# Patient Record
Sex: MALE | Race: ASIAN | NOT HISPANIC OR LATINO | Employment: OTHER | URBAN - METROPOLITAN AREA
[De-identification: names, ages, dates, MRNs, and addresses within clinical notes are randomized per-mention and may not be internally consistent; named-entity substitution may affect disease eponyms.]

---

## 2017-01-04 ENCOUNTER — TRANSFERRED RECORDS (OUTPATIENT)
Dept: HEALTH INFORMATION MANAGEMENT | Facility: CLINIC | Age: 57
End: 2017-01-04

## 2017-01-18 ENCOUNTER — TRANSFERRED RECORDS (OUTPATIENT)
Dept: HEALTH INFORMATION MANAGEMENT | Facility: CLINIC | Age: 57
End: 2017-01-18

## 2017-02-01 ENCOUNTER — TRANSFERRED RECORDS (OUTPATIENT)
Dept: HEALTH INFORMATION MANAGEMENT | Facility: CLINIC | Age: 57
End: 2017-02-01

## 2017-02-09 DIAGNOSIS — E11.9 TYPE 2 DIABETES MELLITUS WITHOUT COMPLICATION, WITHOUT LONG-TERM CURRENT USE OF INSULIN (H): Primary | ICD-10-CM

## 2017-02-09 DIAGNOSIS — E11.9 TYPE 2 DIABETES MELLITUS WITHOUT COMPLICATION (H): ICD-10-CM

## 2017-02-15 ENCOUNTER — TRANSFERRED RECORDS (OUTPATIENT)
Dept: HEALTH INFORMATION MANAGEMENT | Facility: CLINIC | Age: 57
End: 2017-02-15

## 2017-02-17 ENCOUNTER — TRANSFERRED RECORDS (OUTPATIENT)
Dept: HEALTH INFORMATION MANAGEMENT | Facility: CLINIC | Age: 57
End: 2017-02-17

## 2017-02-21 ENCOUNTER — TRANSFERRED RECORDS (OUTPATIENT)
Dept: HEALTH INFORMATION MANAGEMENT | Facility: CLINIC | Age: 57
End: 2017-02-21

## 2017-02-27 ENCOUNTER — RECORDS - HEALTHEAST (OUTPATIENT)
Dept: ADMINISTRATIVE | Facility: OTHER | Age: 57
End: 2017-02-27

## 2017-02-27 ENCOUNTER — OFFICE VISIT (OUTPATIENT)
Dept: FAMILY MEDICINE | Facility: CLINIC | Age: 57
End: 2017-02-27

## 2017-02-27 DIAGNOSIS — J06.9 UPPER RESPIRATORY TRACT INFECTION, UNSPECIFIED TYPE: Primary | ICD-10-CM

## 2017-02-27 DIAGNOSIS — E11.65 TYPE 2 DIABETES MELLITUS WITH HYPERGLYCEMIA, WITHOUT LONG-TERM CURRENT USE OF INSULIN (H): ICD-10-CM

## 2017-02-27 DIAGNOSIS — M1A.0790 IDIOPATHIC CHRONIC GOUT OF ANKLE WITHOUT TOPHUS, UNSPECIFIED LATERALITY: ICD-10-CM

## 2017-02-27 DIAGNOSIS — Z11.59 NEED FOR HEPATITIS C SCREENING TEST: ICD-10-CM

## 2017-02-27 DIAGNOSIS — B19.20 UNSPECIFIED VIRAL HEPATITIS C WITHOUT HEPATIC COMA: ICD-10-CM

## 2017-02-27 DIAGNOSIS — M1A.9XX0 CHRONIC GOUT WITHOUT TOPHUS, UNSPECIFIED CAUSE, UNSPECIFIED SITE: ICD-10-CM

## 2017-02-27 DIAGNOSIS — M22.2X1 PATELLOFEMORAL SYNDROME OF BOTH KNEES: ICD-10-CM

## 2017-02-27 DIAGNOSIS — M10.9 GOUT, UNSPECIFIED: ICD-10-CM

## 2017-02-27 DIAGNOSIS — M22.2X2 PATELLOFEMORAL SYNDROME OF BOTH KNEES: ICD-10-CM

## 2017-02-27 DIAGNOSIS — E11.65 TYPE 2 DIABETES MELLITUS WITH HYPERGLYCEMIA, WITHOUT LONG-TERM CURRENT USE OF INSULIN (H): Primary | ICD-10-CM

## 2017-02-27 DIAGNOSIS — B19.20 HEPATITIS C VIRUS INFECTION WITHOUT HEPATIC COMA, UNSPECIFIED CHRONICITY: ICD-10-CM

## 2017-02-27 LAB
FLUAV AG UPPER RESP QL IA.RAPID: NEGATIVE
FLUBV AG UPPER RESP QL IA.RAPID: NEGATIVE
HBA1C MFR BLD: 10.2 % (ref 4.1–5.7)
URATE SERPL-MCNC: 8 MG/DL (ref 3–8)

## 2017-02-27 RX ORDER — OXYMETAZOLINE HYDROCHLORIDE 0.05 G/100ML
2-3 SPRAY NASAL 2 TIMES DAILY PRN
Qty: 1 BOTTLE | Refills: 0 | Status: SHIPPED | OUTPATIENT
Start: 2017-02-27 | End: 2017-03-04

## 2017-02-27 RX ORDER — CODEINE PHOSPHATE AND GUAIFENESIN 10; 100 MG/5ML; MG/5ML
1 SOLUTION ORAL EVERY 4 HOURS PRN
Qty: 236 ML | Refills: 0 | Status: SHIPPED | OUTPATIENT
Start: 2017-02-27 | End: 2017-04-06

## 2017-02-27 RX ORDER — FEBUXOSTAT 40 MG/1
TABLET, FILM COATED ORAL
Qty: 31 TABLET | Refills: 11 | Status: SHIPPED | OUTPATIENT
Start: 2017-02-27 | End: 2018-03-05

## 2017-02-27 RX ORDER — ISOPROPYL ALCOHOL 0.7 ML/1
1 SWAB TOPICAL 2 TIMES DAILY
Qty: 60 EACH | Refills: 11 | Status: SHIPPED | OUTPATIENT
Start: 2017-02-27 | End: 2019-02-14

## 2017-02-27 NOTE — NURSING NOTE
name: Mello Dykes  Language: Gibraltarian  Agency: [employer]  Phone number: [phone]

## 2017-02-27 NOTE — LETTER
March 7, 2017       [facility] [address]  [address]  [place] [address]        Dear Sa,    I hope you are feeling better.  You did not have Influenza.  As I wrote in a separate message you have been exposed to Hepatitis C in the past but you do not have active infection in your system. You are also protected against (immune to) the other Hepatitis viruses, A and B - this is good.       Your liver is working normally.  Your uric acid (gout test) is in the normal range - good!     The main abnormality is that your diabetes is not well controlled judging by the average sugar A1C level of 10.2.  This should be under 8.0.  It is telling us that on average your blood sugars are around 200 which is too high.  Can you work on getting your sugars down?  This is a combination of being more careful with your diet and of not missing any of your diabetes medications.  If the sugars will not come down better, we will have to recommend insulin or another injection.  Let's check this again at least in 3 months if not sooner.  Take care!    Please see below for your test results.    Resulted Orders   Uric Acid ([facility])   Result Value Ref Range    Uric Acid 8.0 3.0 - 8.0 mg/dL    Narrative    Test performed by:  [facility] [address] [place] [address]   Hep[facility] C Antibody ([facility])   Result Value Ref Range    Hepatitis C Antibody Screen Positive (A) Negative    Narrative    Test performed by:  [facility] [address] [place] [address]   Hemoglobin A1c ([facility])   Result Value Ref Range    Hemoglobin A1C 10.2 (H) 4.1 - 5.7 %   Influenza A/B Antigen ([facility])   Result Value Ref Range    Influenza A NEGATIVE Negative    Influenza B NEGATIVE Negative      Comment:      A negative result does not exclude influenza infection.   A positive result does not rule out co-infections with other pathogens.       Hepatitis B Surface Ag ([facility])   Result Value Ref Range    Hepatitis B  Surface Antigen Negative Negative    Narrative    Test performed by:  [facility] [address] [place] [address]   Hepatitis A Immune Status ([facility])   Result Value Ref Range    Hepatitis A Antibody, Total Positive Positive    Narrative    Test performed by:  [facility] [address] [place] [address]   Hepatitis B Surface Ab ([facility])   Result Value Ref Range    Hepatitis B Surface Antibody Positive (A) Negative    Narrative    Test performed by:  [facility] [address] [place] [address]   Hepatic Profile ([facility])   Result Value Ref Range    Bilirubin Total 0.7 0.0 - 1.0 mg/dL    Bilirubin Direct 0.2 <=0.5 mg/dL    Protein Total 7.8 6.0 - 8.0 g/dL    Albumin 3.8 3.5 - 5.0 g/dL    Alkaline Phosphatase 158 (H) 45 - 120 U/L    AST (SGOT) 25 0 - 40 U/L    ALT (SGPT) 44 0 - 45 U/L    Narrative    Test performed by:  [facility] [address] [place] [address]       If you have any questions, please call the clinic to make an appointment.    Sincerely,    Jason Siddiqui MD

## 2017-02-27 NOTE — MR AVS SNAPSHOT
After Visit Summary   2/27/2017    Sa KENIA Mackey    MRN: [id number]           Patient Information     Date Of Birth          1960        Visit Information        Provider Department      2/27/2017 11:00 AM Jason Siddiqui MD [facility]        Today's Diagnoses     Upper respiratory tract infection, unspecified type    -  1    Type 2 diabetes mellitus with hyperglycemia, without long-term current use of insulin (H)        Idiopathic chronic gout of ankle without tophus, unspecified laterality        Need for hepatitis C screening test        Patellofemoral syndrome of both knees        Gout          Care Instructions    We'll see you again every 3 months.    If the knee pain is not getting better, either consider shots or to go see a specialist.          Follow-ups after your visit        Additional Services     PHYSICAL THERAPY REFERRAL       PT REFERRAL  Sa KENIA Mackey  1960  Phone #: [phone] (home)    needed: Yes  Language: Liam    PT/OT  Facility:   Other: Close to home address    History: Patellofemoral Syndrome both knees; Chronic gout    Precautions/Contraindications: None    Imaging Studies: MRI    Surgical Procedure/Test Results: None    Treatment Goals:   Increase: Strength and ROM  Decrease: Pain    Prognosis: good    Visits: Up to 12    Evaluate: Evaluate and treat                  Follow-up notes from your care team     Return in about 3 months (around 5/27/2017), or if symptoms worsen or fail to improve.      Your next 10 appointments already scheduled     Mar 01, 2017  2:00 PM CST   RETURN EXTENDED with [facility] , [facility] THERAPIST   [facility] ([facility])    [address] [place] [address]   [phone]              Future tests that were ordered for you today     Open Future Orders        Priority Expected Expires Ordered    PHYSICAL THERAPY REFERRAL Routine  2/27/2018 2/27/2017            Who to contact     Please call your clinic  at [phone] to:    Ask questions about your health    Make or cancel appointments    Discuss your medicines    Learn about your test results    Speak to your doctor   If you have compliments or concerns about an experience at your clinic, or if you wish to file a complaint, please contact [facility] Patient Relations at [phone] or email us at [email or web address]         Additional Information About Your Visit        [email or web address] Information     [employer] gives you secure access to your electronic health record. If you see a primary care provider, you can also send messages to your care team and make appointments. If you have questions, please call your primary care clinic.  If you do not have a primary care provider, please call [phone] and they will assist you.      [employer] is an electronic gateway that provides easy, online access to your medical records. With [employer], you can request a clinic appointment, read your test results, renew a prescription or communicate with your care team.     To access your existing account, please contact your [facility] or call [phone] for assistance.        Care EveryWhere ID     This is your Care EveryWhere ID. This could be used by other organizations to access your [facility] medical records  [id number]         Blood Pressure from Last 3 Encounters:   11/04/16 137/85   10/20/16 (!) 154/93   09/22/16 (!) 151/95    Weight from Last 3 Encounters:   11/04/16 225 lb 12.8 oz (102.4 kg)   10/20/16 227 lb (103 kg)   09/22/16 225 lb 9.6 oz (102.3 kg)              We Performed the Following     Hemoglobin A1c ([facility])     Hepatitis C Antibody ([facility])     Influenza A/B Antigen ([facility])     Uric Acid ([facility])          Today's Medication Changes          These changes are accurate as of: 2/27/17 12:37 PM.  If you have any questions, ask your nurse or doctor.               Start taking these medicines.         Dose/Directions    guaiFENesin-codeine 100-10 MG/5ML Soln solution   Commonly known as:  ROBITUSSIN AC   Used for:  Upper respiratory tract infection, unspecified type   Started by:  Jason Siddiqui MD        Dose:  1 tsp.   Take 5 mLs by mouth every 4 hours as needed for cough   Quantity:  236 mL   Refills:  0       oxymetazoline 0.05 % spray   Commonly known as:  AFRIN NASAL SPRAY   Used for:  Upper respiratory tract infection, unspecified type   Started by:  Jason Siddiqui MD        Dose:  2-3 spray   Spray 2-3 sprays into both nostrils 2 times daily as needed for congestion   Quantity:  1 Bottle   Refills:  0         Stop taking these medicines if you haven't already. Please contact your care team if you have questions.     gentamicin 0.3 % ophthalmic ointment   Commonly known as:  GARAMYCIN   Stopped by:  Jason Siddiqui MD                Where to get your medicines      These medications were sent to [facility] - [place], [place] - [address], [place] [address]     Phone:  [phone]     Alcohol Prep Pad 70 % Pads    blood glucose monitoring lancets    blood glucose monitoring test strip    febuxostat 40 MG Tabs tablet    oxymetazoline 0.05 % spray         Some of these will need a paper prescription and others can be bought over the counter.  Ask your nurse if you have questions.     Bring a paper prescription for each of these medications     guaiFENesin-codeine 100-10 MG/5ML Soln solution                Primary Care Provider Office Phone # Fax #    Jason Siddiqui [phone]       [address] [address]        Thank you!     Thank you for choosing [facility]  for your care. Our goal is always to provide you with excellent care. Hearing back from our patients is one way we can continue to improve our services. Please take a few minutes to complete the written survey that you may receive in the mail after your visit  with us. Thank you!             Your Updated Medication List - Protect others around you: Learn how to safely use, store and throw away your medicines at www.disposemymeds.org.          This list is accurate as of: 2/27/17 12:37 PM.  Always use your most recent med list.                   Brand Name Dispense Instructions for use    acetaminophen 500 MG tablet    TYLENOL    100 tablet    Take 2 tablets (1,000 mg) by mouth every 8 hours as needed for mild pain       Alcohol Prep Pad 70 % Pads     60 each    1 pad 2 times daily       aspirin 81 MG EC tablet     90 tablet    Take 1 tablet (81 mg) by mouth daily       atorvastatin 80 MG tablet    LIPITOR    30 tablet    Take 1 tablet (80 mg) by mouth daily       blood glucose monitoring lancets     1 Box    Use to test blood sugar 2 times daily or as directed.       * blood glucose monitoring test strip    ALEM CONTOUR    1 Box    Use to test blood sugars 2 times daily or as directed.       * blood glucose monitoring test strip    no brand specified    1 Box    Use up to 3 times a day       carboxymethylcellulose 0.5 % Soln ophthalmic solution    REFRESH PLUS    1 Bottle    Place 1 drop into both eyes daily as needed for dry eyes       colchicine 0.6 MG tablet    COLCRYS    60 tablet    Take 1 tablet (0.6 mg) by mouth 2 times daily       febuxostat 40 MG Tabs tablet    ULORIC    31 tablet    Take 1 tablet daily       fluocinonide 0.05 % ointment    LIDEX    30 g    Apply sparingly to affected area twice daily for 14 days.  Do not apply to face.       glipiZIDE 10 MG 24 hr tablet    glipiZIDE XL    31 tablet    Take 1 tablet (10 mg) by mouth daily       guaiFENesin-codeine 100-10 MG/5ML Soln solution    ROBITUSSIN AC    236 mL    Take 5 mLs by mouth every 4 hours as needed for cough       lisinopril 10 MG tablet    PRINIVIL/ZESTRIL    30 tablet    Take 1 tablet (10 mg) by mouth daily       metFORMIN 500 MG 24 hr tablet    GLUCOPHAGE-XR    124 tablet    Take 2 tablets  (1,000 mg) by mouth 2 times daily (with meals)       order for DME      (Continuous Positive Airway Pressure) nightly       oxymetazoline 0.05 % spray    AFRIN NASAL SPRAY    1 Bottle    Spray 2-3 sprays into both nostrils 2 times daily as needed for congestion       polyethylene glycol Packet    MIRALAX    238 g    Miralax powder 8.3 oz bottle (238 gm) as directed       sitagliptin 100 MG tablet    JANUVIA    90 tablet    Take 1 tablet (100 mg) by mouth daily       VASELINE CONSTANT CARE Oint     1 Tube    Externally apply topically 3 times daily       vitamin D 2000 UNITS tablet     30 tablet    Take 2,000 Units by mouth daily       * Notice:  This list has 2 medication(s) that are the same as other medications prescribed for you. Read the directions carefully, and ask your doctor or other care provider to review them with you.

## 2017-02-27 NOTE — PATIENT INSTRUCTIONS
We'll see you again every 3 months.    If the knee pain is not getting better, either consider shots or to go see a specialist.        2/28/2017 8:02 AM: [facility][phone]). They will contact patient to schedule.  Abby Cline     Xavier are scheduled for Physical Therapy at:  [facility]  [address] [place] [address]  [phone]  Date:  Tuesday April 11, 2017  Time:  Arrival 2:45 PM for 3:00 Therapy.  Liam  has been requested.  Given to patient.  Cecilia Amaral  4/6/17

## 2017-02-28 ENCOUNTER — TELEPHONE (OUTPATIENT)
Dept: FAMILY MEDICINE | Facility: CLINIC | Age: 57
End: 2017-02-28

## 2017-02-28 ENCOUNTER — AMBULATORY - HEALTHEAST (OUTPATIENT)
Dept: ADMINISTRATIVE | Facility: REHABILITATION | Age: 57
End: 2017-02-28

## 2017-02-28 DIAGNOSIS — M22.2X1 PATELLOFEMORAL SYNDROME OF BOTH KNEES: ICD-10-CM

## 2017-02-28 DIAGNOSIS — M22.2X2 PATELLOFEMORAL SYNDROME OF BOTH KNEES: ICD-10-CM

## 2017-02-28 DIAGNOSIS — B19.20 UNSPECIFIED VIRAL HEPATITIS C WITHOUT HEPATIC COMA: Primary | ICD-10-CM

## 2017-02-28 LAB
ALBUMIN SERPL BCP-MCNC: 3.8 G/DL (ref 3.5–5)
ALP SERPL-CCNC: 158 U/L (ref 45–120)
ALT SERPL W/O P-5'-P-CCNC: 44 U/L (ref 0–45)
AST SERPL-CCNC: 25 U/L (ref 0–40)
BILIRUB DIRECT SERPL-MCNC: 0.2 MG/DL (ref 0–0.5)
BILIRUB SERPL-MCNC: 0.7 MG/DL (ref 0–1)
HCV AB SER QL: POSITIVE
PROT SERPL-MCNC: 7.8 G/DL (ref 6–8)

## 2017-02-28 NOTE — TELEPHONE ENCOUNTER
[facility] phone call message- general phone call:    Reason for call: Just want to update about a prior authorization for Dr. Siddiqui.     Medication Uloric has been approve. Approval date 2/23/17 until 2/27/18. Case Id # [id number]    She is calling from [employer]. No call back needed.     Return call needed: No    OK to leave a message on voice mail? N/A     Primary language: Emirati      needed? Yes    Call taken on February 28, 2017 at 3:11 PM by Neftaly Milan

## 2017-02-28 NOTE — PROGRESS NOTES
SUBJECTIVE:  Sa Mackey is a 56-year-old [place] male who is well-known to me.  He attends today with several concerns:   1.  He received a letter from his insurance company indicating that Uloric wasn't a covered treatment.  He has taken this now for several years and was previously intolerant of allopurinol.  He has severe chronic tophaceous gout and needs to remain on Uloric.  We've already processed a prior authorization request and are waiting to hear from the insurance company that this has been approved.   2.  He had a runny nose and cough for 3 days.  He feels moderately unwell.  He hasn't noticed a fever.  He denies sore throat or ear pain.  He has no sick contacts.   3.  He complains of pain in both knees when he both walks up and down a staircase.  He denies injury.  He says that the symptoms have been present for about 3 weeks and he does not know what triggered them.  He had knee pain in the past and had cortisone shots, which he found very painful and he is reluctant to repeat.   4.  In addition, he reports that he is dissatisfied with his current pharmacy and is considering switching his prescriptions to an alternative pharmacy and wants to get my permission.   REVIEW OF SYSTEMS:  He is notably sleepy during the encounter; he has diagnosis of sleep apnea.  He tells me he uses his CPAP.   PSYCHIATRIC:   He has chronic depressive symptoms and follows with CBT.  We didn't explore this at today's visit.   ENDOCRINE:  He has diabetes and believes it is reasonably controlled, although he needs to get refill of supplies so he can track blood sugars.   OBJECTIVE:   Vital signs are noted.  He is obese.  ENT exam reveals nasal congestion.  Lungs are clear to auscultation.  I obtained a rapid influenza swab.  Heart sounds are normal.  Exam of both knees reveals no warmth, erythema, or significant effusion.  The collateral ligaments appear intact.  Lachman tests are negative bilaterally.  His medication list was  reviewed and updated.   ASSESSMENT:   1.  Influenza-like illness.   2.  Chronic tophaceous gout; needs Uloric for control.   3.  Type II diabetes poorly controlled.   4.  Patellofemoral syndrome.   5.  Sleep apnea, with apparent somnolence.   PLAN:  I offered him cortisone shots into both knees and he declined.  I talked about other treatment strategies and explained about the condition of patellofemoral syndrome.  He agreed to try physical therapy.  I've advised him to return for cortisone shots if the physical therapy is ineffective.  Alternatively, we can refer him for surgical evaluation, which he preferred to avoid.  I recommended we recheck his labs today and I'll follow up with results.  I've given him oxymetazoline for nasal congestion, as well as an antitussive agent.  I've encouraged him to continue losing weight, from which he will benefit on several counts, including sleep apnea, diabetes, and bilateral knee pain.   Total visit time today, through an , was 40 minutes and all of this was face-to-face MD time. Over 50% was spent in counseling and coordination of care.  In addition, I called his pharmacy to verify which meds he was taking, and explaining about the prior authorization of Uloric.

## 2017-03-01 ENCOUNTER — TRANSFERRED RECORDS (OUTPATIENT)
Dept: HEALTH INFORMATION MANAGEMENT | Facility: CLINIC | Age: 57
End: 2017-03-01

## 2017-03-01 DIAGNOSIS — B19.20 UNSPECIFIED VIRAL HEPATITIS C WITHOUT HEPATIC COMA: ICD-10-CM

## 2017-03-01 LAB
HAV IGG SER QL IA: POSITIVE
HBV SURFACE AB SER-ACNC: POSITIVE M[IU]/ML
HBV SURFACE AG SERPL QL IA: NEGATIVE

## 2017-03-03 LAB — HCV RNA QUANT IU/ML: NORMAL IU/ML

## 2017-03-15 ENCOUNTER — TRANSFERRED RECORDS (OUTPATIENT)
Dept: HEALTH INFORMATION MANAGEMENT | Facility: CLINIC | Age: 57
End: 2017-03-15

## 2017-03-22 ENCOUNTER — TRANSFERRED RECORDS (OUTPATIENT)
Dept: HEALTH INFORMATION MANAGEMENT | Facility: CLINIC | Age: 57
End: 2017-03-22

## 2017-04-05 ENCOUNTER — TRANSFERRED RECORDS (OUTPATIENT)
Dept: HEALTH INFORMATION MANAGEMENT | Facility: CLINIC | Age: 57
End: 2017-04-05

## 2017-04-06 ENCOUNTER — OFFICE VISIT (OUTPATIENT)
Dept: FAMILY MEDICINE | Facility: CLINIC | Age: 57
End: 2017-04-06

## 2017-04-06 ENCOUNTER — RECORDS - HEALTHEAST (OUTPATIENT)
Dept: ADMINISTRATIVE | Facility: OTHER | Age: 57
End: 2017-04-06

## 2017-04-06 VITALS
HEART RATE: 64 BPM | DIASTOLIC BLOOD PRESSURE: 86 MMHG | WEIGHT: 231.6 LBS | OXYGEN SATURATION: 96 % | BODY MASS INDEX: 38.84 KG/M2 | SYSTOLIC BLOOD PRESSURE: 135 MMHG | TEMPERATURE: 98.2 F

## 2017-04-06 DIAGNOSIS — L30.9 DERMATITIS: ICD-10-CM

## 2017-04-06 DIAGNOSIS — M25.561 ARTHRALGIA OF BOTH LOWER LEGS: ICD-10-CM

## 2017-04-06 DIAGNOSIS — E11.65 TYPE 2 DIABETES MELLITUS WITH HYPERGLYCEMIA, WITHOUT LONG-TERM CURRENT USE OF INSULIN (H): ICD-10-CM

## 2017-04-06 DIAGNOSIS — Z12.11 ENCOUNTER FOR SCREENING COLONOSCOPY: Primary | ICD-10-CM

## 2017-04-06 DIAGNOSIS — M25.562 ARTHRALGIA OF BOTH LOWER LEGS: ICD-10-CM

## 2017-04-06 DIAGNOSIS — M17.12 PRIMARY LOCALIZED OSTEOARTHROSIS, LOWER LEG, LEFT: ICD-10-CM

## 2017-04-06 DIAGNOSIS — F43.10 POSTTRAUMATIC STRESS DISORDER: ICD-10-CM

## 2017-04-06 RX ORDER — FLUOCINONIDE 0.5 MG/G
OINTMENT TOPICAL
Qty: 60 G | Refills: 3 | Status: SHIPPED | OUTPATIENT
Start: 2017-04-06 | End: 2017-08-02

## 2017-04-06 RX ORDER — TRIAMCINOLONE ACETONIDE 40 MG/ML
40 INJECTION, SUSPENSION INTRA-ARTICULAR; INTRAMUSCULAR ONCE
Qty: 1 ML | Refills: 0 | OUTPATIENT
Start: 2017-04-06 | End: 2017-04-06

## 2017-04-06 NOTE — MR AVS SNAPSHOT
After Visit Summary   4/6/2017    Sa KENIA Mackey    MRN: [id number]           Patient Information     Date Of Birth          1960        Visit Information        Provider Department      4/6/2017 9:00 AM Jason Siddiqui MD [facility]        Today's Diagnoses     Encounter for screening colonoscopy    -  1    Dermatitis        Primary localized osteoarthrosis, lower leg, left        Arthralgia of both lower legs        Posttraumatic stress disorder        Type 2 diabetes mellitus with hyperglycemia, without long-term current use of insulin (H)          Care Instructions    Please come 15 minutes early to your next appointment in May and we will check labs first.    Concerning your dry and cracked skin of your fingers:  Please moisturize them twice daily - I have sent a prescription for Eucerin but insurance may not cover this - if not, please buy this or Vaseline and use twice daily  Use the lidex (fluocinonide) ointment while the hands are cracked and sore - stop it when they are better  Purchase a HYPO-ALLERGENIC, FRAGRANCE FREE soap - DOVE brand has several options.  Only use this soap  Wear LATEX-FREE kitchen gloves when you are washing dishes   If after following all these steps, the dermatitis comes back I will refer you to a dermatologist.    I'll see you again in around 6 weeks - take care!        Follow-ups after your visit        Follow-up notes from your care team     Return in about 6 weeks (around 5/18/2017) for Lab Work, Routine Visit.      Your next 10 appointments already scheduled     Apr 12, 2017  2:00 PM CDT   RETURN EXTENDED with [facility] , [facility] THERAPIST   [facility] ([facility])    [address] [place] [id number]   [phone]              Future tests that were ordered for you today     Open Future Orders        Priority Expected Expires Ordered    Fecal Occult Blood - FIT, iFOB ([facility]) Routine  4/6/2018 4/6/2017            Who to contact      Please call your clinic at [phone] to:    Ask questions about your health    Make or cancel appointments    Discuss your medicines    Learn about your test results    Speak to your doctor   If you have compliments or concerns about an experience at your clinic, or if you wish to file a complaint, please contact [facility] Patient Relations at [phone] or email us at [email or web address]         Additional Information About Your Visit        [employer] Information     [employer] gives you secure access to your electronic health record. If you see a primary care provider, you can also send messages to your care team and make appointments. If you have questions, please call your primary care clinic.  If you do not have a primary care provider, please call [phone] and they will assist you.      [employer] is an electronic gateway that provides easy, online access to your medical records. With [employer], you can request a clinic appointment, read your test results, renew a prescription or communicate with your care team.     To access your existing account, please contact your [facility] or call [phone] for assistance.        Care EveryWhere ID     This is your Care EveryWhere ID. This could be used by other organizations to access your [place] medical records  [id number]        Your Vitals Were     Pulse Temperature Pulse Oximetry BMI (Body Mass Index)          64 98.2  F (36.8  C) (Oral) 96% 38.84 kg/m2         Blood Pressure from Last 3 Encounters:   04/06/17 135/86   11/04/16 137/85   10/20/16 (!) 154/93    Weight from Last 3 Encounters:   04/06/17 231 lb 9.6 oz (105.1 kg)   11/04/16 225 lb 12.8 oz (102.4 kg)   10/20/16 227 lb (103 kg)              We Performed the Following     Kenalog 40 MG  []          Today's Medication Changes          These changes are accurate as of: 4/6/17  9:58 AM.  If you have any questions, ask your nurse  or doctor.               Start taking these medicines.        Dose/Directions    triamcinolone acetonide 40 MG/ML injection   Commonly known as:  KENALOG   Used for:  Primary localized osteoarthrosis, lower leg, left   Started by:  Jason Siddiqui MD        Dose:  40 mg   1 mL (40 mg) by INTRA-ARTICULAR route once for 1 dose   Quantity:  1 mL   Refills:  0         These medicines have changed or have updated prescriptions.        Dose/Directions    fluocinonide 0.05 % ointment   Commonly known as:  LIDEX   This may have changed:  additional instructions   Used for:  Dermatitis   Changed by:  Jason Siddiqui MD        Apply sparingly to dry cracked skin on hands twice daily   Quantity:  60 g   Refills:  3         Stop taking these medicines if you haven't already. Please contact your care team if you have questions.     guaiFENesin-codeine 100-10 MG/5ML Soln solution   Commonly known as:  ROBITUSSIN AC   Stopped by:  Jason Siddiqui MD                Where to get your medicines      [facility] [place], [place] - [address], [place] [address]     Phone:  [phone]     fluocinonide 0.05 % ointment         Some of these will need a paper prescription and others can be bought over the counter.  Ask your nurse if you have questions.     You don't need a prescription for these medications     triamcinolone acetonide 40 MG/ML injection                Primary Care Provider Office Phone # Fax #    Jason Siddiqui [phone]       [address] [address]        Thank you!     Thank you for choosing [facility]  for your care. Our goal is always to provide you with excellent care. Hearing back from our patients is one way we can continue to improve our services. Please take a few minutes to complete the written survey that you may receive in the mail after your visit with us. Thank you!             Your Updated  Medication List - Protect others around you: Learn how to safely use, store and throw away your medicines at www.disposemymeds.org.          This list is accurate as of: 4/6/17  9:58 AM.  Always use your most recent med list.                   Brand Name Dispense Instructions for use    acetaminophen 500 MG tablet    TYLENOL    100 tablet    Take 2 tablets (1,000 mg) by mouth every 8 hours as needed for mild pain       Alcohol Prep Pad 70 % Pads     60 each    1 pad 2 times daily       aspirin 81 MG EC tablet     90 tablet    Take 1 tablet (81 mg) by mouth daily       atorvastatin 80 MG tablet    LIPITOR    30 tablet    Take 1 tablet (80 mg) by mouth daily       blood glucose monitoring lancets     1 Box    Use to test blood sugar 2 times daily or as directed.       * blood glucose monitoring test strip    ALEM CONTOUR    1 Box    Use to test blood sugars 2 times daily or as directed.       * blood glucose monitoring test strip    no brand specified    1 Box    Use up to 3 times a day       carboxymethylcellulose 0.5 % Soln ophthalmic solution    REFRESH PLUS    1 Bottle    Place 1 drop into both eyes daily as needed for dry eyes       colchicine 0.6 MG tablet    COLCRYS    60 tablet    Take 1 tablet (0.6 mg) by mouth 2 times daily       febuxostat 40 MG Tabs tablet    ULORIC    31 tablet    Take 1 tablet daily       fluocinonide 0.05 % ointment    LIDEX    60 g    Apply sparingly to dry cracked skin on hands twice daily       glipiZIDE 10 MG 24 hr tablet    glipiZIDE XL    31 tablet    Take 1 tablet (10 mg) by mouth daily       lisinopril 10 MG tablet    PRINIVIL/ZESTRIL    30 tablet    Take 1 tablet (10 mg) by mouth daily       metFORMIN 500 MG 24 hr tablet    GLUCOPHAGE-XR    124 tablet    Take 2 tablets (1,000 mg) by mouth 2 times daily (with meals)       order for DME      (Continuous Positive Airway Pressure) nightly       polyethylene glycol Packet    MIRALAX    238 g    Miralax powder 8.3 oz bottle (238 gm)  as directed       sitagliptin 100 MG tablet    JANUVIA    90 tablet    Take 1 tablet (100 mg) by mouth daily       triamcinolone acetonide 40 MG/ML injection    KENALOG    1 mL    1 mL (40 mg) by INTRA-ARTICULAR route once for 1 dose       VASELINE CONSTANT CARE Oint     1 Tube    Externally apply topically 3 times daily       vitamin D 2000 UNITS tablet     30 tablet    Take 2,000 Units by mouth daily       * Notice:  This list has 2 medication(s) that are the same as other medications prescribed for you. Read the directions carefully, and ask your doctor or other care provider to review them with you.

## 2017-04-06 NOTE — NURSING NOTE
name: Mariola Rizvi  Language: Prydeinig  Agency: [facility]  Phone number: [phone]

## 2017-04-06 NOTE — PROGRESS NOTES
SUBJECTIVE:  Sa Peña is a 56-year-old male who is well-known to me.  He attends today particularly for two reasons:   1.  He continues to have dry skin of the hands; particularly, the skin of the right hand is cracked, which is quite tender and painful.  He said that the lotions that I gave him haven't worked, although through an  it was clarified that they help clear up the symptom in the short-term, but that it returns.  He felt that washing with soap aggravated the condition.  He said that if he did some housework and then washed his hands with soap, it seemed that it made the skin dry and it could become cracked.  He didn't believe that he has known allergies to other skin products.   2.  He continued to complain of bilateral knee pain, left knee greater than right.  He never heard from physical therapy after the last visit, but he was interested in going.  He is very reluctant to consider having injections into the knees, although I discussed the fact that I would use a local anesthetic.  After prolonged discussion today, he agreed to have the worst knee injected.  On review of the records, I found an MRI of the right knee from 2008 that demonstrated chondromalacia patella, which was my clinical diagnosis at last visit.  An MRI of the left knee was less remarkable in this regard in 2009 and there was no obvious meniscal tear noted.   He reports that his diabetes is well-controlled.  I'm mildly skeptical about this because he had an elevated A1C at last visit and he thought that his blood sugars were normal at that time.  He told me that a fasting blood sugar every day is around 100.  He said that he takes all of his medications.     Concerning his mental health, he said that he still has problems with this.  It comes and goes.  He described a symptom where he feels the walls and ceiling are closing in on him.  He wasn't sure whether this is a physical or mental symptom.  He gave me permission to talk  with his therapist about the possibility that this was a PTSD-related symptom.  He denied headache or other visual changes.  He had ringing in his ears, but he thought his hearing was okay.     OBJECTIVE:   Vital signs were noted.  He is obese.  His PHQ-9 remained positive.  Despite this, he made good eye contact and smiled appropriately.  As noted above, I located and reviewed the MRI reports of his knees.  I reviewed the most recent labs with him, including the uncontrolled A1C.  His uric acid was good.  I called his pharmacy and verified that he appears to be taking all his medications.   On exam, he had cracked and dry skin at the knuckles particularly of the right hand and dry skin at the knuckles of the left hand.  He denied involvement of the feet.  On exam of the left knee, there appeared to be a small effusion in the joint, but there was no redness or warmth.  With his consent, one cc of Kenalog (40 mg per cc) mixed with four cc of bupivacaine was injected without complication into the joint.     ASSESSMENT:   1.  Chronic dermatitis of the hands.   2.  Bilateral knee pain, clinically patellofemoral syndrome.   3.  Diabetes type II, previously uncontrolled.   4.  PTSD.   5.  Uncertain symptom, physical vs mental.     PLAN:  I advised him that he should try to avoid possible allergenic products and I wrote some notes which he should discuss with his son.  He also gave me permission to talk with his son about any of his health conditions.  I've advised that he should use hypoallergenic soap and use latex-free gloves while he does housework.  I've refilled the moderate potency steroid which he previously had and also encouraged him to use a daily moisturizer.  If despite these efforts the dermatitis persists or recurs, I would refer him to Dermatology.  Concerning the knees, if he gets good relief from the left knee injection, I've encouraged him to schedule a right knee injection.  He was sure that he didn't  want to have surgery.  We'll also refer him again to physical therapy in the interim.  We agreed that he'll return in six weeks' time to recheck labs and to review the progress both of the dermatitis and the bilateral knee pain.  He was satisfied with this plan.   Total visit time today. including a knee injection. was 40 minutes and all of this was face-to-face MD time.  Over 50% was spent in counseling and coordination of care, as outlined above.

## 2017-04-06 NOTE — PATIENT INSTRUCTIONS
Please come 15 minutes early to your next appointment in May and we will check labs first.    Concerning your dry and cracked skin of your fingers:  Please moisturize them twice daily - I have sent a prescription for Eucerin but insurance may not cover this - if not, please buy this or Vaseline and use twice daily  Use the lidex (fluocinonide) ointment while the hands are cracked and sore - stop it when they are better  Purchase a HYPO-ALLERGENIC, FRAGRANCE FREE soap - DOVE brand has several options.  Only use this soap  Wear LATEX-FREE kitchen gloves when you are washing dishes   If after following all these steps, the dermatitis comes back I will refer you to a dermatologist.    I'll see you again in around 6 weeks - take care!

## 2017-04-07 ASSESSMENT — PATIENT HEALTH QUESTIONNAIRE - PHQ9: SUM OF ALL RESPONSES TO PHQ QUESTIONS 1-9: 7

## 2017-04-11 ENCOUNTER — OFFICE VISIT - HEALTHEAST (OUTPATIENT)
Dept: PHYSICAL THERAPY | Facility: REHABILITATION | Age: 57
End: 2017-04-11

## 2017-04-11 DIAGNOSIS — R29.898 WEAKNESS OF BOTH HIPS: ICD-10-CM

## 2017-04-11 DIAGNOSIS — M22.2X1 PATELLOFEMORAL STRESS SYNDROME OF BOTH KNEES: ICD-10-CM

## 2017-04-11 DIAGNOSIS — M25.562 ACUTE BILATERAL KNEE PAIN: ICD-10-CM

## 2017-04-11 DIAGNOSIS — M22.2X2 PATELLOFEMORAL STRESS SYNDROME OF BOTH KNEES: ICD-10-CM

## 2017-04-11 DIAGNOSIS — M62.89 QUADRICEP TIGHTNESS: ICD-10-CM

## 2017-04-11 DIAGNOSIS — M25.561 ACUTE BILATERAL KNEE PAIN: ICD-10-CM

## 2017-04-12 ENCOUNTER — TRANSFERRED RECORDS (OUTPATIENT)
Dept: HEALTH INFORMATION MANAGEMENT | Facility: CLINIC | Age: 57
End: 2017-04-12

## 2017-04-18 ENCOUNTER — TRANSFERRED RECORDS (OUTPATIENT)
Dept: HEALTH INFORMATION MANAGEMENT | Facility: CLINIC | Age: 57
End: 2017-04-18

## 2017-04-19 ENCOUNTER — TRANSFERRED RECORDS (OUTPATIENT)
Dept: HEALTH INFORMATION MANAGEMENT | Facility: CLINIC | Age: 57
End: 2017-04-19

## 2017-04-25 ENCOUNTER — OFFICE VISIT - HEALTHEAST (OUTPATIENT)
Dept: PHYSICAL THERAPY | Facility: REHABILITATION | Age: 57
End: 2017-04-25

## 2017-04-25 DIAGNOSIS — M22.2X2 PATELLOFEMORAL STRESS SYNDROME OF BOTH KNEES: ICD-10-CM

## 2017-04-25 DIAGNOSIS — M62.89 QUADRICEP TIGHTNESS: ICD-10-CM

## 2017-04-25 DIAGNOSIS — M25.562 ACUTE BILATERAL KNEE PAIN: ICD-10-CM

## 2017-04-25 DIAGNOSIS — M22.2X1 PATELLOFEMORAL STRESS SYNDROME OF BOTH KNEES: ICD-10-CM

## 2017-04-25 DIAGNOSIS — M25.561 ACUTE BILATERAL KNEE PAIN: ICD-10-CM

## 2017-04-25 DIAGNOSIS — R29.898 WEAKNESS OF BOTH HIPS: ICD-10-CM

## 2017-05-02 ENCOUNTER — OFFICE VISIT - HEALTHEAST (OUTPATIENT)
Dept: PHYSICAL THERAPY | Facility: REHABILITATION | Age: 57
End: 2017-05-02

## 2017-05-02 DIAGNOSIS — M22.2X2 PATELLOFEMORAL STRESS SYNDROME OF BOTH KNEES: ICD-10-CM

## 2017-05-02 DIAGNOSIS — M62.89 QUADRICEP TIGHTNESS: ICD-10-CM

## 2017-05-02 DIAGNOSIS — M25.561 ACUTE BILATERAL KNEE PAIN: ICD-10-CM

## 2017-05-02 DIAGNOSIS — R29.898 WEAKNESS OF BOTH HIPS: ICD-10-CM

## 2017-05-02 DIAGNOSIS — M25.562 ACUTE BILATERAL KNEE PAIN: ICD-10-CM

## 2017-05-02 DIAGNOSIS — M22.2X1 PATELLOFEMORAL STRESS SYNDROME OF BOTH KNEES: ICD-10-CM

## 2017-05-03 ENCOUNTER — TRANSFERRED RECORDS (OUTPATIENT)
Dept: HEALTH INFORMATION MANAGEMENT | Facility: CLINIC | Age: 57
End: 2017-05-03

## 2017-05-09 ENCOUNTER — OFFICE VISIT - HEALTHEAST (OUTPATIENT)
Dept: PHYSICAL THERAPY | Facility: REHABILITATION | Age: 57
End: 2017-05-09

## 2017-05-09 DIAGNOSIS — M22.2X2 PATELLOFEMORAL STRESS SYNDROME OF BOTH KNEES: ICD-10-CM

## 2017-05-09 DIAGNOSIS — M25.562 ACUTE BILATERAL KNEE PAIN: ICD-10-CM

## 2017-05-09 DIAGNOSIS — R29.898 WEAKNESS OF BOTH HIPS: ICD-10-CM

## 2017-05-09 DIAGNOSIS — M62.89 QUADRICEP TIGHTNESS: ICD-10-CM

## 2017-05-09 DIAGNOSIS — M22.2X1 PATELLOFEMORAL STRESS SYNDROME OF BOTH KNEES: ICD-10-CM

## 2017-05-09 DIAGNOSIS — M25.561 ACUTE BILATERAL KNEE PAIN: ICD-10-CM

## 2017-05-10 ENCOUNTER — TELEPHONE (OUTPATIENT)
Dept: FAMILY MEDICINE | Facility: CLINIC | Age: 57
End: 2017-05-10

## 2017-05-17 ENCOUNTER — TRANSFERRED RECORDS (OUTPATIENT)
Dept: HEALTH INFORMATION MANAGEMENT | Facility: CLINIC | Age: 57
End: 2017-05-17

## 2017-06-02 DIAGNOSIS — E11.65 TYPE 2 DIABETES MELLITUS WITH HYPERGLYCEMIA, WITHOUT LONG-TERM CURRENT USE OF INSULIN (H): Primary | ICD-10-CM

## 2017-06-02 RX ORDER — METFORMIN HCL 500 MG
1000 TABLET, EXTENDED RELEASE 24 HR ORAL 2 TIMES DAILY WITH MEALS
Qty: 360 TABLET | Refills: 3 | Status: SHIPPED | OUTPATIENT
Start: 2017-06-02 | End: 2018-03-12

## 2017-06-07 ENCOUNTER — TRANSFERRED RECORDS (OUTPATIENT)
Dept: HEALTH INFORMATION MANAGEMENT | Facility: CLINIC | Age: 57
End: 2017-06-07

## 2017-07-05 ENCOUNTER — TRANSFERRED RECORDS (OUTPATIENT)
Dept: HEALTH INFORMATION MANAGEMENT | Facility: CLINIC | Age: 57
End: 2017-07-05

## 2017-07-26 ENCOUNTER — TRANSFERRED RECORDS (OUTPATIENT)
Dept: HEALTH INFORMATION MANAGEMENT | Facility: CLINIC | Age: 57
End: 2017-07-26

## 2017-08-02 ENCOUNTER — TRANSFERRED RECORDS (OUTPATIENT)
Dept: HEALTH INFORMATION MANAGEMENT | Facility: CLINIC | Age: 57
End: 2017-08-02

## 2017-08-02 DIAGNOSIS — L30.9 DERMATITIS: ICD-10-CM

## 2017-08-02 RX ORDER — FLUOCINONIDE 0.5 MG/G
OINTMENT TOPICAL
Qty: 60 G | Refills: 3 | Status: SHIPPED | OUTPATIENT
Start: 2017-08-02 | End: 2018-02-06

## 2017-08-09 ENCOUNTER — TRANSFERRED RECORDS (OUTPATIENT)
Dept: HEALTH INFORMATION MANAGEMENT | Facility: CLINIC | Age: 57
End: 2017-08-09

## 2017-08-09 DIAGNOSIS — Z12.11 ENCOUNTER FOR SCREENING COLONOSCOPY: ICD-10-CM

## 2017-08-09 LAB — HEMOCCULT STL QL IA: NEGATIVE

## 2017-08-23 ENCOUNTER — TRANSFERRED RECORDS (OUTPATIENT)
Dept: HEALTH INFORMATION MANAGEMENT | Facility: CLINIC | Age: 57
End: 2017-08-23

## 2017-09-13 ENCOUNTER — TRANSFERRED RECORDS (OUTPATIENT)
Dept: HEALTH INFORMATION MANAGEMENT | Facility: CLINIC | Age: 57
End: 2017-09-13

## 2017-09-19 ENCOUNTER — TRANSFERRED RECORDS (OUTPATIENT)
Dept: HEALTH INFORMATION MANAGEMENT | Facility: CLINIC | Age: 57
End: 2017-09-19

## 2017-10-05 DIAGNOSIS — E11.9 TYPE 2 DIABETES MELLITUS WITHOUT COMPLICATION, UNSPECIFIED LONG TERM INSULIN USE STATUS: ICD-10-CM

## 2017-10-05 RX ORDER — GLIPIZIDE 10 MG/1
10 TABLET, FILM COATED, EXTENDED RELEASE ORAL DAILY
Qty: 31 TABLET | Refills: 5 | Status: SHIPPED | OUTPATIENT
Start: 2017-10-05 | End: 2018-03-12

## 2018-01-09 DIAGNOSIS — I10 ESSENTIAL HYPERTENSION WITH GOAL BLOOD PRESSURE LESS THAN 140/90: ICD-10-CM

## 2018-01-09 DIAGNOSIS — E11.9 TYPE 2 DIABETES MELLITUS WITHOUT COMPLICATION, WITHOUT LONG-TERM CURRENT USE OF INSULIN (H): ICD-10-CM

## 2018-01-09 DIAGNOSIS — H04.123 DRY EYES: ICD-10-CM

## 2018-01-09 RX ORDER — CARBOXYMETHYLCELLULOSE SODIUM 5 MG/ML
1 SOLUTION/ DROPS OPHTHALMIC DAILY PRN
Qty: 1 BOTTLE | Refills: 1 | Status: SHIPPED | OUTPATIENT
Start: 2018-01-09 | End: 2018-02-27

## 2018-01-09 RX ORDER — ATORVASTATIN CALCIUM 80 MG/1
80 TABLET, FILM COATED ORAL DAILY
Qty: 30 TABLET | Refills: 1 | Status: SHIPPED | OUTPATIENT
Start: 2018-01-09 | End: 2018-02-27

## 2018-01-09 RX ORDER — LISINOPRIL 10 MG/1
10 TABLET ORAL DAILY
Qty: 30 TABLET | Refills: 1 | Status: SHIPPED | OUTPATIENT
Start: 2018-01-09 | End: 2018-02-27

## 2018-02-06 DIAGNOSIS — L30.9 DERMATITIS: ICD-10-CM

## 2018-02-06 RX ORDER — FLUOCINONIDE 0.5 MG/G
OINTMENT TOPICAL
Qty: 60 G | Refills: 0 | Status: SHIPPED | OUTPATIENT
Start: 2018-02-06 | End: 2018-04-20

## 2018-02-19 ENCOUNTER — TELEPHONE (OUTPATIENT)
Dept: FAMILY MEDICINE | Facility: CLINIC | Age: 58
End: 2018-02-19

## 2018-02-19 NOTE — LETTER
February 19, 2018      RE: Sa KENIA Richter  [address]  [place] [address]    RE: SA KENIA RICHTER (Key: [id number]) - [id number]    To [facility] PRIOR AUTHORIZATION appeals, -[phone].    I have been this man's primary care physician for over 10 years.  He has severe chronic gout.  Over 8 years ago I referred him to rheumatologist, Dr. Jeferson Oneill at [facility].  At the time he was taking maximum dose Allopurinol 600mgs daily and was still having gout symptoms.  Rheumatologist started Uloric 20 mg daily.  After 1 month on this dose and still having gout symptoms, he increased it to 40 mg daily.    The patient has been taking this medication since that time and has reasonable control of his gout.  No other medication has had this benefit   If you do not approve this medication, the patient will suffer unnecessary pain and y0ou will ultimately increase the cost of his care due to increased frequency of costly ER visits, specialist visits and office visits.    Sincerely,    Jason Siddiqui MD

## 2018-02-19 NOTE — TELEPHONE ENCOUNTER
Marcela with [employer] calling back regarding [place] appeal. She states that the doctor will need to submit an appeal statement stating again why the patient needs to be on this medication and any supporting documentation. If no supporting documentation is ok as well and the appeal statement will suffice.  Appeal statement can be faxed to [phone]. /MANJU Young  Routed to Dr. Siddiqui

## 2018-02-23 ENCOUNTER — TELEPHONE (OUTPATIENT)
Dept: FAMILY MEDICINE | Facility: CLINIC | Age: 58
End: 2018-02-23

## 2018-02-23 NOTE — TELEPHONE ENCOUNTER
[facility] phone call message- general phone call:    Reason for call: Calling to inform that the medication Uloric is approved for the member,    Return call needed: Yes    OK to leave a message on voice mail? Yes    Primary language: Liam      needed? Yes    Call taken on February 23, 2018 at 3:09 PM by Kiya Barros

## 2018-02-27 DIAGNOSIS — E11.9 TYPE 2 DIABETES MELLITUS WITHOUT COMPLICATION, WITHOUT LONG-TERM CURRENT USE OF INSULIN (H): ICD-10-CM

## 2018-02-27 DIAGNOSIS — H04.123 DRY EYES: ICD-10-CM

## 2018-02-27 DIAGNOSIS — I10 ESSENTIAL HYPERTENSION WITH GOAL BLOOD PRESSURE LESS THAN 140/90: ICD-10-CM

## 2018-02-27 RX ORDER — CARBOXYMETHYLCELLULOSE SODIUM 5 MG/ML
1 SOLUTION/ DROPS OPHTHALMIC DAILY PRN
Qty: 1 BOTTLE | Refills: 1 | Status: SHIPPED | OUTPATIENT
Start: 2018-02-27 | End: 2019-02-14

## 2018-02-27 RX ORDER — PROBENECID 500 MG/1
TABLET, FILM COATED ORAL
OUTPATIENT
Start: 2018-02-27

## 2018-02-27 RX ORDER — ATORVASTATIN CALCIUM 80 MG/1
80 TABLET, FILM COATED ORAL DAILY
Qty: 30 TABLET | Refills: 1 | Status: SHIPPED | OUTPATIENT
Start: 2018-02-27 | End: 2018-03-12

## 2018-02-27 RX ORDER — LISINOPRIL 10 MG/1
10 TABLET ORAL DAILY
Qty: 30 TABLET | Refills: 1 | Status: SHIPPED | OUTPATIENT
Start: 2018-02-27 | End: 2018-03-12

## 2018-03-05 ENCOUNTER — TELEPHONE (OUTPATIENT)
Dept: FAMILY MEDICINE | Facility: CLINIC | Age: 58
End: 2018-03-05

## 2018-03-05 DIAGNOSIS — M1A.09X0 IDIOPATHIC CHRONIC GOUT OF MULTIPLE SITES WITHOUT TOPHUS: Primary | ICD-10-CM

## 2018-03-05 RX ORDER — FEBUXOSTAT 40 MG/1
TABLET, FILM COATED ORAL
Qty: 31 TABLET | Refills: 0 | Status: SHIPPED | OUTPATIENT
Start: 2018-03-05 | End: 2018-03-12

## 2018-03-05 NOTE — TELEPHONE ENCOUNTER
Called the appeals department of [facility]. Letter was faxed to the dept on 3/1/18 and have not heard back from them. Appeals reviewed Dr. Siddiqui's letter and approved Uloric from 1/23/18 through 2/23/19.     Called [facility] pharmacy, they will need a new Rx from Dr. Siddiqui. Please send.     Thank you. Routed to Dr. Siddiqui. /MANJU Man

## 2018-03-12 ENCOUNTER — AMBULATORY - HEALTHEAST (OUTPATIENT)
Dept: ADMINISTRATIVE | Facility: REHABILITATION | Age: 58
End: 2018-03-12

## 2018-03-12 ENCOUNTER — RECORDS - HEALTHEAST (OUTPATIENT)
Dept: ADMINISTRATIVE | Facility: OTHER | Age: 58
End: 2018-03-12

## 2018-03-12 ENCOUNTER — OFFICE VISIT (OUTPATIENT)
Dept: FAMILY MEDICINE | Facility: CLINIC | Age: 58
End: 2018-03-12
Payer: COMMERCIAL

## 2018-03-12 VITALS
BODY MASS INDEX: 37.9 KG/M2 | DIASTOLIC BLOOD PRESSURE: 89 MMHG | OXYGEN SATURATION: 96 % | SYSTOLIC BLOOD PRESSURE: 159 MMHG | HEART RATE: 65 BPM | TEMPERATURE: 97.7 F | RESPIRATION RATE: 16 BRPM | WEIGHT: 226 LBS

## 2018-03-12 DIAGNOSIS — I10 ESSENTIAL HYPERTENSION WITH GOAL BLOOD PRESSURE LESS THAN 140/90: ICD-10-CM

## 2018-03-12 DIAGNOSIS — M1A.0790 IDIOPATHIC CHRONIC GOUT OF ANKLE WITHOUT TOPHUS, UNSPECIFIED LATERALITY: Primary | ICD-10-CM

## 2018-03-12 DIAGNOSIS — E29.1 TESTICULAR HYPOFUNCTION: ICD-10-CM

## 2018-03-12 DIAGNOSIS — E11.65 TYPE 2 DIABETES MELLITUS WITH HYPERGLYCEMIA, WITHOUT LONG-TERM CURRENT USE OF INSULIN (H): ICD-10-CM

## 2018-03-12 DIAGNOSIS — G47.33 OBSTRUCTIVE SLEEP APNEA: ICD-10-CM

## 2018-03-12 DIAGNOSIS — E11.9 TYPE 2 DIABETES MELLITUS WITHOUT COMPLICATION, UNSPECIFIED LONG TERM INSULIN USE STATUS: ICD-10-CM

## 2018-03-12 DIAGNOSIS — E11.9 TYPE 2 DIABETES MELLITUS WITHOUT COMPLICATION, WITHOUT LONG-TERM CURRENT USE OF INSULIN (H): ICD-10-CM

## 2018-03-12 DIAGNOSIS — M1A.09X0 IDIOPATHIC CHRONIC GOUT OF MULTIPLE SITES WITHOUT TOPHUS: ICD-10-CM

## 2018-03-12 DIAGNOSIS — M10.072 ACUTE IDIOPATHIC GOUT OF LEFT ANKLE: ICD-10-CM

## 2018-03-12 DIAGNOSIS — M25.521 PAIN IN JOINT, UPPER ARM, RIGHT: ICD-10-CM

## 2018-03-12 LAB
ANION GAP SERPL CALCULATED.3IONS-SCNC: 8 MMOL/L (ref 5–18)
BUN SERPL-MCNC: 16 MG/DL (ref 8–22)
CALCIUM SERPL-MCNC: 9.5 MG/DL (ref 8.5–10.5)
CHLORIDE SERPL-SCNC: 102 MMOL/L (ref 98–107)
CHOLEST SERPL-MCNC: 216 MG/DL
CO2 SERPL-SCNC: 27 MMOL/L (ref 22–31)
CREAT SERPL-MCNC: 0.8 MG/DL (ref 0.7–1.3)
CREAT UR-MCNC: 40.5 MG/DL
FASTING?: ABNORMAL
GLUCOSE SERPL-MCNC: 182 MG/DL (ref 70–125)
HBA1C MFR BLD: 11.9 % (ref 4.1–5.7)
HDLC SERPL-MCNC: 39 MG/DL
LDLC SERPL CALC-MCNC: 124 MG/DL
MICROALBUMIN UR-MCNC: 1.31 MG/DL (ref 0–1.99)
MICROALBUMIN/CREAT UR: 32.3 MG/G
POTASSIUM SERPL-SCNC: 3.8 MMOL/L (ref 3.5–5)
SODIUM SERPL-SCNC: 137 MMOL/L (ref 136–145)
TRIGL SERPL-MCNC: 263 MG/DL
URATE SERPL-MCNC: 7.7 MG/DL (ref 3–8)

## 2018-03-12 RX ORDER — ATORVASTATIN CALCIUM 80 MG/1
80 TABLET, FILM COATED ORAL DAILY
Qty: 93 TABLET | Refills: 1 | Status: SHIPPED | OUTPATIENT
Start: 2018-03-12 | End: 2018-08-02

## 2018-03-12 RX ORDER — GLIPIZIDE 10 MG/1
10 TABLET, FILM COATED, EXTENDED RELEASE ORAL DAILY
Qty: 93 TABLET | Refills: 1 | Status: SHIPPED | OUTPATIENT
Start: 2018-03-12 | End: 2018-08-02

## 2018-03-12 RX ORDER — METFORMIN HCL 500 MG
1000 TABLET, EXTENDED RELEASE 24 HR ORAL 2 TIMES DAILY WITH MEALS
Qty: 360 TABLET | Refills: 1 | Status: SHIPPED | OUTPATIENT
Start: 2018-03-12 | End: 2018-08-02

## 2018-03-12 RX ORDER — COLCHICINE 0.6 MG/1
0.6 TABLET ORAL 2 TIMES DAILY PRN
Qty: 60 TABLET | Refills: 11 | Status: SHIPPED | OUTPATIENT
Start: 2018-03-12 | End: 2018-03-15

## 2018-03-12 RX ORDER — IBUPROFEN 800 MG/1
800 TABLET, FILM COATED ORAL EVERY 8 HOURS PRN
Qty: 60 TABLET | Refills: 1 | Status: SHIPPED | OUTPATIENT
Start: 2018-03-12 | End: 2018-05-11

## 2018-03-12 RX ORDER — LISINOPRIL 40 MG/1
40 TABLET ORAL DAILY
Qty: 93 TABLET | Refills: 3 | Status: SHIPPED | OUTPATIENT
Start: 2018-03-12 | End: 2018-08-02

## 2018-03-12 RX ORDER — FEBUXOSTAT 40 MG/1
TABLET, FILM COATED ORAL
Qty: 93 TABLET | Refills: 1 | Status: SHIPPED | OUTPATIENT
Start: 2018-03-12 | End: 2018-08-02

## 2018-03-12 NOTE — MR AVS SNAPSHOT
After Visit Summary   3/12/2018    Sa KENIA Mackey    MRN: [id number]           Patient Information     Date Of Birth          1960        Visit Information        Provider Department      3/12/2018 8:20 AM Jason Siddiqui MD [facility]        Today's Diagnoses     Idiopathic chronic gout of ankle without tophus, unspecified laterality    -  1    Type 2 diabetes mellitus with hyperglycemia, without long-term current use of insulin (H)        Testicular hypofunction        Essential hypertension with goal blood pressure less than 140/90        Pain in joint, upper arm, right        Type 2 diabetes mellitus without complication, unspecified long term insulin use status (H)        Idiopathic chronic gout of multiple sites without tophus        Acute idiopathic gout of left ankle        Type 2 diabetes mellitus without complication, without long-term current use of insulin (H)        Obstructive sleep apnea          Care Instructions    Great to see you again Sa Than!  I'm very happy that your mood is much improved - you can always return to [facility] if you think you need to see them.    Please come back in about 4 weeks to review your labs.    I have placed several referrals for you and we can check on your progress with them when you come back.      Referral sent to [facility]  Phone: [phone]  Fax: [phone]  Clinic will contact patient to schedule  es/March 12, 2018    [facility].  They will contact patient to schedule.  Aniya  03/12/18      Referral sent to [facility]  Phone: [phone]  Fax: [phone]  Clinic will contact patient to schedule  es March 12, 2018    [facility] [address] [address]  Phone: [phone]    Appointment   Date:3/20/18  Time:1:45pm     *Please bring insurance card and photo ID for appointment.   *Your appointment could take up to 90 minutes.    Please contact the above clinic if you  need to cancel or reschedule. Feel free to contact me with any questions. Thanks!    Aniya  Care Coordinator  [phone]    Gave to Taye Ferrell.           Follow-ups after your visit        Additional Services     Diabetes Education Referral       Initial    Reason for Referral: DM2 and HTN desires nutritional counselling     needed: Yes   Language: Liam    May leave message on voicemail: Yes            Ophthalmology Adult Referral       Refill    Reason for Referral: DM2 annual visit     needed: Yes   Language: Liam    May leave message on voicemail: Yes            PHYSICAL THERAPY REFERRAL       PT REFERRAL  Sa KENIA Mackey  1960  Phone #: [phone] (home)    needed: Yes  Language: Liam    PT/OT  Facility:   [facility], P: [phone], F: [phone]    History: Recurring pain at insertion of right deltoid into humerus, xray negative    Precautions/Contraindications: None    Imaging Studies: X-Ray    Surgical Procedure/Test Results: previous lateral epicondylitis surgery    Treatment Goals:   Decrease: Pain    Prognosis: good    Visits: Up to 12    Evaluate: Evaluate and treat            SLEEP EVALUATION & MANAGEMENT REFERRAL - ADULT -[facility] - Numerous Locations - [phone] (Dr Da Silva [place])       Please be aware that coverage of these services is subject to the terms and limitations of your health insurance plan.  Call member services at your health plan with any benefit or coverage questions.      Please bring the following to your appointment:    >>   List of current medications   >>   This referral request   >>   Any documents/labs given to you for this referral                      Follow-up notes from your care team     Return in about 4 weeks (around 4/9/2018), or if symptoms worsen or fail to improve.      Future tests that were ordered for you today     Open Future Orders        Priority Expected Expires Ordered     PHYSICAL THERAPY REFERRAL Routine  3/12/2019 3/12/2018    SLEEP EVALUATION & MANAGEMENT REFERRAL - ADULT -[facility] - Numerous Locations - [phone] (Shila Marie) Routine  3/12/2019 3/12/2018            Who to contact     Please call your clinic at [phone] to:    Ask questions about your health    Make or cancel appointments    Discuss your medicines    Learn about your test results    Speak to your doctor            Additional Information About Your Visit        [facility] Information     [employer] gives you secure access to your electronic health record. If you see a primary care provider, you can also send messages to your care team and make appointments. If you have questions, please call your primary care clinic.  If you do not have a primary care provider, please call [phone] and they will assist you.      [employer] is an electronic gateway that provides easy, online access to your medical records. With [employer], you can request a clinic appointment, read your test results, renew a prescription or communicate with your care team.     To access your existing account, please contact your [facility] or call [phone] for assistance.        Care EveryWhere ID     This is your Care EveryWhere ID. This could be used by other organizations to access your [facility] medical records  [id number]        Your Vitals Were     Pulse Temperature Respirations Pulse Oximetry BMI (Body Mass Index)       65 97.7  F (36.5  C) 16 96% 37.9 kg/m2        Blood Pressure from Last 3 Encounters:   03/12/18 159/89   04/06/17 135/86   11/04/16 137/85    Weight from Last 3 Encounters:   03/12/18 226 lb (102.5 kg)   04/06/17 231 lb 9.6 oz (105.1 kg)   11/04/16 225 lb 12.8 oz (102.4 kg)              We Performed the Following     Basic Metabolic Profile ([facility])     Diabetes Education Referral     Hemoglobin A1c ([facility])     Lipid Dougherty ([facility] [employer]) - Results > 1 hr      Microalbumin Random Ur ([facility])     Ophthalmology Adult Referral     Testosterone F / T ([facility])     Uric Acid ([facility])     XR HUMERUS RT G/E 2 VW          Today's Medication Changes          These changes are accurate as of 3/12/18 12:54 PM.  If you have any questions, ask your nurse or doctor.               Start taking these medicines.        Dose/Directions    ibuprofen 800 MG tablet   Commonly known as:  ADVIL/MOTRIN   Used for:  Pain in joint, upper arm, right   Started by:  Jason Siddiqui MD        Dose:  800 mg   Take 1 tablet (800 mg) by mouth every 8 hours as needed for moderate pain (do not take every day)   Quantity:  60 tablet   Refills:  1         These medicines have changed or have updated prescriptions.        Dose/Directions    colchicine 0.6 MG tablet   Commonly known as:  COLCRYS   This may have changed:    - when to take this  - reasons to take this   Used for:  Acute idiopathic gout of left ankle   Changed by:  Jason Siddiqui MD        Dose:  0.6 mg   Take 1 tablet (0.6 mg) by mouth 2 times daily as needed for moderate pain (gout pain)   Quantity:  60 tablet   Refills:  11       lisinopril 40 MG tablet   Commonly known as:  PRINIVIL/ZESTRIL   This may have changed:    - medication strength  - how much to take   Used for:  Essential hypertension with goal blood pressure less than 140/90   Changed by:  Jason Siddiqui MD        Dose:  40 mg   Take 1 tablet (40 mg) by mouth daily   Quantity:  93 tablet   Refills:  3            Where to get your medicines      These medications were sent to [facility] - [place], [place] - [address], [place] [address]     Phone:  [phone]     aspirin 81 MG EC tablet    atorvastatin 80 MG tablet    blood glucose monitoring lancets    blood glucose monitoring test strip    blood glucose monitoring test strip    colchicine 0.6 MG tablet    febuxostat 40 MG Tabs tablet    glipiZIDE 10 MG 24 hr tablet    ibuprofen 800 MG  tablet    lisinopril 40 MG tablet    metFORMIN 500 MG 24 hr tablet    sitagliptin 100 MG tablet                Primary Care Provider Office Phone # Fax #    Jason Siddiqui [phone]       [address] [address]        Equal Access to Services     ANGELA MANCIA : Hadii shannan neumann hadarslano Soomaali, waaxda luqadaha, qaybta kaalmada adeegyada, waxandrea annin hayjamarcusn gonzalez nancycher prado. So [facility] [phone].    ATENCIÓN: Si habla español, tiene a yen disposición servicios gratuitos de asistencia lingüística. Llame al [phone].    We comply with applicable federal civil rights laws and [place] laws. We do not discriminate on the basis of race, color, national origin, age, disability, sex, sexual orientation, or gender identity.            Thank you!     Thank you for choosing [facility]  for your care. Our goal is always to provide you with excellent care. Hearing back from our patients is one way we can continue to improve our services. Please take a few minutes to complete the written survey that you may receive in the mail after your visit with us. Thank you!             Your Updated Medication List - Protect others around you: Learn how to safely use, store and throw away your medicines at www.disposemymeds.org.          This list is accurate as of 3/12/18 12:54 PM.  Always use your most recent med list.                   Brand Name Dispense Instructions for use Diagnosis    acetaminophen 500 MG tablet    TYLENOL    100 tablet    Take 2 tablets (1,000 mg) by mouth every 8 hours as needed for mild pain    Episodic tension-type headache, not intractable       Alcohol Prep Pad 70 % Pads     60 each    1 pad 2 times daily    Type 2 diabetes mellitus with hyperglycemia, without long-term current use of insulin (H)       aspirin 81 MG EC tablet     90 tablet    Take 1 tablet (81 mg) by mouth daily    Type 2 diabetes mellitus without complication, without long-term  current use of insulin (H)       atorvastatin 80 MG tablet    LIPITOR    93 tablet    Take 1 tablet (80 mg) by mouth daily    Type 2 diabetes mellitus without complication, without long-term current use of insulin (H)       blood glucose monitoring lancets     3 each    Use to test blood sugar 2 times daily or as directed.    Type 2 diabetes mellitus with hyperglycemia, without long-term current use of insulin (H)       * blood glucose monitoring test strip    no brand specified    3 Box    Use up to 3 times a day    Type 2 diabetes mellitus with hyperglycemia, without long-term current use of insulin (H)       * blood glucose monitoring test strip    ALEM CONTOUR    3 Box    Use to test blood sugars 2 times daily or as directed.    Type 2 diabetes mellitus with hyperglycemia, without long-term current use of insulin (H)       carboxymethylcellulose 0.5 % Soln ophthalmic solution    REFRESH PLUS    1 Bottle    Place 1 drop into both eyes daily as needed for dry eyes    Dry eyes       colchicine 0.6 MG tablet    COLCRYS    60 tablet    Take 1 tablet (0.6 mg) by mouth 2 times daily as needed for moderate pain (gout pain)    Acute idiopathic gout of left ankle       febuxostat 40 MG Tabs tablet    ULORIC    93 tablet    Take 1 tablet daily    Idiopathic chronic gout of multiple sites without tophus       fluocinonide 0.05 % ointment    LIDEX    60 g    Apply sparingly to dry cracked skin on hands twice daily    Dermatitis       glipiZIDE 10 MG 24 hr tablet    GLUCOTROL XL    93 tablet    Take 1 tablet (10 mg) by mouth daily    Type 2 diabetes mellitus without complication, unspecified long term insulin use status (H)       ibuprofen 800 MG tablet    ADVIL/MOTRIN    60 tablet    Take 1 tablet (800 mg) by mouth every 8 hours as needed for moderate pain (do not take every day)    Pain in joint, upper arm, right       lisinopril 40 MG tablet    PRINIVIL/ZESTRIL    93 tablet    Take 1 tablet (40 mg) by mouth daily     Essential hypertension with goal blood pressure less than 140/90       metFORMIN 500 MG 24 hr tablet    GLUCOPHAGE-XR    360 tablet    Take 2 tablets (1,000 mg) by mouth 2 times daily (with meals)    Type 2 diabetes mellitus with hyperglycemia, without long-term current use of insulin (H)       order for DME      (Continuous Positive Airway Pressure) nightly        polyethylene glycol Packet    MIRALAX    238 g    Miralax powder 8.3 oz bottle (238 gm) as directed    Routine general medical examination at a health care facility       sitagliptin 100 MG tablet    JANUVIA    93 tablet    Take 1 tablet (100 mg) by mouth daily    Type 2 diabetes mellitus without complication, unspecified long term insulin use status (H)       VASELINE CONSTANT CARE Oint     1 Tube    Externally apply topically 3 times daily    Dermatitis       vitamin D 2000 UNITS tablet     30 tablet    Take 2,000 Units by mouth daily    Hypovitaminosis D       * Notice:  This list has 2 medication(s) that are the same as other medications prescribed for you. Read the directions carefully, and ask your doctor or other care provider to review them with you.

## 2018-03-12 NOTE — LETTER
March 15, 2018       [facility]  [address]  [place] [address]        Dear ,    Please see below for your test results.    Resulted Orders   Microalbumin Random Ur ([facility])   Result Value Ref Range    Microalbumin, Urine 1.31 0.00 - 1.99 mg/dL    Creatinine, Urine 40.5 mg/dL    Albumin Urine mg/g Cr 32.3 (H) <=19.9 mg/g    Narrative    Test performed by:  [facility]  [address] [place] [address]  Microalbumin, Random Urine  <2.0 mg/dL . . . . . . . . Normal  3.0-30.0 mg/dL . . . . . . Microalbuminuria  >30.0 mg/dL . . . . . .  . Clinical Proteinuria  Microalbumin/Creatinine Ratio, Random Urine  <20 mg/g . . . . .. . . . Normal   mg/g . . . . . . . Microalbuminuria  >300 mg/g . . . . . . . . Clinical Proteinuria   Hemoglobin A1c ([facility])   Result Value Ref Range    Hemoglobin A1C 11.9 (H) 4.1 - 5.7 %   Testosterone F / T ([facility])   Result Value Ref Range    Testosterone Free 4.74 3.87 - 14.7 ng/dL      Comment:         -------------------ADDITIONAL INFORMATION-------------------  Testing performed by [facility].  This test was developed and its performance characteristics   determined by [facility] in a manner consistent with CLIA   requirements. This test has not been cleared or approved by   the U.S. Food and Drug Administration.      Testosterone Total 153 (L) 240 - 950 ng/dL      Comment:         -------------------ADDITIONAL INFORMATION-------------------  Testing performed by Liquid Chromatography-Tandem Mass   Spectrometry (LC-MS/MS).  This test was developed and its performance characteristics   determined by [facility] in a manner consistent with CLIA   requirements. This test has not been cleared or approved by   the U.S. Food and Drug Administration.     Test Performed by:  [facility]  [address] [place] [address]      Narrative    Test performed by:  [facility]  [address] [place] [address]   Uric Acid ([facility])   Result Value Ref Range    Uric Acid 7.7 3.0 - 8.0 mg/dL    Narrative    Test performed by:  [facility]  [address] [place] [address]   Basic Metabolic Profile ([facility])   Result Value Ref Range    Sodium 137 136 - 145 mmol/L    Potassium 3.8 3.5 - 5.0 mmol/L    Chloride 102 98 - 107 mmol/L    CO2, Total 27 22 - 31 mmol/L    Anion Gap 8 5 - 18 mmol/L    Glucose 182 (H) 70 - 125 mg/dL    Calcium 9.5 8.5 - 10.5 mg/dL    Urea Nitrogen 16 8 - 22 mg/dL    Creatinine 0.80 0.70 - 1.30 mg/dL    GFR Estimate If Black >60 >60 mL/min/1.73m2    GFR Estimate >60 >60 mL/min/1.73m2    Narrative    Test performed by:  [facility]  [address] [place] [address]  Fasting Glucose reference range is 70-99 mg/dL per  American Diabetes Association (ADA) guidelines.   Lipid Swisher ([facility]) - Results > 1 hr   Result Value Ref Range    Cholesterol 216 (H) <=199 mg/dL    Triglycerides 263 (H) <=149 mg/dL    HDL Cholesterol 39 (L) >=40 mg/dL    LDL Cholesterol Calculated 124 <=129 mg/dL    Fasting? Unknown     Narrative    Test performed by:  [facility]  [address] [place] [address]                   Clyde Peña - it was very nice to see you in clinic a few days ago.  And SO good to hear that you are doing better! - I was worried that you were, on the contrary, doing worse when I had not seen you for so long.  The main abnormality with your labs is that the average sugar is too high - we aim to have the A1C measurement under 8.0.  You are prescribed 3 medications for diabetes: Metformin 2 tablets twice daily, Sitagliptin one tab once daily and Glipizide also one tab once daily.  I strongly encourage you to ALWAYS take these medications in addition to being careful with your diet and trying to lose weight - OK?  If we can't get your sugars down, all that's left are injections and I know you don't want to start using  those.    Otherwise, your gout lab (uric acid) was good, your kidneys are working well and your testosterone levels are improved from previously (although still on low end).  OK?  I look forward to seeing you again in 1 month - take care!  Dr Jason dooley

## 2018-03-12 NOTE — PATIENT INSTRUCTIONS
Great to see you again Sa Than!  I'm very happy that your mood is much improved - you can always return to [facility] if you think you need to see them.    Please come back in about 4 weeks to review your labs.    I have placed several referrals for you and we can check on your progress with them when you come back.      Referral sent to [facility]  Phone: [phone]  Fax: [phone]  Clinic will contact patient to schedule  es/March 12, 2018    [facility].  They will contact patient to schedule.  Aniya  03/12/18      Referral sent to [facility]  Phone: [phone]  Fax: [phone]  Clinic will contact patient to schedule  es March 12, 2018    [facility]  [address] [place] [id number]  Phone: [phone]    Appointment   Date:3/20/18  Time:1:45pm     *Please bring insurance card and photo ID for appointment.   *Your appointment could take up to 90 minutes.    Please contact the above clinic if you need to cancel or reschedule. Feel free to contact me with any questions. Thanks!    Aniya  Care Coordinator  [phone]    Gave to Taye Ferrell.

## 2018-03-12 NOTE — PROGRESS NOTES
This is a 57-year-old [place] man who is well known to me.  He had been asked to come in due to a prolonged absence from clinic.  When questioned he reports that this was because he was feeling well.  In particular he reports that his depression is much improved and he does not need to follow with CVT.  He attributes exercise and meditation as the reason for the improvement.    Otherwise he is complaining of pain in his right upper arm.  He says he has had this pain in the past and it is worse in cold weather.  It has been bothering him for 2 weeks.  He can identify a tender spot to palpation.  He does not believe it is gout since this usually affects his knees and feet.    Concerning his diabetes he acknowledges that he does miss some pills some days.  He denies any urinary frequency.  He has lost a little weight and is generally happy about this.  He has not recently seen an eye doctor and does note that his vision is impaired and is therefore agreeable to follow-up.  He says that he would like to get more education about healthy eating.  He has previously visited with clinic nurses but would like more in-depth information.    ROS:  He has been diagnosed with sleep apnea but is not using CPAP.  He is told that he snores.  He also wakes during the middle of the night and cannot return to sleep.  He does check his blood pressures at home and says the top number is usually either 140 or 150.    Objective:  /89  Pulse 65  Temp 97.7  F (36.5  C)  Resp 16  Wt 226 lb (102.5 kg)  SpO2 96%  BMI 37.9 kg/m2  His blood pressure is elevated.  He remains obese although has lost about 15 pounds.  He makes good eye contact and looks much happier than at previous encounters smiling frequently.  He identifies the insertion of the deltoid and to the right humerus as approximately the site of his upper arm pain.  Will obtain an x-ray to rule out more sinister possible causes.  I spent 15 minutes reviewing his medications,  reconciling his medication list and sending refills.    Personal review of the x-ray reveals no bony injury of the right humerus.    Sa was seen today for musculoskeletal problem and derm problem.    Diagnoses and all orders for this visit:    Idiopathic chronic gout of ankle without tophus, unspecified laterality  -     Uric Acid ([facility])    Type 2 diabetes mellitus with hyperglycemia, without long-term current use of insulin (H)  -     Diabetes Education Referral  -     Ophthalmology Adult Referral  -     Microalbumin Random Ur ([facility])  -     Cancel: Basic Metabolic Panel ([facility])  -     Hemoglobin A1c ([facility])  -     Cancel: Lipid Panel ([facility])  -     metFORMIN (GLUCOPHAGE-XR) 500 MG 24 hr tablet; Take 2 tablets (1,000 mg) by mouth 2 times daily (with meals)  -     blood glucose monitoring (NO BRAND SPECIFIED) test strip; Use up to 3 times a day  -     blood glucose monitoring (ALEM MICROLET) lancets; Use to test blood sugar 2 times daily or as directed.  -     blood glucose monitoring ([employer] CONTOUR) test strip; Use to test blood sugars 2 times daily or as directed.  -     [facility] Profile ([facility])  -     Lipid Gonzales ([facility]) - Results > 1 hr    Testicular hypofunction  -     Testosterone F / T ([facility])    Essential hypertension with goal blood pressure less than 140/90  -     lisinopril (PRINIVIL/ZESTRIL) 40 MG tablet; Take 1 tablet (40 mg) by mouth daily    Pain in joint, upper arm, right  -     XR HUMERUS RT G/E 2 VW  -     ibuprofen (ADVIL/MOTRIN) 800 MG tablet; Take 1 tablet (800 mg) by mouth every 8 hours as needed for moderate pain (do not take every day)  -     PHYSICAL THERAPY REFERRAL; Future    Type 2 diabetes mellitus without complication, unspecified long term insulin use status (H)  -     sitagliptin (JANUVIA) 100 MG tablet; Take 1 tablet (100 mg) by mouth daily  -     glipiZIDE (GLUCOTROL XL) 10 MG 24 hr tablet; Take 1 tablet (10 mg) by mouth daily    Idiopathic  chronic gout of multiple sites without tophus  -     febuxostat (ULORIC) 40 MG TABS tablet; Take 1 tablet daily    Acute idiopathic gout of left ankle  -     colchicine (COLCRYS) 0.6 MG tablet; Take 1 tablet (0.6 mg) by mouth 2 times daily as needed for moderate pain (gout pain)    Type 2 diabetes mellitus without complication, without long-term current use of insulin (H)  -     atorvastatin (LIPITOR) 80 MG tablet; Take 1 tablet (80 mg) by mouth daily  -     aspirin 81 MG EC tablet; Take 1 tablet (81 mg) by mouth daily    Obstructive sleep apnea  -     SLEEP EVALUATION & MANAGEMENT REFERRAL - [facility] - Numerous Locations - [phone] (Dr Da Silva [place]); Future      Since his blood pressures elevated I recommended increasing his lisinopril dose and he agrees.  I placed a referral for diabetes nutritional education.    Is very likely that he continues to have sleep apnea that is not currently treated and ever referred him back to the sleep clinic to have this reevaluated.  I referred him to physical therapy for the right upper arm pain.  It is possible that an injection at the site of the tendon insertion might help but he declines any injections.    I refilled all his medications.  He likes to take ibuprofen as needed pain and I refilled this but urged caution and not to take it daily.    I placed a referral to ophthalmology for routine diabetes exam.    There was insufficient time to review his lab results with him today and have asked him to return in a few weeks time both to discuss his labs and review his general progress and review his medications one more time.    Total visit time was 50 mins, all of which was face to face MD time, and over 50% of this time was spent in counseling and coordination of care.

## 2018-03-13 ENCOUNTER — COMMUNICATION - HEALTHEAST (OUTPATIENT)
Dept: ADMINISTRATIVE | Facility: CLINIC | Age: 58
End: 2018-03-13

## 2018-03-14 ENCOUNTER — DOCUMENTATION ONLY (OUTPATIENT)
Dept: FAMILY MEDICINE | Facility: CLINIC | Age: 58
End: 2018-03-14

## 2018-03-14 LAB
TESTOST SERPL-MCNC: 153 NG/DL (ref 240–950)
TESTOSTERONE FREE: 4.74 NG/DL (ref 3.87–14.7)

## 2018-03-14 NOTE — PROGRESS NOTES
Prior Authorization needed on:  03/14/18    Medication:  Colchicine Dose:  0.6    Pharmacy confirmed as   [facility] - [place], [place] - [address]  [address] [facility] [address]  [place] [address]  Phone: [phone] Fax: [phone]  : Yes    Insurance Name:  [employer]  Insurance Phone:   Insurance Patient ID:     Key: [id number]    Alternatives Suggested:  Brand name Colycrys. Insurance will cover up to 3 days of this for an emergency but anything outside of that qty, requires a HESHAM Oliver March 14, 2018 at 4:39 PM

## 2018-03-19 ENCOUNTER — OFFICE VISIT - HEALTHEAST (OUTPATIENT)
Dept: PHYSICAL THERAPY | Facility: REHABILITATION | Age: 58
End: 2018-03-19

## 2018-03-19 DIAGNOSIS — M25.811 SHOULDER IMPINGEMENT, RIGHT: ICD-10-CM

## 2018-03-19 DIAGNOSIS — M79.18 MYOFASCIAL PAIN: ICD-10-CM

## 2018-03-19 DIAGNOSIS — M25.521 RIGHT ELBOW PAIN: ICD-10-CM

## 2018-03-19 DIAGNOSIS — M25.511 CHRONIC RIGHT SHOULDER PAIN: ICD-10-CM

## 2018-03-19 DIAGNOSIS — R29.3 POOR POSTURE: ICD-10-CM

## 2018-03-19 DIAGNOSIS — M62.81 GENERALIZED MUSCLE WEAKNESS: ICD-10-CM

## 2018-03-19 DIAGNOSIS — G89.29 CHRONIC RIGHT SHOULDER PAIN: ICD-10-CM

## 2018-03-22 ENCOUNTER — OFFICE VISIT - HEALTHEAST (OUTPATIENT)
Dept: PHYSICAL THERAPY | Facility: REHABILITATION | Age: 58
End: 2018-03-22

## 2018-03-22 DIAGNOSIS — M25.811 SHOULDER IMPINGEMENT, RIGHT: ICD-10-CM

## 2018-03-22 DIAGNOSIS — G89.29 CHRONIC RIGHT SHOULDER PAIN: ICD-10-CM

## 2018-03-22 DIAGNOSIS — M25.521 RIGHT ELBOW PAIN: ICD-10-CM

## 2018-03-22 DIAGNOSIS — M62.81 GENERALIZED MUSCLE WEAKNESS: ICD-10-CM

## 2018-03-22 DIAGNOSIS — M79.18 MYOFASCIAL PAIN: ICD-10-CM

## 2018-03-22 DIAGNOSIS — M25.511 CHRONIC RIGHT SHOULDER PAIN: ICD-10-CM

## 2018-03-22 DIAGNOSIS — R29.3 POOR POSTURE: ICD-10-CM

## 2018-03-23 ENCOUNTER — DOCUMENTATION ONLY (OUTPATIENT)
Dept: PSYCHOLOGY | Facility: CLINIC | Age: 58
End: 2018-03-23

## 2018-03-23 NOTE — PROGRESS NOTES
Trenton Leonardo Christine; Aniya Mike Dr. and Aniya,     I am update you both that we are not going to screen him at this time. That reason is that he is doing will at this time and he dose not need services at this time. If he does in the future need service, we are open to screen him.     Thank You,   Trenton

## 2018-03-26 ENCOUNTER — OFFICE VISIT - HEALTHEAST (OUTPATIENT)
Dept: PHYSICAL THERAPY | Facility: REHABILITATION | Age: 58
End: 2018-03-26

## 2018-03-26 DIAGNOSIS — M79.18 MYOFASCIAL PAIN: ICD-10-CM

## 2018-03-26 DIAGNOSIS — M25.521 RIGHT ELBOW PAIN: ICD-10-CM

## 2018-03-26 DIAGNOSIS — G89.29 CHRONIC RIGHT SHOULDER PAIN: ICD-10-CM

## 2018-03-26 DIAGNOSIS — R29.3 POOR POSTURE: ICD-10-CM

## 2018-03-26 DIAGNOSIS — M25.811 SHOULDER IMPINGEMENT, RIGHT: ICD-10-CM

## 2018-03-26 DIAGNOSIS — M25.511 CHRONIC RIGHT SHOULDER PAIN: ICD-10-CM

## 2018-03-26 DIAGNOSIS — M62.81 GENERALIZED MUSCLE WEAKNESS: ICD-10-CM

## 2018-03-29 ENCOUNTER — OFFICE VISIT - HEALTHEAST (OUTPATIENT)
Dept: EDUCATION SERVICES | Facility: CLINIC | Age: 58
End: 2018-03-29

## 2018-03-29 DIAGNOSIS — E11.9 DIABETES (H): ICD-10-CM

## 2018-04-05 ENCOUNTER — OFFICE VISIT - HEALTHEAST (OUTPATIENT)
Dept: PHYSICAL THERAPY | Facility: REHABILITATION | Age: 58
End: 2018-04-05

## 2018-04-05 DIAGNOSIS — G89.29 CHRONIC RIGHT SHOULDER PAIN: ICD-10-CM

## 2018-04-05 DIAGNOSIS — M79.18 MYOFASCIAL PAIN: ICD-10-CM

## 2018-04-05 DIAGNOSIS — M25.511 CHRONIC RIGHT SHOULDER PAIN: ICD-10-CM

## 2018-04-05 DIAGNOSIS — R29.3 POOR POSTURE: ICD-10-CM

## 2018-04-05 DIAGNOSIS — M25.811 SHOULDER IMPINGEMENT, RIGHT: ICD-10-CM

## 2018-04-05 DIAGNOSIS — M25.521 RIGHT ELBOW PAIN: ICD-10-CM

## 2018-04-05 DIAGNOSIS — M62.81 GENERALIZED MUSCLE WEAKNESS: ICD-10-CM

## 2018-04-09 ENCOUNTER — TELEPHONE (OUTPATIENT)
Dept: FAMILY MEDICINE | Facility: CLINIC | Age: 58
End: 2018-04-09

## 2018-04-09 DIAGNOSIS — G89.29 CHRONIC PAIN OF RIGHT UPPER EXTREMITY: Primary | ICD-10-CM

## 2018-04-09 DIAGNOSIS — M79.601 CHRONIC PAIN OF RIGHT UPPER EXTREMITY: Primary | ICD-10-CM

## 2018-04-09 NOTE — TELEPHONE ENCOUNTER
[facility] phone call message- general phone call:    Reason for call: Would like order to Kacie who is Order Specialist for OT. For Pain Science Education. For chronic pain     Return call needed: Yes    OK to leave a message on voice mail? Yes    Primary language: Liam      needed? Yes    Call taken on April 9, 2018 at 9:47 AM by Renetta Severino

## 2018-04-19 ENCOUNTER — OFFICE VISIT - HEALTHEAST (OUTPATIENT)
Dept: PHYSICAL THERAPY | Facility: REHABILITATION | Age: 58
End: 2018-04-19

## 2018-04-19 DIAGNOSIS — M79.18 MYOFASCIAL PAIN: ICD-10-CM

## 2018-04-19 DIAGNOSIS — M62.81 GENERALIZED MUSCLE WEAKNESS: ICD-10-CM

## 2018-04-19 DIAGNOSIS — G89.29 CHRONIC RIGHT SHOULDER PAIN: ICD-10-CM

## 2018-04-19 DIAGNOSIS — M25.811 SHOULDER IMPINGEMENT, RIGHT: ICD-10-CM

## 2018-04-19 DIAGNOSIS — M25.521 RIGHT ELBOW PAIN: ICD-10-CM

## 2018-04-19 DIAGNOSIS — M25.511 CHRONIC RIGHT SHOULDER PAIN: ICD-10-CM

## 2018-04-19 DIAGNOSIS — R29.3 POOR POSTURE: ICD-10-CM

## 2018-04-20 ENCOUNTER — OFFICE VISIT (OUTPATIENT)
Dept: FAMILY MEDICINE | Facility: CLINIC | Age: 58
End: 2018-04-20
Payer: COMMERCIAL

## 2018-04-20 VITALS
BODY MASS INDEX: 37.06 KG/M2 | TEMPERATURE: 98.1 F | OXYGEN SATURATION: 95 % | SYSTOLIC BLOOD PRESSURE: 122 MMHG | DIASTOLIC BLOOD PRESSURE: 85 MMHG | RESPIRATION RATE: 16 BRPM | HEART RATE: 62 BPM | WEIGHT: 221 LBS

## 2018-04-20 DIAGNOSIS — L30.1 POMPHOLYX: Primary | ICD-10-CM

## 2018-04-20 DIAGNOSIS — E11.65 TYPE 2 DIABETES MELLITUS WITH HYPERGLYCEMIA, WITHOUT LONG-TERM CURRENT USE OF INSULIN (H): ICD-10-CM

## 2018-04-20 DIAGNOSIS — H65.93 OME (OTITIS MEDIA WITH EFFUSION), BILATERAL: ICD-10-CM

## 2018-04-20 RX ORDER — BETAMETHASONE DIPROPIONATE 0.5 MG/G
OINTMENT, AUGMENTED TOPICAL
Qty: 50 G | Refills: 3 | Status: SHIPPED | OUTPATIENT
Start: 2018-04-20 | End: 2019-02-14

## 2018-04-20 NOTE — LETTER
4/20/2018      RE: Sa T Key  [address]  [place] [address]     ARELY Mcconnell - chronic suppurative OME with a concern for right sided cholesteatoma - regards, Jason Siddiqui    This is a 57-year-old male who is well known to me.  He attends for a number of reasons:  1.  He continues to have an eruption on the fingers of both hands.  He has been using a high potency steroid ointment which does help but this symptoms return.  He does not have any similar eruption on his feet.  He cannot identify any particular triggers and does not believe he is allergic to any environmental products.    2.  He was very happy with the nutrition referral and has implemented many of the recommendations.  He brings with him his glucose meter today to show that his readings for the past 2 weeks are consistently between 60 and 150.  Given that his last A1c was over 11, he is congratulated for having made these significant changes.  He verifies that he is taking 3 anti-diabetes medications.    #3 he reports that he has some burning behind his right ear ×2 weeks.  He mentions that he notices some drainage from both ears and this is a long-standing symptom.  He recalls that he had been referred to ENT several years ago for decreased hearing but apparently hearing was better when tested at their clinic and he does not recall any recommendations or follow-up.  He knows that he had ear infections when he was a child in [place].    Objective:  /85  Pulse 62  Temp 98.1  F (36.7  C)  Resp 16  Wt 221 lb (100.2 kg)  SpO2 95%  BMI 37.06 kg/m2    His blood pressure is excellent and he is congratulated for this also.  He has lost about 10 pounds since last visit    Examination of his fingers reveals an eruption consistent with dyshidrotic eczema particularly in the webspaces between fingers.    His ears are examined and show concerning effusions bilaterally with some evidence of purulence in 1 of the canals consistent with  chronic suppurative otitis media with effusion.  In addition he has some tenderness in the right mastoid which raises a concern for cholesteatoma.  He is unsure how long the ear problems have persisted.    I obtained a tympanogram which verifies decreased movement of both tympanic membranes consistent with this diagnosis.    Sa was seen today for shoulder right.    Diagnoses and all orders for this visit:    Pompholyx  -     augmented betamethasone dipropionate (DIPROLENE-AF) 0.05 % ointment; Apply sparingly to affected area twice daily as needed.  Do not apply to face.    OME (otitis media with effusion), bilateral  -     amoxicillin-clavulanate (AUGMENTIN) 875-125 MG per tablet; Take 1 tablet by mouth 2 times daily  -     TYMPANOMETRY  -     OTOLARYNGOLOGY REFERRAL; Future    Type 2 diabetes mellitus with hyperglycemia, without long-term current use of insulin (H)      I prescribed a stronger potency steroid for his dermatitis and offered a referral to dermatology and also to allergy to explore whether he is allergic to something in his environment which continues to provoke this dermatitis.  For now he does not want to pursue such a referral.    I have encouraged him to continue his excellent diet and exercise and be adherent with his diabetes medications and will plan to follow-up in 6 weeks for this.    I am very concerned about his ear findings today including the possibility of a cholesteatoma.  I recommended we obtain a scan of the right ear and he is reluctant to consider this.  He will however agreed to see an ENT doctor and I will refer him.  I started him on Augmentin for now.  I will copy my notes to ENT.    Total visit time was 25 mins, all of which was face to face MD time, and over 50% of this time was spent in counseling and coordination of care.        Jason Siddiqui MD

## 2018-04-20 NOTE — MR AVS SNAPSHOT
After Visit Summary   4/20/2018    Sa KENIA Mackey    MRN: [id number]           Patient Information     Date Of Birth          1960        Visit Information        Provider Department      4/20/2018 10:00 AM Jason Siddiqui MD [facility]        Today's Diagnoses     Pompholyx    -  1    OME (otitis media with effusion), bilateral        Type 2 diabetes mellitus with hyperglycemia, without long-term current use of insulin (H)           Follow-ups after your visit        Additional Services     OTOLARYNGOLOGY REFERRAL       Patient to stop at the [place] Desk    Reason for Referral: Bilateral OME with drainage, flat tympanograms - concern for right cholesteatoma    ONLY Dr Hughes, [facility], [place]     needed: Yes  Language: Liam    May leave message on voicemail: Yes                  Follow-up notes from your care team     Return in about 6 weeks (around 6/1/2018).      Future tests that were ordered for you today     Open Future Orders        Priority Expected Expires Ordered    OTOLARYNGOLOGY REFERRAL Routine  4/20/2019 4/20/2018            Who to contact     Please call your clinic at [phone] to:    Ask questions about your health    Make or cancel appointments    Discuss your medicines    Learn about your test results    Speak to your doctor            Additional Information About Your Visit        [employer] Information     [employer] gives you secure access to your electronic health record. If you see a primary care provider, you can also send messages to your care team and make appointments. If you have questions, please call your primary care clinic.  If you do not have a primary care provider, please call [phone] and they will assist you.      [employer] is an electronic gateway that provides easy, online access to your medical records. With [employer], you can request a clinic appointment, read your test results, renew a prescription or communicate with your care  team.     To access your existing account, please contact your [facility] or call [phone] for assistance.        Care EveryWhere ID     This is your Care EveryWhere ID. This could be used by other organizations to access your [place] medical records  [id number]        Your Vitals Were     Pulse Temperature Respirations Pulse Oximetry BMI (Body Mass Index)       62 98.1  F (36.7  C) 16 95% 37.06 kg/m2        Blood Pressure from Last 3 Encounters:   04/20/18 122/85   03/12/18 159/89   04/06/17 135/86    Weight from Last 3 Encounters:   04/20/18 221 lb (100.2 kg)   03/12/18 226 lb (102.5 kg)   04/06/17 231 lb 9.6 oz (105.1 kg)              We Performed the Following     TYMPANOMETRY          Today's Medication Changes          These changes are accurate as of 4/20/18 12:34 PM.  If you have any questions, ask your nurse or doctor.               Start taking these medicines.        Dose/Directions    amoxicillin-clavulanate 875-125 MG per tablet   Commonly known as:  AUGMENTIN   Used for:  OME (otitis media with effusion), bilateral   Started by:  Jason Siddiqui MD        Dose:  1 tablet   Take 1 tablet by mouth 2 times daily   Quantity:  20 tablet   Refills:  0       augmented betamethasone dipropionate 0.05 % ointment   Commonly known as:  DIPROLENE-AF   Used for:  Pompholyx   Started by:  Jason Siddiqui MD        Apply sparingly to affected area twice daily as needed.  Do not apply to face.   Quantity:  50 g   Refills:  3         Stop taking these medicines if you haven't already. Please contact your care team if you have questions.     fluocinonide 0.05 % ointment   Commonly known as:  LIDEX   Stopped by:  Jason Siddiqui MD                Where to get your medicines      [facility] [place], [place] - [address], [place] [address]     Phone:  [phone]     amoxicillin-clavulanate 875-125 MG per tablet     augmented betamethasone dipropionate 0.05 % ointment                Primary Care Provider Office Phone # Fax #    Jason Siddiqui [phone]       [address] [address]        Equal Access to Services     ANGELA MANCIA : Chayo neumann franceso Soomaali, waaxda luqadaha, qaybta kaalmada ademichael, ale mitchellamarilys adamsharoon diamond van prado. So [facility] [place] [phone].    ATENCIÓN: Si habla español, tiene a yen disposición servicios gratuitos de asistencia lingüística. Llame al [phone].    We comply with applicable federal civil rights laws and [place] laws. We do not discriminate on the basis of race, color, national origin, age, disability, sex, sexual orientation, or gender identity.            Thank you!     Thank you for choosing [facility]  for your care. Our goal is always to provide you with excellent care. Hearing back from our patients is one way we can continue to improve our services. Please take a few minutes to complete the written survey that you may receive in the mail after your visit with us. Thank you!             Your Updated Medication List - Protect others around you: Learn how to safely use, store and throw away your medicines at www.disposemymeds.org.          This list is accurate as of 4/20/18 12:34 PM.  Always use your most recent med list.                   Brand Name Dispense Instructions for use Diagnosis    acetaminophen 500 MG tablet    TYLENOL    100 tablet    Take 2 tablets (1,000 mg) by mouth every 8 hours as needed for mild pain    Episodic tension-type headache, not intractable       Alcohol Prep Pad 70 % Pads     60 each    1 pad 2 times daily    Type 2 diabetes mellitus with hyperglycemia, without long-term current use of insulin (H)       amoxicillin-clavulanate 875-125 MG per tablet    AUGMENTIN    20 tablet    Take 1 tablet by mouth 2 times daily    OME (otitis media with effusion), bilateral       aspirin 81 MG EC tablet      90 tablet    Take 1 tablet (81 mg) by mouth daily    Type 2 diabetes mellitus without complication, without long-term current use of insulin (H)       atorvastatin 80 MG tablet    LIPITOR    93 tablet    Take 1 tablet (80 mg) by mouth daily    Type 2 diabetes mellitus without complication, without long-term current use of insulin (H)       augmented betamethasone dipropionate 0.05 % ointment    DIPROLENE-AF    50 g    Apply sparingly to affected area twice daily as needed.  Do not apply to face.    Pompholyx       blood glucose lancets standard    no brand specified    100 each    Use to test blood sugar 3 times daily or as directed.    Type 2 diabetes mellitus with hyperglycemia, without long-term current use of insulin (H)       * blood glucose monitoring test strip    no brand specified    3 Box    Use up to 3 times a day    Type 2 diabetes mellitus with hyperglycemia, without long-term current use of insulin (H)       * blood glucose monitoring test strip    no brand specified    100 strip    Use to test blood sugars 3 times daily or as directed    Type 2 diabetes mellitus with hyperglycemia, without long-term current use of insulin (H)       carboxymethylcellulose 0.5 % Soln ophthalmic solution    REFRESH PLUS    1 Bottle    Place 1 drop into both eyes daily as needed for dry eyes    Dry eyes       febuxostat 40 MG Tabs tablet    ULORIC    93 tablet    Take 1 tablet daily    Idiopathic chronic gout of multiple sites without tophus       glipiZIDE 10 MG 24 hr tablet    GLUCOTROL XL    93 tablet    Take 1 tablet (10 mg) by mouth daily    Type 2 diabetes mellitus without complication, unspecified long term insulin use status (H)       ibuprofen 800 MG tablet    ADVIL/MOTRIN    60 tablet    Take 1 tablet (800 mg) by mouth every 8 hours as needed for moderate pain (do not take every day)    Pain in joint, upper arm, right       lisinopril 40 MG tablet    PRINIVIL/ZESTRIL    93 tablet    Take 1 tablet (40 mg) by  mouth daily    Essential hypertension with goal blood pressure less than 140/90       metFORMIN 500 MG 24 hr tablet    GLUCOPHAGE-XR    360 tablet    Take 2 tablets (1,000 mg) by mouth 2 times daily (with meals)    Type 2 diabetes mellitus with hyperglycemia, without long-term current use of insulin (H)       ONETOUCH BASIC SYSTEM w/Device Kit     1 kit    1 kit daily    Type 2 diabetes mellitus with hyperglycemia, without long-term current use of insulin (H)       order for DME      (Continuous Positive Airway Pressure) nightly        polyethylene glycol Packet    MIRALAX    238 g    Miralax powder 8.3 oz bottle (238 gm) as directed    Routine general medical examination at a health care facility       sitagliptin 100 MG tablet    JANUVIA    93 tablet    Take 1 tablet (100 mg) by mouth daily    Type 2 diabetes mellitus without complication, unspecified long term insulin use status (H)       VASELINE CONSTANT CARE Oint     1 Tube    Externally apply topically 3 times daily    Dermatitis       vitamin D 2000 units tablet     30 tablet    Take 2,000 Units by mouth daily    Hypovitaminosis D       * Notice:  This list has 2 medication(s) that are the same as other medications prescribed for you. Read the directions carefully, and ask your doctor or other care provider to review them with you.

## 2018-04-20 NOTE — PROGRESS NOTES
This is a 57-year-old male who is well known to me.  He attends for a number of reasons:  1.  He continues to have an eruption on the fingers of both hands.  He has been using a high potency steroid ointment which does help but this symptoms return.  He does not have any similar eruption on his feet.  He cannot identify any particular triggers and does not believe he is allergic to any environmental products.    2.  He was very happy with the nutrition referral and has implemented many of the recommendations.  He brings with him his glucose meter today to show that his readings for the past 2 weeks are consistently between 60 and 150.  Given that his last A1c was over 11, he is congratulated for having made these significant changes.  He verifies that he is taking 3 anti-diabetes medications.    #3 he reports that he has some burning behind his right ear ×2 weeks.  He mentions that he notices some drainage from both ears and this is a long-standing symptom.  He recalls that he had been referred to ENT several years ago for decreased hearing but apparently hearing was better when tested at their clinic and he does not recall any recommendations or follow-up.  He knows that he had ear infections when he was a child in [place].    Objective:  /85  Pulse 62  Temp 98.1  F (36.7  C)  Resp 16  Wt 221 lb (100.2 kg)  SpO2 95%  BMI 37.06 kg/m2    His blood pressure is excellent and he is congratulated for this also.  He has lost about 10 pounds since last visit    Examination of his fingers reveals an eruption consistent with dyshidrotic eczema particularly in the webspaces between fingers.    His ears are examined and show concerning effusions bilaterally with some evidence of purulence in 1 of the canals consistent with chronic suppurative otitis media with effusion.  In addition he has some tenderness in the right mastoid which raises a concern for cholesteatoma.  He is unsure how long the ear problems have  persisted.    I obtained a tympanogram which verifies decreased movement of both tympanic membranes consistent with this diagnosis.    Sa was seen today for shoulder right.    Diagnoses and all orders for this visit:    Pompholyx  -     augmented betamethasone dipropionate (DIPROLENE-AF) 0.05 % ointment; Apply sparingly to affected area twice daily as needed.  Do not apply to face.    OME (otitis media with effusion), bilateral  -     amoxicillin-clavulanate (AUGMENTIN) 875-125 MG per tablet; Take 1 tablet by mouth 2 times daily  -     TYMPANOMETRY  -     OTOLARYNGOLOGY REFERRAL; Future    Type 2 diabetes mellitus with hyperglycemia, without long-term current use of insulin (H)      I prescribed a stronger potency steroid for his dermatitis and offered a referral to dermatology and also to allergy to explore whether he is allergic to something in his environment which continues to provoke this dermatitis.  For now he does not want to pursue such a referral.    I have encouraged him to continue his excellent diet and exercise and be adherent with his diabetes medications and will plan to follow-up in 6 weeks for this.    I am very concerned about his ear findings today including the possibility of a cholesteatoma.  I recommended we obtain a scan of the right ear and he is reluctant to consider this.  He will however agreed to see an ENT doctor and I will refer him.  I started him on Augmentin for now.  I will copy my notes to ENT.    Total visit time was 25 mins, all of which was face to face MD time, and over 50% of this time was spent in counseling and coordination of care.

## 2018-04-24 NOTE — PATIENT INSTRUCTIONS
[facility]   [address], [place] [address]  Phone: [phone]    Appointment  Date:5/8/18  Time: 10:15am arrival      Please contact the above clinic if you need to cancel or reschedule. Feel free to contact me with any questions. Thanks!    Aniya  Care Coordinator  [phone]  Gave to Taye Ferrell

## 2018-04-26 NOTE — TELEPHONE ENCOUNTER
Chalino from [facility] is calling back she needs these orders fax tho them she has been waiting for two to three weeks fax number

## 2018-04-26 NOTE — TELEPHONE ENCOUNTER
Return call from therapist she states that the patient is overall doing well without any new symptoms. She states that his strength and motion in his arm is better but the dilemma is that the patient continues to have pain and because of that she would like for him to see one of their OT specialist who leads a pain science education program to help with his pain. She states that it is a different skill than what she has or able to perform. She feel that the patient will benefit from the program but a referral would be required. /MANJU Young  Routed to Dr. Siddiqui and Referrals

## 2018-04-26 NOTE — TELEPHONE ENCOUNTER
It looks like Dr. Siddiqui is requesting that we address why this is needed as it has not been discussed in the past.   Triage, do you feel it would be appropriate to evaluate symptoms or should they come in for an office visit?  Please advise.

## 2018-04-30 ENCOUNTER — AMBULATORY - HEALTHEAST (OUTPATIENT)
Dept: ADMINISTRATIVE | Facility: REHABILITATION | Age: 58
End: 2018-04-30

## 2018-04-30 DIAGNOSIS — M79.601 CHRONIC PAIN OF RIGHT UPPER EXTREMITY: ICD-10-CM

## 2018-04-30 DIAGNOSIS — G89.29 CHRONIC PAIN OF RIGHT UPPER EXTREMITY: ICD-10-CM

## 2018-04-30 NOTE — TELEPHONE ENCOUNTER
Referral sent to [facility]  Phone: [phone]  Fax: [phone]  Clinic will contact patient to schedule  es/April 30, 2018

## 2018-05-03 ENCOUNTER — OFFICE VISIT - HEALTHEAST (OUTPATIENT)
Dept: PHYSICAL THERAPY | Facility: REHABILITATION | Age: 58
End: 2018-05-03

## 2018-05-03 DIAGNOSIS — M62.81 GENERALIZED MUSCLE WEAKNESS: ICD-10-CM

## 2018-05-03 DIAGNOSIS — R29.3 POOR POSTURE: ICD-10-CM

## 2018-05-03 DIAGNOSIS — M25.511 CHRONIC RIGHT SHOULDER PAIN: ICD-10-CM

## 2018-05-03 DIAGNOSIS — M25.811 SHOULDER IMPINGEMENT, RIGHT: ICD-10-CM

## 2018-05-03 DIAGNOSIS — G89.29 CHRONIC RIGHT SHOULDER PAIN: ICD-10-CM

## 2018-05-03 DIAGNOSIS — M79.18 MYOFASCIAL PAIN: ICD-10-CM

## 2018-05-03 DIAGNOSIS — M25.521 RIGHT ELBOW PAIN: ICD-10-CM

## 2018-05-11 ENCOUNTER — OFFICE VISIT (OUTPATIENT)
Dept: FAMILY MEDICINE | Facility: CLINIC | Age: 58
End: 2018-05-11
Payer: COMMERCIAL

## 2018-05-11 VITALS
SYSTOLIC BLOOD PRESSURE: 145 MMHG | TEMPERATURE: 97.9 F | BODY MASS INDEX: 38.6 KG/M2 | DIASTOLIC BLOOD PRESSURE: 83 MMHG | OXYGEN SATURATION: 97 % | WEIGHT: 230.2 LBS | HEART RATE: 68 BPM

## 2018-05-11 DIAGNOSIS — M76.32 ILIOTIBIAL BAND SYNDROME, LEFT: Primary | ICD-10-CM

## 2018-05-11 RX ORDER — IBUPROFEN 800 MG/1
800 TABLET, FILM COATED ORAL EVERY 8 HOURS PRN
Qty: 60 TABLET | Refills: 1 | Status: SHIPPED | OUTPATIENT
Start: 2018-05-11 | End: 2018-07-25

## 2018-05-11 NOTE — PROGRESS NOTES
"       SUBJECTIVE       Sa T Key is a 57 year old  male with a PMH significant for:     Patient Active Problem List   Diagnosis     Health Care Home     Male erectile disorder     Obstructive sleep apnea     Posttraumatic stress disorder     Testicular hypofunction     Gout     Hyperlipidemia LDL goal <100     Lesion of ulnar nerve     Hypovitaminosis D     Allergic rhinitis     H/O exploratory laparotomy     Chronic gout without tophus, unspecified cause, unspecified site     Dyshidrotic eczema     Essential hypertension with goal blood pressure less than 140/90     Severe episode of recurrent major depressive disorder (H)     Type 2 diabetes mellitus with hyperglycemia, without long-term current use of insulin (H)     He presents with L thigh pain. In the past used to have \"ama horses\" of the thigh. Also has had numbness in this region, over the outside of his L thigh. Numbness has been there 1-2 months. No injury at the outset. Progressed to pain when the patient started exercising, walking outside. Walking and touching the outside of the leg worse. It is hard to sleep on the L side. Has not tried anything to make the pain better.     PMH, Medications and Allergies were reviewed and updated as needed.        REVIEW OF SYSTEMS     See HPI        OBJECTIVE     Vitals:    05/11/18 1554 05/11/18 1557   BP: 162/89 145/83   Pulse: 68 68   Temp: 97.9  F (36.6  C)    TempSrc: Oral    SpO2: 97%    Weight: 230 lb 3.2 oz (104.4 kg)      Body mass index is 38.6 kg/(m^2).    General appearance: Well-appearing adult male.  Alert, oriented and appropriate.  No acute distress  HEENT: Mucous membranes moist  CV: Regular rate and rhythm, no rubs, murmurs or extra heart sounds  Pulmonary: Clear to auscultation bilaterally.  No wheezes, rales or rhonchi  MSK: No tenderness along the cervical, thoracic or lumbar spinous processes.  No tenderness of the paraspinous musculature, SI joints or greater trochanters.  Patient is " tender to palpation over the left iliotibial band distal to the greater trochanter, extending toward the knee.  There is normal active range of motion and strength of the hip and knee    No results found for this or any previous visit (from the past 24 hour(s)).        ASSESSMENT AND PLAN     1. Iliotibial band syndrome, left  Suspect that this is secondary to rapid acceleration of activity in a short amount of time.  Patient states that he has not been active for some time prior to extended walking just before the onset of pain.  No evidence of low back involvement.  Discussed relative rest from activities that exacerbate pain for now.  Ice and/or heat coupled with anti-inflammatory medications for discomfort.  Patient referred to physical therapy as well.  - PHYSICAL THERAPY REFERRAL; Future      RTC in 4-6 weeks for follow up of iliotibial band syndrome or sooner if develops new or worsening symptoms.    Latanya Grimm

## 2018-05-11 NOTE — NURSING NOTE
Due to patient being non-English speaking/uses sign language, an  was used for this visit. Only for face-to-face interpretation by an external agency, date and length of interpretation can be found on the scanned worksheet.     name: Taye Ferrell  Agency: [employer]  Language: Liam   Telephone number: [phone]  Type of interpretation: Face-to-face, spoken

## 2018-05-11 NOTE — PROGRESS NOTES
Preceptor Attestation:   Patient seen, evaluated and discussed with the resident. I have verified the content of the note, which accurately reflects my assessment of the patient and the plan of care.   Supervising Physician:  Magen Black MD

## 2018-05-11 NOTE — MR AVS SNAPSHOT
After Visit Summary   5/11/2018    Sa KENIA Mackey    MRN: [id number]           Patient Information     Date Of Birth          1960        Visit Information        Provider Department      5/11/2018 3:30 PM Latanya Grimm MD [facility]        Today's Diagnoses     Iliotibial band syndrome, left    -  1       Follow-ups after your visit        Additional Services     PHYSICAL THERAPY REFERRAL       PT/OT REFERRAL  Sa KENIA Mackey  1960  Phone #: [phone] (home)    needed: Yes  Language: Liam    PT/OT  Facility:   [facility], P: [phone], F: [phone]    History: L IT band pain after sudden increase in physical activity    Precautions/Contraindications: None    Imaging Studies: None    Surgical Procedure/Test Results: None    Treatment Goals:   Increase: Flexibility and Strength  Decrease: Pain    Prognosis: good    Visits: Up to 10    Evaluate: Evaluate and treat    Plan: Manual Therapy, Flexibility Exercise and Strength Exercise                  Future tests that were ordered for you today     Open Future Orders        Priority Expected Expires Ordered    PHYSICAL THERAPY REFERRAL Routine  5/11/2019 5/11/2018            Who to contact     Please call your clinic at [phone] to:    Ask questions about your health    Make or cancel appointments    Discuss your medicines    Learn about your test results    Speak to your doctor            Additional Information About Your Visit        [employer] Information     [employer] gives you secure access to your electronic health record. If you see a primary care provider, you can also send messages to your care team and make appointments. If you have questions, please call your primary care clinic.  If you do not have a primary care provider, please call [phone] and they will assist you.      [employer] is an electronic gateway that provides easy, online access to your medical records. With [employer], you can  request a clinic appointment, read your test results, renew a prescription or communicate with your care team.     To access your existing account, please contact your [facility] or call [phone] for assistance.        Care EveryWhere ID     This is your Care EveryWhere ID. This could be used by other organizations to access your [place] medical records  [id number]        Your Vitals Were     Pulse Temperature Pulse Oximetry BMI (Body Mass Index)          68 97.9  F (36.6  C) (Oral) 97% 38.6 kg/m2         Blood Pressure from Last 3 Encounters:   05/11/18 145/83   04/20/18 122/85   03/12/18 159/89    Weight from Last 3 Encounters:   05/11/18 230 lb 3.2 oz (104.4 kg)   04/20/18 221 lb (100.2 kg)   03/12/18 226 lb (102.5 kg)                 Where to get your medicines      These medications were sent to [facility] - [place], [place] - [address], [place] [address]     Phone:  [phone]     ibuprofen 800 MG tablet          Primary Care Provider Office Phone # Fax #    Jason Siddiqui [phone]       [address] [place] [address]        Equal Access to Services     ANGELA MANCIA : Hadii shannan neumann hadasho Soomaali, waaxda luqadaha, qaybta kaalmada adeegyada, ale prado. So [facility] [phone].    ATENCIÓN: Si habla español, tiene a yen disposición servicios gratuitos de asistencia lingüística. Llame al [phone].    We comply with applicable federal civil rights laws and [place] laws. We do not discriminate on the basis of race, color, national origin, age, disability, sex, sexual orientation, or gender identity.            Thank you!     Thank you for choosing [facility]  for your care. Our goal is always to provide you with excellent care. Hearing back from our patients is one way we can continue to improve our services. Please take a few minutes to complete the written survey that you may  receive in the mail after your visit with us. Thank you!             Your Updated Medication List - Protect others around you: Learn how to safely use, store and throw away your medicines at www.disposemymeds.org.          This list is accurate as of 5/11/18  4:31 PM.  Always use your most recent med list.                   Brand Name Dispense Instructions for use Diagnosis    acetaminophen 500 MG tablet    TYLENOL    100 tablet    Take 2 tablets (1,000 mg) by mouth every 8 hours as needed for mild pain    Episodic tension-type headache, not intractable       Alcohol Prep Pad 70 % Pads     60 each    1 pad 2 times daily    Type 2 diabetes mellitus with hyperglycemia, without long-term current use of insulin (H)       amoxicillin-clavulanate 875-125 MG per tablet    AUGMENTIN    20 tablet    Take 1 tablet by mouth 2 times daily    OME (otitis media with effusion), bilateral       aspirin 81 MG EC tablet     90 tablet    Take 1 tablet (81 mg) by mouth daily    Type 2 diabetes mellitus without complication, without long-term current use of insulin (H)       atorvastatin 80 MG tablet    LIPITOR    93 tablet    Take 1 tablet (80 mg) by mouth daily    Type 2 diabetes mellitus without complication, without long-term current use of insulin (H)       augmented betamethasone dipropionate 0.05 % ointment    DIPROLENE-AF    50 g    Apply sparingly to affected area twice daily as needed.  Do not apply to face.    Pompholyx       blood glucose lancets standard    no brand specified    100 each    Use to test blood sugar 3 times daily or as directed.    Type 2 diabetes mellitus with hyperglycemia, without long-term current use of insulin (H)       * blood glucose monitoring test strip    no brand specified    3 Box    Use up to 3 times a day    Type 2 diabetes mellitus with hyperglycemia, without long-term current use of insulin (H)       * blood glucose monitoring test strip    no brand specified    100 strip    Use to test blood  sugars 3 times daily or as directed    Type 2 diabetes mellitus with hyperglycemia, without long-term current use of insulin (H)       carboxymethylcellulose 0.5 % Soln ophthalmic solution    REFRESH PLUS    1 Bottle    Place 1 drop into both eyes daily as needed for dry eyes    Dry eyes       febuxostat 40 MG Tabs tablet    ULORIC    93 tablet    Take 1 tablet daily    Idiopathic chronic gout of multiple sites without tophus       glipiZIDE 10 MG 24 hr tablet    GLUCOTROL XL    93 tablet    Take 1 tablet (10 mg) by mouth daily    Type 2 diabetes mellitus without complication, unspecified long term insulin use status (H)       ibuprofen 800 MG tablet    ADVIL/MOTRIN    60 tablet    Take 1 tablet (800 mg) by mouth every 8 hours as needed for moderate pain (do not take every day)        lisinopril 40 MG tablet    PRINIVIL/ZESTRIL    93 tablet    Take 1 tablet (40 mg) by mouth daily    Essential hypertension with goal blood pressure less than 140/90       metFORMIN 500 MG 24 hr tablet    GLUCOPHAGE-XR    360 tablet    Take 2 tablets (1,000 mg) by mouth 2 times daily (with meals)    Type 2 diabetes mellitus with hyperglycemia, without long-term current use of insulin (H)       ONETOUCH BASIC SYSTEM w/Device Kit     1 kit    1 kit daily    Type 2 diabetes mellitus with hyperglycemia, without long-term current use of insulin (H)       order for DME      (Continuous Positive Airway Pressure) nightly        polyethylene glycol Packet    MIRALAX    238 g    Miralax powder 8.3 oz bottle (238 gm) as directed    Routine general medical examination at a health care facility       sitagliptin 100 MG tablet    JANUVIA    93 tablet    Take 1 tablet (100 mg) by mouth daily    Type 2 diabetes mellitus without complication, unspecified long term insulin use status (H)       VASELINE CONSTANT CARE Oint     1 Tube    Externally apply topically 3 times daily    Dermatitis       vitamin D 2000 units tablet     30 tablet    Take 2,000  Units by mouth daily    Hypovitaminosis D       * Notice:  This list has 2 medication(s) that are the same as other medications prescribed for you. Read the directions carefully, and ask your doctor or other care provider to review them with you.

## 2018-05-15 NOTE — PATIENT INSTRUCTIONS
Referral sent to [facility]  Phone: [phone]  Fax: [phone]  Clinic will contact patient to schedule  es/May 15, 2018    PHYSICAL THERAPY REFERRAL APPOINTMENT:  [facility]  [address]  [place] [address]  [phone]  Date:  Monday August 13, 2018  Time:  8:00 AM   has been requested for this appointment.  Given to patient.  Cecilia Amaral  8/2/18

## 2018-05-16 ENCOUNTER — AMBULATORY - HEALTHEAST (OUTPATIENT)
Dept: ADMINISTRATIVE | Facility: REHABILITATION | Age: 58
End: 2018-05-16

## 2018-05-16 DIAGNOSIS — M76.32 IT BAND SYNDROME, LEFT: ICD-10-CM

## 2018-05-29 ENCOUNTER — TRANSFERRED RECORDS (OUTPATIENT)
Dept: HEALTH INFORMATION MANAGEMENT | Facility: CLINIC | Age: 58
End: 2018-05-29

## 2018-07-25 DIAGNOSIS — M1A.09X0 IDIOPATHIC CHRONIC GOUT OF MULTIPLE SITES WITHOUT TOPHUS: Primary | ICD-10-CM

## 2018-07-25 RX ORDER — IBUPROFEN 800 MG/1
800 TABLET, FILM COATED ORAL EVERY 8 HOURS PRN
Qty: 60 TABLET | Refills: 1 | Status: SHIPPED | OUTPATIENT
Start: 2018-07-25 | End: 2019-02-14

## 2018-08-02 ENCOUNTER — OFFICE VISIT (OUTPATIENT)
Dept: FAMILY MEDICINE | Facility: CLINIC | Age: 58
End: 2018-08-02
Payer: COMMERCIAL

## 2018-08-02 VITALS
BODY MASS INDEX: 38.1 KG/M2 | HEART RATE: 58 BPM | WEIGHT: 227.2 LBS | TEMPERATURE: 97.8 F | DIASTOLIC BLOOD PRESSURE: 94 MMHG | RESPIRATION RATE: 16 BRPM | OXYGEN SATURATION: 96 % | SYSTOLIC BLOOD PRESSURE: 154 MMHG

## 2018-08-02 DIAGNOSIS — E11.65 TYPE 2 DIABETES MELLITUS WITH HYPERGLYCEMIA, WITHOUT LONG-TERM CURRENT USE OF INSULIN (H): ICD-10-CM

## 2018-08-02 DIAGNOSIS — H65.91 OME (OTITIS MEDIA WITH EFFUSION), RIGHT: ICD-10-CM

## 2018-08-02 DIAGNOSIS — M1A.0790 IDIOPATHIC CHRONIC GOUT OF ANKLE WITHOUT TOPHUS, UNSPECIFIED LATERALITY: ICD-10-CM

## 2018-08-02 DIAGNOSIS — E11.65 TYPE 2 DIABETES MELLITUS WITH HYPERGLYCEMIA, WITHOUT LONG-TERM CURRENT USE OF INSULIN (H): Primary | ICD-10-CM

## 2018-08-02 DIAGNOSIS — E11.9 TYPE 2 DIABETES MELLITUS WITHOUT COMPLICATION, UNSPECIFIED LONG TERM INSULIN USE STATUS: ICD-10-CM

## 2018-08-02 DIAGNOSIS — M1A.09X0 IDIOPATHIC CHRONIC GOUT OF MULTIPLE SITES WITHOUT TOPHUS: ICD-10-CM

## 2018-08-02 DIAGNOSIS — I10 ESSENTIAL HYPERTENSION WITH GOAL BLOOD PRESSURE LESS THAN 140/90: ICD-10-CM

## 2018-08-02 DIAGNOSIS — G57.12 MERALGIA PARAESTHETICA, LEFT: ICD-10-CM

## 2018-08-02 DIAGNOSIS — E11.9 TYPE 2 DIABETES MELLITUS WITHOUT COMPLICATION, WITHOUT LONG-TERM CURRENT USE OF INSULIN (H): ICD-10-CM

## 2018-08-02 DIAGNOSIS — M76.32 ILIOTIBIAL BAND SYNDROME, LEFT: Primary | ICD-10-CM

## 2018-08-02 LAB
CHOLEST SERPL-MCNC: 169.3 MG/DL (ref 0–200)
CHOLEST/HDLC SERPL: 4.4 {RATIO} (ref 0–5)
HBA1C MFR BLD: 6.8 % (ref 4.1–5.7)
HDLC SERPL-MCNC: 38.7 MG/DL
LDLC SERPL CALC-MCNC: 80 MG/DL (ref 0–129)
TRIGL SERPL-MCNC: 250.9 MG/DL (ref 0–150)
URATE SERPL-MCNC: 8.1 MG/DL (ref 3–8)
VLDL CHOLESTEROL: 50.2 MG/DL (ref 7–32)

## 2018-08-02 RX ORDER — FEBUXOSTAT 40 MG/1
TABLET, FILM COATED ORAL
Qty: 93 TABLET | Refills: 1 | Status: SHIPPED | OUTPATIENT
Start: 2018-08-02 | End: 2019-02-14

## 2018-08-02 RX ORDER — LISINOPRIL 40 MG/1
40 TABLET ORAL DAILY
Qty: 93 TABLET | Refills: 1 | Status: SHIPPED | OUTPATIENT
Start: 2018-08-02 | End: 2019-02-14

## 2018-08-02 RX ORDER — METFORMIN HCL 500 MG
1000 TABLET, EXTENDED RELEASE 24 HR ORAL 2 TIMES DAILY WITH MEALS
Qty: 360 TABLET | Refills: 1 | Status: SHIPPED | OUTPATIENT
Start: 2018-08-02 | End: 2019-02-14

## 2018-08-02 RX ORDER — GLIPIZIDE 10 MG/1
10 TABLET, FILM COATED, EXTENDED RELEASE ORAL DAILY
Qty: 93 TABLET | Refills: 1 | Status: SHIPPED | OUTPATIENT
Start: 2018-08-02 | End: 2019-02-14

## 2018-08-02 RX ORDER — ATORVASTATIN CALCIUM 80 MG/1
80 TABLET, FILM COATED ORAL DAILY
Qty: 93 TABLET | Refills: 1 | Status: SHIPPED | OUTPATIENT
Start: 2018-08-02 | End: 2019-02-14

## 2018-08-02 NOTE — PATIENT INSTRUCTIONS
We have referred you again to PHYSICAL THERAPY for left leg pain (IT band syndrome - information below)  Take your gout, blood pressure and diabetes medications.  Please bring ALL your medications with you to the next appointment.  I have refilled all your pills, including ULORIC which you should be taking daily.   Follow up with ENT doctor as discussed to get procedure to right ear.  Take care - see you in 3 months   Understanding Iliotibial Band Syndrome  Iliotibial band syndrome, or IT band syndrome, is a condition that causes pain on the outside of the knee. It most often occurs in athletes, especially long-distance runners. It can also happen if you cycle, ski, row, or play soccer. It can also occur in people who are just starting to exercise.  What is the IT band?  The iliotibial (IT) band is a strong, thick band of tissue that runs down the outside of your thigh. It goes all the way from your hipbones to the top of your shinbone (tibia), just below your knee joint. The bones of your knee joint include your tibia, your thighbone (femur), and your kneecap (patella).  When you bend and straighten your leg, the IT band moves over the outer lower edge of your femur. Over time, bending and straightening your leg can cause the IT band to irritate nearby tissues and cause pain.    Follow up with ENT doctor as discussed to get procedure to right eqar  What causes IT band syndrome?  Researchers are still learning the exact cause of IT band syndrome. The pain may be caused by the IT band rubbing over the lower outer edge of the femur. This may cause inflammation in the bone, tendons, and small fluid-filled sacs (bursa) in the area. The IT band may also compress the tissue under it and cause pain.  If you are a runner, you might be more likely to develop IT band syndrome if:    You run on uneven or downhill terrain    You run on worn-out shoes    You run many miles per day    Your legs slope a little inward from your  knee to your ankle (bowlegs)  What are the symptoms of IT band syndrome?  IT band syndrome causes pain on the outside of the knee or side of the thigh. It might affect one or both of your knees. The pain is an aching, burning feeling that can spread up the thigh to the hip. You may feel this pain only when you exercise, such as while running. The pain may be worst right after you step on your foot. It may only happen near the end of your exercise. As the condition gets worse, pain may start earlier and continue after you have stopped exercising. Going up and down the stairs may make the pain worse.  How is IT band syndrome diagnosed?  Your doctor will ask about your health history and your symptoms. He or she will give you a physical exam. This will include an exam of your knee. The range of motion and strength of your knee will be tested. The doctor will look for areas of pain around your knee, thigh, and hip. The symptoms of IT band syndrome can be like those of osteoarthritis or a meniscal tear. Your doctor will need to make sure IT band syndrome is the cause of your symptoms. If the diagnosis is not clear, you may need imaging tests. These can include an X-ray or MRI scan.  Date Last Reviewed: 4/1/2017 2000-2017 [employer]. [address] [place] [address], [place] [address]. All rights reserved. This information is not intended as a substitute for professional medical care. Always follow your healthcare professional's instructions.

## 2018-08-02 NOTE — LETTER
August 6, 2018       T Key  [address]  [place] [address]        Dear ,  Congratulations!  Your average sugar test (A1C) is at goal! (see recent results below).  It's the lowest number it's been for several years!  Keep up the good work Sa Than - you had told me that your diet was better - it shows, it's working - this is fantastic news!     Also your bad cholesterol LDL is at a low number - this is also good!     Your uric acid (gout) test is mildly increased - this should have been reduced by restarting Uloric.     See you in about 3 months!     Lab Results        Component                Value               Date                        A1C                      6.8                 08/02/2018                  A1C                      11.9                03/12/2018                  A1C                      10.2                02/27/2017                  A1C                      10.2                11/04/2016                  A1C                      12.0                07/28/2016             Please see below for your test results.    Resulted Orders   Hemoglobin A1c ([facility])   Result Value Ref Range    Hemoglobin A1C 6.8 (H) 4.1 - 5.7 %   Uric Acid ([facility])   Result Value Ref Range    Uric Acid 8.1 (H) 3.0 - 8.0 mg/dL    Narrative    Test performed by:  [facility]  [address] [place] [address]   Lipid Panel ([facility])   Result Value Ref Range    Cholesterol 169.3 0.0 - 200.0 mg/dL    Cholesterol/HDL Ratio 4.4 0.0 - 5.0    HDL Cholesterol 38.7 (L) >40.0 mg/dL    LDL Cholesterol Calculated 80 0 - 129 mg/dL    Triglycerides 250.9 (H) 0.0 - 150.0 mg/dL    VLDL Cholesterol 50.2 (H) 7.0 - 32.0 mg/dL       If you have any questions, please call the clinic to make an appointment.    Sincerely,    Jason Siddiqui MD

## 2018-08-02 NOTE — MR AVS SNAPSHOT
After Visit Summary   8/2/2018    Sa KENIA Mackey    MRN: [id number]           Patient Information     Date Of Birth          1960        Visit Information        Provider Department      8/2/2018 8:40 AM Jason Siddiqui MD [facility]        Today's Diagnoses     Iliotibial band syndrome, left    -  1    Type 2 diabetes mellitus with hyperglycemia, without long-term current use of insulin (H)        Idiopathic chronic gout of ankle without tophus, unspecified laterality        Meralgia paraesthetica, left        Type 2 diabetes mellitus without complication, without long-term current use of insulin (H)        Idiopathic chronic gout of multiple sites without tophus        Type 2 diabetes mellitus without complication, unspecified long term insulin use status (H)        Essential hypertension with goal blood pressure less than 140/90        OME (otitis media with effusion), right          Care Instructions    We have referred you again to PHYSICAL THERAPY for left leg pain (IT band syndrome - information below)  Take your gout, blood pressure and diabetes medications.  Please bring ALL your medications with you to the next appointment.  I have refilled all your pills, including ULORIC which you should be taking daily.   Follow up with ENT doctor as discussed to get procedure to right ear.  Take care - see you in 3 months   Understanding Iliotibial Band Syndrome  Iliotibial band syndrome, or IT band syndrome, is a condition that causes pain on the outside of the knee. It most often occurs in athletes, especially long-distance runners. It can also happen if you cycle, ski, row, or play soccer. It can also occur in people who are just starting to exercise.  What is the IT band?  The iliotibial (IT) band is a strong, thick band of tissue that runs down the outside of your thigh. It goes all the way from your hipbones to the top of your shinbone (tibia), just below your knee joint. The bones of your knee  joint include your tibia, your thighbone (femur), and your kneecap (patella).  When you bend and straighten your leg, the IT band moves over the outer lower edge of your femur. Over time, bending and straightening your leg can cause the IT band to irritate nearby tissues and cause pain.    Follow up with ENT doctor as discussed to get procedure to right eqar  What causes IT band syndrome?  Researchers are still learning the exact cause of IT band syndrome. The pain may be caused by the IT band rubbing over the lower outer edge of the femur. This may cause inflammation in the bone, tendons, and small fluid-filled sacs (bursa) in the area. The IT band may also compress the tissue under it and cause pain.  If you are a runner, you might be more likely to develop IT band syndrome if:    You run on uneven or downhill terrain    You run on worn-out shoes    You run many miles per day    Your legs slope a little inward from your knee to your ankle (bowlegs)  What are the symptoms of IT band syndrome?  IT band syndrome causes pain on the outside of the knee or side of the thigh. It might affect one or both of your knees. The pain is an aching, burning feeling that can spread up the thigh to the hip. You may feel this pain only when you exercise, such as while running. The pain may be worst right after you step on your foot. It may only happen near the end of your exercise. As the condition gets worse, pain may start earlier and continue after you have stopped exercising. Going up and down the stairs may make the pain worse.  How is IT band syndrome diagnosed?  Your doctor will ask about your health history and your symptoms. He or she will give you a physical exam. This will include an exam of your knee. The range of motion and strength of your knee will be tested. The doctor will look for areas of pain around your knee, thigh, and hip. The symptoms of IT band syndrome can be like those of osteoarthritis or a meniscal tear.  Your doctor will need to make sure IT band syndrome is the cause of your symptoms. If the diagnosis is not clear, you may need imaging tests. These can include an X-ray or MRI scan.  Date Last Reviewed: 4/1/2017 2000-2017 [employer]. [address] [place] [address]. All rights reserved. This information is not intended as a substitute for professional medical care. Always follow your healthcare professional's instructions.                Follow-ups after your visit        Follow-up notes from your care team     Return in about 3 months (around 11/2/2018), or if symptoms worsen or fail to improve.      Who to contact     Please call your clinic at [phone] to:    Ask questions about your health    Make or cancel appointments    Discuss your medicines    Learn about your test results    Speak to your doctor            Additional Information About Your Visit        [employer] Information     [employer] gives you secure access to your electronic health record. If you see a primary care provider, you can also send messages to your care team and make appointments. If you have questions, please call your primary care clinic.  If you do not have a primary care provider, please call [phone] and they will assist you.      [employer] is an electronic gateway that provides easy, online access to your medical records. With [employer], you can request a clinic appointment, read your test results, renew a prescription or communicate with your care team.     To access your existing account, please contact your [facility] or call [phone] for assistance.        Care EveryWhere ID     This is your Care EveryWhere ID. This could be used by other organizations to access your [facility] medical records  [id number]        Your Vitals Were     Pulse Temperature Respirations Pulse Oximetry BMI (Body Mass Index)       58 97.8  F (36.6  C) (Oral) 16 96% 38.1 kg/m2        Blood  Pressure from Last 3 Encounters:   08/02/18 (!) 154/94   05/11/18 145/83   04/20/18 122/85    Weight from Last 3 Encounters:   08/02/18 227 lb 3.2 oz (103.1 kg)   05/11/18 230 lb 3.2 oz (104.4 kg)   04/20/18 221 lb (100.2 kg)              We Performed the Following     Hemoglobin A1c ([facility] )     Lipid Panel ([facility])     Uric Acid ([facility])          Today's Medication Changes          These changes are accurate as of 8/2/18  9:25 AM.  If you have any questions, ask your nurse or doctor.               Stop taking these medicines if you haven't already. Please contact your care team if you have questions.     amoxicillin-clavulanate 875-125 MG per tablet   Commonly known as:  AUGMENTIN   Stopped by:  Jason Siddiqui MD                Where to get your medicines      These medications were sent to [facility] - [place], [place] - [address], [place] [address]     Phone:  [phone]     atorvastatin 80 MG tablet    febuxostat 40 MG Tabs tablet    glipiZIDE 10 MG 24 hr tablet    lisinopril 40 MG tablet    metFORMIN 500 MG 24 hr tablet    sitagliptin 100 MG tablet                Primary Care Provider Office Phone # Fax #    Jason Siddiqui [phone]       [address] [place] [address]        Equal Access to Services     ANGELA MANCIA AH: Hadii shannan ku hadasho Soomaali, waaxda luqadaha, qaybta kaalmada adeegyada, ale prado. So [facility] [phone].    ATENCIÓN: Si habla español, tiene a yen disposición servicios gratuitos de asistencia lingüística. Lucila al [phone].    We comply with applicable federal civil rights laws and [place] laws. We do not discriminate on the basis of race, color, national origin, age, disability, sex, sexual orientation, or gender identity.            Thank you!     Thank you for choosing [facility]  for your care. Our goal is always to provide you with excellent care. Hearing back from our patients is one  way we can continue to improve our services. Please take a few minutes to complete the written survey that you may receive in the mail after your visit with us. Thank you!             Your Updated Medication List - Protect others around you: Learn how to safely use, store and throw away your medicines at www.disposemymeds.org.          This list is accurate as of 8/2/18  9:25 AM.  Always use your most recent med list.                   Brand Name Dispense Instructions for use Diagnosis    acetaminophen 500 MG tablet    TYLENOL    100 tablet    Take 2 tablets (1,000 mg) by mouth every 8 hours as needed for mild pain    Episodic tension-type headache, not intractable       Alcohol Prep Pad 70 % Pads     60 each    1 pad 2 times daily    Type 2 diabetes mellitus with hyperglycemia, without long-term current use of insulin (H)       aspirin 81 MG EC tablet     90 tablet    Take 1 tablet (81 mg) by mouth daily    Type 2 diabetes mellitus without complication, without long-term current use of insulin (H)       atorvastatin 80 MG tablet    LIPITOR    93 tablet    Take 1 tablet (80 mg) by mouth daily    Type 2 diabetes mellitus without complication, without long-term current use of insulin (H)       augmented betamethasone dipropionate 0.05 % ointment    DIPROLENE-AF    50 g    Apply sparingly to affected area twice daily as needed.  Do not apply to face.    Pompholyx       blood glucose lancets standard    no brand specified    100 each    Use to test blood sugar 3 times daily or as directed.    Type 2 diabetes mellitus with hyperglycemia, without long-term current use of insulin (H)       * blood glucose monitoring test strip    no brand specified    3 Box    Use up to 3 times a day    Type 2 diabetes mellitus with hyperglycemia, without long-term current use of insulin (H)       * blood glucose monitoring test strip    no brand specified    100 strip    Use to test blood sugars 3 times daily or as directed    Type 2  diabetes mellitus with hyperglycemia, without long-term current use of insulin (H)       carboxymethylcellulose 0.5 % Soln ophthalmic solution    REFRESH PLUS    1 Bottle    Place 1 drop into both eyes daily as needed for dry eyes    Dry eyes       febuxostat 40 MG Tabs tablet    ULORIC    93 tablet    Take 1 tablet daily    Idiopathic chronic gout of multiple sites without tophus       glipiZIDE 10 MG 24 hr tablet    GLUCOTROL XL    93 tablet    Take 1 tablet (10 mg) by mouth daily    Type 2 diabetes mellitus without complication, unspecified long term insulin use status (H)       ibuprofen 800 MG tablet    ADVIL/MOTRIN    60 tablet    Take 1 tablet (800 mg) by mouth every 8 hours as needed for moderate pain (do not take every day)    Idiopathic chronic gout of multiple sites without tophus       lisinopril 40 MG tablet    PRINIVIL/ZESTRIL    93 tablet    Take 1 tablet (40 mg) by mouth daily    Essential hypertension with goal blood pressure less than 140/90       metFORMIN 500 MG 24 hr tablet    GLUCOPHAGE-XR    360 tablet    Take 2 tablets (1,000 mg) by mouth 2 times daily (with meals)    Type 2 diabetes mellitus with hyperglycemia, without long-term current use of insulin (H)       ONETOUCH BASIC SYSTEM w/Device Kit     1 kit    1 kit daily    Type 2 diabetes mellitus with hyperglycemia, without long-term current use of insulin (H)       order for DME      (Continuous Positive Airway Pressure) nightly        polyethylene glycol Packet    MIRALAX    238 g    Miralax powder 8.3 oz bottle (238 gm) as directed    Routine general medical examination at a health care facility       sitagliptin 100 MG tablet    JANUVIA    93 tablet    Take 1 tablet (100 mg) by mouth daily    Type 2 diabetes mellitus without complication, unspecified long term insulin use status (H)       VASELINE CONSTANT CARE Oint     1 Tube    Externally apply topically 3 times daily    Dermatitis       vitamin D 2000 units tablet     30 tablet     Take 2,000 Units by mouth daily    Hypovitaminosis D       * Notice:  This list has 2 medication(s) that are the same as other medications prescribed for you. Read the directions carefully, and ask your doctor or other care provider to review them with you.

## 2018-08-02 NOTE — PROGRESS NOTES
This is a 58-year-old [place] male who is well known to me.  He follows up with several concerns.  He apologizes for not having come back sooner but did see a different physician in clinic for leg pain.  He also reports that for 2 weeks in July he became [place] and lived in a [place] and feels that this experience was good for his health and that he ate less and was very calm and free of stress.  Overall he reports that mentally he is doing better and remains optimistic and has a good outlook.    His concerns today include:  1.  He continues to have pain on the lateral aspect of the left leg.  He did see a clinic colleague who diagnosed him with left IT band syndrome and referred him to physical therapy.  He tells me he never heard about the physical therapy appointment but is willing to go.  In addition he is describing numbness running from his left pelvis along the lateral aspect of the left leg which is been present for many months.  He is denying low back pain.    2.  He continues to have pain in his right ear.  Following last visit I referred to ENT and he attended and describes what sounds like a recommendation to have a PE tube inserted.  He wants to check with me before agreeing to proceed.    3.  He tells me he is not taking Uloric and as a result is having various pains in his joints notably his arms and hands.  He wonders if he can restart this medication.    Objective:  BP (!) 154/94 (BP Location: Left arm, Patient Position: Sitting, Cuff Size: Adult Large)  Pulse 58  Temp 97.8  F (36.6  C) (Oral)  Resp 16  Wt 227 lb 3.2 oz (103.1 kg)  SpO2 96%  BMI 38.1 kg/m2  His blood pressure's mildly elevated.  He remains obese.  Exam of his right ear clinically continues to appear like OME.  He has tenderness on palpation above the lateral aspect of the left knee in the region of the IT band and consistent with IT band syndrome.  He outlines the area of numbness running along the lateral aspect of the left  leg and not entirely overlapping the area of pain.  We reviewed his medication list although he does not have his medicines with him.  We reviewed most recent labs.    Sa was seen today for follow up for, pain, ear problem and eye problem.    Diagnoses and all orders for this visit:    Iliotibial band syndrome, left    Type 2 diabetes mellitus with hyperglycemia, without long-term current use of insulin (H)  -     Hemoglobin A1c ([facility])  -     Lipid Panel ([facility])  -     metFORMIN (GLUCOPHAGE-XR) 500 MG 24 hr tablet; Take 2 tablets (1,000 mg) by mouth 2 times daily (with meals)    Idiopathic chronic gout of ankle without tophus, unspecified laterality  -     Uric Acid ([facility])    Meralgia paraesthetica, left    Type 2 diabetes mellitus without complication, without long-term current use of insulin (H)  -     atorvastatin (LIPITOR) 80 MG tablet; Take 1 tablet (80 mg) by mouth daily    Idiopathic chronic gout of multiple sites without tophus  -     febuxostat (ULORIC) 40 MG TABS tablet; Take 1 tablet daily    Type 2 diabetes mellitus without complication, unspecified long term insulin use status (H)  -     sitagliptin (JANUVIA) 100 MG tablet; Take 1 tablet (100 mg) by mouth daily  -     glipiZIDE (GLUCOTROL XL) 10 MG 24 hr tablet; Take 1 tablet (10 mg) by mouth daily    Essential hypertension with goal blood pressure less than 140/90  -     lisinopril (PRINIVIL/ZESTRIL) 40 MG tablet; Take 1 tablet (40 mg) by mouth daily    OME (otitis media with effusion), right      I told him I believe that he has left-sided meralgia paresthetica (lateral cutaneous nerve of the thigh) in addition to left IT band syndrome and explained that the former is a minor annoyance that is likely secondary to central obesity while the latter should respond well to PT.  We will go ahead and set him up with a PT appointment while he is in clinic to avoid miscommunications.    We will check his labs today and notify him of the results.    I  encouraged him to proceed with the recommended ENT procedure to his right ear and we will have clinic call to assist in that follow-up process.    I did call his pharmacy and apparently he has been out of some of his medications which were mailed to him yesterday.  In addition I verified that carlotta Grubbs has been prior authorized and needs to be given to him.  For some reason he has not been receiving this apparently for a few months.  I refilled his other meds as well.    By the time of dictation his A1c has come back much improved and he will be heartily congratulated for his efforts.  I have asked him to come in every 3 months or sooner as needed.    He does tell me that he remains well mentally and does not feel like he needs to follow-up with psychologist or take psychotropic medications.    Total visit time today is 50 mins, all of which was face to face MD time, and over 50% of this time was spent in counseling and coordination of care.

## 2018-08-02 NOTE — NURSING NOTE
Due to patient being non-English speaking/uses sign language, an  was used for this visit. Only for face-to-face interpretation by an external agency, date and length of interpretation can be found on the scanned worksheet.     name: Taye Ferrell  Agency: [employer]  Language: Liam   Telephone number: [phone]  Type of interpretation: Face-to-face, spoken

## 2018-08-06 NOTE — PROGRESS NOTES
Hi  Than - congratulations!  Your average sugar test (A1C) is at goal! (see recent results below).  It's the lowest number it's been for several years!  Keep up the good work Sa Than - you had told me that your diet was better - it shows, it's working - this is fantastic news!    Also your bad cholesterol LDL is at a low number - this is also good!    Your uric acid (gout) test is mildly increased - this should have been reduced by restarting Uloric.    See you in about 3 months!   Dr Jason Siddiqui    Lab Results       Component                Value               Date                       A1C                      6.8                 08/02/2018                 A1C                      11.9                03/12/2018                 A1C                      10.2                02/27/2017                 A1C                      10.2                11/04/2016                 A1C                      12.0                07/28/2016

## 2018-08-13 ENCOUNTER — OFFICE VISIT - HEALTHEAST (OUTPATIENT)
Dept: PHYSICAL THERAPY | Facility: REHABILITATION | Age: 58
End: 2018-08-13

## 2018-08-13 DIAGNOSIS — M62.81 MUSCLE WEAKNESS (GENERALIZED): ICD-10-CM

## 2018-08-13 DIAGNOSIS — M76.32 IT BAND SYNDROME, LEFT: ICD-10-CM

## 2018-08-20 DIAGNOSIS — E11.9 TYPE 2 DIABETES MELLITUS WITHOUT COMPLICATION, WITHOUT LONG-TERM CURRENT USE OF INSULIN (H): ICD-10-CM

## 2018-08-28 ENCOUNTER — OFFICE VISIT - HEALTHEAST (OUTPATIENT)
Dept: PHYSICAL THERAPY | Facility: REHABILITATION | Age: 58
End: 2018-08-28

## 2018-08-28 DIAGNOSIS — M62.81 MUSCLE WEAKNESS (GENERALIZED): ICD-10-CM

## 2018-08-28 DIAGNOSIS — M76.32 IT BAND SYNDROME, LEFT: ICD-10-CM

## 2018-09-11 ENCOUNTER — OFFICE VISIT - HEALTHEAST (OUTPATIENT)
Dept: PHYSICAL THERAPY | Facility: REHABILITATION | Age: 58
End: 2018-09-11

## 2018-09-11 DIAGNOSIS — M76.32 IT BAND SYNDROME, LEFT: ICD-10-CM

## 2018-09-11 DIAGNOSIS — M62.81 MUSCLE WEAKNESS (GENERALIZED): ICD-10-CM

## 2018-09-19 DIAGNOSIS — E11.9 TYPE 2 DIABETES MELLITUS WITHOUT COMPLICATION, UNSPECIFIED LONG TERM INSULIN USE STATUS: ICD-10-CM

## 2018-09-20 ENCOUNTER — OFFICE VISIT - HEALTHEAST (OUTPATIENT)
Dept: PHYSICAL THERAPY | Facility: REHABILITATION | Age: 58
End: 2018-09-20

## 2018-09-20 DIAGNOSIS — M62.81 MUSCLE WEAKNESS (GENERALIZED): ICD-10-CM

## 2018-09-20 DIAGNOSIS — M76.32 IT BAND SYNDROME, LEFT: ICD-10-CM

## 2018-10-26 ENCOUNTER — OFFICE VISIT (OUTPATIENT)
Dept: FAMILY MEDICINE | Facility: CLINIC | Age: 58
End: 2018-10-26
Payer: COMMERCIAL

## 2018-10-26 VITALS
OXYGEN SATURATION: 95 % | BODY MASS INDEX: 38.1 KG/M2 | RESPIRATION RATE: 12 BRPM | WEIGHT: 227.2 LBS | TEMPERATURE: 97.9 F | DIASTOLIC BLOOD PRESSURE: 84 MMHG | HEART RATE: 66 BPM | SYSTOLIC BLOOD PRESSURE: 138 MMHG

## 2018-10-26 DIAGNOSIS — Z00.00 ENCOUNTER FOR PREVENTIVE CARE: ICD-10-CM

## 2018-10-26 DIAGNOSIS — M1A.0790 IDIOPATHIC CHRONIC GOUT OF ANKLE WITHOUT TOPHUS, UNSPECIFIED LATERALITY: Primary | ICD-10-CM

## 2018-10-26 DIAGNOSIS — E11.65 TYPE 2 DIABETES MELLITUS WITH HYPERGLYCEMIA, WITHOUT LONG-TERM CURRENT USE OF INSULIN (H): ICD-10-CM

## 2018-10-26 DIAGNOSIS — Z23 NEED FOR IMMUNIZATION AGAINST INFLUENZA: ICD-10-CM

## 2018-10-26 DIAGNOSIS — R19.5 POSITIVE FIT (FECAL IMMUNOCHEMICAL TEST): ICD-10-CM

## 2018-10-26 LAB
BUN SERPL-MCNC: 12 MG/DL (ref 7–21)
CALCIUM SERPL-MCNC: 9.7 MG/DL (ref 8.5–10.1)
CHLORIDE SERPLBLD-SCNC: 103.6 MMOL/L (ref 98–110)
CO2 SERPL-SCNC: 29.9 MMOL/L (ref 20–32)
CREAT SERPL-MCNC: 0.7 MG/DL (ref 0.7–1.3)
GFR SERPL CREATININE-BSD FRML MDRD: >90 ML/MIN/1.7 M2
GLUCOSE SERPL-MCNC: 183.6 MG'DL (ref 70–99)
HBA1C MFR BLD: 8.7 % (ref 4.1–5.7)
HIV 1+2 AB+HIV1 P24 AG SERPL QL IA: NEGATIVE
POTASSIUM SERPL-SCNC: 4.5 MMOL/DL (ref 3.2–4.6)
SODIUM SERPL-SCNC: 140.2 MMOL/L (ref 132–142)
URATE SERPL-MCNC: 5.2 MG/DL (ref 3–8)

## 2018-10-26 RX ORDER — COLCHICINE 0.6 MG/1
TABLET ORAL
Qty: 30 TABLET | Refills: 3 | Status: SHIPPED | OUTPATIENT
Start: 2018-10-26 | End: 2019-02-14

## 2018-10-26 ASSESSMENT — PATIENT HEALTH QUESTIONNAIRE - PHQ9: SUM OF ALL RESPONSES TO PHQ QUESTIONS 1-9: 2

## 2018-10-26 NOTE — PATIENT INSTRUCTIONS
[facility]  [address], [place]   PHONE: [phone]    [facility]  [address], [place]   PHONE: [phone]    [facility]  [address], [place]  (across from [facility])  PHONE: [phone]    [facility]  Multiple locations in the [facility]  Main phone line: [phone]  Fax: [phone]

## 2018-10-26 NOTE — MR AVS SNAPSHOT
After Visit Summary   10/26/2018    Sa KENIA Mackey    MRN: [id number]           Patient Information     Date Of Birth          1960        Visit Information        Provider Department      10/26/2018 9:20 AM Jason Siddiqui MD [facility]        Today's Diagnoses     Idiopathic chronic gout of ankle without tophus, unspecified laterality    -  1    Type 2 diabetes mellitus with hyperglycemia, without long-term current use of insulin (H)        Need for immunization against influenza        Encounter for preventive care        Positive FIT (fecal immunochemical test)          Care Instructions    [facility]  [address], [place]   PHONE: [phone]    [email or web address]  [address], [place]   PHONE: [phone]    [facility]  [address], [place]  (across from [facility])  PHONE: [phone]    [facility]  Multiple locations in the [facility]  Main phone line: [phone]  Fax: [phone]            Follow-ups after your visit        Follow-up notes from your care team     Return in about 3 months (around 1/26/2019), or if symptoms worsen or fail to improve.      Future tests that were ordered for you today     Open Future Orders        Priority Expected Expires Ordered    Fecal Occult Blood - FIT, iFOB ([facility]) Routine  11/2/2018 10/26/2018            Who to contact     Please call your clinic at [phone] to:    Ask questions about your health    Make or cancel appointments    Discuss your medicines    Learn about your test results    Speak to your doctor            Additional Information About Your Visit        [employer] Information     [email or web address] gives you secure access to your electronic health record. If you see a primary care provider, you can also send messages to your care team and make appointments. If you have questions, please call your primary care clinic.  If you do not have a primary care provider,  please call [phone] and they will assist you.      [employer] is an electronic gateway that provides easy, online access to your medical records. With [employer], you can request a clinic appointment, read your test results, renew a prescription or communicate with your care team.     To access your existing account, please contact your [facility] or call [phone] for assistance.        Care EveryWhere ID     This is your Care EveryWhere ID. This could be used by other organizations to access your [facility] medical records  [id number]        Your Vitals Were     Pulse Temperature Respirations Pulse Oximetry BMI (Body Mass Index)       66 97.9  F (36.6  C) (Oral) 12 95% 38.1 kg/m2        Blood Pressure from Last 3 Encounters:   10/26/18 138/84   08/02/18 (!) 154/94   05/11/18 145/83    Weight from Last 3 Encounters:   10/26/18 227 lb 3.2 oz (103.1 kg)   08/02/18 227 lb 3.2 oz (103.1 kg)   05/11/18 230 lb 3.2 oz (104.4 kg)              We Performed the Following     ADMIN VACCINE, INITIAL     FLU VAC QUADRIVLENT SPLIT VIRUS IM 0.5ml dosage     HIV Ag/Ab Screen [place] ([employer])          Today's Medication Changes          These changes are accurate as of 10/26/18 10:00 AM.  If you have any questions, ask your nurse or doctor.               Start taking these medicines.        Dose/Directions    colchicine 0.6 MG tablet   Commonly known as:  COLCRYS   Used for:  Idiopathic chronic gout of ankle without tophus, unspecified laterality   Started by:  Jason Siddiqui MD        Take 2 tabs (1.2mg) by mouth at the first sign of a gout attack, then 1 tab (0.6mg) 1 hour later.  Max 3 tabs (1.8mg) over 1 hour.   Quantity:  30 tablet   Refills:  3       order for DME   Used for:  Idiopathic chronic gout of ankle without tophus, unspecified laterality   Started by:  Jason Siddiqui MD        Equipment being ordered: 1 seated 4-wheel walker with brakes   Quantity:  1 Device   Refills:  0             Where to get your medicines      These medications were sent to [facility] [place], [place] - [address], [place] [address]     Phone:  [phone]     colchicine 0.6 MG tablet         Some of these will need a paper prescription and others can be bought over the counter.  Ask your nurse if you have questions.     Bring a paper prescription for each of these medications     order for DME                Primary Care Provider Office Phone # Fax #    Jason Siddiqui [phone]       [address] [place] [id number]        Equal Access to Services     [facility]: Hadii shannan neumann hadasho Soomaali, waaxda luqadaha, qaybta kaalmada adeegyada, ale araujo . So [facility] [phone].    ATENCIÓN: Si habla español, tiene a yen disposición servicios gratuitos de asistencia lingüística. [facility] [phone].    We comply with applicable federal civil rights laws and [place] laws. We do not discriminate on the basis of race, color, national origin, age, disability, sex, sexual orientation, or gender identity.            Thank you!     Thank you for choosing [facility]  for your care. Our goal is always to provide you with excellent care. Hearing back from our patients is one way we can continue to improve our services. Please take a few minutes to complete the written survey that you may receive in the mail after your visit with us. Thank you!             Your Updated Medication List - Protect others around you: Learn how to safely use, store and throw away your medicines at www.disposemymeds.org.          This list is accurate as of 10/26/18 10:00 AM.  Always use your most recent med list.                   Brand Name Dispense Instructions for use Diagnosis    acetaminophen 500 MG tablet    TYLENOL    100 tablet    Take 2 tablets (1,000 mg) by mouth every 8 hours as needed for mild pain    Episodic tension-type headache, not intractable        Alcohol Prep Pad 70 % Pads     60 each    1 pad 2 times daily    Type 2 diabetes mellitus with hyperglycemia, without long-term current use of insulin (H)       aspirin 81 MG EC tablet     90 tablet    Take 1 tablet (81 mg) by mouth daily    Type 2 diabetes mellitus without complication, without long-term current use of insulin (H)       atorvastatin 80 MG tablet    LIPITOR    93 tablet    Take 1 tablet (80 mg) by mouth daily    Type 2 diabetes mellitus without complication, without long-term current use of insulin (H)       augmented betamethasone dipropionate 0.05 % ointment    DIPROLENE-AF    50 g    Apply sparingly to affected area twice daily as needed.  Do not apply to face.    Pompholyx       blood glucose lancets standard    no brand specified    100 each    Use to test blood sugar 3 times daily or as directed.    Type 2 diabetes mellitus with hyperglycemia, without long-term current use of insulin (H)       * blood glucose monitoring test strip    no brand specified    3 Box    Use up to 3 times a day    Type 2 diabetes mellitus with hyperglycemia, without long-term current use of insulin (H)       * blood glucose monitoring test strip    no brand specified    100 strip    Use to test blood sugars 3 times daily or as directed    Type 2 diabetes mellitus with hyperglycemia, without long-term current use of insulin (H)       carboxymethylcellulose 0.5 % Soln ophthalmic solution    REFRESH PLUS    1 Bottle    Place 1 drop into both eyes daily as needed for dry eyes    Dry eyes       colchicine 0.6 MG tablet    COLCRYS    30 tablet    Take 2 tabs (1.2mg) by mouth at the first sign of a gout attack, then 1 tab (0.6mg) 1 hour later.  Max 3 tabs (1.8mg) over 1 hour.    Idiopathic chronic gout of ankle without tophus, unspecified laterality       febuxostat 40 MG Tabs tablet    ULORIC    93 tablet    Take 1 tablet daily    Idiopathic chronic gout of multiple sites without tophus       glipiZIDE 10 MG 24 hr  tablet    GLUCOTROL XL    93 tablet    Take 1 tablet (10 mg) by mouth daily    Type 2 diabetes mellitus without complication, unspecified long term insulin use status       ibuprofen 800 MG tablet    ADVIL/MOTRIN    60 tablet    Take 1 tablet (800 mg) by mouth every 8 hours as needed for moderate pain (do not take every day)    Idiopathic chronic gout of multiple sites without tophus       lisinopril 40 MG tablet    PRINIVIL/ZESTRIL    93 tablet    Take 1 tablet (40 mg) by mouth daily    Essential hypertension with goal blood pressure less than 140/90       metFORMIN 500 MG 24 hr tablet    GLUCOPHAGE-XR    360 tablet    Take 2 tablets (1,000 mg) by mouth 2 times daily (with meals)    Type 2 diabetes mellitus with hyperglycemia, without long-term current use of insulin (H)       ONETOUCH BASIC SYSTEM w/Device Kit     1 kit    1 kit daily    Type 2 diabetes mellitus with hyperglycemia, without long-term current use of insulin (H)       order for DME      (Continuous Positive Airway Pressure) nightly        order for DME     1 Device    Equipment being ordered: 1 seated 4-wheel walker with brakes    Idiopathic chronic gout of ankle without tophus, unspecified laterality       polyethylene glycol Packet    MIRALAX    238 g    Miralax powder 8.3 oz bottle (238 gm) as directed    Routine general medical examination at a health care facility       sitagliptin 100 MG tablet    JANUVIA    93 tablet    Take 1 tablet (100 mg) by mouth daily    Type 2 diabetes mellitus without complication, unspecified long term insulin use status       VASELINE CONSTANT CARE Oint     1 Tube    Externally apply topically 3 times daily    Dermatitis       vitamin D 2000 units tablet     30 tablet    Take 2,000 Units by mouth daily    Hypovitaminosis D       * Notice:  This list has 2 medication(s) that are the same as other medications prescribed for you. Read the directions carefully, and ask your doctor or other care provider to review them  with you.

## 2018-10-26 NOTE — NURSING NOTE
Injectable influenza vaccine documentation    1. Has the patient received the information for the influenza vaccine? YES    2. Does the patient have a severe allergy to eggs (Patients with a severe egg allergy should be assessed by a medical provider, RN, or clinical pharmacist. If they receive the influenza vaccine, please have them observed for 15 minutes.)? No    3. Has the patient had an allergic reaction to previous influenza vaccines? No    4. Has the patient had any severe allergic reactions to past influenza vaccines ? No       5. Does patient have a history of Guillain-Garland syndrome? No    Ok per Dr. Siddiqui to go ahead and administrate flu shot .  Based on responses above, I administered the influenza vaccine.  Neris Saravia CMA      Due to patient being non-English speaking/uses sign language, an  was used for this visit. Only for face-to-face interpretation by an external agency, date and length of interpretation can be found on the scanned worksheet.     name: Taye Ferrell  Language: Liam  Agency: [employer]  Phone number: [phone]

## 2018-10-26 NOTE — LETTER
October 30, 2018       [facility]  [address]  [place] [address]        Dear Sa,    Your gout test (uric acid) is better - that's good.  But your average sugar A1C has gotten a bit worse - we want it to be under 8.0 - like it was in August.  Hopefully you can do what you were doing so well then - see below.  Also, kidneys are working well - that is good.  Take care!    Hemoglobin A1C        Date                     Value               Ref Range           Status                10/26/2018               8.7 (H)             4.1 - 5.7 %         Final                  08/02/2018               6.8 (H)             4.1 - 5.7 %         Final                  03/12/2018               11.9 (H)            4.1 - 5.7 %         Final     Please see below for your test results.    Resulted Orders   HIV Ag/Ab Screen [place] ([employer])   Result Value Ref Range    HIV Antigen/Antibody Negative Negative    Narrative    Test performed by:  [email or web address]  [address] [place] [address]  Method is Abbott HIV Ag/Ab for the detection of HIV p24 antigen, HIV-1   antibodies and HIV-2 antibodies.   Hemoglobin A1c ([facility])   Result Value Ref Range    Hemoglobin A1C 8.7 (H) 4.1 - 5.7 %   Basic Metabolic Panel ([facility])   Result Value Ref Range    Urea Nitrogen 12.0 7.0 - 21.0 mg/dL    Calcium 9.7 8.5 - 10.1 mg/dL    Chloride 103.6 98.0 - 110.0 mmol/L    Carbon Dioxide 29.9 20.0 - 32.0 mmol/L    Creatinine 0.7 0.7 - 1.3 mg/dL    Glucose 183.6 (H) 70.0 - 99.0 mg'dL    Potassium 4.5 3.2 - 4.6 mmol/dL    Sodium 140.2 132.0 - 142.0 mmol/L    GFR Estimate >90 >60.0 mL/min/1.7 m2    GFR Estimate If Black >90 >60.0 mL/min/1.7 m2   Uric Acid ([employer])   Result Value Ref Range    Uric Acid 5.2 3.0 - 8.0 mg/dL    Narrative    Test performed by:  [facility]  [address] [place] [address]       If you have any questions, please call the clinic to make an appointment.    Sincerely,    Jason Siddiqui MD

## 2018-10-26 NOTE — PROGRESS NOTES
This is a 58-year-old male who is well known to me.  He attends today for a number of reasons:  1.  He reports that he had a flareup of his gout which caused bilateral knee pain and right ankle pain.  His ankle has been a common site of gout attacks over the years.  He is taking you Lorick and did not miss any pills and thinks the attack occurred because he had been eating a lot of chicken organ meats such as liver and kidneys which he typically avoids.  He does not have any medication to take when he has a flareup and we will discuss this.      #2 he requests a seated walker.  [place] and he says this helps him be active.    3.  He believes he is controlling his diabetes well.  This was very well controlled when last checked nearly 3 months ago and he is congratulated for that control.  He says that was in part related to regular exercise.    In the course of conversation he reports that he is  from his wife who lives with a daughter while he lives with his son.    Objective:  /84 (BP Location: Left arm, Patient Position: Sitting, Cuff Size: Adult Large)  Pulse 66  Temp 97.9  F (36.6  C) (Oral)  Resp 12  Wt 227 lb 3.2 oz (103.1 kg)  SpO2 95%  BMI 38.1 kg/m2  Blood pressure is satisfactory.  He remains obese and has not had a significant weight change.  Exam of his knees reveals no active arthritis today.  He does describe tenderness on palpation of the right ankle.  He denies any plantar fascia symptoms.    Results for orders placed or performed in visit on 10/26/18   Hemoglobin A1c ([facility])   Result Value Ref Range    Hemoglobin A1C 8.7 (H) 4.1 - 5.7 %   Basic Metabolic Panel ([facility])   Result Value Ref Range    Urea Nitrogen 12.0 7.0 - 21.0 mg/dL    Calcium 9.7 8.5 - 10.1 mg/dL    Chloride 103.6 98.0 - 110.0 mmol/L    Carbon Dioxide 29.9 20.0 - 32.0 mmol/L    Creatinine 0.7 0.7 - 1.3 mg/dL    Glucose 183.6 (H) 70.0 -  99.0 mg'dL    Potassium 4.5 3.2 - 4.6 mmol/dL    Sodium 140.2 132.0 - 142.0 mmol/L    GFR Estimate >90 >60.0 mL/min/1.7 m2    GFR Estimate If Black >90 >60.0 mL/min/1.7 m2     Sa was seen today for recheck medication, musculoskeletal problem and medication reconciliation.    Diagnoses and all orders for this visit:    Idiopathic chronic gout of ankle without tophus, unspecified laterality  -     Uric Acid ([facility]); Future  -     colchicine (COLCRYS) 0.6 MG tablet; Take 2 tabs (1.2mg) by mouth at the first sign of a gout attack, then 1 tab (0.6mg) 1 hour later.  Max 3 tabs (1.8mg) over 1 hour.  -     order for DME; Equipment being ordered: 1 seated 4-wheel walker with brakes  -     Uric Acid ([facility])    Type 2 diabetes mellitus with hyperglycemia, without long-term current use of insulin (H)  -     Hemoglobin A1c ([facility]); Future  -     Basic Metabolic Panel ([facility]); Future  -     Hemoglobin A1c ([facility])  -     Basic Metabolic Panel ([facility])    Need for immunization against influenza  -     ADMIN VACCINE, INITIAL  -     FLU VAC QUADRIVLENT SPLIT VIRUS IM 0.5ml dosage    Encounter for preventive care  -     HIV Ag/Ab Screen [place] ([facility])    Positive FIT (fecal immunochemical test)  -     Fecal Occult Blood - FIT, iFOB ([facility]); Future      I reminded him that he has not had a colonoscopy for a positive fit result.  He says this is not a good time and he is also scared of the procedure.  He does agreed to repeat a fit test.    I have sent a prescription for colchicine for him to use if he does have an acute gout attack.  If this is financially prohibitive instead he will use ibuprofen which he has at home.    We will check routine labs again.  At the time of dictation his A1c has come back higher than when last measured and I will follow-up with him about this.  He agrees to a flu shot today.    We will plan to see him in 3 months

## 2018-10-29 NOTE — PROGRESS NOTES
Hi Sa Than, your gout test (uric acid) is better - that's good.  But your average sugar A1C has gotten a bit worse - we want it to be under 8.0 - like it was in August.  Hopefully you can do what you were doing so well then - see below.  Also, kidneys are working well - that is good.  Take care!: Dr Jason Siddiqui    Hemoglobin A1C       Date                     Value               Ref Range           Status                10/26/2018               8.7 (H)             4.1 - 5.7 %         Final                 08/02/2018               6.8 (H)             4.1 - 5.7 %         Final                 03/12/2018               11.9 (H)            4.1 - 5.7 %         Final            ----------

## 2019-02-14 ENCOUNTER — OFFICE VISIT (OUTPATIENT)
Dept: FAMILY MEDICINE | Facility: CLINIC | Age: 59
End: 2019-02-14
Payer: COMMERCIAL

## 2019-02-14 VITALS
RESPIRATION RATE: 16 BRPM | BODY MASS INDEX: 37.9 KG/M2 | TEMPERATURE: 98.2 F | DIASTOLIC BLOOD PRESSURE: 87 MMHG | OXYGEN SATURATION: 95 % | HEIGHT: 64 IN | WEIGHT: 222 LBS | HEART RATE: 73 BPM | SYSTOLIC BLOOD PRESSURE: 137 MMHG

## 2019-02-14 DIAGNOSIS — G44.219 EPISODIC TENSION-TYPE HEADACHE, NOT INTRACTABLE: ICD-10-CM

## 2019-02-14 DIAGNOSIS — R30.0 DYSURIA: ICD-10-CM

## 2019-02-14 DIAGNOSIS — M1A.09X0 IDIOPATHIC CHRONIC GOUT OF MULTIPLE SITES WITHOUT TOPHUS: ICD-10-CM

## 2019-02-14 DIAGNOSIS — H04.123 DRY EYES: ICD-10-CM

## 2019-02-14 DIAGNOSIS — L30.1 POMPHOLYX: ICD-10-CM

## 2019-02-14 DIAGNOSIS — E11.65 TYPE 2 DIABETES MELLITUS WITH HYPERGLYCEMIA, WITHOUT LONG-TERM CURRENT USE OF INSULIN (H): Primary | ICD-10-CM

## 2019-02-14 DIAGNOSIS — Z00.00 ROUTINE GENERAL MEDICAL EXAMINATION AT A HEALTH CARE FACILITY: ICD-10-CM

## 2019-02-14 DIAGNOSIS — E55.9 HYPOVITAMINOSIS D: ICD-10-CM

## 2019-02-14 DIAGNOSIS — I10 ESSENTIAL HYPERTENSION WITH GOAL BLOOD PRESSURE LESS THAN 140/90: ICD-10-CM

## 2019-02-14 DIAGNOSIS — L84 CORNS AND CALLOSITIES: ICD-10-CM

## 2019-02-14 DIAGNOSIS — M1A.0790 IDIOPATHIC CHRONIC GOUT OF ANKLE WITHOUT TOPHUS, UNSPECIFIED LATERALITY: ICD-10-CM

## 2019-02-14 DIAGNOSIS — L30.9 DERMATITIS: ICD-10-CM

## 2019-02-14 LAB
BACTERIA: NORMAL
BUN SERPL-MCNC: 11.6 MG/DL (ref 7–21)
CALCIUM SERPL-MCNC: 8.9 MG/DL (ref 8.5–10.1)
CASTS: NORMAL /LPF
CHLORIDE SERPLBLD-SCNC: 104.7 MMOL/L (ref 98–110)
CO2 SERPL-SCNC: 22.9 MMOL/L (ref 20–32)
CREAT SERPL-MCNC: 0.8 MG/DL (ref 0.7–1.3)
CREAT UR-MCNC: 56.7 MG/DL
CRYSTAL URINE: NORMAL /LPF
EPITHELIAL CELLS UR: <2 /LPF (ref 0–2)
GFR SERPL CREATININE-BSD FRML MDRD: >90 ML/MIN/1.7 M2
GLUCOSE SERPL-MCNC: 382.7 MG'DL (ref 70–99)
HBA1C MFR BLD: 11.1 % (ref 4.1–5.7)
MICROALBUMIN UR-MCNC: 0.72 MG/DL (ref 0–1.99)
MICROALBUMIN/CREAT UR: 12.7 MG/G
MUCOUS URINE: NORMAL LPF
POTASSIUM SERPL-SCNC: 4.6 MMOL/DL (ref 3.2–4.6)
RBC URINE: NORMAL /HPF
SODIUM SERPL-SCNC: 135.8 MMOL/L (ref 132–142)
WBC URINE: NORMAL /HPF

## 2019-02-14 RX ORDER — CHOLECALCIFEROL (VITAMIN D3) 50 MCG
2000 TABLET ORAL DAILY
Qty: 90 TABLET | Refills: 3 | Status: SHIPPED | OUTPATIENT
Start: 2019-02-14 | End: 2019-06-28

## 2019-02-14 RX ORDER — FEBUXOSTAT 40 MG/1
TABLET, FILM COATED ORAL
Qty: 93 TABLET | Refills: 3 | Status: SHIPPED | OUTPATIENT
Start: 2019-02-14 | End: 2019-05-24

## 2019-02-14 RX ORDER — METFORMIN HCL 500 MG
1000 TABLET, EXTENDED RELEASE 24 HR ORAL 2 TIMES DAILY WITH MEALS
Qty: 360 TABLET | Refills: 3 | Status: SHIPPED | OUTPATIENT
Start: 2019-02-14 | End: 2020-02-17

## 2019-02-14 RX ORDER — ATORVASTATIN CALCIUM 80 MG/1
80 TABLET, FILM COATED ORAL DAILY
Qty: 93 TABLET | Refills: 3 | Status: SHIPPED | OUTPATIENT
Start: 2019-02-14 | End: 2020-02-17

## 2019-02-14 RX ORDER — LISINOPRIL 40 MG/1
40 TABLET ORAL DAILY
Qty: 93 TABLET | Refills: 3 | Status: SHIPPED | OUTPATIENT
Start: 2019-02-14 | End: 2020-02-17

## 2019-02-14 RX ORDER — BETAMETHASONE DIPROPIONATE 0.5 MG/G
OINTMENT, AUGMENTED TOPICAL
Qty: 50 G | Refills: 3 | Status: SHIPPED | OUTPATIENT
Start: 2019-02-14 | End: 2019-06-28

## 2019-02-14 RX ORDER — CARBOXYMETHYLCELLULOSE SODIUM 5 MG/ML
1 SOLUTION/ DROPS OPHTHALMIC DAILY PRN
Qty: 3 BOTTLE | Refills: 3 | Status: SHIPPED | OUTPATIENT
Start: 2019-02-14 | End: 2020-03-05

## 2019-02-14 RX ORDER — GLIPIZIDE 10 MG/1
10 TABLET, FILM COATED, EXTENDED RELEASE ORAL DAILY
Qty: 93 TABLET | Refills: 3 | Status: SHIPPED | OUTPATIENT
Start: 2019-02-14 | End: 2019-06-28

## 2019-02-14 RX ORDER — IBUPROFEN 800 MG/1
800 TABLET, FILM COATED ORAL DAILY PRN
Qty: 30 TABLET | Refills: 3 | Status: SHIPPED | OUTPATIENT
Start: 2019-02-14 | End: 2019-06-24

## 2019-02-14 RX ORDER — ACETAMINOPHEN 500 MG
1000 TABLET ORAL EVERY 8 HOURS PRN
Qty: 100 TABLET | Refills: 3 | Status: SHIPPED | OUTPATIENT
Start: 2019-02-14 | End: 2020-10-08

## 2019-02-14 RX ORDER — BLOOD SUGAR DIAGNOSTIC
1 STRIP MISCELLANEOUS 2 TIMES DAILY
Qty: 100 EACH | Refills: 3 | Status: SHIPPED | OUTPATIENT
Start: 2019-02-14 | End: 2019-09-27

## 2019-02-14 RX ORDER — MINERAL OIL/HYDROPHIL PETROLAT
OINTMENT (GRAM) TOPICAL PRN
Qty: 420 G | Refills: 3 | Status: SHIPPED | OUTPATIENT
Start: 2019-02-14 | End: 2020-02-14

## 2019-02-14 RX ORDER — COLCHICINE 0.6 MG/1
TABLET ORAL
Qty: 30 TABLET | Refills: 3 | Status: SHIPPED | OUTPATIENT
Start: 2019-02-14 | End: 2019-06-28

## 2019-02-14 ASSESSMENT — MIFFLIN-ST. JEOR: SCORE: 1746.96

## 2019-02-14 NOTE — NURSING NOTE
Due to patient being non-English speaking/uses sign language, an  was used for this visit. Only for face-to-face interpretation by an external agency, date and length of interpretation can be found on the scanned worksheet.     name: Mello Dykes  Agency: [employer]  Language: Liam   Telephone number: [phone]  Type of interpretation: Face-to-face, spoken

## 2019-02-14 NOTE — PROGRESS NOTES
This is a 58-year-old Citizen of [place] speaking male who is well known to me and who attends for a routine follow-up.  However it transpires that he has not taken any of his medications, apart from his gout pill, for 2 months.  This is because his prior pharmacy closed.  In addition his son bought a home approximately [address] from the clinic where he now lives making it difficult for him both to attend pharmacy and to attend this clinic; his son drove him here today because this is his day off.  His son works as a  in [employer].    He reports that about 2 days ago he felt unwell and had some shivering.  He had one episode of burning with urination that resolved after he drank a lot of water.  He denies upper respiratory symptoms or cough and says he is feeling a bit better now.  He is however having generalized body pains which he feels relates to an exacerbation of his chronic gout since he is not taking medications.    He does not have any baseline nocturia.  Concerning his mood, he says he always feels a bit down when he is having physical symptoms and that once the pain improves he expects his mood will improve.  He does not want to see a psychologist.    He has corns on both feet and the one on his left foot is quite painful and he would appreciate this being pared today.    He is agreeable to restarting all his medications.  He was interested in the idea of a mail order such that they would be delivered to his home.  I asked pharmacy to look into this and was told that his insurance does not cover this option.    He reports that he has dental caries and knows that he needs to follow-up with a dentist who has already recommended extractions.    He continues to have a chronic dermatitis of his right ring finger and requests a refill of the previously prescribed steroid ointment.      Objective:  /87 (BP Location: Left arm, Patient Position: Sitting, Cuff Size: Adult Large)   Pulse  "73   Temp 98.2  F (36.8  C) (Oral)   Resp 16   Ht 1.632 m (5' 4.25\")   Wt 100.7 kg (222 lb)   SpO2 95%   BMI 37.81 kg/m    H EENT exam reveals that the TMs look satisfactory, he has some nasal congestion but no neck adenopathy and no goiter.  He has an elevated pulse but it is regular.  Lungs are clear to auscultation.  Abdomen is soft without any guarding.  He has no renal angle tenderness and no suprapubic tenderness.    Exam of his feet reveals that he is got good pedal pulses bilaterally.  Microfilament exam of both feet is normal.  He has a callus overlying approximately the fourth MTP joint on the left foot and would like this..  He has a callus on the inner aspect of the right foot which she would also like pared.    Procedure:  Callus on left foot and callus on medial aspect of right foot both pared without complication and with good effect.    I reviewed his medications and reconciled that he is taking these and refilled them to a new pharmacy.  This took 15 minutes of my time.    Results for orders placed or performed in visit on 02/14/19   Urine Microscopic ([facility])   Result Value Ref Range    WBC Urine None <5 /hpf    RBC Urine None <5 /hpf    Epithelial Cells UR <2 0 - 2 /lpf    Mucous Urine None NONE lpf    Casts Urine None NONE /lpf    Crystal Urine None NONE /lpf    Bacteria Wet Prep Few None     Hemoglobin A1C   Date Value Ref Range Status   02/14/2019 11.1 (H) 4.1 - 5.7 % Final         Sa was seen today for diabetes, fatigue and arthritis.    Diagnoses and all orders for this visit:    Type 2 diabetes mellitus with hyperglycemia, without long-term current use of insulin (H)  -     Microalbumin Creatinine Ratio Random Ur ([facility])  -     blood glucose (NO BRAND SPECIFIED) test strip; Use to test blood sugars 3 times daily or as directed  -     blood glucose (NO BRAND SPECIFIED) lancets standard; Use to test blood sugar 3 times daily or as directed.  -     glipiZIDE (GLUCOTROL XL) 10 MG 24 " hr tablet; Take 1 tablet (10 mg) by mouth daily  -     metFORMIN (GLUCOPHAGE-XR) 500 MG 24 hr tablet; Take 2 tablets (1,000 mg) by mouth 2 times daily (with meals)  -     sitagliptin (JANUVIA) 100 MG tablet; Take 1 tablet (100 mg) by mouth daily  -     Alcohol Swabs (ALCOHOL PADS) 70 % PADS; 1 pad 2 times daily    Dysuria  -     Urine Microscopic ([facility])    Episodic tension-type headache, not intractable  -     acetaminophen (TYLENOL) 500 MG tablet; Take 2 tablets (1,000 mg) by mouth every 8 hours as needed for mild pain    Dry eyes  -     carboxymethylcellulose (REFRESH PLUS) 0.5 % SOLN ophthalmic solution; Place 1 drop into both eyes daily as needed for dry eyes    Hypovitaminosis D  -     vitamin D3 (CHOLECALCIFEROL) 2000 units tablet; Take 1 tablet by mouth daily    Idiopathic chronic gout of ankle without tophus, unspecified laterality  -     colchicine (COLCRYS) 0.6 MG tablet; Take 2 tabs (1.2mg) by mouth at the first sign of a gout attack, then 1 tab (0.6mg) 1 hour later.  Max 3 tabs (1.8mg) over 1 hour.    Pompholyx  -     augmented betamethasone dipropionate (DIPROLENE-AF) 0.05 % external ointment; Apply sparingly to affected area twice daily as needed.  Do not apply to face.    Idiopathic chronic gout of multiple sites without tophus  -     febuxostat (ULORIC) 40 MG TABS tablet; Take 1 tablet daily  -     ibuprofen (ADVIL/MOTRIN) 800 MG tablet; Take 1 tablet (800 mg) by mouth daily as needed for moderate pain (do not take every day)    Essential hypertension with goal blood pressure less than 140/90  -     lisinopril (PRINIVIL/ZESTRIL) 40 MG tablet; Take 1 tablet (40 mg) by mouth daily    Routine general medical examination at a health care facility    Dermatitis  -     mineral oil-hydrophilic petrolatum (AQUAPHOR) external ointment; Apply topically as needed    Corns and callosities  -     TRIM HYPERKERATOTIC SKIN LESION,2-4    Other orders  -     aspirin (ASA) 81 MG EC tablet; Take 1 tablet (81 mg) by  mouth daily  -     atorvastatin (LIPITOR) 80 MG tablet; Take 1 tablet (80 mg) by mouth daily      He clearly needs to continue taking his medications.  We discussed the fact that his diabetes is poorly controlled today whereas it had been well controlled in August 2018.  I refilled his diabetes medications and recommended that he restart at 1 metformin pill daily and gradually increase back up to 4 pills daily as is currently prescribed.    He likely is having gout symptoms causing his generalized pain.  We agreed to restart you ULoric as well as both colchicine and ibuprofen for as needed symptoms.  In particular I encouraged him not to take the latter on a daily basis and he agrees not to.    We discussed the option of transferring care to a clinic closer to his home however given his longevity with this clinic he would instead prefer to come here for routine care about every 3 months and only go to a local clinic for acute needs.  I am agreeable with this plan.  I refilled his medications to his local pharmacy deliberately intending for him to be able to receive 3-month quantities each time they are filled.    Total visit time was 50 mins, all of which was face to face MD time, and over 50% of this time was spent in counseling and coordination of care including 15 minutes spent performing medication reconciliation and refill.

## 2019-02-14 NOTE — PATIENT INSTRUCTIONS
Goal A1c is under 8.0 - like it was back in August.    Lab Results   Component Value Date    A1C 11.1 02/14/2019    A1C 8.7 10/26/2018    A1C 6.8 08/02/2018    A1C 11.9 03/12/2018    A1C 10.2 02/27/2017     Patient Education     Treating Corns and Calluses  If your corns or calluses are mild, reducing friction may help. Different shoes, moleskin patches, or soft pads may be all the treatment you need. In more severe cases, treating tissue buildup may require your doctor s care. Sometimes custom-made shoe inserts (orthotics) or special pads are prescribed to reduce friction and pressure.     Doctor examining senior man's foot.   Change shoes  If you have corns, your doctor may suggest wearing shoes that have more toe room. This way, buckled joints are less likely to be pinched against the top of the shoe. If you have calluses, wearing a cushioned insole, arch support, or heel counter can help reduce friction.  Visit your doctor  In some cases, your doctor may trim away the outer layers of skin that make up the corn or callus. For a painful corn, medicine may be injected beneath the built-up tissue.  Wear orthotics  Orthotics are specially made to meet the needs of your feet. They cushion calluses or divert pressure away from these problem areas. Worn as directed, orthotics help limit existing problems and prevent new ones from forming.  If you need surgery  If a bone or joint is out of place, certain parts of your foot may be under too much pressure. This can cause severe corns and calluses. In such cases, surgery may be the best way to correct the problem.  Outpatient procedures  In most cases, surgery to improve bone position is an outpatient procedure. Your doctor may cut away excess bone, reposition prominent bones, or even fuse joints. Sometimes tendons or ligaments are cut to reduce tension on a bone or joint. Your doctor will talk with you about the procedure that is best suited to your needs.  Date Last  Reviewed: 7/1/2016 2000-2018 [employer] [address] [place] [address], [place] [address]. All rights reserved. This information is not intended as a substitute for professional medical care. Always follow your healthcare professional's instructions.

## 2019-02-14 NOTE — LETTER
February 15, 2019      Sa T Key  [address] [place] [address]        Dear Sa,    You are not losing any protein in your urine - this is good.  There also is no sign of a urine infection.  Take care - take your medications and I will expect to see you in 3 months.  Please see below for your test results.    Resulted Orders   Urine Microscopic ([facility])   Result Value Ref Range    WBC Urine None <5 /hpf    RBC Urine None <5 /hpf    Epithelial Cells UR <2 0 - 2 /lpf    Mucous Urine None NONE lpf    Casts Urine None NONE /lpf    Crystal Urine None NONE /lpf    Bacteria Wet Prep Few None   Microalbumin Creatinine Ratio Random Ur ([facility])   Result Value Ref Range    Microalbumin, Urine 0.72 0.00 - 1.99 mg/dL    Creatinine, Urine 56.7 mg/dL    Albumin Urine mg/g Cr 12.7 <=19.9 mg/g    Narrative    Test performed by:  [facility]  [address] [place] [address]  Microalbumin, Random Urine  <2.0 mg/dL . . . . . . . . Normal  3.0-30.0 mg/dL . . . . . . Microalbuminuria  >30.0 mg/dL . . . . . .  . Clinical Proteinuria  Microalbumin/Creatinine Ratio, Random Urine  <20 mg/g . . . . .. . . . Normal   mg/g . . . . . . . Microalbuminuria  >300 mg/g . . . . . . . . Clinical Proteinuria       If you have any questions, please call the clinic to make an appointment.    Sincerely,    Jason Siddiqui MD

## 2019-02-15 NOTE — RESULT ENCOUNTER NOTE
Clyde Peña Key - you are not losing any protein in your urine - this is good.  There also is no sign of a urine infection.  Take care - take your medications and I will expect to see you in 3 months - Dr Jason Siddiqui

## 2019-03-08 ENCOUNTER — TELEPHONE (OUTPATIENT)
Dept: FAMILY MEDICINE | Facility: CLINIC | Age: 59
End: 2019-03-08

## 2019-03-08 DIAGNOSIS — H04.123 DRY EYES: Primary | ICD-10-CM

## 2019-03-08 NOTE — TELEPHONE ENCOUNTER
Drug Change Request for carboxymethylcellulose (REFRESH PLUS) 0.5 % SOLN ophthalmic solution.     See pharmacy message: This eye drop is not covered on insurance. May we change to a different lubricant eye drop the insurance covers? Please advise.    Neris Saravia CMA

## 2019-03-21 NOTE — LETTER
June 6, 2019      TO: [employer]         To Whom it May Concern,    We are requesting an appeal of the prior authorization authorization rejection for patient Sa KENIA Mackey for Uloric 40mg daily.  The case number from [employer] is [id number].  We would like to appeal this because patient has been on Uloric since 2013.  He was switched to this medicine when he failed allopurinol for gout with tophi.  He has been fairly stable on this medication.  He is also on colchicine 0.6 mg as needed for gout attacks.  To my knowledge she has not tried probenecid; however we would prefer to continue on a medication that we know works versus switch to a medication that may not work and worsen the patient's gout status.      Sincerely,      Jason Lema, Pharm.D.

## 2019-03-21 NOTE — TELEPHONE ENCOUNTER
DRUG CHANGE REQUEST for febuxostat (ULORIC) 40 MG TABS    Please advise and see pharmacy message: Drug not covered by patient plan. Preferred alternative: Allopurinol, Probenecid and Probenecidcolchicine.    Neris Saravia, CMA

## 2019-05-22 ENCOUNTER — OFFICE VISIT (OUTPATIENT)
Dept: FAMILY MEDICINE | Facility: CLINIC | Age: 59
End: 2019-05-22
Payer: COMMERCIAL

## 2019-05-22 VITALS
HEART RATE: 71 BPM | TEMPERATURE: 98.9 F | RESPIRATION RATE: 16 BRPM | OXYGEN SATURATION: 96 % | SYSTOLIC BLOOD PRESSURE: 137 MMHG | DIASTOLIC BLOOD PRESSURE: 78 MMHG

## 2019-05-22 DIAGNOSIS — M79.672 PAIN IN BOTH FEET: Primary | ICD-10-CM

## 2019-05-22 DIAGNOSIS — Z00.00 PREVENTATIVE HEALTH CARE: ICD-10-CM

## 2019-05-22 DIAGNOSIS — M79.671 PAIN IN BOTH FEET: Primary | ICD-10-CM

## 2019-05-22 ASSESSMENT — PATIENT HEALTH QUESTIONNAIRE - PHQ9: SUM OF ALL RESPONSES TO PHQ QUESTIONS 1-9: 3

## 2019-05-22 NOTE — PROGRESS NOTES
[facility] Clinic Note    Patient: Sa KENIA Mackey  : 1960  MRN: [id number]         SUBJECTIVE       Sa KENIA Mackey is a 58 year old male with a PMH significant for:  Patient Active Problem List   Diagnosis     Health Care Home     Male erectile disorder     Obstructive sleep apnea     Posttraumatic stress disorder     Testicular hypofunction     Gout     Hyperlipidemia LDL goal <100     Lesion of ulnar nerve     Hypovitaminosis D     Allergic rhinitis     H/O exploratory laparotomy     Chronic gout without tophus, unspecified cause, unspecified site     Dyshidrotic eczema     Essential hypertension with goal blood pressure less than 140/90     Severe episode of recurrent major depressive disorder (H)     Type 2 diabetes mellitus with hyperglycemia, without long-term current use of insulin (H)     He presents to clinic today with chief complaint of pain in his feet.    Two days ago, the patient was on stairs in his home when he sustained a fall. He fell down 3 stairs. Immediately after the fall, the patient developed pain in his feet bilaterally. The pain involved the entire ankle and midfoot. The pain was worse in the right foot. The patient has had swelling involving the ankle, midfoot, and forefoot bilaterally since the fall. He did not seek out medical attention until today.    The patient was unable to bear weight immediately after the fall due to the pain. He has been using a wheelchair for mobility since the fall. He normally ambulates with a cane. He is now able to stand and bear weight on both feet with the assistance of the cane. He is uncertain if he is able to walk more than 4 steps.    The patient reports no pain in his knees, hips, back, wrists or shoulders. He did not hit his head or lose consciousness. No weakness or numbness.    Past Medical History, Past Surgical History, Medications, Allergies, and Family History were reviewed and updated as needed.        REVIEW OF SYSTEMS      CONSTITUTIONAL: No fever or chills.   CARDIOVASCULAR: There is lower extremity edema bilaterally.  MUSCULOSKELETAL: See above.   NEUROLOGIC: See above.          OBJECTIVE     Vitals:    05/22/19 1642 05/22/19 1646   BP: 150/81 137/78   BP Location: Right arm Right arm   Patient Position: Sitting Sitting   Cuff Size: Adult Regular Adult Regular   Pulse: 71 71   Resp: 16    Temp: 98.9  F (37.2  C)    TempSrc: Oral    SpO2: 96%      There is no height or weight on file to calculate BMI.    Physical Exam:  GENERAL: No acute distress.  SKIN: No rashes, lesions, erythema, petechiae, purpura, ecchymosis of the legs and feet bilaterally.  CARDIAC: Dorsalis pedis and posterior tibial pulses intact bilaterally.  MUSCULOSKELETAL: The ankle, midfoot, and forefoot are edematous bilaterally. There is mild tenderness to palpation over the medial and lateral malleoli on the left; there is moderate tenderness to palpation of the lateral malleolus on the right there is mild tenderness to palpation of the medial malleolus on the right; there is significant tenderness to palpation over the base of the 5th metatarsal and navicular bone on the right. Able to fully bear weight on two feet but unable to ambulate more than 4 feet due to pain.  NEUROLOGIC: Sensation to light touch is intact over the plantar, dorsal, medial and lateral surfaces of the ankle. Able to wiggle all toes and plantar flex and dorsiflex bilaterally.  PSYCH: Normal affect, mood, orientation, memory and insight.    LABORATORY:  No results found for this or any previous visit (from the past 24 hour(s)).      ASSESSMENT AND PLAN     1. Pain in both feet  Patient with bilateral foot pain (R > L) after sustaining mechanical fall on the stairs 2 days ago. Having significant pain and swelling (R > L) since the fall. Unable to bear weight or ambulate immediately after the fall. Has been using wheelchair since fall. Patient could stand and bear weight while standing on 2  feet in clinic today with assistance of cane. However, patient unable to ambulate more than 4 feet due to the pain during exam today. Exam also significant for moderate to severe tenderness to palpation of the lateral malleolus, base of 5th metatarsal and navicular bone. Based on these findings, patient should have x-ray imaging to assess for possible fracture. Unfortunately radiology was not available during the patient's clinic visit. Option to go to the emergency department versus return to clinic in 1-2 days for imaging was provided to the patient. Patient has a clinic appointment scheduled on 5/24 and stated he would like to have imaging taken at that time. Patient was encouraged to use ice/cold therapy, elevation, and ibuprofen/acetaminophen to help with pain management until imaging performed.  - XR ANKLE BILATERAL G/E 3 VW    2. Preventative health care  Patient due for colon cancer screening. Fecal occult blood test ordered today.  - Fecal Occult Blood - FIT, iFOB ([facility]); Future      Return to clinic in 1-2 days for follow-up of ankle and foot pain or sooner if develops new or worsening symptoms.    Patient was discussed with attending physician, Dr. Ruben Dias, who agrees with the assessment and plan.    Zachary Vasquez, PGY-1  [facility]  5/22/2019

## 2019-05-22 NOTE — PROGRESS NOTES
Preceptor Attestation:   Patient seen, evaluated and discussed with the resident. I have verified the content of the note, which accurately reflects my assessment of the patient and the plan of care.   Supervising Physician:  Ruben Dias MD

## 2019-05-22 NOTE — NURSING NOTE
Due to patient being non-English speaking/uses sign language, an  was used for this visit. Only for face-to-face interpretation by an external agency, date and length of interpretation can be found on the scanned worksheet.     name: Taye Ferrell  Agency: [employer]  Language: Liam   Telephone number: [phone]          Type of interpretation: Face-to-face, spoken      Patient declined to get height and weight today.

## 2019-05-22 NOTE — PATIENT INSTRUCTIONS
Plan:  1. It was a pleasure seeing you in clinic today.  2. You will need an x-ray to make sure there is no fracture of the ankle and foot. Please get the xray on Friday during your appointment with Dr. Siddiqui.  3. You can use Tylenol and ibuprofen alternating as needed for pain.  4. You can use ice/ice packs to apply cold to the areas of pain.  5. Elevating the feet can help with swelling and pain as well.    Please follow-up on Friday, May 24th with Dr. Siddiqui    [facility]  Phone: [phone]  Address: [address] [place] [address]

## 2019-05-22 NOTE — NURSING NOTE
FIT TEST  May 22, 2019 FIT TEST was ordered and patient was encouraged to stop at lab to get the kit.  JENNA Zavala

## 2019-05-24 ENCOUNTER — OFFICE VISIT (OUTPATIENT)
Dept: FAMILY MEDICINE | Facility: CLINIC | Age: 59
End: 2019-05-24
Payer: COMMERCIAL

## 2019-05-24 VITALS
RESPIRATION RATE: 12 BRPM | SYSTOLIC BLOOD PRESSURE: 133 MMHG | OXYGEN SATURATION: 94 % | TEMPERATURE: 98 F | HEART RATE: 77 BPM | DIASTOLIC BLOOD PRESSURE: 71 MMHG

## 2019-05-24 DIAGNOSIS — M25.579 PAIN IN JOINT, ANKLE AND FOOT, UNSPECIFIED LATERALITY: ICD-10-CM

## 2019-05-24 DIAGNOSIS — G56.22 ULNAR NEUROPATHY AT ELBOW OF LEFT UPPER EXTREMITY: ICD-10-CM

## 2019-05-24 DIAGNOSIS — S92.425A CLOSED NONDISPLACED FRACTURE OF DISTAL PHALANX OF LEFT GREAT TOE, INITIAL ENCOUNTER: ICD-10-CM

## 2019-05-24 DIAGNOSIS — E78.5 HYPERLIPIDEMIA LDL GOAL <100: ICD-10-CM

## 2019-05-24 DIAGNOSIS — M1A.09X0 IDIOPATHIC CHRONIC GOUT OF MULTIPLE SITES WITHOUT TOPHUS: ICD-10-CM

## 2019-05-24 RX ORDER — HYDROCODONE BITARTRATE AND ACETAMINOPHEN 5; 325 MG/1; MG/1
1 TABLET ORAL EVERY 6 HOURS PRN
Qty: 30 TABLET | Refills: 0 | Status: SHIPPED | OUTPATIENT
Start: 2019-05-24 | End: 2019-09-27

## 2019-05-24 RX ORDER — FEBUXOSTAT 40 MG/1
TABLET, FILM COATED ORAL
Qty: 93 TABLET | Refills: 3 | Status: SHIPPED | OUTPATIENT
Start: 2019-05-24 | End: 2020-03-05

## 2019-05-24 NOTE — NURSING NOTE
Due to patient being non-English speaking/uses sign language, an  was used for this visit. Only for face-to-face interpretation by an external agency, date and length of interpretation can be found on the scanned worksheet.     name: Taye Ferrell  Agency: [employer]  Language: Liam   Telephone number: [phone]  Type of interpretation: Face-to-face, spoken

## 2019-05-24 NOTE — PROGRESS NOTES
Submitted prior authorization for Uloric to [employer] per the recommendation of Dr. Siddiqui.  Paperwork given to me MD to keep if needed in the near future.    Savi Lema, Pharm.D.

## 2019-05-24 NOTE — PROGRESS NOTES
This is a 58-year-old Andorran speaking male who is well-known to me.  He attends today having been seen 2 days ago when there was a concern for a fracture however it was so late in the evening that x-rays could not be obtained.  He had already scheduled this appointment with me and it was therefore agreed that he would return today and have x-rays performed at this visit.  History is that he fell down a few stairs at home about 1 week ago.  He is complaining of pain in both feet, right greater than left.  He is presently seated in a wheelchair and reluctant to weight-bear.    When asked about his overall condition he reports that he has been unable to get his Uloric medication due to lack of insurance coverage.  He is been out of this for about 2 months.    He believes that his diabetes is well controlled however review of the records indicate that his last A1c was poorly controlled.  He prefer not to recheck labs today.    He also reports that for about 3 weeks he is noticing numbness in his ulnar 3 fingers of the left hand.  It is possible that he slept awkwardly on his left elbow.  He says he notices it most when he wakes in the morning.  I remind him that he did have an ulnar neuropathy affecting the right upper extremity and he believes that it is similar to that time.  He has no personal history of carpal tunnel syndrome.    Objective:  /71 (BP Location: Left arm, Patient Position: Sitting, Cuff Size: Adult Large)   Pulse 77   Temp 98  F (36.7  C) (Oral)   Resp 12   SpO2 94%   His blood pressure satisfactory  Exam of the feet reveals that he does have some bruising and marked tenderness on palpation of the left great toe.  In addition he has swelling over the dorsum of the right foot which is quite tender to palpation over the midfoot and on the lateral aspects.  He also appears to have some bruising affecting the right great and second toe.    X-rays of his ankles had previously been ordered.  In  addition I ordered x-ray of the left great toe and of the right foot.  Personal review of the x-rays by me reveals a nondisplaced fracture at the proximal aspect of the left IP joint of the great toe which does enter the joint.  Review of the right foot x-rays reveals no evidence for a fracture.  I did review the x-rays with radiology who confirmed my interpretation.    On exam of his left upper extremity he is describing tingling and numbness over the ulnar 3 fingers of the left hand.  Specifically he  denies that there is any sensory symptoms affecting the thumb and index finger.  Tinel sign does not appear to be positive nor does palpation at the ulnar nerve in the elbow reproduces symptoms.  There is no evidence of muscle wasting.    Sa was seen today for diabetes, arthritis and other.    Diagnoses and all orders for this visit:    Pain in joint, ankle and foot, unspecified laterality  -     Cancel: XR ANKLE BILATERAL G/E 3 VW  -     XR FOOT RT G/E 3 VW  -     XR TOE LT G/E 2 VW  -     XR Ankle Left G/E 3 Views  -     XR ANKLE RT G/E 3 VW    Hyperlipidemia LDL goal <100  -     aspirin (ASA) 81 MG EC tablet; Take 1 tablet (81 mg) by mouth daily    Idiopathic chronic gout of multiple sites without tophus  -     febuxostat (ULORIC) 40 MG TABS tablet; Take 1 tablet daily    Closed nondisplaced fracture of distal phalanx of left great toe, initial encounter  -     HYDROcodone-acetaminophen (NORCO) 5-325 MG tablet; Take 1 tablet by mouth every 6 hours as needed for pain    Ulnar neuropathy at elbow of left upper extremity    I did attempt to consult with orthopedics over the phone given that this patient lives about an hour away and due to his lack of English follow-up is more difficult than usual however I was unable to connect with an orthopedist.  We agreed that either using an orthopedic shoe or a well fitting running shoe and avoiding further injury should allow the fracture of the great toe to heal adequately.   He understands and agrees to follow-up.  If there is any repeat injury or risk of movement of the fracture site he needs to return and will repeat an x-ray.    It is entirely possible that gout may explain some of the swelling of the dorsum of the right foot or indeed this may represent soft tissue swelling.  In any case he needs to restart his Uloric and I will ask pharmacy to assist with obtaining a PA.  I complained to his community pharmacy that they had not contacted me seeking a PA and instead to allow the patient to be without the medication for 2 months.    Concerning his probable ulnar neuropathy, I suggested that we could obtain an EMG to confirm the diagnosis.  He would prefer to wait and see if it will resolve by itself and will follow-up if it does not.    He will plan to schedule another visit to follow-up on his diabetes at which point we will check his labs.  He can follow-up in about 4 weeks to reassess resolution of the great toe fracture.

## 2019-05-24 NOTE — PATIENT INSTRUCTIONS
Hemoglobin A1C   Date Value Ref Range Status   02/14/2019 11.1 (H) 4.1 - 5.7 % Final   10/26/2018 8.7 (H) 4.1 - 5.7 % Final   08/02/2018 6.8 (H) 4.1 - 5.7 % Final     Goal A1c is under 8.0

## 2019-06-06 NOTE — TELEPHONE ENCOUNTER
I called [facility] to follow-up on the PA that I sent 2 weeks ago.  They tell me the PA was denied on 5/25 because he is not tried and failed to covered alternatives for gout.  The covered alternatives are allopurinol, probenecid, and probenecid with colchicine.  To my knowledge patient has never been on probenecid or probenecid with colchicine.  But I need to know that he has tried and failed allopurinol.    We can file an appeal to [facility], which I will fax momentarily.  If this does not work then our only other option is to give the patient probenecid and then retry the prior authorization for Uloric if he fails probenecid.    Savi Lema, Pharm.D.

## 2019-06-06 NOTE — TELEPHONE ENCOUNTER
Please advise, receive another PA request. Do you want to process with PA or change medication?     Neris Saravia CMA

## 2019-06-07 NOTE — TELEPHONE ENCOUNTER
He for sure failed max dose Allopurinol (600mgs daily) about 10 years ago when - for that reason - I referred him to a rheumatologist.  He was prescribed colchicine but I also do not recall that he was on probenicid.  Do you want me to write a letter?

## 2019-06-21 DIAGNOSIS — M1A.09X0 IDIOPATHIC CHRONIC GOUT OF MULTIPLE SITES WITHOUT TOPHUS: ICD-10-CM

## 2019-06-24 RX ORDER — IBUPROFEN 800 MG/1
800 TABLET, FILM COATED ORAL DAILY PRN
Qty: 30 TABLET | Refills: 3 | Status: SHIPPED | OUTPATIENT
Start: 2019-06-24 | End: 2019-11-06

## 2019-06-28 ENCOUNTER — OFFICE VISIT (OUTPATIENT)
Dept: PHARMACY | Facility: CLINIC | Age: 59
End: 2019-06-28
Payer: COMMERCIAL

## 2019-06-28 ENCOUNTER — OFFICE VISIT (OUTPATIENT)
Dept: FAMILY MEDICINE | Facility: CLINIC | Age: 59
End: 2019-06-28
Payer: COMMERCIAL

## 2019-06-28 VITALS
OXYGEN SATURATION: 95 % | HEIGHT: 65 IN | WEIGHT: 225.8 LBS | TEMPERATURE: 98.1 F | RESPIRATION RATE: 24 BRPM | BODY MASS INDEX: 37.62 KG/M2 | DIASTOLIC BLOOD PRESSURE: 83 MMHG | SYSTOLIC BLOOD PRESSURE: 146 MMHG | HEART RATE: 90 BPM

## 2019-06-28 DIAGNOSIS — E11.65 TYPE 2 DIABETES MELLITUS WITH HYPERGLYCEMIA, WITHOUT LONG-TERM CURRENT USE OF INSULIN (H): ICD-10-CM

## 2019-06-28 DIAGNOSIS — E55.9 HYPOVITAMINOSIS D: ICD-10-CM

## 2019-06-28 DIAGNOSIS — Z00.00 ROUTINE GENERAL MEDICAL EXAMINATION AT A HEALTH CARE FACILITY: ICD-10-CM

## 2019-06-28 DIAGNOSIS — L30.1 POMPHOLYX: ICD-10-CM

## 2019-06-28 DIAGNOSIS — M1A.9XX0 CHRONIC GOUT WITHOUT TOPHUS, UNSPECIFIED CAUSE, UNSPECIFIED SITE: ICD-10-CM

## 2019-06-28 DIAGNOSIS — E11.65 TYPE 2 DIABETES MELLITUS WITH HYPERGLYCEMIA, WITHOUT LONG-TERM CURRENT USE OF INSULIN (H): Primary | ICD-10-CM

## 2019-06-28 DIAGNOSIS — M1A.9XX0 CHRONIC GOUT WITHOUT TOPHUS, UNSPECIFIED CAUSE, UNSPECIFIED SITE: Primary | ICD-10-CM

## 2019-06-28 DIAGNOSIS — G56.22 ULNAR NEUROPATHY OF LEFT UPPER EXTREMITY: ICD-10-CM

## 2019-06-28 DIAGNOSIS — M1A.0790 IDIOPATHIC CHRONIC GOUT OF ANKLE WITHOUT TOPHUS, UNSPECIFIED LATERALITY: ICD-10-CM

## 2019-06-28 DIAGNOSIS — Z00.00 PREVENTATIVE HEALTH CARE: ICD-10-CM

## 2019-06-28 LAB
BUN SERPL-MCNC: 12.8 MG/DL (ref 7–21)
CALCIUM SERPL-MCNC: 9.3 MG/DL (ref 8.5–10.1)
CHLORIDE SERPLBLD-SCNC: 101.5 MMOL/L (ref 98–110)
CO2 SERPL-SCNC: 24.3 MMOL/L (ref 20–32)
CREAT SERPL-MCNC: 0.7 MG/DL (ref 0.7–1.3)
GFR SERPL CREATININE-BSD FRML MDRD: >90 ML/MIN/1.7 M2
GLUCOSE SERPL-MCNC: 366.8 MG'DL (ref 70–99)
HBA1C MFR BLD: 11.2 % (ref 4.1–5.7)
POTASSIUM SERPL-SCNC: 4.1 MMOL/DL (ref 3.2–4.6)
SODIUM SERPL-SCNC: 135.3 MMOL/L (ref 132–142)
URATE SERPL-MCNC: 8 MG/DL (ref 3–8)

## 2019-06-28 RX ORDER — CHOLECALCIFEROL (VITAMIN D3) 50 MCG
2000 TABLET ORAL DAILY
Qty: 90 TABLET | Refills: 3 | Status: SHIPPED | OUTPATIENT
Start: 2019-06-28 | End: 2020-03-05

## 2019-06-28 RX ORDER — COLCHICINE 0.6 MG/1
TABLET ORAL
Qty: 60 TABLET | Refills: 3 | Status: SHIPPED | OUTPATIENT
Start: 2019-06-28 | End: 2019-12-09

## 2019-06-28 RX ORDER — POLYETHYLENE GLYCOL 3350 17 G/17G
POWDER, FOR SOLUTION ORAL
Qty: 238 G | Refills: 0 | Status: SHIPPED | OUTPATIENT
Start: 2019-06-28 | End: 2020-03-05

## 2019-06-28 RX ORDER — GLIPIZIDE 10 MG/1
10 TABLET, FILM COATED, EXTENDED RELEASE ORAL 2 TIMES DAILY
Qty: 180 TABLET | Refills: 3 | Status: SHIPPED | OUTPATIENT
Start: 2019-06-28 | End: 2020-02-17

## 2019-06-28 RX ORDER — PREDNISONE 20 MG/1
TABLET ORAL
Qty: 12 TABLET | Refills: 0 | Status: SHIPPED | OUTPATIENT
Start: 2019-06-28 | End: 2020-03-05

## 2019-06-28 RX ORDER — BETAMETHASONE DIPROPIONATE 0.5 MG/G
OINTMENT, AUGMENTED TOPICAL
Qty: 50 G | Refills: 3 | Status: SHIPPED | OUTPATIENT
Start: 2019-06-28 | End: 2020-03-05

## 2019-06-28 ASSESSMENT — ANXIETY QUESTIONNAIRES
7. FEELING AFRAID AS IF SOMETHING AWFUL MIGHT HAPPEN: NOT AT ALL
1. FEELING NERVOUS, ANXIOUS, OR ON EDGE: NOT AT ALL
6. BECOMING EASILY ANNOYED OR IRRITABLE: NOT AT ALL
3. WORRYING TOO MUCH ABOUT DIFFERENT THINGS: NOT AT ALL
2. NOT BEING ABLE TO STOP OR CONTROL WORRYING: NOT AT ALL
5. BEING SO RESTLESS THAT IT IS HARD TO SIT STILL: NOT AT ALL
GAD7 TOTAL SCORE: 0
IF YOU CHECKED OFF ANY PROBLEMS ON THIS QUESTIONNAIRE, HOW DIFFICULT HAVE THESE PROBLEMS MADE IT FOR YOU TO DO YOUR WORK, TAKE CARE OF THINGS AT HOME, OR GET ALONG WITH OTHER PEOPLE: NOT DIFFICULT AT ALL

## 2019-06-28 ASSESSMENT — PATIENT HEALTH QUESTIONNAIRE - PHQ9
SUM OF ALL RESPONSES TO PHQ QUESTIONS 1-9: 0
5. POOR APPETITE OR OVEREATING: NOT AT ALL

## 2019-06-28 ASSESSMENT — MIFFLIN-ST. JEOR: SCORE: 1781.71

## 2019-06-28 ASSESSMENT — PAIN SCALES - GENERAL: PAINLEVEL: SEVERE PAIN (7)

## 2019-06-28 NOTE — NURSING NOTE
Chief Complaint   Patient presents with     RECHECK     Having Leg Pain      Dave Marshall CMA      FIT TEST  June 28, 2019 FIT TEST was ordered and given to patient.   Dave Marshall CMA      Due to patient being non-English speaking/uses sign language, an  was used for this visit. Only for face-to-face interpretation by an external agency, date and length of interpretation can be found on the scanned worksheet.     name: Taye Ferrell  Agency: [employer]  Language: Liam   Telephone number: [phone]  Type of interpretation: Face-to-face, spoken     Dave Marshall CMA

## 2019-06-28 NOTE — PROGRESS NOTES
"This is a 58-year-old [place] Islander speaking male who is well-known to me.  He attends today primarily complaining of severe bilateral lower extremity pain.  When asked to describe this further he points to his knees, ankles and feet as the site of pain.  He reports that the pain has been present since he fell over 1 month ago.  He denies any low back pain.  He denies any sensory changes or weakness in the lower extremities.  He also reports that he has still not received febuxostat from his pharmacy despite the fact that he received a letter from his insurance company saying that it would be covered.    I explained to him that we have spent quite a bit of time obtaining prior authorization for febuxostat and I regret that his pharmacy has not yet provided him with this.  I will ask pharmacy to assist us in making sure he gets on this medication.    When asked whether he feels that his symptoms are caused by gout he says he is not sure.    In addition he continues to have numbness in the ulnar nerve distribution of the left upper extremity.  We discussed this at last visit and he says he is now ready to have this worked up.    He says that he is much less active because of his severe lower extremity pain and believes this explains why his diabetes is so poorly controlled today.    Objective:  /83   Pulse 90   Temp 98.1  F (36.7  C) (Oral)   Resp 24   Ht 1.66 m (5' 5.35\")   Wt 102.4 kg (225 lb 12.8 oz)   SpO2 95%   BMI 37.17 kg/m    His blood pressure is mildly elevated, he remains obese.  His knee joints are exquisitely tender on exam, as are his ankles and the dorsum of both feet.  Clinically, I am highly suspicious that much of his symptomatology reflects active gout given that he has not taken febuxostat for over 2 months.  He again demonstrates altered sensation in the ulnar nerve distribution of the left upper extremity.    Results for orders placed or performed in visit on 06/28/19   Uric Acid " ([facility])   Result Value Ref Range    Uric Acid 8.0 3.0 - 8.0 mg/dL    Narrative    Test performed by:  [facility]  [address] [place] [address]   Hemoglobin A1c ([facility])   Result Value Ref Range    Hemoglobin A1C 11.2 (H) 4.1 - 5.7 %   Basic Metabolic Panel ([facility])   Result Value Ref Range    Urea Nitrogen 12.8 7.0 - 21.0 mg/dL    Calcium 9.3 8.5 - 10.1 mg/dL    Chloride 101.5 98.0 - 110.0 mmol/L    Carbon Dioxide 24.3 20.0 - 32.0 mmol/L    Creatinine 0.7 0.7 - 1.3 mg/dL    Glucose 366.8 (H) 70.0 - 99.0 mg'dL    Potassium 4.1 3.2 - 4.6 mmol/dL    Sodium 135.3 132.0 - 142.0 mmol/L    GFR Estimate >90 >60.0 mL/min/1.7 m2    GFR Estimate If Black >90 >60.0 mL/min/1.7 m2     We have discussed his elevated A1c today indicating that a parenteral medication is indicated given that he has maximized oral diabetic treatments.     was seen today for recheck.    Diagnoses and all orders for this visit:    Chronic gout without tophus, unspecified cause, unspecified site  -     Uric Acid ([facility])  -     predniSONE (DELTASONE) 20 MG tablet; Take 2 tabs daily for 3 days, 1 tab daily for 3 days, 0.5 tab daily for 3 days, 0.25 tab daily for 3 days then stop    Type 2 diabetes mellitus with hyperglycemia, without long-term current use of insulin (H)  -     Hemoglobin A1c ([facility])  -     Basic Metabolic Panel ([facility])  -     glipiZIDE (GLUCOTROL XL) 10 MG 24 hr tablet; Take 1 tablet (10 mg) by mouth 2 times daily  -     exenatide ER (BYDUREON) 2 MG pen; Inject 2 mg Subcutaneous every 7 days    Preventative health care  -     Fecal Occult Blood - FIT, iFOB ([facility]); Future    Idiopathic chronic gout of ankle without tophus, unspecified laterality  -     colchicine (COLCRYS) 0.6 MG tablet; Take 1 tab twice a day for 5 days then 1 tab as needed for gout flare up.  Max 3 tabs (1.8mg) over 1 hour.    Pompholyx  -     augmented betamethasone dipropionate (DIPROLENE-AF) 0.05 % external ointment; Apply sparingly to  affected area twice daily as needed.  Do not apply to face.    Hypovitaminosis D  -     vitamin D3 (CHOLECALCIFEROL) 2000 units (50 mcg) tablet; Take 1 tablet (2,000 Units) by mouth daily    Routine general medical examination at a health care facility  -     polyethylene glycol (MIRALAX) packet; Miralax powder 8.3 oz bottle (238 gm) as directed    Ulnar neuropathy of left upper extremity  -     NEUROLOGY ADULT REFERRAL; Future      I have asked pharmacy to make sure he is able to  his febuxostat.  Given that he appears to be in the middle of an active gout episode I recommended that he wait a few days before restarting febuxostat.  He is taken prednisone with good effect in the past.  I explained that this is going to make his elevated blood sugars worse and strongly encouraged him to commence a parenteral injection.  He eventually agrees and I will ask pharmacy to discuss this further with him.  In addition he is found colchicine helpful and we agreed to use a 0.6 mg twice daily dosing for the next 5 days after which time he can use it as needed.    I will place a referral for an EMG plus neuro consult to evaluate his left upper extremity neuropathic symptoms.    I have asked him to return in about a month time to review his progress and hopefully at that point his gout will be much better controlled and his blood sugars will have come down.    Total visit time was 25 mins, all of which was face to face MD time, and over 50% of this time was spent in counseling and coordination of care.

## 2019-06-28 NOTE — PROGRESS NOTES
"SUBJECTIVE/OBJECTIVE:                Sa KENIA Mackey is a 58 year old male coming in for a follow-up visit for Medication Therapy Management.  He was referred to me from Dr. Siddiqui.     Chief Complaint: Follow up from our visit on 5/24.  Coming in for diabetes and gout.    Medication Adherence/Access:    Diabetes:  Pt currently taking metformin, glipizide, sitagliptan. Pt is not experiencing side effects.  SMBG: rarely.    Patient is not experiencing hypoglycemia  Recent symptoms of high blood sugar? none  ACEi/ARB: Yes: lisinopril .   Urine Albumin:   Lab Results   Component Value Date    UMALCR 12.7 02/14/2019      Aspirin: Taking 81mg daily and denies side effects    Gout: Currently taking NSAID (ibpuprofen) and colchicine . He is supposed to be taking Uloric but has not received it from the pharmacy Pt reports increased pain -  everywhere. Pt is experiencing the following medication side effects: none.   Uric Acid   Date Value Ref Range Status   06/28/2019 8.0 3.0 - 8.0 mg/dL Final   ]        Today's Vitals:.  BP Readings from Last 1 Encounters:   06/28/19 146/83     Pulse Readings from Last 1 Encounters:   06/28/19 90     Wt Readings from Last 1 Encounters:   06/28/19 102.4 kg (225 lb 12.8 oz)     Ht Readings from Last 1 Encounters:   06/28/19 1.66 m (5' 5.35\")     Estimated body mass index is 37.17 kg/m  as calculated from the following:    Height as of an earlier encounter on 6/28/19: 1.66 m (5' 5.35\").    Weight as of an earlier encounter on 6/28/19: 102.4 kg (225 lb 12.8 oz).    Temp Readings from Last 1 Encounters:   06/28/19 98.1  F (36.7  C) (Oral)       ASSESSMENT:              Current medications were reviewed today as discussed above.      Medication Adherence: good, no issues identified    Diabetes: Needs Improvement. Patient is not meeting A1c goal of < 7%.   Pt would benefit from    SFU (glipizide) :  increase dose to 20mg dialy; gave patient the choice of BID or once daily and he chose BID    GLP1 " Agonist (Bydureon) :  Start at dose : 2mg weekly.  Went over with patient how to use this.    Patient will need to discontinue Januvia with this agent but I did not want to tell him to stop this until I knew he started the Bydureon    Gout: Needs improvement.    Pt would benefit from the following medication changes:     Add burst of prednisone and colchine per Dr. Siddiqui.    Restart Uloric.  Called called patient's pharmacy and let them know that this could be filled.  Also educated patient to not start this medication until Sunday as it could potentially make his gout worse until the prednisone and colchicine have time to kick in.    PLAN:                  1. Start Prednisone today   2. Start colchicine today - tale 1 tablet two times daily for 5 days.  Then you can take this as need for gout pain  3. Re-start Uloric Sunday.  4. Start new injection (Bydureon)  5. Increase glipizide to twice daily.    I spent 30 minutes with this patient today. All changes were made via collaborative practice agreement with Dr Siddiqui. A copy of the visit note was provided to the patient's primary care provider.     Will follow up in 1 month when he sees Dr Siddiqui againm.    The patient was given a summary of these recommendations as an after visit summary.    Savi Lema, Lukas.D.

## 2019-06-28 NOTE — LETTER
July 8, 2019      Sa T Key  [address] [place] [address]        Dear ,    As we discussed your sugars were too high and hopefully the new medication will help you get these down better, which is important for your health.  Your uric acid level was at the upper limits of normal and I do hope that your gout is now doing better and that you are taking Uloric every day.  Please do let me know if you have any problems getting any of your prescriptions-ask someone to call to leave a message for me.     I did put in a referral to have an EMG on your left arm and see an neurologist.  I look forward to seeing you again in about 1 month, take care  Please see below for your test results.    Resulted Orders   Uric Acid ([facility])   Result Value Ref Range    Uric Acid 8.0 3.0 - 8.0 mg/dL    Narrative    Test performed by:  [facility]  [address] [place] [address]   Hemoglobin A1c ([facility])   Result Value Ref Range    Hemoglobin A1C 11.2 (H) 4.1 - 5.7 %   Basic Metabolic Panel ([facility])   Result Value Ref Range    Urea Nitrogen 12.8 7.0 - 21.0 mg/dL    Calcium 9.3 8.5 - 10.1 mg/dL    Chloride 101.5 98.0 - 110.0 mmol/L    Carbon Dioxide 24.3 20.0 - 32.0 mmol/L    Creatinine 0.7 0.7 - 1.3 mg/dL    Glucose 366.8 (H) 70.0 - 99.0 mg'dL    Potassium 4.1 3.2 - 4.6 mmol/dL    Sodium 135.3 132.0 - 142.0 mmol/L    GFR Estimate >90 >60.0 mL/min/1.7 m2    GFR Estimate If Black >90 >60.0 mL/min/1.7 m2       If you have any questions, please call the clinic to make an appointment.    Sincerely,    Jason Siddiqui MD

## 2019-06-28 NOTE — LETTER
July 12, 2019      Sa T Key  [address] [place] [address]        Dear ,    Please see below for your test results.    NEUROLOGY ADULT REFERRAL   [facility]  Phone: [phone]  Fax: [phone]    [facility]  [address] [address]      Appointment:  Friday July 26, 2019  Arrival Time:  1:15 pm  Provider:      Please bring a copy of your insurance card and photo ID    If you cannot make this appointment please call [phone] to reschedule    If you have any questions, please call the clinic to make an appointment.    Sincerely,    Jason Siddiqui MD

## 2019-06-28 NOTE — PATIENT INSTRUCTIONS
FIT TEST  June 28, 2019 FIT TEST was ordered and given to patient.   Dave Marshall CMA    1. Start Prednisone today   2. Start colchicine today - tale 1 tablet two times daily for 5 days.  Then you can take this as need for gout pain  3. Re-start Uloric Sunday.  4. Start new injection (Bydureon)  5. Increase glipizide to twice daily.       NEUROLOGY ADULT REFERRAL   [facility]  Phone: [phone]  Fax: [phone]    [facility]  [address] [place] [id number]      Appointment:  Friday July 26, 2019  Arrival Time:  1:15 pm  Provider:      Please bring a copy of your insurance card and photo ID    If you cannot make this appointment please call [phone] to reschedule    *HOLD FOR COMPLETED OV NOTE* Referral, demographics, office note and medication list faxed to [phone].

## 2019-06-29 ASSESSMENT — ANXIETY QUESTIONNAIRES: GAD7 TOTAL SCORE: 0

## 2019-07-02 NOTE — RESULT ENCOUNTER NOTE
Hello, as we discussed your sugars were too high and hopefully the new medication will help you get these down better, which is important for your health.  Your uric acid level was at the upper limits of normal and I do hope that your gout is now doing better and that you are taking Uloric every day.  Please do let me know if you have any problems getting any of your prescriptions-ask someone to call to leave a message for me.    I did put in a referral to have an EMG on your left arm and see an neurologist.  I look forward to seeing you again in about 1 month, take care, Dr. Jason dooley

## 2019-07-26 ENCOUNTER — AMBULATORY - HEALTHEAST (OUTPATIENT)
Dept: ADMINISTRATIVE | Facility: REHABILITATION | Age: 59
End: 2019-07-26

## 2019-07-26 ENCOUNTER — OFFICE VISIT (OUTPATIENT)
Dept: FAMILY MEDICINE | Facility: CLINIC | Age: 59
End: 2019-07-26
Payer: COMMERCIAL

## 2019-07-26 VITALS
OXYGEN SATURATION: 95 % | TEMPERATURE: 97.9 F | RESPIRATION RATE: 20 BRPM | SYSTOLIC BLOOD PRESSURE: 136 MMHG | WEIGHT: 230.4 LBS | DIASTOLIC BLOOD PRESSURE: 85 MMHG | BODY MASS INDEX: 38.39 KG/M2 | HEART RATE: 85 BPM | HEIGHT: 65 IN

## 2019-07-26 DIAGNOSIS — Z00.00 PREVENTATIVE HEALTH CARE: ICD-10-CM

## 2019-07-26 DIAGNOSIS — F33.2 SEVERE EPISODE OF RECURRENT MAJOR DEPRESSIVE DISORDER, WITHOUT PSYCHOTIC FEATURES (H): ICD-10-CM

## 2019-07-26 DIAGNOSIS — E11.9 DIABETES MELLITUS WITHOUT COMPLICATION (H): ICD-10-CM

## 2019-07-26 DIAGNOSIS — M1A.9XX0 CHRONIC GOUT WITHOUT TOPHUS, UNSPECIFIED CAUSE, UNSPECIFIED SITE: ICD-10-CM

## 2019-07-26 DIAGNOSIS — E11.65 TYPE 2 DIABETES MELLITUS WITH HYPERGLYCEMIA, WITHOUT LONG-TERM CURRENT USE OF INSULIN (H): Primary | ICD-10-CM

## 2019-07-26 DIAGNOSIS — G56.20 ULNAR NEUROPATHY: ICD-10-CM

## 2019-07-26 LAB — HEMOCCULT STL QL IA: NEGATIVE

## 2019-07-26 ASSESSMENT — MIFFLIN-ST. JEOR: SCORE: 1791.97

## 2019-07-26 NOTE — RESULT ENCOUNTER NOTE
Good!  Your test is negative for blood in your stools.  We still do recommend a colonoscopy every 10 years for everyone over age 50 so do let me know if you are ready for that - regards, Jason Siddiqui

## 2019-07-26 NOTE — LETTER
July 26, 2019       KENIA Key  [address] [place] [address]        Dear Sa,    Good!  Your test is negative for blood in your stools.  We still do recommend a colonoscopy every 10 years for everyone over age 50 so do let me know if you are ready for that.  Please see below for your test results.    Resulted Orders   Fecal Occult Blood - FIT, iFOB ([facility])   Result Value Ref Range    Occult Blood Scn FIT NEGATIVE Negative       If you have any questions, please call the clinic to make an appointment.    Sincerely,    Jason Siddiqui MD

## 2019-07-26 NOTE — PROGRESS NOTES
"This is a 59-year-old well-known to me.  He returns today mainly because he received the prescription of bydureon shots from pharmacy but does not know how to administer these.  He brings the box of shots with him today and hopes he can be instructed in proper technique.  We followed up on the last visit.  He reports that he is now taking febuxostat and that his gout is much better.  He feels that prednisone really helped with his lower extremity pain and then he is now much more mobile.    He is happy to continue taking bydureon and agrees to return in about 2 months to recheck diabetes control.    He feels that his mood is better as well.  He continues to have ulnar neuropathic symptoms and in fact has an EMG scheduled for about 2 hours time.    Objective:  /85 (BP Location: Left arm, Patient Position: Sitting, Cuff Size: Adult Large)   Pulse 85   Temp 97.9  F (36.6  C) (Oral)   Resp 20   Ht 1.651 m (5' 5\")   Wt 104.5 kg (230 lb 6.4 oz)   SpO2 95%   BMI 38.34 kg/m    His blood pressure is controlled.  He has gained a few pounds and remains in the obese category.    With the assistance of an , I step-by-step reviewed the process for proper administration of bydureon and had the patient administer the shot himself.  He expressed confidence in being able to continue to do this.     was seen today for musculoskeletal problem and medication reconciliation.    Diagnoses and all orders for this visit:    Type 2 diabetes mellitus with hyperglycemia, without long-term current use of insulin (H)    Preventative health care  -     Fecal Occult Blood - FIT, iFOB ([facility])    Chronic gout without tophus, unspecified cause, unspecified site    Severe episode of recurrent major depressive disorder, without psychotic features (H)      We agreed that he will return in about 2 months time to reassess his diabetes control.  I will watch for the EMG results and any recommendations from neurology in the " meantime.    Total visit time was 25 mins, all of which was face to face MD time, and over 50% of this time was spent in counseling and coordination of care, including assisting patient self-administer injectable for the first time.

## 2019-07-26 NOTE — NURSING NOTE
Due to patient being non-English speaking/uses sign language, an  was used for this visit. Only for face-to-face interpretation by an external agency, date and length of interpretation can be found on the scanned worksheet.     name: Silvina ([id number])  Agency: [employer]  Language: Liam   Telephone number: [phone]  Type of interpretation: Telemedicine, spoken

## 2019-08-30 ENCOUNTER — TRANSFERRED RECORDS (OUTPATIENT)
Dept: HEALTH INFORMATION MANAGEMENT | Facility: CLINIC | Age: 59
End: 2019-08-30

## 2019-09-27 ENCOUNTER — OFFICE VISIT (OUTPATIENT)
Dept: FAMILY MEDICINE | Facility: CLINIC | Age: 59
End: 2019-09-27
Payer: COMMERCIAL

## 2019-09-27 VITALS
SYSTOLIC BLOOD PRESSURE: 138 MMHG | DIASTOLIC BLOOD PRESSURE: 78 MMHG | WEIGHT: 233.4 LBS | BODY MASS INDEX: 38.89 KG/M2 | HEART RATE: 93 BPM | OXYGEN SATURATION: 94 % | HEIGHT: 65 IN | RESPIRATION RATE: 16 BRPM | TEMPERATURE: 98.1 F

## 2019-09-27 DIAGNOSIS — E78.5 HYPERLIPIDEMIA LDL GOAL <100: Primary | ICD-10-CM

## 2019-09-27 DIAGNOSIS — G56.22 LESION OF LEFT ULNAR NERVE: Primary | ICD-10-CM

## 2019-09-27 DIAGNOSIS — M1A.9XX0 CHRONIC GOUT WITHOUT TOPHUS, UNSPECIFIED CAUSE, UNSPECIFIED SITE: ICD-10-CM

## 2019-09-27 DIAGNOSIS — E11.65 TYPE 2 DIABETES MELLITUS WITH HYPERGLYCEMIA, WITHOUT LONG-TERM CURRENT USE OF INSULIN (H): ICD-10-CM

## 2019-09-27 DIAGNOSIS — E78.5 HYPERLIPIDEMIA LDL GOAL <100: ICD-10-CM

## 2019-09-27 LAB
BUN SERPL-MCNC: 17.2 MG/DL (ref 7–21)
CALCIUM SERPL-MCNC: 8.8 MG/DL (ref 8.5–10.1)
CHLORIDE SERPLBLD-SCNC: 103.1 MMOL/L (ref 98–110)
CHOLEST SERPL-MCNC: 190.2 MG/DL (ref 0–200)
CHOLEST/HDLC SERPL: 5.2 {RATIO} (ref 0–5)
CO2 SERPL-SCNC: 23.1 MMOL/L (ref 20–32)
CREAT SERPL-MCNC: 0.7 MG/DL (ref 0.7–1.3)
GFR SERPL CREATININE-BSD FRML MDRD: >90 ML/MIN/1.7 M2
GLUCOSE SERPL-MCNC: 266.3 MG'DL (ref 70–99)
HBA1C MFR BLD: 7 % (ref 4.1–5.7)
HDLC SERPL-MCNC: 36.4 MG/DL
LDLC SERPL CALC-MCNC: 119 MG/DL (ref 0–129)
POTASSIUM SERPL-SCNC: 4.2 MMOL/DL (ref 3.2–4.6)
SODIUM SERPL-SCNC: 135 MMOL/L (ref 132–142)
TRIGL SERPL-MCNC: 172.5 MG/DL (ref 0–150)
URATE SERPL-MCNC: 7.7 MG/DL (ref 3–8)
VLDL CHOLESTEROL: 34.5 MG/DL (ref 7–32)

## 2019-09-27 RX ORDER — BLOOD SUGAR DIAGNOSTIC
1 STRIP MISCELLANEOUS 3 TIMES DAILY
Qty: 100 EACH | Refills: 11 | Status: SHIPPED | OUTPATIENT
Start: 2019-09-27 | End: 2020-03-05

## 2019-09-27 RX ORDER — DIPHENHYDRAMINE HYDROCHLORIDE 25 MG/1
1 CAPSULE, LIQUID FILLED ORAL DAILY
Qty: 1 KIT | Refills: 0 | Status: SHIPPED | OUTPATIENT
Start: 2019-09-27 | End: 2020-12-31

## 2019-09-27 ASSESSMENT — MIFFLIN-ST. JEOR: SCORE: 1805.58

## 2019-09-27 NOTE — PROGRESS NOTES
"This is a 59-year-old Italian male who is well-known to me.  He is late for today's appointment and we therefore were not able to obtain labs prior to the appointment as planned.  He reports that he has made some major changes to his diet and he anticipates that his blood sugars will be much better.  We had started by chester and indeed I had administered the first injection in clinic since he was unable to understand the instructions for how to do this.  He tells me today that he did experience some burning sensation in his chest when he received the injection but did not tell anyone since he was worried that he would be sent to the emergency room and did not want to go.  He says the pain lasted about a day and then went away.  But as a result he did not take anymore by chester and does not want to use it.    He is checking his blood sugars about 3 times per day and wants to continue this.  He is asking for a new meter saying his current one does not work well plus the fact that he needs new lancets.  He has changed pharmacy as asking a refill the supplies to his new pharmacy.  He is also been having trouble getting his daily aspirin and wants this refilled.  He says that he has his other medications and does not need refills for now.    He did attend neurology and apparently had an EMG.  Results are not available to me he was told that he might benefit from a left wrist splint.  He is asking if I can help him secure 1 of these.    Objective:  /78 (BP Location: Left arm, Patient Position: Sitting, Cuff Size: Adult Large)   Pulse 93   Temp 98.1  F (36.7  C) (Oral)   Resp 16   Ht 1.651 m (5' 5\")   Wt 105.9 kg (233 lb 6.4 oz)   SpO2 94%   BMI 38.84 kg/m    His blood pressure is good.  He has not lost significant weight.  His affect appears good and he is smiling and seems less distressed than on prior occasions.  I fitted him with a left wrist splint and he is happy with this.  Physical exam is otherwise " not performed.    Results for orders placed or performed in visit on 09/27/19   Hemoglobin A1c ([facility])   Result Value Ref Range    Hemoglobin A1C 7.0 (H) 4.1 - 5.7 %     Sa was seen today for diabetes.    Diagnoses and all orders for this visit:    Lesion of left ulnar nerve  -     WRIST COCK-UP NON-MOLDED    Type 2 diabetes mellitus with hyperglycemia, without long-term current use of insulin (H)  -     Blood Glucose Monitoring Suppl (BLOOD GLUCOSE MONITOR SYSTEM) w/Device KIT; 1 kit daily  -     blood glucose (NO BRAND SPECIFIED) lancets standard; Use to test blood sugar 3 times daily or as directed.  -     blood glucose (NO BRAND SPECIFIED) test strip; Use up to 3 times a day  -     Alcohol Swabs (ALCOHOL PADS) 70 % PADS; 1 pad 3 times daily  -     Discontinue: exenatide ER (BYDUREON) 2 MG pen; discontinue per patient request  -     Basic Metabolic Panel ([facility])  -     Hemoglobin A1c ([facility])    Hyperlipidemia LDL goal <100  -     aspirin (ASA) 81 MG EC tablet; Take 1 tablet (81 mg) by mouth daily  -     Lipid Panel ([facility])    Chronic gout without tophus, unspecified cause, unspecified site  -     Uric Acid ([facility])      At the time of dictation his A1c has indeed come back much improved and I will congratulate him for this impressive dietary effort in the absence of any parenteral diabetic medications.  I refilled the medications he has requested including a new glucose meter.  I suggested he return in about 3 months time to continue monitoring his excellent diabetic control and have asked him to bring his meter with him at that time.    Total visit time was 25 mins, all of which was face to face MD time, and over 50% of this time was spent in counseling and coordination of care.

## 2019-09-27 NOTE — NURSING NOTE
Due to patient being non-English speaking/uses sign language, an  was used for this visit. Only for face-to-face interpretation by an external agency, date and length of interpretation can be found on the scanned worksheet.     name: Taye Ferrell  Agency: [employer]  Language: Liam   Telephone number: [phone]  Type of interpretation: Face-to-face, spoken

## 2019-09-27 NOTE — LETTER
September 30, 2019      Sa T Key  [address] [place] [address]        Dear ,    Well done!, as you predicted, your average sugar, A1c is MUCH better. Congratulations!  Being more careful with your diet and taking Metformin has worked! You are at goal of 7.0!  Keep up the good work.  As you can see below, this is the best reading you have had this year.  Also your gout result is good and your kidneys continue to work well.  Your bad cholesterol LDL is a bit higher than I would expect - please make sure not to miss taking Atorvastatin each day - OK?  Take care!  See you in 3 months or so.    Hemoglobin A1C        Date                     Value               Ref Range           Status                09/27/2019               7.0 (H)             4.1 - 5.7 %         Final                  06/28/2019               11.2 (H)            4.1 - 5.7 %         Final                  02/14/2019               11.1 (H)            4.1 - 5.7 %         Final            Please see below for your test results.    Resulted Orders   Uric Acid ([facility])   Result Value Ref Range    Uric Acid 7.7 3.0 - 8.0 mg/dL    Narrative    Test performed by:  [facility]  [address] [place] [address]   Lipid Panel ([facility])   Result Value Ref Range    Cholesterol 190.2 0.0 - 200.0 mg/dL    Cholesterol/HDL Ratio 5.2 (H) 0.0 - 5.0    HDL Cholesterol 36.4 (L) >40.0 mg/dL    LDL Cholesterol Calculated 119 0 - 129 mg/dL    Triglycerides 172.5 (H) 0.0 - 150.0 mg/dL    VLDL Cholesterol 34.5 (H) 7.0 - 32.0 mg/dL   Basic Metabolic Panel ([facility])   Result Value Ref Range    Urea Nitrogen 17.2 7.0 - 21.0 mg/dL    Calcium 8.8 8.5 - 10.1 mg/dL    Chloride 103.1 98.0 - 110.0 mmol/L    Carbon Dioxide 23.1 20.0 - 32.0 mmol/L    Creatinine 0.7 0.7 - 1.3 mg/dL    Glucose 266.3 (H) 70.0 - 99.0 mg'dL    Potassium 4.2 3.2 - 4.6 mmol/dL    Sodium 135.0 132.0 - 142.0 mmol/L    GFR Estimate >90 >60.0 mL/min/1.7 m2    GFR Estimate If Black >90 >60.0  mL/min/1.7 m2   Hemoglobin A1c ([facility])   Result Value Ref Range    Hemoglobin A1C 7.0 (H) 4.1 - 5.7 %       If you have any questions, please call the clinic to make an appointment.    Sincerely,    Jason Siddiqui MD

## 2019-09-30 NOTE — RESULT ENCOUNTER NOTE
Clyde Noonan Than - well done!, as you predicted, your average sugar, A1c is MUCH better.  Congratulations!  Being more careful with your diet and taking Metformin has worked!  You are at goal of 7.0!  Keep up the good work.  As you can see below, this is the best reading you have had this year.  Also your gout result is good and your kidneys continue to work well.  Your bad cholesterol LDL is a bit higher than I would expect - please make sure not to miss taking Atorvastatin each day - OK?  Take care!  See you in 3 months or so - Dr Jason Siddiqui    Hemoglobin A1C       Date                     Value               Ref Range           Status                09/27/2019               7.0 (H)             4.1 - 5.7 %         Final                 06/28/2019               11.2 (H)            4.1 - 5.7 %         Final                 02/14/2019               11.1 (H)            4.1 - 5.7 %         Final            ----------

## 2019-11-05 ENCOUNTER — HEALTH MAINTENANCE LETTER (OUTPATIENT)
Age: 59
End: 2019-11-05

## 2019-11-06 DIAGNOSIS — M1A.09X0 IDIOPATHIC CHRONIC GOUT OF MULTIPLE SITES WITHOUT TOPHUS: ICD-10-CM

## 2019-11-06 DIAGNOSIS — M1A.0790 IDIOPATHIC CHRONIC GOUT OF ANKLE WITHOUT TOPHUS, UNSPECIFIED LATERALITY: ICD-10-CM

## 2019-11-06 RX ORDER — IBUPROFEN 800 MG/1
800 TABLET, FILM COATED ORAL EVERY 8 HOURS PRN
Qty: 90 TABLET | Refills: 3 | Status: SHIPPED | OUTPATIENT
Start: 2019-11-06 | End: 2020-03-05

## 2019-12-09 RX ORDER — COLCHICINE 0.6 MG/1
TABLET, FILM COATED ORAL
Qty: 60 TABLET | Refills: 3 | Status: SHIPPED | OUTPATIENT
Start: 2019-12-09 | End: 2020-03-05

## 2020-01-20 ENCOUNTER — TELEPHONE (OUTPATIENT)
Dept: FAMILY MEDICINE | Facility: CLINIC | Age: 60
End: 2020-01-20

## 2020-01-20 NOTE — TELEPHONE ENCOUNTER
PA create and routed to [facility] PA med to get PA going. Neris Saravia, CMA

## 2020-01-20 NOTE — TELEPHONE ENCOUNTER
Prior Authorization needed on:  febuxostat (ULORIC) 40 MG TABS tablet    Medication:  febuxostat (ULORIC)  Dose: 40 MG TABS tablet    Pharmacy confirmed as   [facility] - [place], [place] - [address]  [address]  [facility] [address]  [place] [address]  Phone: [phone] Fax: [phone]    [id number] - [place], [place] - [address] [facility] AT [facility] [address]  [address] [facility] [address]  Phone: [phone] Fax: [phone]: Yes    Insurance Name:  UCARE CONNECT [place] ONLY [[id number]]  Insurance Phone: [phone]   Insurance Patient ID: [id number]     See pharmacy message: Plan does not cover this medication. Do you want to send in alternative or process with PA?     Neris Saravia CMA January 20, 2020 at 4:06 PM

## 2020-01-20 NOTE — TELEPHONE ENCOUNTER
Prior Authorization needed on:  COLCRYS 0.6 MG tablet    Medication:  COLCRYS  Dose: 0.6 MG tablet    Pharmacy confirmed as [email or web address][id number] - [place], [place] - [address] [facility] AT [facility] [address]  [address] [facility] [address]  Phone: [phone] Fax: [phone]  : Yes    Insurance Name:  UC[employer] CONNECT [place] ONLY [[id number]]  Insurance Phone: [phone]   Insurance Patient ID: [id number]     See pharmacy message: Plan does not cover this medication. Do you want to sent in alternative or process with PA?     Neris Saravia CMA January 20, 2020 at 4:41 PM

## 2020-01-20 NOTE — TELEPHONE ENCOUNTER
Prior Authorization Retail Medication Request    Medication/Dose: febuxostat (ULORIC) 40 MG TABS tablet  ICD code (if different than what is on RX):  NA  Previously Tried and Failed:  colchicine (COLCRYS) 0.6 MG tablet and predniSONE (DELTASONE) 20 MG tablet   Rationale:  NA    Insurance Name:  UCARE CONNECT [place] ONLY [[id number]]  Insurance ID:  [id number]       Pharmacy Information (if different than what is on RX)  Name:  Same  Phone:  Same

## 2020-01-21 ENCOUNTER — TELEPHONE (OUTPATIENT)
Dept: FAMILY MEDICINE | Facility: CLINIC | Age: 60
End: 2020-01-21

## 2020-01-21 NOTE — TELEPHONE ENCOUNTER
Prior Authorization Retail Medication Request    Medication/Dose: COLCRYS 0.6 MG tablet  ICD code (if different than what is on RX):  Same  Previously Tried and Failed:  febuxostat (ULORIC) 40 MG TABS and naproxen (NAPROSYN) 375 MG tablet   Rationale:  NA    Insurance Name:  UC[employer] CONNECT [place] ONLY [[id number]]  Insurance ID:  [id number]       Pharmacy Information (if different than what is on RX)  Name:  Same  Phone:  Same

## 2020-01-22 NOTE — TELEPHONE ENCOUNTER
Central Prior Authorization Team  Phone: [phone]    PA Initiation    Medication: febuxostat (ULORIC) 40 MG TABS tablet  Insurance Company: [place] Medicaid ([facility]) - Phone [phone] Fax [phone]  Pharmacy Filling the Rx: [id number] - [place], [place] - [facility] [address] AT [facility] [address]  Filling Pharmacy Phone: [phone]  Filling Pharmacy Fax:    Start Date: 1/22/2020

## 2020-01-23 NOTE — TELEPHONE ENCOUNTER
Prior Authorization Approval  PA#[id number]  NDC#[id number]  Authorization Effective Date: 1/22/2020  Authorization Expiration Date: 1/31/2020- Due to new insurance plan starting on 02/01/2020  Medication: febuxostat (ULORIC) 40 MG TABS tablet- APPROVED   Approved Dose/Quantity:   Reference #:     Insurance Company: [place] Medicaid ([facility]) - Phone [phone] Fax [phone]  Expected CoPay:       CoPay Card Available:      Foundation Assistance Needed:    Which Pharmacy is filling the prescription (Not needed for infusion/clinic administered): [id number] - [place], [place] - [address] [place] [address] [address] AT [facility] & [address] [place] [address]  Pharmacy Notified: Yes  Patient Notified: Comment:  **Instructed pharmacy to notify patient when script is ready to /ship.**

## 2020-01-23 NOTE — TELEPHONE ENCOUNTER
Central Prior Authorization Team   Phone: [phone]    PA Initiation    Medication: COLCRYS 0.6 MG tablet  Insurance Company: [place] Medicaid ([facility]) - Phone [phone] Fax [phone]  Pharmacy Filling the Rx: [facility][id number] - [place], [place] - [facility] [address] AT [facility] & [address]  Filling Pharmacy Phone: [phone]  Filling Pharmacy Fax: [phone]  Start Date: 1/23/2020

## 2020-01-24 NOTE — TELEPHONE ENCOUNTER
PRIOR AUTHORIZATION DENIED    Medication: COLCRYS 0.6 MG-DENIED    Denial Date: 1/23/2020    Denial Rational:        Appeal Information:  IF YOU WOULD LIKE TO APPEAL PLEASE SUPPLY PA TEAM WITH A LETTER OF MEDICAL NECESSITY WITH CLINICAL REASON.

## 2020-01-27 NOTE — TELEPHONE ENCOUNTER
Medication Appeal Initiation    We have initiated an appeal for the requested medication:  Medication: COLCRYS 0.6 MG-APPEAL INITIATED  Appeal Start Date:  1/27/2020  Insurance Company: [place] Medicaid ([facility]) - Phone [phone] Fax [phone]  Comments:  APPEAL LETTER FAXED.

## 2020-01-29 NOTE — TELEPHONE ENCOUNTER
MEDICATION APPEAL DENIED    Medication: COLCRYS 0.6 MG-APPEAL DENIED    Denial Date:01/28/2020      Denial Rational:        Second Level Appeal Information:      Second level appeals will be managed by the clinic staff and provider. Please contact the [employer] Prior Authorization Team if additional information about the denial is needed.

## 2020-02-07 NOTE — TELEPHONE ENCOUNTER
I am having Taye Ferrell, patient's  reach out to him to come in for an office visit. Neris Saravia, CMA

## 2020-02-16 ENCOUNTER — HEALTH MAINTENANCE LETTER (OUTPATIENT)
Age: 60
End: 2020-02-16

## 2020-02-16 DIAGNOSIS — I10 ESSENTIAL HYPERTENSION WITH GOAL BLOOD PRESSURE LESS THAN 140/90: ICD-10-CM

## 2020-02-16 DIAGNOSIS — E11.65 TYPE 2 DIABETES MELLITUS WITH HYPERGLYCEMIA, WITHOUT LONG-TERM CURRENT USE OF INSULIN (H): ICD-10-CM

## 2020-02-17 RX ORDER — ATORVASTATIN CALCIUM 80 MG/1
TABLET, FILM COATED ORAL
Qty: 90 TABLET | Refills: 1 | Status: SHIPPED | OUTPATIENT
Start: 2020-02-17 | End: 2020-03-05

## 2020-02-17 RX ORDER — GLIPIZIDE 10 MG/1
TABLET, FILM COATED, EXTENDED RELEASE ORAL
Qty: 90 TABLET | Refills: 1 | Status: SHIPPED | OUTPATIENT
Start: 2020-02-17 | End: 2020-03-05

## 2020-02-17 RX ORDER — LISINOPRIL 40 MG/1
TABLET ORAL
Qty: 90 TABLET | Refills: 1 | Status: SHIPPED | OUTPATIENT
Start: 2020-02-17 | End: 2020-03-05

## 2020-02-17 RX ORDER — METFORMIN HCL 500 MG
TABLET, EXTENDED RELEASE 24 HR ORAL
Qty: 360 TABLET | Refills: 1 | Status: SHIPPED | OUTPATIENT
Start: 2020-02-17 | End: 2020-03-05

## 2020-03-04 ENCOUNTER — DOCUMENTATION ONLY (OUTPATIENT)
Dept: PHARMACY | Facility: PHYSICIAN GROUP | Age: 60
End: 2020-03-04

## 2020-03-04 DIAGNOSIS — E78.5 HYPERLIPIDEMIA LDL GOAL <100: Primary | ICD-10-CM

## 2020-03-04 DIAGNOSIS — M1A.9XX0 CHRONIC GOUT WITHOUT TOPHUS, UNSPECIFIED CAUSE, UNSPECIFIED SITE: ICD-10-CM

## 2020-03-04 DIAGNOSIS — E11.65 TYPE 2 DIABETES MELLITUS WITH HYPERGLYCEMIA, WITHOUT LONG-TERM CURRENT USE OF INSULIN (H): ICD-10-CM

## 2020-03-04 NOTE — PROGRESS NOTES
Patient will be coming in to see  on 3/5 for follow up at 10:40 am. It seems like pharmacy schedule is full on this day  and patient would not be able to be added during this time.     Interpretor Taye Ferrell left a voice message on patients home phone to bring in all his medications to appointment with .     Patients pharmacy ([phone]) was contacted ahead of time to confirm when the last time patient picked up his medications based off of current medication list     Atorvastatin 80mg- 30 day supply- 2/17/20    Glipizide XL 10mg- 30 day supply 2/18/20    Ibuprofen 800mg-30 day supply 2/18/20    Lisinopril 40mg-30 day supply 2/18/20    Glucophage-XR 500mg-30 day supply 2/18/20    Patient has not picked up these medications for a while    Tylenol 500mg-2/14/19    Aspirin 81mg-6/13/19    Betamethasone Diproionate Aug-7/19/19    Carboxymethylcellulose Refresh 0.5%- never filled    Cochicine 0.6mg-6/30/19    Dextran 70-hyprmellose 0.1-0.3%- never filled    Emollient aquaphor-2/14/19    Febuxostat 40mg-6/30/19    Miralax-6/30/19    Prednisone 20mg-6/30/19    sitagliptin 100mg-7/26/19    Marcelo Banegas  Pharmacy Resident

## 2020-03-05 ENCOUNTER — OFFICE VISIT (OUTPATIENT)
Dept: FAMILY MEDICINE | Facility: CLINIC | Age: 60
End: 2020-03-05
Payer: COMMERCIAL

## 2020-03-05 VITALS
TEMPERATURE: 98.3 F | HEART RATE: 65 BPM | SYSTOLIC BLOOD PRESSURE: 157 MMHG | OXYGEN SATURATION: 96 % | RESPIRATION RATE: 12 BRPM | WEIGHT: 231.2 LBS | BODY MASS INDEX: 37.16 KG/M2 | HEIGHT: 66 IN | DIASTOLIC BLOOD PRESSURE: 80 MMHG

## 2020-03-05 DIAGNOSIS — F33.2 SEVERE EPISODE OF RECURRENT MAJOR DEPRESSIVE DISORDER, WITHOUT PSYCHOTIC FEATURES (H): ICD-10-CM

## 2020-03-05 DIAGNOSIS — M1A.9XX0 CHRONIC GOUT WITHOUT TOPHUS, UNSPECIFIED CAUSE, UNSPECIFIED SITE: ICD-10-CM

## 2020-03-05 DIAGNOSIS — E78.5 HYPERLIPIDEMIA LDL GOAL <100: ICD-10-CM

## 2020-03-05 DIAGNOSIS — M1A.09X0 IDIOPATHIC CHRONIC GOUT OF MULTIPLE SITES WITHOUT TOPHUS: ICD-10-CM

## 2020-03-05 DIAGNOSIS — I10 ESSENTIAL HYPERTENSION WITH GOAL BLOOD PRESSURE LESS THAN 140/90: ICD-10-CM

## 2020-03-05 DIAGNOSIS — E55.9 HYPOVITAMINOSIS D: ICD-10-CM

## 2020-03-05 DIAGNOSIS — E11.65 TYPE 2 DIABETES MELLITUS WITH HYPERGLYCEMIA, WITHOUT LONG-TERM CURRENT USE OF INSULIN (H): Primary | ICD-10-CM

## 2020-03-05 DIAGNOSIS — L30.1 POMPHOLYX: ICD-10-CM

## 2020-03-05 DIAGNOSIS — H04.123 DRY EYES: ICD-10-CM

## 2020-03-05 DIAGNOSIS — F43.10 POSTTRAUMATIC STRESS DISORDER: ICD-10-CM

## 2020-03-05 DIAGNOSIS — Z00.00 ROUTINE GENERAL MEDICAL EXAMINATION AT A HEALTH CARE FACILITY: ICD-10-CM

## 2020-03-05 DIAGNOSIS — G44.219 EPISODIC TENSION-TYPE HEADACHE, NOT INTRACTABLE: ICD-10-CM

## 2020-03-05 LAB
BUN SERPL-MCNC: 11.7 MG/DL (ref 7–21)
CALCIUM SERPL-MCNC: 9.8 MG/DL (ref 8.5–10.1)
CHLORIDE SERPLBLD-SCNC: 104.5 MMOL/L (ref 98–110)
CHOLEST SERPL-MCNC: 156.6 MG/DL (ref 0–200)
CHOLEST/HDLC SERPL: 4.1 {RATIO} (ref 0–5)
CO2 SERPL-SCNC: 29.2 MMOL/L (ref 20–32)
CREAT SERPL-MCNC: 0.7 MG/DL (ref 0.7–1.3)
CREAT UR-MCNC: 100.4 MG/DL
GFR SERPL CREATININE-BSD FRML MDRD: >90 ML/MIN/1.7 M2
GLUCOSE SERPL-MCNC: 210.5 MG'DL (ref 70–99)
HBA1C MFR BLD: 10.2 % (ref 4.1–5.7)
HDLC SERPL-MCNC: 38.6 MG/DL
LDLC SERPL CALC-MCNC: 78 MG/DL (ref 0–129)
MICROALBUMIN UR-MCNC: 1.57 MG/DL (ref 0–1.99)
MICROALBUMIN/CREAT UR: 15.6 MG/G
POTASSIUM SERPL-SCNC: 4.6 MMOL/L (ref 3.2–4.6)
SODIUM SERPL-SCNC: 138.7 MMOL/L (ref 132–142)
TRIGL SERPL-MCNC: 202.5 MG/DL (ref 0–150)
URATE SERPL-MCNC: 7.1 MG/DL (ref 3–8)
VLDL CHOLESTEROL: 40.5 MG/DL (ref 7–32)

## 2020-03-05 RX ORDER — METFORMIN HCL 500 MG
TABLET, EXTENDED RELEASE 24 HR ORAL
Qty: 360 TABLET | Refills: 1 | Status: SHIPPED | OUTPATIENT
Start: 2020-03-05 | End: 2020-10-08

## 2020-03-05 RX ORDER — IBUPROFEN 800 MG/1
800 TABLET, FILM COATED ORAL EVERY 8 HOURS PRN
Qty: 90 TABLET | Refills: 3 | Status: SHIPPED | OUTPATIENT
Start: 2020-03-05 | End: 2020-10-08

## 2020-03-05 RX ORDER — GLIPIZIDE 10 MG/1
20 TABLET, FILM COATED, EXTENDED RELEASE ORAL DAILY
Qty: 180 TABLET | Refills: 1 | Status: SHIPPED | OUTPATIENT
Start: 2020-03-05 | End: 2020-10-08

## 2020-03-05 RX ORDER — BLOOD SUGAR DIAGNOSTIC
1 STRIP MISCELLANEOUS 3 TIMES DAILY
Qty: 100 EACH | Refills: 5 | Status: SHIPPED | OUTPATIENT
Start: 2020-03-05 | End: 2020-10-08

## 2020-03-05 RX ORDER — FEBUXOSTAT 40 MG/1
TABLET, FILM COATED ORAL
Qty: 93 TABLET | Refills: 1 | Status: SHIPPED | OUTPATIENT
Start: 2020-03-05 | End: 2020-10-08

## 2020-03-05 RX ORDER — ATORVASTATIN CALCIUM 80 MG/1
80 TABLET, FILM COATED ORAL DAILY
Qty: 90 TABLET | Refills: 1 | Status: SHIPPED | OUTPATIENT
Start: 2020-03-05 | End: 2020-10-08

## 2020-03-05 RX ORDER — LISINOPRIL 40 MG/1
40 TABLET ORAL DAILY
Qty: 90 TABLET | Refills: 1 | Status: SHIPPED | OUTPATIENT
Start: 2020-03-05 | End: 2020-10-08

## 2020-03-05 RX ORDER — BETAMETHASONE DIPROPIONATE 0.5 MG/G
OINTMENT, AUGMENTED TOPICAL
Qty: 50 G | Refills: 3 | Status: SHIPPED | OUTPATIENT
Start: 2020-03-05 | End: 2020-10-08

## 2020-03-05 RX ORDER — CHOLECALCIFEROL (VITAMIN D3) 50 MCG
2000 TABLET ORAL DAILY
Qty: 90 TABLET | Refills: 1 | Status: SHIPPED | OUTPATIENT
Start: 2020-03-05 | End: 2020-10-08

## 2020-03-05 RX ORDER — CARBOXYMETHYLCELLULOSE SODIUM 5 MG/ML
1 SOLUTION/ DROPS OPHTHALMIC DAILY PRN
Qty: 3 BOTTLE | Refills: 3 | Status: SHIPPED | OUTPATIENT
Start: 2020-03-05 | End: 2020-10-08

## 2020-03-05 RX ORDER — COLCHICINE 0.6 MG/1
TABLET, FILM COATED ORAL
Qty: 60 TABLET | Refills: 3 | Status: SHIPPED | OUTPATIENT
Start: 2020-03-05 | End: 2020-10-08

## 2020-03-05 RX ORDER — POLYETHYLENE GLYCOL 3350 17 G/17G
POWDER, FOR SOLUTION ORAL
Qty: 238 G | Refills: 5 | Status: SHIPPED | OUTPATIENT
Start: 2020-03-05 | End: 2020-10-08

## 2020-03-05 ASSESSMENT — PATIENT HEALTH QUESTIONNAIRE - PHQ9: SUM OF ALL RESPONSES TO PHQ QUESTIONS 1-9: 4

## 2020-03-05 ASSESSMENT — MIFFLIN-ST. JEOR: SCORE: 1811.47

## 2020-03-05 NOTE — PROGRESS NOTES
"This is a 59-year-old Maldivian speaking male who is well-known to me.  He attends today to follow-up for his chronic diseases, particularly gout and diabetes.  He was without insurance for some time and reports that he did not take his medications for a number of weeks however reports that the insurance issues have been resolved and that he is taking his medications currently.  I have some question about this given that I received several requests for prior authorizations for his gout medications.  In any case, he is asking me to switch pharmacies to 1 that will mail his medications to him.    He neglected to bring his medications with him which I had deliberately asked him to do via phone call prior to the visit.  He is familiar with most of the medications either by name or by purpose.    He also reports that he has had some recurrence of depressed mood in association with return of some nightmares that had previously gone away.  [facility] and they had expressed willingness for him to return to see them if he felt the need.  He has not been attending a psychologist for over 1 year.  He would now like to restart visits with the psychologist.  He now lives over 1 hour from the clinic and is aware that this is an issue in terms of obtaining easy access to mental health services.    Otherwise he has not seen an eye doctor for some time and is agreeable to seeing one.  He denies any numbness or tingling in his feet and does not have any complaints of joint pain at today's visit.    Objective:  BP (!) 157/80 (BP Location: Left arm, Patient Position: Sitting, Cuff Size: Adult Large)   Pulse 65   Temp 98.3  F (36.8  C) (Oral)   Resp 12   Ht 1.676 m (5' 6\")   Wt 104.9 kg (231 lb 3.2 oz)   SpO2 96%   BMI 37.32 kg/m    His systolic blood pressure is elevated, he is obese and has not had any significant weight loss.  Pulse is regular.  Physical exam is otherwise not " performed today due to the time involved with reconciling his medication list and sending refills to his new pharmacy.  I reviewed his medication list and spent 25 minutes updating this, reconciling it per his report and sending refills to pharmacy.  In addition we discussed improving his diabetes control and he agrees to restart taking a weekly parenteral injection.    Results for orders placed or performed in visit on 03/05/20   Lipid Panel ([facility])     Status: Abnormal   Result Value Ref Range    Cholesterol 156.6 0.0 - 200.0 mg/dL    Cholesterol/HDL Ratio 4.1 0.0 - 5.0    HDL Cholesterol 38.6 (L) >40.0 mg/dL    LDL Cholesterol Calculated 78 0 - 129 mg/dL    Triglycerides 202.5 (H) 0.0 - 150.0 mg/dL    VLDL Cholesterol 40.5 (H) 7.0 - 32.0 mg/dL   Basic Metabolic Panel ([facility])     Status: Abnormal   Result Value Ref Range    Urea Nitrogen 11.7 7.0 - 21.0 mg/dL    Calcium 9.8 8.5 - 10.1 mg/dL    Chloride 104.5 98.0 - 110.0 mmol/L    Carbon Dioxide 29.2 20.0 - 32.0 mmol/L    Creatinine 0.7 (L) 0.7 - 1.3 mg/dL    Glucose 210.5 (H) 70.0 - 99.0 mg'dL    Potassium 4.6 (H) 3.2 - 4.6 mmol/L    Sodium 138.7 132.0 - 142.0 mmol/L    GFR Estimate >90 >60.0 mL/min/1.7 m2    GFR Estimate If Black >90 >60.0 mL/min/1.7 m2   Hemoglobin A1c ([facility])     Status: Abnormal   Result Value Ref Range    Hemoglobin A1C 10.2 (H) 4.1 - 5.7 %     Sa was seen today for diabetes, hypertension and other.    Diagnoses and all orders for this visit:    Type 2 diabetes mellitus with hyperglycemia, without long-term current use of insulin (H)  -     Basic Metabolic Panel ([facility])  -     Hemoglobin A1c ([facility])  -     Alcohol Swabs (ALCOHOL PADS) 70 % PADS; 1 pad 3 times daily  -     atorvastatin (LIPITOR) 80 MG tablet; Take 1 tablet (80 mg) by mouth daily  -     blood glucose (NO BRAND SPECIFIED) lancets standard; Use to test blood sugar 3 times daily or as directed.  -     blood glucose (NO BRAND SPECIFIED) test strip; Use up to 3 times a day  -      metFORMIN (GLUCOPHAGE-XR) 500 MG 24 hr tablet; TAKE TWO TABLETS BY MOUTH TWICE DAILY WITH MEALS  -     glipiZIDE (GLUCOTROL XL) 10 MG 24 hr tablet; Take 2 tablets (20 mg) by mouth daily  -     exenatide ER (BYDUREON) 2 MG pen; Inject 2 mg Subcutaneous every 7 days  -     Microalbumin Creatinine Ratio Random Ur ([facility])  -     OPHTHALMOLOGY ADULT REFERRAL    Chronic gout without tophus, unspecified cause, unspecified site  -     Uric Acid ([facility])    Hyperlipidemia LDL goal <100  -     Lipid Panel ([facility])  -     aspirin (ASA) 81 MG EC tablet; Take 1 tablet (81 mg) by mouth daily    Posttraumatic stress disorder  -     MENTAL HEALTH REFERRAL  -    Severe episode of recurrent major depressive disorder, without psychotic features (H)  -     MENTAL HEALTH REFERRAL  -    Episodic tension-type headache, not intractable    Pompholyx  -     augmented betamethasone dipropionate (DIPROLENE-AF) 0.05 % external ointment; Apply sparingly to affected area twice daily as needed.  Do not apply to face.    Dry eyes  -     carboxymethylcellulose (REFRESH PLUS) 0.5 % SOLN ophthalmic solution; Place 1 drop into both eyes daily as needed for dry eyes    Idiopathic chronic gout of multiple sites without tophus  -     COLCRYS 0.6 MG tablet; Take 1 tab twice a day for 5 days then 1 tab as needed for gout flare up. Max 3 tabs (1.8mg) over 1 hour.  -     febuxostat (ULORIC) 40 MG TABS tablet; Take 1 tablet daily  -     ibuprofen (ADVIL/MOTRIN) 800 MG tablet; Take 1 tablet (800 mg) by mouth every 8 hours as needed for moderate pain or fever (do not take every day)    Essential hypertension with goal blood pressure less than 140/90  -     lisinopril (ZESTRIL) 40 MG tablet; Take 1 tablet (40 mg) by mouth daily    Routine general medical examination at a health care facility  -     polyethylene glycol (MIRALAX) packet; Miralax powder 8.3 oz bottle (238 gm) as directed    Hypovitaminosis D  -     vitamin D3 (CHOLECALCIFEROL) 2000 units  (50 mcg) tablet; Take 1 tablet (2,000 Units) by mouth daily      As noted I have refilled all his medications and sent these to his new pharmacy.  I am concerned that he may still require prior authorization of some of his medications and will monitor for these.  He has been taking febuxostat for many years and needs this to control his gout given that other treatments have been unsuccessful.  We should restart by chester after discussion of hypoglycemic treatment options with the patient.    I placed a referral for mental health indicating the logistical difficulties given that [place] from the clinic.    We agreed to follow-up in 3 months time or sooner if needed to recheck his diabetes and gout control as well as reassess his mood.    Total visit time was 50 mins, all of which was face to face MD time, and over 50% of this time was spent in counseling and coordination of care, 25 minutes of which was spent in reconciling medications and refilling..

## 2020-03-05 NOTE — LETTER
March 9, 2020       KENIA Key  [address] [place] [address]        Dear ,    We are writing to inform you of your test results.    Apart from the high average sugar A1c that we already discussed, your other labs are good - your gout lab uric acid is not high, your kidneys are working well and you do not lose protein in your urine and your bad cholesterol LDL level is low.  Let's work to get your A1c  to 7.0 like it was last year - OK?  See you in about 3 months - please let me know if you have any concerns before then     Resulted Orders   Lipid Panel ([facility])   Result Value Ref Range    Cholesterol 156.6 0.0 - 200.0 mg/dL    Cholesterol/HDL Ratio 4.1 0.0 - 5.0    HDL Cholesterol 38.6 (L) >40.0 mg/dL    LDL Cholesterol Calculated 78 0 - 129 mg/dL    Triglycerides 202.5 (H) 0.0 - 150.0 mg/dL    VLDL Cholesterol 40.5 (H) 7.0 - 32.0 mg/dL   Basic Metabolic Panel ([facility])   Result Value Ref Range    Urea Nitrogen 11.7 7.0 - 21.0 mg/dL    Calcium 9.8 8.5 - 10.1 mg/dL    Chloride 104.5 98.0 - 110.0 mmol/L    Carbon Dioxide 29.2 20.0 - 32.0 mmol/L    Creatinine 0.7 (L) 0.7 - 1.3 mg/dL    Glucose 210.5 (H) 70.0 - 99.0 mg'dL    Potassium 4.6 (H) 3.2 - 4.6 mmol/L    Sodium 138.7 132.0 - 142.0 mmol/L    GFR Estimate >90 >60.0 mL/min/1.7 m2    GFR Estimate If Black >90 >60.0 mL/min/1.7 m2   Hemoglobin A1c ([facility])   Result Value Ref Range    Hemoglobin A1C 10.2 (H) 4.1 - 5.7 %   Uric Acid ([facility])   Result Value Ref Range    Uric Acid 7.1 3.0 - 8.0 mg/dL    Narrative    Test performed by:  [facility]  [address] [place] [address]   Microalbumin Creatinine Ratio Random Ur ([facility])   Result Value Ref Range    Microalbumin, Urine 1.57 0.00 - 1.99 mg/dL    Creatinine, Urine 100.4 mg/dL    Albumin Urine mg/g Cr 15.6 <=19.9 mg/g    Narrative    Test performed by:  [facility]  [address] [place] [address]  Microalbumin, Random Urine  <2.0 mg/dL . . . . . . . .  Normal  3.0-30.0 mg/dL . . . . . . Microalbuminuria  >30.0 mg/dL . . . . . .  . Clinical Proteinuria  Microalbumin/Creatinine Ratio, Random Urine  <20 mg/g . . . . .. . . . Normal   mg/g . . . . . . . Microalbuminuria  >300 mg/g . . . . . . . . Clinical Proteinuria       If you have any questions or concerns, please call the clinic at the number listed above.       Sincerely,        Jason Siddiqui MD

## 2020-03-06 ENCOUNTER — DOCUMENTATION ONLY (OUTPATIENT)
Dept: PSYCHOLOGY | Facility: CLINIC | Age: 60
End: 2020-03-06

## 2020-03-06 NOTE — RESULT ENCOUNTER NOTE
Clyde Peña - apart from the high average sugar A1c that we already discussed, your other labs are good - your gout lab uric acid is not high, your kidneys are working well and you do not lose protein in your urine and your bad cholesterol LDL level is low.  Let's work to get your A1c  to 7.0 like it was last year - OK?  See you in about 3 months - please let me know if you have any concerns before then - Dr Jason Siddiqui

## 2020-03-06 NOTE — PATIENT INSTRUCTIONS
03/06/20    MENTAL HEALTH REFERRAL    Mental Health referral routed to behavioral health team for recommendations. See Documentation Only encounter for more information.    Liliana Colindres    03/09/20  Ophthalmology Referral:    [facility]  [address] [place] [address]  View Map  Phone: [phone]  Fax: [phone]     Demographics and referral faxed to [phone]. They will contact patient to schedule.     Liliana Colindres

## 2020-03-06 NOTE — PROGRESS NOTES
Behavioral Health Team,    Patient is being referred for mental health services by their provider, Dr. Siddiqui.  Please advise if we are able to see patient for in house treatment or if a community option would be best.    Thank you,    Liliana Colindres  3/6/2020

## 2020-03-09 NOTE — PROGRESS NOTES
Review of Dr. Siddiqui's order and note indicates that this is a referral for psychotherapy to treat depression and PTSD.  Has received care with [facility] in the past, but now lives over one hour away from [place] ([facility]).  Would prefer in-home therapy if possible.  Would need Finnish speaking therapist or .  Will ask our social work team to reach out to both of the agencies to determine if they can serve patients at that distance:    [facility]  Complete on-line referral form or call [phone].    [facility]  [address]  [place], [place]   [address]  Office:   [phone]  Fax:   [phone]    We could also reach out to [facility] to see if they might be able to accommodate request for in-home services for this patient as I am aware that they do some extension work in the [place] area.  Not sure they can do this for individuals, but couldn't hurt to ask.  I will route that question to Wendy Parks for her input and recommendations.    [facility]  [address]  [address]  [place] [address]  [phone]    If none of these agencies can provide services at this distance, I did find one mental health resource in [place], [place], but it is unlikely that this provider does home visits.  Could explore this once we have gotten additional clarity on service area for providers noted above.    Kaylyn Navarro PsyD  Address: [address] [place] [address]  Phone: [phone]    Let me know if you have questions or would like additional follow up from me.  Thanks!      Marline Ross, Ph.D.,     Disclaimer  The above treatment recommendations are based on consultation with the patient's primary care provider and a review of relevant information in EPIC.? I have not personally examined the patient.? All recommendations should be implemented with  considerations of the patient's relevant prior history and current clinical status.  Please contact me with any questions about the care of this patient.

## 2020-03-09 NOTE — PROGRESS NOTES
I have verified the content of the note, which accurately reflects my assessment of the patient and the plan of care.   Savi Lema, [facility], PharmD

## 2020-03-10 NOTE — PROGRESS NOTES
Social Work Note:    Data and Intervention:     SW reached out to Minerva Lindsay ([phone]) with [facility] to further explore ability to coordinate in-home therapy for patient. No answer. No v/m left as message was left yesterday.     SW called main number for [facility] ([phone]). Minerva is in session all day today so unable to answer or return this worker's call. SW asked if an email address could be provided for Minerva. Representative said no. SW asked representative if she could let Minerva know that I have called and am requesting a call back. Representative wrote down this worker's name, call back number, and reason for call.     Assessment and Plan:     SW will reach out again if there is no return call today.     KALEIGH Dougherty

## 2020-03-16 NOTE — PROGRESS NOTES
Social Work Note:    Data and Intervention:     SW called Bella again to further explore ability to coordinate in-home therapy for patient in [facility]. Representative advised that she is unsure if they have providers who would travel to [facility] and stated that she is able to send SW a referral form via email that will be evaluated by Dr. Tristan Reyes in intake. If Bella has a provider, they will reach out to schedule an intake appt. Due to the COVID-19 spreading, [facility] is offering tel- health sessions.     Assessment and Plan:     SW will complete referral form and fax back to [facility].     SW will check-in on the status of the referral next week.     KALEIGH Dougherty

## 2020-03-19 ENCOUNTER — TRANSFERRED RECORDS (OUTPATIENT)
Dept: HEALTH INFORMATION MANAGEMENT | Facility: CLINIC | Age: 60
End: 2020-03-19

## 2020-03-30 NOTE — PROGRESS NOTES
Social Work Note:    Data and Intervention:     DEMETRIO called [facility] to check in on status of psychotherapy that was sent last week. No answer. SW left v/m with call back number.     Assessment and Plan:     DEMETRIO will route to DEMETRIO Mott to monitor status of referral as well as DEMETRIO will be leaving Clinic as of 4/3/2020.     KALEIGH Dougherty

## 2020-04-23 NOTE — PROGRESS NOTES
This SW reached out to patient via [employer] [place] . Called the listed home phone #[phone]. No answer. Did not leave a message. Attempted outreach to listed contact, patient's daughter, the phone signals busy.     SW called [facility] to inquire if cares were established. Called Minerva Angélica () and left a message inquiring if patient ever established cares.     MIGUELINA Redmond

## 2020-04-30 ENCOUNTER — TELEPHONE (OUTPATIENT)
Dept: FAMILY MEDICINE | Facility: CLINIC | Age: 60
End: 2020-04-30

## 2020-04-30 NOTE — TELEPHONE ENCOUNTER
Prior Authorization Retail Medication Request    Medication/Dose: COLCRYS 0.6 MG tablet   ICD code (if different than what is on RX): same  Previously Tried and Failed:  febuxostat (ULORIC) 40 MG TABS tablet and colchicine 0.6 MG tablet  Rationale:  n/a    Insurance Name:  CHELY[employer] CONNECT MA ONLY [[id number]]  Insurance ID:  [id number]       Pharmacy Information (if different than what is on RX)  Name:  [facility].  Phone:  [phone]

## 2020-05-01 NOTE — TELEPHONE ENCOUNTER
Central Prior Authorization Team   Phone: [phone]    [place] Initiation    Medication: COLCRYS 0.6 MG tablet (KENNEY)  Insurance Company: [employer] - Phone [phone] Fax [phone]  Pharmacy Filling the Rx: [place], [place] - [address] [place] [address].   Filling Pharmacy Phone: [phone]  Filling Pharmacy Fax: [phone]  Start Date: 5/1/2020

## 2020-05-01 NOTE — TELEPHONE ENCOUNTER
Prior Authorization Approval        Authorization Effective Date: 4/1/2020  Authorization Expiration Date: 5/1/2021  Medication: COLCRYS 0.6 MG tablet (KENNEY)   Approved Dose/Quantity:   Reference #: [id number]   Insurance Company: [employer]/EXPRESS S[employer]IPTS - Phone [phone] Fax [phone]  Which Pharmacy is filling the prescription (Not needed for infusion/clinic administered): [place], [place] - [address] [place] [address]  Pharmacy Notified: Yes - faxed approval letter to pharmacy with note to let patient know when rx is ready. My direct contact info given if needing anything further.  Patient Notified:

## 2020-10-08 ENCOUNTER — VIRTUAL VISIT (OUTPATIENT)
Dept: FAMILY MEDICINE | Facility: CLINIC | Age: 60
End: 2020-10-08
Payer: COMMERCIAL

## 2020-10-08 DIAGNOSIS — E55.9 HYPOVITAMINOSIS D: ICD-10-CM

## 2020-10-08 DIAGNOSIS — L30.1 POMPHOLYX: ICD-10-CM

## 2020-10-08 DIAGNOSIS — F43.10 POSTTRAUMATIC STRESS DISORDER: Primary | ICD-10-CM

## 2020-10-08 DIAGNOSIS — F33.1 MODERATE EPISODE OF RECURRENT MAJOR DEPRESSIVE DISORDER (H): ICD-10-CM

## 2020-10-08 DIAGNOSIS — H04.123 DRY EYES: ICD-10-CM

## 2020-10-08 DIAGNOSIS — G44.219 EPISODIC TENSION-TYPE HEADACHE, NOT INTRACTABLE: ICD-10-CM

## 2020-10-08 DIAGNOSIS — M1A.09X0 IDIOPATHIC CHRONIC GOUT OF MULTIPLE SITES WITHOUT TOPHUS: ICD-10-CM

## 2020-10-08 DIAGNOSIS — E11.65 TYPE 2 DIABETES MELLITUS WITH HYPERGLYCEMIA, WITHOUT LONG-TERM CURRENT USE OF INSULIN (H): ICD-10-CM

## 2020-10-08 DIAGNOSIS — E78.5 HYPERLIPIDEMIA LDL GOAL <100: ICD-10-CM

## 2020-10-08 DIAGNOSIS — Z00.00 ROUTINE GENERAL MEDICAL EXAMINATION AT A HEALTH CARE FACILITY: ICD-10-CM

## 2020-10-08 DIAGNOSIS — I10 ESSENTIAL HYPERTENSION WITH GOAL BLOOD PRESSURE LESS THAN 140/90: ICD-10-CM

## 2020-10-08 PROCEDURE — 99214 OFFICE O/P EST MOD 30 MIN: CPT | Mod: 95 | Performed by: FAMILY MEDICINE

## 2020-10-08 RX ORDER — GLIPIZIDE 10 MG/1
20 TABLET, FILM COATED, EXTENDED RELEASE ORAL DAILY
Qty: 180 TABLET | Refills: 1 | Status: SHIPPED | OUTPATIENT
Start: 2020-10-08 | End: 2021-06-08

## 2020-10-08 RX ORDER — IBUPROFEN 800 MG/1
800 TABLET, FILM COATED ORAL EVERY 8 HOURS PRN
Qty: 90 TABLET | Refills: 3 | Status: SHIPPED | OUTPATIENT
Start: 2020-10-08 | End: 2021-06-08

## 2020-10-08 RX ORDER — POLYETHYLENE GLYCOL 3350 17 G/17G
POWDER, FOR SOLUTION ORAL
Qty: 238 G | Refills: 5 | Status: SHIPPED | OUTPATIENT
Start: 2020-10-08 | End: 2020-10-13

## 2020-10-08 RX ORDER — CARBOXYMETHYLCELLULOSE SODIUM 5 MG/ML
1 SOLUTION/ DROPS OPHTHALMIC DAILY PRN
Qty: 3 BOTTLE | Refills: 3 | Status: SHIPPED | OUTPATIENT
Start: 2020-10-08 | End: 2020-12-04

## 2020-10-08 RX ORDER — BETAMETHASONE DIPROPIONATE 0.5 MG/G
OINTMENT, AUGMENTED TOPICAL
Qty: 50 G | Refills: 3 | Status: SHIPPED | OUTPATIENT
Start: 2020-10-08 | End: 2021-05-18

## 2020-10-08 RX ORDER — COLCHICINE 0.6 MG/1
TABLET, FILM COATED ORAL
Qty: 60 TABLET | Refills: 3 | Status: SHIPPED | OUTPATIENT
Start: 2020-10-08 | End: 2021-03-16

## 2020-10-08 RX ORDER — LISINOPRIL 40 MG/1
40 TABLET ORAL DAILY
Qty: 90 TABLET | Refills: 1 | Status: SHIPPED | OUTPATIENT
Start: 2020-10-08 | End: 2021-04-13

## 2020-10-08 RX ORDER — CHOLECALCIFEROL (VITAMIN D3) 50 MCG
50 TABLET ORAL DAILY
Qty: 90 TABLET | Refills: 1 | Status: SHIPPED | OUTPATIENT
Start: 2020-10-08 | End: 2021-04-13

## 2020-10-08 RX ORDER — ATORVASTATIN CALCIUM 80 MG/1
80 TABLET, FILM COATED ORAL DAILY
Qty: 90 TABLET | Refills: 1 | Status: SHIPPED | OUTPATIENT
Start: 2020-10-08 | End: 2021-04-13

## 2020-10-08 RX ORDER — FEBUXOSTAT 40 MG/1
TABLET, FILM COATED ORAL
Qty: 90 TABLET | Refills: 1 | Status: SHIPPED | OUTPATIENT
Start: 2020-10-08 | End: 2021-04-15

## 2020-10-08 RX ORDER — BLOOD SUGAR DIAGNOSTIC
1 STRIP MISCELLANEOUS 3 TIMES DAILY
Qty: 100 EACH | Refills: 5 | Status: SHIPPED | OUTPATIENT
Start: 2020-10-08 | End: 2021-05-12

## 2020-10-08 RX ORDER — ACETAMINOPHEN 500 MG
1000 TABLET ORAL EVERY 8 HOURS PRN
Qty: 100 TABLET | Refills: 3 | Status: SHIPPED | OUTPATIENT
Start: 2020-10-08 | End: 2021-11-15

## 2020-10-08 RX ORDER — METFORMIN HCL 500 MG
TABLET, EXTENDED RELEASE 24 HR ORAL
Qty: 360 TABLET | Refills: 1 | Status: SHIPPED | OUTPATIENT
Start: 2020-10-08 | End: 2021-04-13

## 2020-10-08 NOTE — PROGRESS NOTES
"Family Medicine Telephone Visit Note               Telephone Visit Consent   Patient was verbally read the following and verbal consent was obtained.    \"Telephone visits are billed at different rates depending on your insurance coverage. During this emergency period, for some insurers they may be billed the same as an in-person visit.  Please reach out to your insurance provider with any questions.  If during the course of the call the physician/provider feels a telephone visit is not appropriate, you will not be charged for this service.\"    Name person giving consent:  Patient   Date verbal consent given:  10/8/2020  Time verbal consent given:  10:45 AM           Chief Complaint   Patient presents with     Knee Pain     knee pain / high blood pressure     Due to patient being non-English speaking/uses sign language, an  was used for this visit. Only for face-to-face interpretation by an external agency, date and length of interpretation can be found on the scanned worksheet.     name: Taye Ferrell  Agency: [employer]  Language: Maltese   Telephone number: [phone]  Type of interpretation: Telephone, spoken         HPI   Patients name:   Appointment start time:  11.25 AM    This is a 60-year-old Maltese speaking male who is well-known to me.  He now lives 1 hour from clinic and due to this fact in addition to concerns about COVID-19 risk he does not want to come to clinic in person at this time.  He has a number of concerns today:  1.  Bilateral knee pain, left greater than right.  He has had this in the past but it had previously improved.  However in recent months he is describing that his knee pain has significantly increased to the point that it impairs his walking.  He did not have any specific injury.  He does not believe it is gout since it does not feel the same.  He does hear some clicking when he bends his knee and this is painful.  He did have MRIs of his knees over 10 years ago " which did not reveal surgical indication at the time.  2.  Elevated blood pressure.  He says that he checks his home BP and blood pressure is often in the 150s over 90s whereas previously it was lower.  He does not drink alcohol.  He is overweight and not sure if he has gained weight.  3.  He feels some apathy and lack of interest in life.  He has had a history of PTSD and major depressive disorder and was actively working with [facility] therapist for many years but, he thinks due to COVID-19 restrictions, stopped visiting with them several months ago.  He is still taking all the medications he has been prescribed.  He is open to seeing another therapist.    4.  He reports that he is very happy to use exenatide for his diabetes.  He believes that this makes him feel better in general in addition to controlling his blood sugars.  He has a friend who is using a continuous glucose meter and he would be interested in trying this out.  .      Current Outpatient Medications   Medication Sig Dispense Refill     acetaminophen (TYLENOL) 500 MG tablet Take 2 tablets (1,000 mg) by mouth every 8 hours as needed for mild pain 100 tablet 3     Alcohol Swabs (ALCOHOL PADS) 70 % PADS 1 pad 3 times daily 100 each 5     aspirin (ASA) 81 MG EC tablet Take 1 tablet (81 mg) by mouth daily 90 tablet 1     atorvastatin (LIPITOR) 80 MG tablet Take 1 tablet (80 mg) by mouth daily 90 tablet 1     augmented betamethasone dipropionate (DIPROLENE-AF) 0.05 % external ointment Apply sparingly to affected area twice daily as needed.  Do not apply to face. 50 g 3     blood glucose (NO BRAND SPECIFIED) lancets standard Use to test blood sugar 3 times daily or as directed. 100 each 5     blood glucose (NO BRAND SPECIFIED) test strip Use up to 3 times a day 1 Box 5     Blood Glucose Monitoring Suppl (BLOOD GLUCOSE MONITOR SYSTEM) w/Device KIT 1 kit daily 1 kit 0     carboxymethylcellulose (REFRESH PLUS) 0.5 % SOLN ophthalmic solution Place 1 drop into both  "eyes daily as needed for dry eyes 3 Bottle 3     COLCRYS 0.6 MG tablet Take 1 tab twice a day for 5 days then 1 tab as needed for gout flare up. Max 3 tabs (1.8mg) over 1 hour. 60 tablet 3     exenatide ER (BYDUREON) 2 MG pen Inject 2 mg Subcutaneous every 7 days 12 each 1     febuxostat (ULORIC) 40 MG TABS tablet Take 1 tablet daily 90 tablet 1     glipiZIDE (GLUCOTROL XL) 10 MG 24 hr tablet Take 2 tablets (20 mg) by mouth daily 180 tablet 1     ibuprofen (ADVIL/MOTRIN) 800 MG tablet Take 1 tablet (800 mg) by mouth every 8 hours as needed for moderate pain or fever (do not take every day) 90 tablet 3     lisinopril (ZESTRIL) 40 MG tablet Take 1 tablet (40 mg) by mouth daily 90 tablet 1     metFORMIN (GLUCOPHAGE-XR) 500 MG 24 hr tablet TAKE TWO TABLETS BY MOUTH TWICE DAILY WITH MEALS 360 tablet 1     order for DME Equipment being ordered: 1 seated 4-wheel walker with brakes 1 Device 0     ORDER FOR DME  (Continuous Positive Airway Pressure) nightly       polyethylene glycol (MIRALAX) 17 g packet Miralax powder 8.3 oz bottle (238 gm) as directed 238 g 5     Skin Protectants, Misc. (VASELINE CONSTANT CARE) OINT Externally apply topically 3 times daily 1 Tube 3     vitamin D3 (CHOLECALCIFEROL) 50 mcg (2000 units) tablet Take 1 tablet (50 mcg) by mouth daily 90 tablet 1     Allergies   Allergen Reactions     Allopurinol      Cortisporin [Neomycin-Polymyxin-Hc] Swelling     Indomethacin      Salsalate      Tetracycline      Tramadol               Review of Systems:     No other systems addressed during today's encounter.         Physical Exam:     There were no vitals taken for this visit.  Estimated body mass index is 37.32 kg/m  as calculated from the following:    Height as of 3/5/20: 1.676 m (5' 6\").    Weight as of 3/5/20: 104.9 kg (231 lb 3.2 oz).    Exam:  Constitutional: moderate distress  Psychiatric: affect flat and judgment and insight intact        Assessment and Plan   Sa was seen today for knee " pain.    Diagnoses and all orders for this visit:    Posttraumatic stress disorder    Episodic tension-type headache, not intractable  -     acetaminophen (TYLENOL) 500 MG tablet; Take 2 tablets (1,000 mg) by mouth every 8 hours as needed for mild pain    Type 2 diabetes mellitus with hyperglycemia, without long-term current use of insulin (H)  -     Alcohol Swabs (ALCOHOL PADS) 70 % PADS; 1 pad 3 times daily  -     atorvastatin (LIPITOR) 80 MG tablet; Take 1 tablet (80 mg) by mouth daily  -     blood glucose (NO BRAND SPECIFIED) lancets standard; Use to test blood sugar 3 times daily or as directed.  -     blood glucose (NO BRAND SPECIFIED) test strip; Use up to 3 times a day  -     exenatide ER (BYDUREON) 2 MG pen; Inject 2 mg Subcutaneous every 7 days  -     glipiZIDE (GLUCOTROL XL) 10 MG 24 hr tablet; Take 2 tablets (20 mg) by mouth daily  -     metFORMIN (GLUCOPHAGE-XR) 500 MG 24 hr tablet; TAKE TWO TABLETS BY MOUTH TWICE DAILY WITH MEALS    Hyperlipidemia LDL goal <100  -     aspirin (ASA) 81 MG EC tablet; Take 1 tablet (81 mg) by mouth daily    Pompholyx  -     augmented betamethasone dipropionate (DIPROLENE-AF) 0.05 % external ointment; Apply sparingly to affected area twice daily as needed.  Do not apply to face.    Dry eyes  -     carboxymethylcellulose (REFRESH PLUS) 0.5 % SOLN ophthalmic solution; Place 1 drop into both eyes daily as needed for dry eyes    Idiopathic chronic gout of multiple sites without tophus  -     COLCRYS 0.6 MG tablet; Take 1 tab twice a day for 5 days then 1 tab as needed for gout flare up. Max 3 tabs (1.8mg) over 1 hour.  -     febuxostat (ULORIC) 40 MG TABS tablet; Take 1 tablet daily  -     ibuprofen (ADVIL/MOTRIN) 800 MG tablet; Take 1 tablet (800 mg) by mouth every 8 hours as needed for moderate pain or fever (do not take every day)    Essential hypertension with goal blood pressure less than 140/90  -     lisinopril (ZESTRIL) 40 MG tablet; Take 1 tablet (40 mg) by mouth  daily    Routine general medical examination at a health care facility  -     polyethylene glycol (MIRALAX) 17 g packet; Miralax powder 8.3 oz bottle (238 gm) as directed    Hypovitaminosis D  -     vitamin D3 (CHOLECALCIFEROL) 50 mcg (2000 units) tablet; Take 1 tablet (50 mcg) by mouth daily    Moderate episode of recurrent major depressive disorder (H)  -     MENTAL HEALTH REFERRAL  -      He tells me he is taking all of his medications.  It does seem as if they need refill and so I went through each 1 and refill of this for him.  He has not been particularly adherent in the past and I do think he would benefit from a medication reconciliation visit and have asked him to schedule this for the next few weeks and to bring his medications with him to that visit.    I will place a referral for mental health to see if he can be offered telephone counseling which would be his preference given the current restrictions with his travel to clinic.    It is unclear without examining him what is causing his knee pains.  I suggested that cortisone injections might be quite symptomatically helpful for him and that we could repeat imaging probably initially in the form of x-rays and then subsequently with MRI if warranted.  He also needs to have labs checked including follow-up on his diabetes.    For all these reasons I strongly encouraged him to schedule a long office visit in the coming weeks where we can obtain labs and also probably imaging of his knees.  Expressed agreement and understanding.  If it is possible to order a CGM despite his lack of insulin usage I will place such an order after checking with pharmacy.    Refilled medications that would be required in the next 3 months.     After Visit Information:  Will print and mail AVS     Return in about 3 weeks (around 10/29/2020), or if symptoms worsen or fail to improve, for Follow up, with me, in person.    Appointment end time: 11.50 AM  This is a telephone visit  that took 25 minutes.      Clinician location:  [facility]      Jason Siddiqui MD

## 2020-10-09 ENCOUNTER — DOCUMENTATION ONLY (OUTPATIENT)
Dept: PSYCHOLOGY | Facility: CLINIC | Age: 60
End: 2020-10-09

## 2020-10-09 NOTE — PROGRESS NOTES
Behavioral Health Team,    Patient is being referred for mental health services by their provider, Dr. Siddiqui.  Please advise if we are able to see patient for in house treatment or if a community option would be best.    Thank you,    Liliana Colindres  10/9/2020

## 2020-10-13 ENCOUNTER — TELEPHONE (OUTPATIENT)
Dept: FAMILY MEDICINE | Facility: CLINIC | Age: 60
End: 2020-10-13

## 2020-10-13 DIAGNOSIS — M1A.09X0 IDIOPATHIC CHRONIC GOUT OF MULTIPLE SITES WITHOUT TOPHUS: ICD-10-CM

## 2020-10-13 DIAGNOSIS — Z00.00 ROUTINE GENERAL MEDICAL EXAMINATION AT A HEALTH CARE FACILITY: ICD-10-CM

## 2020-10-13 RX ORDER — POLYETHYLENE GLYCOL 3350 17 G/17G
POWDER, FOR SOLUTION ORAL
Qty: 238 G | Refills: 5 | Status: SHIPPED | OUTPATIENT
Start: 2020-10-13 | End: 2022-04-29

## 2020-10-13 NOTE — PROGRESS NOTES
Review of Dr. Siddiqui's order and note indicates that this is a referral for psychotherapy to address depressed mood.  We did review a similar referral on 3/6/2020 and attempted to connect to [facility] at that time.  Has received services with [facility] in the past but thought it was too far to travel after moving out of the [facility]. As most services are virtual at this time, distance may be less of a barrier.  Will ask Tiera to reach out to patient to discuss the following options for mental health.  Will also route to team at [facility] to see if they may be able to  this patient and if so what the timeline would be for that.    [facility]  [address]  [address]  [place] [address]  [phone]  COVID-19 UPDATE 03-: [facility] agreed to not be in [place] clinic. Will see patients at their location in person but are meeting with most people via phone. They will bill non-billable when using telehealth services.     [facility] [place], [facility]  [address]  [place], [place]   [address]  Office:   [phone]  Fax:   [phone]  COVID-19 UPDATE 03-: ACCEPTING NEW REFERRALS FOR ARMHS, THERAPY, ASSESSMENTS & GAMBLING! *NIGHTS & WEEKENDS AVAILABLE.  In order to accommodate everyone's safety during the COVID-19 situation, all services will be provided via TeleHealth.  FOR CLIENT REFERRALS, CONTACT SHERITA BOYD at [phone] or [email or web address].    [facility]  [address] [place] [address]  [phone]  COVID-19 UPDATE 03-: [facility] is not doing any face to face visits in their clinics until at least 3/31/2020 and potentially beyond that depending on the current situation. They are not accepting new referrals either. They will continue telehealth visits for those who were already receiving their cares that way. They are not starting new telehealth visits at this  time.     [facility]  [address] [place] [address]  [phone]  Fax:  [phone]  [email or web address]   There is an online referral form.    Bilingual therapists:   * Patty Jacobs, MSW, LGSW:  Fluent in English Sgaw Ashley, Czech and conversational Palestinian   * Tresa Isabel MA, MSW, LGSW, LADC: English, Greenlandic, Sherry and Tibetan   * [facility], MSW, LICSW:  English, Hmong and conversational Jr  COVID-19 UPDATE 03-: [facility] is still operating as business per usual, at this time. They have telehealth options for people who would rather do that. Clients should call their office to arrange this.    [facility], [facility]   [address]  [place] [address]  [phone]    [address] [place] [address] [address]  [place] [address]  [phone]    Services/Specialties:  Hmong Speaking clinicians  Neuropsychological & Psychological Assessments  Outpatient Therapy (Children & Adults)  Group Therapy  Autism Spectrum Disorder  Fetal Alcohol Spectrum Disorder  DBT    Multicultural Psychology / Refugees & Immigrants  COVID-19 UPDATE 03-: Closed from Wednesday, March 18th through Monday, March 27th. Our administrative office will remain open;  however, there will be no in-person appointments available.    [facility] [address]   [place], [place] [address]  [phone]  COVID-19 UPDATE 03-:  [facility] has changed ARHMS visits to telehealth (doing FaceTime or video chat with clients), no in person ARHMS. Office based visits are still happening as business per usual for the time being. They anticipate this will change in the coming days.    [facility]  [address] [place] [address]  Main Phone: [phone]  Fax: [phone]  [facility] / [phone]  COVID-19 UPDATE 03-: [facility] is closed and not accepting new referrals at this time nor are providing  services via telephonically or virtually until April.     Let me know if you have questions or would like additional follow up from me.  Thanks!      Marline Ross, Ph.D.,LP     Disclaimer  The above treatment recommendations are based on consultation with the patient's primary care provider and a review of relevant information in EPIC.? I have not personally examined the patient.? All recommendations should be implemented with considerations of the patient's relevant prior history and current clinical status.  Please contact me with any questions about the care of this patient.

## 2020-10-13 NOTE — TELEPHONE ENCOUNTER
Prior Authorization Retail Medication Request    Medication/Dose: febuxostat (ULORIC) 40 MG TABS tablet  ICD code (if different than what is on RX):  Idiopathic chronic gout of multiple sites without tophus [M1A.09X0]   Previously Tried and Failed:    Rationale:      Insurance Name:  [employer] CONNECT MA ONLY [[id number]]  Insurance ID:  [id number]       Pharmacy Information (if different than what is on RX)  [facility]  [facility] [place] [place] [facility] [address] [place] [address]  Phone:  [phone]

## 2020-10-13 NOTE — TELEPHONE ENCOUNTER
Central Prior Authorization Team   Phone: [phone]    [place] Initiation    Medication: febuxostat (ULORIC) 40 MG TABS tablet  Insurance Company: [employer] - Phone [phone] Fax [phone]  Pharmacy Filling the Rx: [place], [place] - [address] [place] [address].   Filling Pharmacy Phone: [phone]  Filling Pharmacy Fax: [phone]  Start Date: 10/13/2020

## 2020-10-14 NOTE — TELEPHONE ENCOUNTER
Prior Authorization Approval    Authorization Effective Date: 9/13/2020  Authorization Expiration Date: 10/13/2021  Medication: febuxostat (ULORIC) 40 MG-APPROVED  Approved Dose/Quantity:    Reference #:     Insurance Company: [employer] - Phone [phone] Fax [phone]  Expected CoPay:       CoPay Card Available:      Foundation Assistance Needed:    Which Pharmacy is filling the prescription (Not needed for infusion/clinic administered): [place], [place] - [address] [place] [address]  Pharmacy Notified: Yes  Patient Notified: Yes  **Instructed pharmacy to notify patient when script is ready to /ship.**

## 2020-10-23 NOTE — PROGRESS NOTES
This SW utilized a Language Line South [place]  to reach out to patient. His phone went straight to . SW did not leave a message.     He doesn't have any future visits scheduled. SW will attempt another f/u next week.     MIGUELINA Redmond

## 2020-10-29 NOTE — PROGRESS NOTES
DEMETRIO utilized [employer] [place]  to reach out to patient. DEMETRIO called patient at his primary #[phone], it went straight to . SW did not leave a message.     DEMETRIO called patient's ED contact, his daughter Milton Genao, #[phone]. That phone went straight to  as well. Did not leave a message.     DEMETRIO called patient's regular , Taye Ferrell, per recommendation from . Taye Ferrell indicates that he has never had a phone # for . Taye Ferrell has communicated with Sa Key via Messenger Video in the past. He indicates that  usually contacts him when he is ready to schedule an appointment. Taye is aware, as is , that he should schedule a f/u, in person visit, with Dr. Siddiqui.     DEMETRIO will route this back to [facility] and ask that they keep Sa on as a referral for the end of November. The hope is that patient will schedule with  in the next month and during that office visit, the [place] team can clarify with  if he'd like to proceed with [facility] or another therapy agency in the community.     Routing to , Trenton Leonardo, and  for FYI on the plan.     MIGUELINA Redmond

## 2020-12-02 DIAGNOSIS — E11.65 TYPE 2 DIABETES MELLITUS WITH HYPERGLYCEMIA, WITHOUT LONG-TERM CURRENT USE OF INSULIN (H): Primary | ICD-10-CM

## 2020-12-04 ENCOUNTER — OFFICE VISIT (OUTPATIENT)
Dept: PHARMACY | Facility: CLINIC | Age: 60
End: 2020-12-04
Payer: COMMERCIAL

## 2020-12-04 ENCOUNTER — OFFICE VISIT (OUTPATIENT)
Dept: FAMILY MEDICINE | Facility: CLINIC | Age: 60
End: 2020-12-04
Payer: COMMERCIAL

## 2020-12-04 ENCOUNTER — RECORDS - HEALTHEAST (OUTPATIENT)
Dept: ADMINISTRATIVE | Facility: OTHER | Age: 60
End: 2020-12-04

## 2020-12-04 ENCOUNTER — HOSPITAL ENCOUNTER (OUTPATIENT)
Dept: CARDIOLOGY | Facility: CLINIC | Age: 60
Discharge: HOME | End: 2020-12-04
Attending: FAMILY MEDICINE

## 2020-12-04 ENCOUNTER — TRANSFERRED RECORDS (OUTPATIENT)
Dept: HEALTH INFORMATION MANAGEMENT | Facility: CLINIC | Age: 60
End: 2020-12-04

## 2020-12-04 VITALS
BODY MASS INDEX: 36.83 KG/M2 | WEIGHT: 228.2 LBS | OXYGEN SATURATION: 96 % | SYSTOLIC BLOOD PRESSURE: 147 MMHG | RESPIRATION RATE: 16 BRPM | TEMPERATURE: 98.6 F | DIASTOLIC BLOOD PRESSURE: 82 MMHG | HEART RATE: 68 BPM

## 2020-12-04 DIAGNOSIS — H04.123 DRY EYES: ICD-10-CM

## 2020-12-04 DIAGNOSIS — E66.01 MORBID OBESITY (H): ICD-10-CM

## 2020-12-04 DIAGNOSIS — L30.1 DYSHIDROTIC ECZEMA: ICD-10-CM

## 2020-12-04 DIAGNOSIS — M25.50 ARTHRALGIA, UNSPECIFIED JOINT: ICD-10-CM

## 2020-12-04 DIAGNOSIS — E55.9 HYPOVITAMINOSIS D: ICD-10-CM

## 2020-12-04 DIAGNOSIS — E11.65 TYPE 2 DIABETES MELLITUS WITH HYPERGLYCEMIA, WITHOUT LONG-TERM CURRENT USE OF INSULIN (H): Primary | ICD-10-CM

## 2020-12-04 DIAGNOSIS — R00.2 PALPITATIONS: ICD-10-CM

## 2020-12-04 DIAGNOSIS — M25.50 PAIN IN JOINT: ICD-10-CM

## 2020-12-04 DIAGNOSIS — E78.5 HYPERLIPIDEMIA LDL GOAL <100: ICD-10-CM

## 2020-12-04 DIAGNOSIS — Z23 NEED FOR PROPHYLACTIC VACCINATION AND INOCULATION AGAINST INFLUENZA: Primary | ICD-10-CM

## 2020-12-04 DIAGNOSIS — M1A.9XX0 CHRONIC GOUT WITHOUT TOPHUS, UNSPECIFIED CAUSE, UNSPECIFIED SITE: ICD-10-CM

## 2020-12-04 DIAGNOSIS — E11.65 TYPE 2 DIABETES MELLITUS WITH HYPERGLYCEMIA, WITHOUT LONG-TERM CURRENT USE OF INSULIN (H): ICD-10-CM

## 2020-12-04 DIAGNOSIS — H65.93 OME (OTITIS MEDIA WITH EFFUSION), BILATERAL: ICD-10-CM

## 2020-12-04 DIAGNOSIS — I10 ESSENTIAL HYPERTENSION WITH GOAL BLOOD PRESSURE LESS THAN 140/90: ICD-10-CM

## 2020-12-04 DIAGNOSIS — H72.92 TYMPANIC MEMBRANE PERFORATION, LEFT: ICD-10-CM

## 2020-12-04 LAB
BUN SERPL-MCNC: 14 MG/DL (ref 7–21)
CALCIUM SERPL-MCNC: 9.4 MG/DL (ref 8.5–10.1)
CHLORIDE SERPLBLD-SCNC: 99.7 MMOL/L (ref 98–110)
CO2 SERPL-SCNC: 29.7 MMOL/L (ref 20–32)
CREAT SERPL-MCNC: 0.7 MG/DL (ref 0.7–1.3)
GFR SERPL CREATININE-BSD FRML MDRD: >90 ML/MIN/1.7 M2
GLUCOSE SERPL-MCNC: 171.4 MG'DL (ref 70–99)
HBA1C MFR BLD: 9 % (ref 4.1–5.7)
POTASSIUM SERPL-SCNC: 4.2 MMOL/L (ref 3.2–4.6)
SODIUM SERPL-SCNC: 134.1 MMOL/L (ref 132–142)

## 2020-12-04 PROCEDURE — 99605 MTMS BY PHARM NP 15 MIN: CPT | Performed by: PHARMACIST

## 2020-12-04 PROCEDURE — 36415 COLL VENOUS BLD VENIPUNCTURE: CPT | Performed by: FAMILY MEDICINE

## 2020-12-04 PROCEDURE — 83036 HEMOGLOBIN GLYCOSYLATED A1C: CPT | Performed by: FAMILY MEDICINE

## 2020-12-04 PROCEDURE — 99215 OFFICE O/P EST HI 40 MIN: CPT | Mod: 25 | Performed by: FAMILY MEDICINE

## 2020-12-04 PROCEDURE — 80048 BASIC METABOLIC PNL TOTAL CA: CPT | Performed by: FAMILY MEDICINE

## 2020-12-04 PROCEDURE — 90686 IIV4 VACC NO PRSV 0.5 ML IM: CPT | Performed by: FAMILY MEDICINE

## 2020-12-04 PROCEDURE — 90471 IMMUNIZATION ADMIN: CPT | Performed by: FAMILY MEDICINE

## 2020-12-04 PROCEDURE — 99607 MTMS BY PHARM ADDL 15 MIN: CPT | Performed by: PHARMACIST

## 2020-12-04 RX ORDER — OFLOXACIN 3 MG/ML
5 SOLUTION AURICULAR (OTIC) DAILY
Qty: 10 ML | Refills: 1 | Status: SHIPPED | OUTPATIENT
Start: 2020-12-04 | End: 2020-12-09

## 2020-12-04 RX ORDER — CARBOXYMETHYLCELLULOSE SODIUM 5 MG/ML
1 SOLUTION/ DROPS OPHTHALMIC DAILY PRN
Qty: 3 BOTTLE | Refills: 3 | Status: SHIPPED | OUTPATIENT
Start: 2020-12-04 | End: 2021-05-12

## 2020-12-04 RX ORDER — FLASH GLUCOSE SENSOR
KIT MISCELLANEOUS
Qty: 2 EACH | Refills: 11 | Status: SHIPPED | OUTPATIENT
Start: 2020-12-04 | End: 2021-07-27

## 2020-12-04 RX ORDER — BLOOD SUGAR DIAGNOSTIC
STRIP MISCELLANEOUS
Qty: 50 STRIP | Refills: 11 | Status: SHIPPED | OUTPATIENT
Start: 2020-12-04 | End: 2023-03-17

## 2020-12-04 RX ORDER — FLASH GLUCOSE SCANNING READER
1 EACH MISCELLANEOUS DAILY
Qty: 1 DEVICE | Refills: 0 | Status: SHIPPED | OUTPATIENT
Start: 2020-12-04 | End: 2020-12-11

## 2020-12-04 ASSESSMENT — PATIENT HEALTH QUESTIONNAIRE - PHQ9: SUM OF ALL RESPONSES TO PHQ QUESTIONS 1-9: 5

## 2020-12-04 NOTE — PATIENT INSTRUCTIONS
It was great to meet with you today.  You may receive a survey via email or text message in the next few days about your MTM pharmacist or physician.  We value your experience and would be very thankful for your time with providing feedback on our clinic survey.       [facility] [address]; [place] [address]- [address] entrance; take the elevator to the third floor to cardiology to get event monitor    [facility] will send you the FreeStyle Machine.  Please make an appointment with the Pharmacist at clinic if you need help using it    Jardiance - new med for diabetes - take 1 tablet every day

## 2020-12-04 NOTE — PROGRESS NOTES
MTM ENCOUNTER  SUBJECTIVE/OBJECTIVE:                           Sa KENIA Mackey is a 60 year old male seen for an initial visit. He was referred to me from Dr Siddiqui.  was present during today's visit. Patient saw provider prior to our visit today.     Reason for visit: Problem with glucometer.    Allergies/ADRs: Reviewed in chart  Social History     Tobacco Use     Smoking status: Never Smoker     Smokeless tobacco: Never Used   Substance Use Topics     Alcohol use: No     Drug use: No     Caffeine: not addressed  Activity: not addressed  Past Medical History: Reviewed in chart    Medication Adherence/Access: Patient reports missing doses of medications maybe a couple times per week if he gets distracted.  His medications that he brings with him today were all filled 10/8/2020 at [facility] for 30-day supplies.  When I brought this to the patient's attention he said it is because [facility] sends in his medications and sometimes he does not get them right away.  He does not use a pillbox but is interested in getting 1.    Type 2 Diabetes:  Currently taking Metformin 2000 milligrams daily, glipizide 20 mg daily, and exanetide 2mg once weekly.  Patient reports no adverse effects from these medications.  Patient is not currently checking his blood sugars; he was given Accu-Chek test strips from his pharmacy but his meter is a One Touch ultra.  He is interested in freestyle marielle.   Symptoms of low blood sugar? none  Symptoms of high blood sugar? none  Eye exam: Not addressed  Foot exam: Not addressed  Diet/Exercise: Not addressed  Aspirin: Taking 81mg daily and denies side effects  Statin: Yes: Atorvastatin 80 mg daily and denies side effects  ACEi/ARB: Yes: Lisinopril 40 mg daily and denies side effects.   Urine Albumin:   Lab Results   Component Value Date    UMALCR 15.6 03/05/2020      Lab Results   Component Value Date    A1C 9.0 12/04/2020    A1C 10.2 03/05/2020    A1C 7.0 09/27/2019    A1C 11.2  06/28/2019    A1C 11.1 02/14/2019       Gout with Tophi: Current medications include Uloric 40 mg daily and colchicine as needed.  Patient does not remember when the last time he had a gout flare was.  He says that he uses colchicine maybe 1 time per month.  Does not have any complaints about these medications.    Dishidrotic eczema: Current medications includes betamethasone 0.05% ointment and vaseline.  Patient uses this as needed for spots on his hands.  He reports this is an effective treatment.    Vitamin D Deficiency: Current medication includes cholecalciferol 2000 units daily.  Patient reports no side effects from this medication.    Hypertension: Current medications include lisinopril 40 mg daily.  Patient does not self-monitor blood pressure.  Patient reports no current medication side effects.  BP Readings from Last 3 Encounters:   12/04/20 (!) 147/82   03/05/20 (!) 157/80   09/27/19 138/78     Hyperlipidemia: Current therapy includes atorvastatin 80 mg daily.  Patient reports no significant myalgias or other side effects.  The 10-year ASCVD risk score ([place] ROSANNA Jr., et al., 2013) is: 20.6%    Values used to calculate the score:      Age: 60 years      Sex: Male      Is Non- : No      Diabetic: Yes      Tobacco smoker: No      Systolic Blood Pressure: 147 mmHg      Is BP treated: Yes      HDL Cholesterol: 38.6 mg/dL      Total Cholesterol: 156.6 mg/dL  Recent Labs   Lab Test 03/05/20  1059 09/27/19  1212   CHOL 156.6 190.2   HDL 38.6* 36.4*   LDL 78 119   TRIG 202.5* 172.5*   CHOLHDLRATIO 4.1 5.2*     Dry eyes: Current medication includes Refresh Plus eyedrops as needed.  Patient reports this works for his dry eyes but he does need a refill.    General pain: Patient has both acetaminophen and ibuprofen for general pain which he does not use that often.  It sounds like he prefers acetaminophen or ibuprofen.  He requests that the pharmacy stop sending him this medication as he has a  "lot of it at home.    Today's Vitals:   BP Readings from Last 1 Encounters:   12/04/20 (!) 147/82     Pulse Readings from Last 1 Encounters:   12/04/20 68     Wt Readings from Last 1 Encounters:   12/04/20 228 lb 3.2 oz (103.5 kg)     Ht Readings from Last 1 Encounters:   03/05/20 5' 6\" (1.676 m)     Estimated body mass index is 36.83 kg/m  as calculated from the following:    Height as of 3/5/20: 5' 6\" (1.676 m).    Weight as of an earlier encounter on 12/4/20: 228 lb 3.2 oz (103.5 kg).    Temp Readings from Last 1 Encounters:   12/04/20 98.6  F (37  C) (Oral)       ASSESSMENT:                            Medication Adherence: Advised use of pill boxes to help med adherence; unfortunately I forgot to provide him one today.    Type 2 diabetes: Patient is not meeting a hemoglobin A1c goal of less than 7%.  Patient would benefit from the addition of Jardiance 10 mg daily.  He is agreeable to this today.  Additionally patient may benefit from freestyle marielle monitor.  This was sent to his pharmacy and it is covered.  Gave patient information on freestyle which was in English however patient states that his son should be able to help him with  this.  Told him to make an appointment with the Pharm.D. if he is unable to understand it.      Gout with tophi: Patient is not meeting a uric acid goal of less than 5, however I am okay with this as patient is not having regular gout attacks.  Additionally patient has had problems with allopurinol in the past and is currently on Uloric which carries a black box warning for patients with cardiovascular disease that can increase risk of cardiovascular death.  Patient does not have established cardiovascular disease except for the risk factor of diabetes.  Still the same I am more comfortable with him being on this lower dose given that it is controlling his symptoms.    Dyshidrotic eczema: Stable, continue current medications.    Vitamin D deficiency: Stable, continue current " medication.    Hypertension: Patient is not meeting goal of less than 140/90.  This was deferred at this visit due to time and other cardiovascular needs (see Dr. Magdaleno note).  We will continue to monitor.  If blood pressure continues to be high in this patient we will need to start a another agent as he is already maxed out on lisinopril.  I would avoid diuretics as he has gout, therefore would likely recommend amlodipine.    Hyperlipidemia: Stable, continue current medication    Dry eye: Stable, continue current medication    General pain: Stable, continue current medications.  Will call patient's pharmacy to told them to take acetaminophen and ibuprofen off automatic refill.    PLAN:                              [facility] will send you the FreeStyle Machine.  Please make an appointment with the Pharmacist at clinic if you need help using it    Jardiance - new med for diabetes - take 1 tablet every day     Medication issues to be addressed at a future visit      Monitor blood pressure and add second agent    I spent 40 minutes with this patient today. All changes were made via collaborative practice agreement with Dr Siddiqui. A copy of the visit note was provided to the patient's primary care provider.    Will follow up in approximately 1 month.    The patient was given a summary of these recommendations. See Provider note/AVS from today.     Savi Lema, Pharm.D.      Medication Therapy Recommendations Needing Review  Type 2 diabetes mellitus with hyperglycemia, without long-term current use of insulin (H)    Current Medication: empagliflozin (JARDIANCE) 10 MG TABS tablet   Rationale: Synergistic therapy - Needs additional medication therapy - Indication   Recommendation: Start Medication   Status: Accepted per CPA          Current Medication: metFORMIN (GLUCOPHAGE-XR) 500 MG 24 hr tablet   Rationale: Medication requires monitoring - Needs additional monitoring   Recommendation: Self-Monitoring    Status: Accepted per CPA   Note: Added Freestyle Saurabh

## 2020-12-04 NOTE — PROGRESS NOTES
Received the following update from Dr. Siddiqui on 12/4/2020:          Hello, I talked with Sa Than today and he WOULD like to talk with a [facility] psychologist - particularly by phone.  He understands that there may be a delay in that happening.  Thank you, Jason Siddiqui      Will ask [facility] staff to reach out to patient when they begin scheduling screenings in January.    Marline Ross, PhD, LP

## 2020-12-04 NOTE — PROGRESS NOTES
This is a 60-year-old [place] speaking male who is well-known to me and who has several concerns at today's visit.  Initially this visit had been scheduled because he was having severe knee pain during a telephone encounter approximately 1 month ago.  However he tells me that the knee pain has improved to the point that he does not need a cortisone injection today.    In addition he was quite depressed over the telephone encounter and reports that this too has improved although he would still appreciate visiting with a psychologist from the [facility] agency.  He describes that he has some other needs like accessing social media and wonders if they can also help him with that.    When asked about current symptoms, he reports that he is experiencing dizziness.  He describes this as a sense of unsteadiness.  It may be worse when he moves his neck.  He does not actually see things move around him as an vertigo.  He does note a fullness or heaviness in his head and some decreased hearing.  He says it has been present for about 2 months.    We discussed that his A1c is not at goal.  He did have a gap in taking his medications but says he is generally good at taking these at this time.  He agreed to visit with our pharmacist and address changing diabetes medications.  He is interested in obtaining a continuous glucose monitor if this would be covered by his insurance since he has friends who found this very helpful.  He also reports that the pharmacy is giving him test strips which do not work in his meter.  He also complains that the lancet device he has is loose and does not always work well.    ROS:  Cardiac: He does report that over the past few weeks he has noticed some palpitations in his chest.    Objective:  BP (!) 147/82 (BP Location: Left arm, Patient Position: Sitting, Cuff Size: Adult Large)   Pulse 68   Temp 98.6  F (37  C) (Oral)   Resp 16   Wt 103.5 kg (228 lb 3.2 oz)   SpO2 96%   BMI 36.83 kg/m    His  systolic BP is just above goal.  He has lost a few pounds but remains in an obese category.  Exam of his tympanic membranes reveals that these are opaque and on the left side there appears to be a mucoid discharge present on the membrane.  Clinically they are consistent with bilateral otitis media with effusion with apparently a left-sided perforation.  There is a burst of irregular heartbeats times about 5 beats when I listen although the remaining rhythm is sinus for about 60 seconds.  Lungs are clear to auscultation.  He has no goiter.  He does have +1 lower extremity edema which is symmetric.    Results for orders placed or performed in visit on 12/04/20   Basic Metabolic Panel ([facility])     Status: Abnormal   Result Value Ref Range    Urea Nitrogen 14.0 7.0 - 21.0 mg/dL    Calcium 9.4 8.5 - 10.1 mg/dL    Chloride 99.7 98.0 - 110.0 mmol/L    Carbon Dioxide 29.7 20.0 - 32.0 mmol/L    Creatinine 0.7 0.7 - 1.3 mg/dL    Glucose 171.4 (H) 70.0 - 99.0 mg'dL    Potassium 4.2 3.2 - 4.6 mmol/L    Sodium 134.1 132.0 - 142.0 mmol/L    GFR Estimate >90 >60.0 mL/min/1.7 m2    GFR Estimate If Black >90 >60.0 mL/min/1.7 m2   Hemoglobin A1c ([facility])     Status: Abnormal   Result Value Ref Range    Hemoglobin A1C 9.0 (H) 4.1 - 5.7 %     Sa was seen today for other, pain, referral, dizziness and imm/inj.    Diagnoses and all orders for this visit:    Need for prophylactic vaccination and inoculation against influenza  -     INFLUENZA VACCINE IM > 6 MONTHS VALENT IIV4 [83382]    Type 2 diabetes mellitus with hyperglycemia, without long-term current use of insulin (H)  -     Basic Metabolic Panel ([facility])  -     Hemoglobin A1c ([facility])  -     Continuous Blood Gluc  (FREESTYLE JOSE 14 DAY READER) DARIEN; 1 each daily Use to test blood sugar daily per 's instructions.  -     Continuous Blood Gluc Sensor (FREESTYLE JOSE 14 DAY SENSOR) MISC; Use to test blood sugar daily per 's instructions.  Remove and  replace every 14 days.  -     blood glucose (NO BRAND SPECIFIED) test strip; Use up to 3 times a day  -     empagliflozin (JARDIANCE) 10 MG TABS tablet; Take 1 tablet (10 mg) by mouth daily    OME (otitis media with effusion), bilateral  -     TYMPANOMETRY  -     amoxicillin-clavulanate (AUGMENTIN) 875-125 MG tablet; Take 1 tablet by mouth 2 times daily for 10 days    Morbid obesity (H)    Palpitations  -     Cancel: Zio Patch Holter Adult Pediatric Greater than 48 hrs; Future  -     Cardiac Event Monitor Adult Pediatric; Future    Tympanic membrane perforation, left  -     amoxicillin-clavulanate (AUGMENTIN) 875-125 MG tablet; Take 1 tablet by mouth 2 times daily for 10 days  -     ofloxacin (FLOXIN) 0.3 % otic solution; Place 5 drops into both ears daily for 5 days    Dry eyes  -     carboxymethylcellulose (REFRESH PLUS) 0.5 % SOLN ophthalmic solution; Place 1 drop into both eyes daily as needed for dry eyes      Please see the attached pharmacy notes discussing a change in his diabetes management.  I endorse the changes and particularly support him using a CGM which hopefully will help him better follow a diabetic appropriate diet.    It is likely that his symptoms of dizziness and head fullness are secondary to the bilateral otitis media with effusion and perforation.  I have treated these and have asked him to follow-up with me particularly if his symptoms do not improve or resolve.    Given the observed irregularity in his heart beat for 5 beats and his reported history of palpitations in the past 2 weeks I have ordered a Holter monitor.  Given that he lives 1 hour away he is sent directly from clinic to the cardiology clinic to be set up with this immediately after today's encounter.    He is interested in receiving psychological services and I will follow up with CBT psychologist to arrange for this.    Total visit time was 45 mins, all of which was face to face MD time, and over 50% of this time was spent  in counseling and coordination of care.

## 2020-12-04 NOTE — NURSING NOTE
Due to patient being non-English speaking/uses sign language, an  was used for this visit. Only for face-to-face interpretation by an external agency, date and length of interpretation can be found on the scanned worksheet.     name: Taye Ferrell  Agency: [employer]  Language: Liam   Telephone number:   Type of interpretation: Group, spoken; number of participants: 2

## 2020-12-11 DIAGNOSIS — E11.65 TYPE 2 DIABETES MELLITUS WITH HYPERGLYCEMIA, WITHOUT LONG-TERM CURRENT USE OF INSULIN (H): ICD-10-CM

## 2020-12-11 RX ORDER — FLASH GLUCOSE SCANNING READER
1 EACH MISCELLANEOUS DAILY
Qty: 1 EACH | Refills: 0 | Status: SHIPPED | OUTPATIENT
Start: 2020-12-11 | End: 2023-03-17

## 2020-12-16 NOTE — PROGRESS NOTES
Received the following update from [facility] on 12/15/2020:          This is a great news. We will reach out as soon as possible.     Thank you.

## 2020-12-31 ENCOUNTER — OFFICE VISIT (OUTPATIENT)
Dept: FAMILY MEDICINE | Facility: CLINIC | Age: 60
End: 2020-12-31
Payer: COMMERCIAL

## 2020-12-31 VITALS
BODY MASS INDEX: 36.93 KG/M2 | RESPIRATION RATE: 16 BRPM | WEIGHT: 228.8 LBS | OXYGEN SATURATION: 96 % | DIASTOLIC BLOOD PRESSURE: 83 MMHG | TEMPERATURE: 98.4 F | HEART RATE: 72 BPM | SYSTOLIC BLOOD PRESSURE: 132 MMHG

## 2020-12-31 DIAGNOSIS — H65.93 OME (OTITIS MEDIA WITH EFFUSION), BILATERAL: Primary | ICD-10-CM

## 2020-12-31 DIAGNOSIS — L29.9 EAR ITCHING: ICD-10-CM

## 2020-12-31 DIAGNOSIS — E11.65 TYPE 2 DIABETES MELLITUS WITH HYPERGLYCEMIA, WITHOUT LONG-TERM CURRENT USE OF INSULIN (H): ICD-10-CM

## 2020-12-31 PROCEDURE — 99215 OFFICE O/P EST HI 40 MIN: CPT | Performed by: FAMILY MEDICINE

## 2020-12-31 RX ORDER — DIPHENHYDRAMINE HYDROCHLORIDE 25 MG/1
1 CAPSULE, LIQUID FILLED ORAL DAILY
Qty: 1 KIT | Refills: 0 | Status: SHIPPED | OUTPATIENT
Start: 2020-12-31 | End: 2022-08-25

## 2020-12-31 RX ORDER — HYDROCORTISONE AND ACETIC ACID 20.75; 10.375 MG/ML; MG/ML
3 SOLUTION AURICULAR (OTIC) 2 TIMES DAILY PRN
Qty: 10 ML | Refills: 3 | Status: SHIPPED | OUTPATIENT
Start: 2020-12-31 | End: 2021-03-04

## 2020-12-31 NOTE — PATIENT INSTRUCTIONS
Change the sensor on your arm every 2 weeks - due next Jan 13, 2021.    To remind yourself how to do it (or to get help from a family member) look at this video on your computer:  [email or web address] (video)     Your goal for your blood sugar is between  when you are fasting.

## 2020-12-31 NOTE — NURSING NOTE
Due to patient being non-English speaking/uses sign language, an  was used for this visit. Only for face-to-face interpretation by an external agency, date and length of interpretation can be found on the scanned worksheet.     name: Taye Ferrell  Agency: [employer]  Language: Liam   Telephone number: [phone]  Type of interpretation: Face-to-face, spoken

## 2020-12-31 NOTE — PROGRESS NOTES
This is a 60-year-old Somali speaking male who is well-known to me.  He returns today to follow-up on otitis media with effusion and a perforated tympanic membrane as well as follow-up on palpitations and a Holter monitor results.  In addition he brings with him a freestyle marielle glucose meter which she just received but does not know how to use.  He also reports that he did not receive any of his medications apart from febuxostat and empagliflozin.      He says that his symptoms of dizziness and fullness in his head have fully resolved since taking the course of antibiotics and eardrops.  I reviewed the tympanogram from last visit which showed otitis media with effusion and perforation of the left tympanic membrane at least and possibly also of the right.    He also has had no further palpitations but did not yet receive the result.    Objective:  /83 (BP Location: Left arm, Patient Position: Sitting, Cuff Size: Adult Large)   Pulse 72   Temp 98.4  F (36.9  C) (Oral)   Resp 16   Wt 103.8 kg (228 lb 12.8 oz)   SpO2 96%   BMI 36.93 kg/m    His blood pressure is satisfactory.  He remains obese.  His ears reveal flaky dry otitis externa in the external canals but the tympanic membranes are much improved in appearance and the perforation appears to have healed.  His pulse is regular, he has trace lower extremity edema.    Together with assistance from our triage nurse, I helped him assemble the freestyle marielle device and applied the first sensor or to his skin and set up his reader with the appropriate date and time.  Settings of goal between 80 and 140 were applied.    In addition I called his pharmacist taking 15 minutes of my time.  Pharmacist informs me that due to him having Medicare, therefore bitten automatically refilling his medications and need to get approval each time.  They typically call his son and pharmacist tells me they have left messages for the son who has not returned the messages  therefore they have been prevented from sending his refills.  We reviewed his medications together and pharmacist confirms what will be sent to him.  There will be a delay in him receiving by [facility] and in the [facility] HC prescription.  In addition we verified that his insurance no longer covers strips for a diabetic meter he had been previously given and so I am asked to send another meter for use when there are extremes in his Freestyle results.    We attempted to repeat a tympanogram however there was a technical error preventing this from being performed.    Sa was seen today for ear problem, follow up, recheck medication and medication reconciliation.    Diagnoses and all orders for this visit:    OME (otitis media with effusion), bilateral  -     Cancel: TYMPANOMETRY    Type 2 diabetes mellitus with hyperglycemia, without long-term current use of insulin (H)  -     Blood Glucose Monitoring Suppl (BLOOD GLUCOSE MONITOR SYSTEM) w/Device KIT; 1 kit daily    Ear itching  -     acetic acid-hydrocortisone (VOSOL-HC) 1-2 % otic solution; Place 3 drops into both ears 2 times daily as needed (itching)      Overall patient is gratified that his otitis media with effusion and perforated tympanic membrane have resolved.  I did give him Vo[facility] HC to be used as needed itching.  I have encouraged him to follow-up sooner if he develops a recurrence of ENT symptoms in the future.    He will be receiving all his medications and I have made sure he understands that some family member has to approve refills for these to occur every time.    I have encouraged him to return in about 3 to 6 months time.  I have advised him on how to use his freestyle marielle and to contact us if he is noticing extremes in the readings.    Total visit time was 50 mins, all of which was face to face MD time, and over 50% of this time was spent in counseling and coordination of care.

## 2021-01-07 DIAGNOSIS — E11.65 TYPE 2 DIABETES MELLITUS WITH HYPERGLYCEMIA, WITHOUT LONG-TERM CURRENT USE OF INSULIN (H): Primary | ICD-10-CM

## 2021-01-08 RX ORDER — LANCING DEVICE
EACH MISCELLANEOUS
Qty: 1 EACH | Refills: 0 | Status: SHIPPED | OUTPATIENT
Start: 2021-01-08

## 2021-01-12 ENCOUNTER — TELEPHONE (OUTPATIENT)
Dept: FAMILY MEDICINE | Facility: CLINIC | Age: 61
End: 2021-01-12

## 2021-01-13 NOTE — TELEPHONE ENCOUNTER
Called patient with Liam language line interpretor [phone]. No answer at patient's number or patient contact (daughter). ./LR

## 2021-01-15 ENCOUNTER — ANCILLARY PROCEDURE (OUTPATIENT)
Dept: GENERAL RADIOLOGY | Facility: CLINIC | Age: 61
End: 2021-01-15
Attending: FAMILY MEDICINE
Payer: COMMERCIAL

## 2021-01-15 ENCOUNTER — OFFICE VISIT (OUTPATIENT)
Dept: FAMILY MEDICINE | Facility: CLINIC | Age: 61
End: 2021-01-15
Payer: COMMERCIAL

## 2021-01-15 VITALS
RESPIRATION RATE: 16 BRPM | OXYGEN SATURATION: 95 % | SYSTOLIC BLOOD PRESSURE: 138 MMHG | HEART RATE: 86 BPM | TEMPERATURE: 99.3 F | DIASTOLIC BLOOD PRESSURE: 79 MMHG

## 2021-01-15 DIAGNOSIS — M25.572 PAIN IN JOINT INVOLVING ANKLE AND FOOT, LEFT: ICD-10-CM

## 2021-01-15 DIAGNOSIS — S82.842A BIMALLEOLAR FRACTURE, LEFT, CLOSED, INITIAL ENCOUNTER: ICD-10-CM
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
## 2021-01-15 PROCEDURE — 29515 APPLICATION SHORT LEG SPLINT: CPT | Performed by: FAMILY MEDICINE

## 2021-01-15 PROCEDURE — 73610 X-RAY EXAM OF ANKLE: CPT | Mod: LT | Performed by: RADIOLOGY

## 2021-01-15 PROCEDURE — 99214 OFFICE O/P EST MOD 30 MIN: CPT | Mod: 25 | Performed by: FAMILY MEDICINE

## 2021-01-15 NOTE — NURSING NOTE
Due to patient being non-English speaking/uses sign language, an  was used for this visit. Only for face-to-face interpretation by an external agency, date and length of interpretation can be found on the scanned worksheet.     name: Vasile Lozano  Agency: [employer]  Language: Liam   Telephone number: [phone]  Type of interpretation: Telemedicine, spoken

## 2021-01-15 NOTE — PROGRESS NOTES
Longstanding patient of mine fell on ice 4 days ago, immediate pain in Left Ankle.  Decided to wait to see if pain would get better and scheduled visit with me today.    Otherwise has been well.  Has chronic gout and DM.    Objective:  /79   Pulse 86   Temp 99.3  F (37.4  C) (Oral)   Resp 16   SpO2 95%     Swollen bruised left ankle  Small abrasion medial aspect with small amount of oozing mucopurulent discharge  Xray obtained: Bimalleolar fracture with disrupted ankle mortice.  Discussed with [facility] - will transfer to [facility] ER.    Fracture immobilized with a short leg splint.    Called and discussed with ER physician.    Sa was seen today for trauma and toe pain.    Diagnoses and all orders for this visit:    Fall, initial encounter  -     XR Ankle Left G/E 3 Views; Future    Pain in joint involving ankle and foot, left  -     XR Ankle Left G/E 3 Views; Future    Bimalleolar fracture, left, closed, initial encounter  -     APPLY SHORT LEG SPLINT

## 2021-01-18 NOTE — PROGRESS NOTES
Received update from [facility] on 1/15/2021    Clyde Ross, I am reaching out to let you know that we are having hard time reaching this member. I have been calling almost everyday but not luck. We will keep trying, just wanted to know if there is another number we can reach him. Please let me know.     Thank you,   [place]

## 2021-01-19 ENCOUNTER — TELEPHONE (OUTPATIENT)
Dept: FAMILY MEDICINE | Facility: CLINIC | Age: 61
End: 2021-01-19

## 2021-01-19 NOTE — TELEPHONE ENCOUNTER
Reached out to patient to ensure he was able to follow up with [facility] regarding ankle fracture. No answer at numbers in chart, left message with Liam  [phone] requesting he call back if he has not scheduled with orthopedics.     Also left message with  Taye Ferrell to return call to discuss. ./LR

## 2021-01-19 NOTE — TELEPHONE ENCOUNTER
Spoke with [facility], patient has not yet scheduled follow up. Will continue trying to reach him. ./LR

## 2021-01-20 ENCOUNTER — TELEPHONE (OUTPATIENT)
Dept: FAMILY MEDICINE | Facility: CLINIC | Age: 61
End: 2021-01-20

## 2021-01-20 NOTE — TELEPHONE ENCOUNTER
I talked with Dr Pedro Romero, [facility].  Patient should have been seen by them this week.  He clarifies that ankle fractures do not require hospitalization (especially with COVID concerns).  Usually OK to see them up to about a week after injury.    I explained patient is now Day #9 since injury, has no pain meds, lives 1 hour away and does not speak English.  He will have his team reach out to patient to set up appt with one of their ankle specialists.  If we get a hold of the patient first, please have him call [facility] and get scheduled this week with an ankle specialist.

## 2021-01-20 NOTE — TELEPHONE ENCOUNTER
Attempted patient again. No answer at home phone, patient contact's number is incorrect per person who answered. Removed from chart.     Message sent to  (Taye Ferrell) requesting assistance with correct contact information.     Per message from Dr. Siddiqui, [facility] also plans to reach out to patient for scheduling. ./LR

## 2021-01-20 NOTE — TELEPHONE ENCOUNTER
Spoke with Taye Ferrell who notes we do have the correct number but patient often does not answer his phone to unknown numbers. He will reach out to patient and let him know [facility] will be calling today so it is very important he answer his phone. ./LR

## 2021-01-22 NOTE — TELEPHONE ENCOUNTER
Spoke with [facility] who confirm patient is scheduled for an appointment on 1/26 at 3:15pm. ./LR

## 2021-01-26 NOTE — PROGRESS NOTES
Received update from [facility] on 1/22/21:           Clyde Ross,     I want to update everyone that I am able to get a hold of the member and schedule an intake on Wednesday Jan 27 at 10 am with Wendy. If you have any question, please let Wendy and I know.     Thank you,   Kings

## 2021-01-29 ENCOUNTER — OFFICE VISIT (OUTPATIENT)
Dept: FAMILY MEDICINE | Facility: CLINIC | Age: 61
End: 2021-01-29
Payer: COMMERCIAL

## 2021-01-29 ENCOUNTER — OFFICE VISIT (OUTPATIENT)
Dept: PHARMACY | Facility: CLINIC | Age: 61
End: 2021-01-29
Payer: COMMERCIAL

## 2021-01-29 VITALS
DIASTOLIC BLOOD PRESSURE: 84 MMHG | SYSTOLIC BLOOD PRESSURE: 139 MMHG | TEMPERATURE: 98.7 F | RESPIRATION RATE: 20 BRPM | HEART RATE: 65 BPM | OXYGEN SATURATION: 96 %

## 2021-01-29 DIAGNOSIS — E11.65 TYPE 2 DIABETES MELLITUS WITH HYPERGLYCEMIA, WITHOUT LONG-TERM CURRENT USE OF INSULIN (H): ICD-10-CM

## 2021-01-29 DIAGNOSIS — S82.842A BIMALLEOLAR FRACTURE, LEFT, CLOSED, INITIAL ENCOUNTER: ICD-10-CM

## 2021-01-29 DIAGNOSIS — E11.65 TYPE 2 DIABETES MELLITUS WITH HYPERGLYCEMIA, WITHOUT LONG-TERM CURRENT USE OF INSULIN (H): Primary | ICD-10-CM

## 2021-01-29 DIAGNOSIS — Z01.818 PREOP GENERAL PHYSICAL EXAM: ICD-10-CM

## 2021-01-29 DIAGNOSIS — Z01.818 PREOP GENERAL PHYSICAL EXAM: Primary | ICD-10-CM

## 2021-01-29 LAB
BUN SERPL-MCNC: 16.3 MG/DL (ref 7–21)
CALCIUM SERPL-MCNC: 9.1 MG/DL (ref 8.5–10.1)
CHLORIDE SERPLBLD-SCNC: 102.9 MMOL/L (ref 98–110)
CO2 SERPL-SCNC: 25.7 MMOL/L (ref 20–32)
CREAT SERPL-MCNC: 0.7 MG/DL (ref 0.7–1.3)
GFR SERPL CREATININE-BSD FRML MDRD: >90 ML/MIN/1.7 M2
GLUCOSE SERPL-MCNC: 120 MG'DL (ref 70–99)
HBA1C MFR BLD: 7.8 % (ref 4.1–5.7)
HCT VFR BLD AUTO: 42.2 % (ref 40–53)
HEMOGLOBIN: 12.6 G/DL (ref 13.3–17.7)
MCH RBC QN AUTO: 21.1 PG (ref 26.5–35)
MCHC RBC AUTO-ENTMCNC: 29.9 G/DL (ref 32–36)
MCV RBC AUTO: 70.5 FL (ref 78–100)
PLATELET # BLD AUTO: 407 K/UL (ref 150–450)
POTASSIUM SERPL-SCNC: 4.3 MMOL/L (ref 3.2–4.6)
RBC # BLD AUTO: 6 M/UL (ref 4.4–5.9)
SARS-COV-2 RNA RESP QL NAA+PROBE: NOT DETECTED
SODIUM SERPL-SCNC: 138.9 MMOL/L (ref 132–142)
SPECIMEN SOURCE: NORMAL
WBC # BLD AUTO: 8.3 K/UL (ref 4–11)

## 2021-01-29 PROCEDURE — 99214 OFFICE O/P EST MOD 30 MIN: CPT | Performed by: FAMILY MEDICINE

## 2021-01-29 PROCEDURE — 83036 HEMOGLOBIN GLYCOSYLATED A1C: CPT | Performed by: FAMILY MEDICINE

## 2021-01-29 PROCEDURE — 87635 SARS-COV-2 COVID-19 AMP PRB: CPT | Performed by: FAMILY MEDICINE

## 2021-01-29 PROCEDURE — 93000 ELECTROCARDIOGRAM COMPLETE: CPT | Performed by: FAMILY MEDICINE

## 2021-01-29 PROCEDURE — 99207 PR NO CHARGE LOS: CPT | Performed by: PHARMACIST

## 2021-01-29 PROCEDURE — 80048 BASIC METABOLIC PNL TOTAL CA: CPT | Performed by: FAMILY MEDICINE

## 2021-01-29 PROCEDURE — 36415 COLL VENOUS BLD VENIPUNCTURE: CPT | Performed by: FAMILY MEDICINE

## 2021-01-29 PROCEDURE — 85027 COMPLETE CBC AUTOMATED: CPT | Performed by: FAMILY MEDICINE

## 2021-01-29 RX ORDER — HYDROCODONE BITARTRATE AND ACETAMINOPHEN 5; 325 MG/1; MG/1
1 TABLET ORAL EVERY 6 HOURS PRN
Qty: 20 TABLET | Refills: 0 | Status: SHIPPED | OUTPATIENT
Start: 2021-01-29 | End: 2021-02-03

## 2021-01-29 NOTE — PROGRESS NOTES
Clinical Pharmacy Consult:                                                    Sa KENIA Mackey is a 60 year old male seen for a clinical pharmacist consult.  He was referred to me from Dr. Siddiqui.     Reason for Consult: Freestyle education    Discussion: Provided education to patient on how to use and attach Freestyle Saurabh device. Patient's relative was present and helped translate. Patient was able to attach sensor and synchronize it with the reader device.     Plan:  1. Check blood sugars before breakfast and two hours after meals  2. Bring Freestyle Saurabh Martin to every clinic visit for report download    Tucker Hammond PharmD

## 2021-01-29 NOTE — NURSING NOTE
Due to patient being non-English speaking/uses sign language, an  was used for this visit. Only for face-to-face interpretation by an external agency, date and length of interpretation can be found on the scanned worksheet.     name: Taye Ferrell  Agency: [employer]  Language: Wallisian   Telephone number: [phone]  Type of interpretation: Telephone, spoken

## 2021-01-29 NOTE — PROGRESS NOTES
[facility] [address] [place] [address]  Phone: [phone]  Fax: [phone]  Primary Provider: Jason Siddiqui    PREOPERATIVE EVALUATION:  Today's date: 1/29/2021    Sa KENIA Mackey is a 60 year old male who presents for a preoperative evaluation.    Surgical Information:  Surgery/Procedure: Open Reduction and Internal Fixation Fractured Left Ankle   Surgery Location: [facility] [place] office  Surgeon: Dr Tolbert  Surgery Date: 2/1/21  Time of Surgery: TBD  Where patient plans to recover: At home with family  Fax number for surgical facility:     Type of Anesthesia Anticipated: General    Subjective     HPI related to upcoming procedure:   Fractured Left Ankle on 1/11/21, saw MD on 1/15/21 and referred to ER and then [facility]    Preop Questions 1/29/2021   1. Have you ever had a heart attack or stroke? No   2. Have you ever had surgery on your heart or blood vessels, such as a stent placement, a coronary artery bypass, or surgery on an artery in your head, neck, heart, or legs? No   3. Do you have chest pain with activity? No   4. Do you have a history of  heart failure? No   5. Do you currently have a cold, bronchitis or symptoms of other infection? No   6. Do you have a cough, shortness of breath, or wheezing? No   7. Do you or anyone in your family have previous history of blood clots? No   8. Do you or does anyone in your family have a serious bleeding problem such as prolonged bleeding following surgeries or cuts? No   9. Have you ever had problems with anemia or been told to take iron pills? No   10. Have you had any abnormal blood loss such as black, tarry or bloody stools? No   11. Have you ever had a blood transfusion? No   12. Are you willing to have a blood transfusion if it is medically needed before, during, or after your surgery? Yes   13. Have you or any of your relatives ever had problems with anesthesia? No   14. Do you have sleep apnea, excessive  snoring or daytime drowsiness? YES   15. Do you have any artifical heart valves or other implanted medical devices like a pacemaker, defibrillator, or continuous glucose monitor? No   16. Do you have artificial joints? No   17. Are you allergic to latex? No       Health Care Directive:  Patient does not have a Health Care Directive or Living Will: Discussed advance care planning with patient; information given to patient to review.    Preoperative Review of :   reviewed - no record of controlled substances prescribed.      Status of Chronic Conditions:  DEPRESSION - Patient has a long history of Depression of moderate severity requiring medication for control with recent symptoms being stable..Current symptoms of depression include depressed mood.     DIABETES - Patient has a longstanding history of DiabetesType Type II . Patient is being treated with oral agents, SMBG and GLP1 agonist and denies significant side effects. Control has been fair. Complicating factors include but are not limited to: hypertension, hyperlipidemia and morbid obesity .     HYPERLIPIDEMIA - Patient has a long history of significant Hyperlipidemia requiring medication for treatment with recent fair control. Patient reports no problems or side effects with the medication.     HYPERTENSION - Patient has longstanding history of HTN , currently denies any symptoms referable to elevated blood pressure. Specifically denies chest pain, palpitations, dyspnea, orthopnea, PND or peripheral edema. Blood pressure readings are usually in normal range. Current medication regimen is as listed below. Patient denies any side effects of medication.     SLEEP PROBLEM - Patient has a longstanding history of BEV and uses CPAP. Patient has tried OTC medications with limited success.       Review of Systems  CONSTITUTIONAL: NEGATIVE for fever, chills, change in weight  INTEGUMENTARY/SKIN: NEGATIVE for worrisome rashes, moles or lesions  EYES: NEGATIVE for  vision changes or irritation  ENT/MOUTH: NEGATIVE for ear, mouth and throat problems  RESP: NEGATIVE for significant cough or SOB  BREAST: NEGATIVE for masses, tenderness or discharge  CV: NEGATIVE for chest pain, palpitations or peripheral edema.  Exercise tolerance is good - moderate activity around house without symptoms  GI: NEGATIVE for nausea, abdominal pain, heartburn, or change in bowel habits  : NEGATIVE for frequency, dysuria, or hematuria  MUSCULOSKELETAL: NEGATIVE for significant arthralgias or myalgia but Positive for ANKLE PAIN  NEURO: NEGATIVE for weakness, dizziness but POSITIVE for paresthesias usually in FEET and RIGHT ARM secondary to ulnar neuropathy  ENDOCRINE: NEGATIVE for temperature intolerance, skin/hair changes  HEME: NEGATIVE for bleeding problems  PSYCHIATRIC: NEGATIVE for changes in mood or affect - chronic baseline depressed mood, unchanged    Patient Active Problem List    Diagnosis Date Noted     Morbid obesity (H) 12/04/2020     Priority: Medium     Type 2 diabetes mellitus with hyperglycemia, without long-term current use of insulin (H) 11/04/2016     Priority: Medium     Dyshidrotic eczema 09/22/2016     Priority: Medium     Essential hypertension with goal blood pressure less than 140/90 09/22/2016     Priority: Medium     Severe episode of recurrent major depressive disorder (H) 09/22/2016     Priority: Medium     Per assessment by Dr. Gabe Tay from [facility] 10-4-16       H/O exploratory laparotomy 05/06/2016     Priority: Medium     Uncertain problem - thinks colon was removed       Chronic gout without tophus, unspecified cause, unspecified site 05/06/2016     Priority: Medium     Allergic rhinitis 06/17/2014     Priority: Medium     Problem list name updated by automated process. Provider to review       Hypovitaminosis D 02/24/2014     Priority: Medium     Lesion of ulnar nerve 10/17/2013     Priority: Medium     Health Care Home 12/13/2012      Priority: Medium     Tier Level: 2    DX V65.8 REPLACED WITH 314[facility] HEALTH CARE HOME (04/08/2013)       Male erectile disorder 12/13/2012     Priority: Medium     Obstructive sleep apnea 12/13/2012     Priority: Medium     Uses CPAP       Posttraumatic stress disorder 12/13/2012     Priority: Medium     Patient experiences agarophobia and generalized anxiety symptoms - [facility]       Testicular hypofunction 12/13/2012     Priority: Medium     Testosterone implants from urology       Hyperlipidemia LDL goal <100 12/13/2012     Priority: Medium      Past Medical History:   Diagnosis Date     Depressive disorder      Diabetes (H)      Hypertension      History reviewed. No pertinent surgical history.  Current Outpatient Medications   Medication Sig Dispense Refill     HYDROcodone-acetaminophen (NORCO) 5-325 MG tablet Take 1 tablet by mouth every 6 hours as needed for pain 20 tablet 0     acetaminophen (TYLENOL) 500 MG tablet Take 2 tablets (1,000 mg) by mouth every 8 hours as needed for mild pain 100 tablet 3     acetic acid-hydrocortisone (VOSOL-HC) 1-2 % otic solution Place 3 drops into both ears 2 times daily as needed (itching) 10 mL 3     Alcohol Swabs (ALCOHOL PADS) 70 % PADS 1 pad 3 times daily 100 each 5     aspirin (ASA) 81 MG EC tablet Take 1 tablet (81 mg) by mouth daily 90 tablet 1     atorvastatin (LIPITOR) 80 MG tablet Take 1 tablet (80 mg) by mouth daily 90 tablet 1     augmented betamethasone dipropionate (DIPROLENE-AF) 0.05 % external ointment Apply sparingly to affected area twice daily as needed.  Do not apply to face. 50 g 3     blood glucose (FREESTYLE PRECISION LUIS TEST) test strip Use to test blood sugar 1-2 times daily or as directed. 50 strip 11     blood glucose (NO BRAND SPECIFIED) lancets standard Use to test blood sugar 3 times daily or as directed. 100 each 5     blood glucose (NO BRAND SPECIFIED) test strip Use up to 3 times a day 1 Box 5     Blood Glucose  Monitoring Suppl (BLOOD GLUCOSE MONITOR SYSTEM) w/Device KIT 1 kit daily 1 kit 0     carboxymethylcellulose (REFRESH PLUS) 0.5 % SOLN ophthalmic solution Place 1 drop into both eyes daily as needed for dry eyes 3 Bottle 3     COLCRYS 0.6 MG tablet Take 1 tab twice a day for 5 days then 1 tab as needed for gout flare up. Max 3 tabs (1.8mg) over 1 hour. 60 tablet 3     Continuous Blood Gluc  (FREESTYLE JOSE 14 DAY READER) DARIEN 1 EACH DAILY USE TO TEST BLOOD SUGAR DAILY PER 'S INSTRUCTIONS. 1 each 0     Continuous Blood Gluc Sensor (FREESTYLE JOSE 14 DAY SENSOR) MISC Use to test blood sugar daily per 's instructions.  Remove and replace every 14 days. 2 each 11     empagliflozin (JARDIANCE) 10 MG TABS tablet Take 1 tablet (10 mg) by mouth daily 90 tablet 3     exenatide ER (BYDUREON) 2 MG pen Inject 2 mg Subcutaneous every 7 days 12 each 1     febuxostat (ULORIC) 40 MG TABS tablet Take 1 tablet daily 90 tablet 1     glipiZIDE (GLUCOTROL XL) 10 MG 24 hr tablet Take 2 tablets (20 mg) by mouth daily 180 tablet 1     ibuprofen (ADVIL/MOTRIN) 800 MG tablet Take 1 tablet (800 mg) by mouth every 8 hours as needed for moderate pain or fever (do not take every day) 90 tablet 3     Lancet Devices (EASY TOUCH LANCING DEVICE) MISC USE TO TEST BLOOD SUGARS 1 each 0     lisinopril (ZESTRIL) 40 MG tablet Take 1 tablet (40 mg) by mouth daily 90 tablet 1     metFORMIN (GLUCOPHAGE-XR) 500 MG 24 hr tablet TAKE TWO TABLETS BY MOUTH TWICE DAILY WITH MEALS 360 tablet 1     order for DME Equipment being ordered: 1 seated 4-wheel walker with brakes 1 Device 0     ORDER FOR DME  (Continuous Positive Airway Pressure) nightly       polyethylene glycol (MIRALAX) 17 g packet Miralax powder 8.3 oz bottle (238 gm) as directed 238 g 5     Skin Protectants, Misc. (VASELINE CONSTANT CARE) OINT Externally apply topically 3 times daily 1 Tube 3     vitamin D3 (CHOLECALCIFEROL) 50 mcg (2000 units) tablet Take 1 tablet (50  mcg) by mouth daily 90 tablet 1       Allergies   Allergen Reactions     Allopurinol      Cortisporin [Neomycin-Polymyxin-Hc] Swelling     Indomethacin      Salsalate      Tetracycline      Tramadol         Social History     Tobacco Use     Smoking status: Never Smoker     Smokeless tobacco: Never Used   Substance Use Topics     Alcohol use: No       History   Drug Use No         Objective     /84 (BP Location: Left arm, Patient Position: Sitting, Cuff Size: Adult Large)   Pulse 65   Temp 98.7  F (37.1  C) (Oral)   Resp 20   SpO2 96%     Physical Exam    GENERAL APPEARANCE: healthy, alert and no distress     EYES: EOMI,  PERRL     HENT: ear canals and TM's normal and nose and mouth without ulcers or lesions     NECK: no adenopathy, no asymmetry, masses, or scars and thyroid normal to palpation     RESP: lungs clear to auscultation - no rales, rhonchi or wheezes     BREAST: normal without masses, tenderness or nipple discharge and no palpable axillary masses or adenopathy     CV: regular rates and rhythm, normal S1 S2, no S3 or S4 and no murmur, click or rub     ABDOMEN:  soft, nontender, no HSM or masses and bowel sounds normal     MS: extremities normal- no gross deformities noted, no evidence of inflammation in joints, FROM in all extremities Deformed Left Ankle.     SKIN: no suspicious lesions or rashes     NEURO: Normal strength and tone, sensory exam grossly normal, mentation intact and speech normal     PSYCH: mentation appears normal and affect is at baseline     LYMPHATICS: No cervical adenopathy    Diagnostics:  Recent Results (from the past 24 hour(s))   CBC with Plt ([facility])    Collection Time: 01/29/21 10:49 AM   Result Value Ref Range    WBC 8.3 4.0 - 11.0 K/uL    RBC 6.0 (H) 4.4 - 5.9 M/uL    Hemoglobin 12.6 (L) 13.3 - 17.7 g/dL    Hematocrit 42.2 40.0 - 53.0 %    MCV 70.5 (L) 78.0 - 100.0 fL    MCH 21.1 (L) 26.5 - 35.0    MCHC 29.9 (L) 32.0 - 36.0 g/dL    Platelets 407.0 150.0 - 450.0 K/uL    Hemoglobin A1c ([facility])    Collection Time: 01/29/21 11:52 AM   Result Value Ref Range    Hemoglobin A1C 7.8 (H) 4.1 - 5.7 %   Manual report BMP 1/29/21 (would not print):   Glucose 120  Creatinine 0.7  All electrolytes within normal limits    EKG: Normal Sinus Rhythm, Q in III, aVF of uncertain significance but unchanged since 2015 and no symptoms, normal axis, normal intervals, no acute ST/T changes c/w ischemia, no LVH by voltage criteria, unchanged from previous tracings    Revised Cardiac Risk Index (RCRI):  The patient has the following serious cardiovascular risks for perioperative complications:   - No serious cardiac risks = 0 points     RCRI Interpretation: 0 points: Class I (very low risk - 0.4% complication rate)     Assessment & Plan   The proposed surgical procedure is considered INTERMEDIATE risk.      Risks and Recommendations:  The patient has the following additional risks and recommendations for perioperative complications:  Diabetes:  - Patient is not on insulin therapy: regular NPO guidelines can be followed.   Anemia/Bleeding/Clotting:   - Hold Aspirin and NSAID from today (3 days before surgery)  Medication Instructions:  Patient is to take all scheduled medications on the day of surgery   - metformin: HOLD day of surgery.   - sulfonylurea (e.g. glyburide, glipizide): HOLD day of surgery   - DPP-4 Inhibitor (e.g. sitagliptin [Januvia], linagliptin [Tradjenta]): Continue without modification.    - GLP-1 Injectable (exenitide, liraglutide, semaglutide, dulaglutide, etc.): HOLD day of surgery     RECOMMENDATION:  APPROVAL GIVEN to proceed with proposed procedure pending review of diagnostic evaluation.    Signed Electronically by: Jason Siddiqui MD    Copy of this evaluation report is provided to requesting physician.

## 2021-01-29 NOTE — PROGRESS NOTES
I have verified the content of the note, which accurately reflects my assessment of the patient and the plan of care.   Savi Lema, [facility], PharmD

## 2021-01-29 NOTE — PATIENT INSTRUCTIONS
Do not take Aspirin or Ibuprofen until after the surgery  Do not take Diabetes medications on day of surgery  OK to take blood pressure medications with a sip of water on day of surgery.

## 2021-02-01 ENCOUNTER — TRANSFERRED RECORDS (OUTPATIENT)
Dept: HEALTH INFORMATION MANAGEMENT | Facility: CLINIC | Age: 61
End: 2021-02-01

## 2021-02-03 ENCOUNTER — DOCUMENTATION ONLY (OUTPATIENT)
Dept: FAMILY MEDICINE | Facility: CLINIC | Age: 61
End: 2021-02-03

## 2021-02-03 ENCOUNTER — TELEPHONE (OUTPATIENT)
Dept: FAMILY MEDICINE | Facility: CLINIC | Age: 61
End: 2021-02-03

## 2021-02-03 NOTE — PROGRESS NOTES
Received update 2/1/21    Hi Dr. Ross,     I reached Sa Than Key last week and completed the eligibility screening. He was eligible and he confirmed interest in psychotherapy and TCM with [facility]. He had been hard to reach the last few months per your communication with Ta and I confirmed the number we had reached him on was the only number. He agreed to start with us in a few weeks, by the first week of March, since we are pacing intakes between our two providers. We will keep you and Dr. Siddiqui updated when he completes the Diagnostic Assessment appointment.     Thanks,   Wendy

## 2021-02-03 NOTE — TELEPHONE ENCOUNTER
Called patient to discuss recent refill request for Hydrocodone and accucheck device kit. No answer at both numbers in chart.     Of note, patient did have surgery with [facility] two days ago and may have received pain medical from them for post-op pain control.     Will try again at later time. ./LR

## 2021-02-03 NOTE — PROGRESS NOTES
"Another update from 2/1/21:    We discussed the importance of being able to contact him in person with him this past Friday.  His son's number is now also listed in the \"demographics\" section of EMR and agreed to be contacted if we're having trouble reaching Dad.  Jason Siddiqui     "

## 2021-02-03 NOTE — PROGRESS NOTES
To be completed in Nursing note:    Please reference list for forms that require a visit for completion.  Please remind patients that providers are given 3-5 business days to complete and return forms.      Form type: FMLA: Family member    Date form received: 2/3/2021    Date form completed by Physician: 2/4/2021    How was form returned to patient (mailed, faxed, or at  for patient to ): Call pt/family    Date form mailed/faxed/left at  for patient and sent to HIM for scanning:      Once form is left for patient, faxed, or mailed PCS will then close the documentation only encounter.

## 2021-02-16 ENCOUNTER — TELEPHONE (OUTPATIENT)
Dept: FAMILY MEDICINE | Facility: CLINIC | Age: 61
End: 2021-02-16

## 2021-02-16 NOTE — TELEPHONE ENCOUNTER
[facility] phone call message- general phone call:    Reason for call: Romero, son, picked up the [facility] paperwork and it  yesterday.  His dad has an appt next month on .  It is going to be hard for Romero to get him to his appt without being off.  Can you extend the [facility] paperwork to  because he works nights.    Return call needed: Yes    OK to leave a message on voice mail? Yes    Primary language: Liam      needed? Yes    Call taken on 2021 at 11:13 AM by Emiliano Presley

## 2021-02-17 NOTE — TELEPHONE ENCOUNTER
Left message with son requesting he call back to clarify what is being requested regarding additional time off. ./LR

## 2021-02-18 NOTE — TELEPHONE ENCOUNTER
Left message for patient noting that if paperwork contains 1-2 days off post surgery patient should be covered. Paperwork is not scanned in to chart so cannot confirm, routed to Neris, PCS and medical records. ./LR

## 2021-03-03 DIAGNOSIS — L29.9 EAR ITCHING: ICD-10-CM

## 2021-03-04 RX ORDER — HYDROCORTISONE AND ACETIC ACID 20.75; 10.375 MG/ML; MG/ML
3 SOLUTION AURICULAR (OTIC) 2 TIMES DAILY PRN
Qty: 10 ML | Refills: 3 | Status: SHIPPED | OUTPATIENT
Start: 2021-03-04 | End: 2021-04-27

## 2021-03-16 DIAGNOSIS — M1A.09X0 IDIOPATHIC CHRONIC GOUT OF MULTIPLE SITES WITHOUT TOPHUS: ICD-10-CM

## 2021-03-16 RX ORDER — COLCHICINE 0.6 MG/1
TABLET, FILM COATED ORAL
Qty: 60 TABLET | Refills: 3 | Status: SHIPPED | OUTPATIENT
Start: 2021-03-16 | End: 2021-07-28

## 2021-04-13 ENCOUNTER — TELEPHONE (OUTPATIENT)
Dept: FAMILY MEDICINE | Facility: CLINIC | Age: 61
End: 2021-04-13

## 2021-04-13 DIAGNOSIS — E11.65 TYPE 2 DIABETES MELLITUS WITH HYPERGLYCEMIA, WITHOUT LONG-TERM CURRENT USE OF INSULIN (H): ICD-10-CM

## 2021-04-13 DIAGNOSIS — I10 ESSENTIAL HYPERTENSION WITH GOAL BLOOD PRESSURE LESS THAN 140/90: ICD-10-CM

## 2021-04-13 DIAGNOSIS — E55.9 HYPOVITAMINOSIS D: ICD-10-CM

## 2021-04-13 DIAGNOSIS — E78.5 HYPERLIPIDEMIA LDL GOAL <100: ICD-10-CM

## 2021-04-13 RX ORDER — ASPIRIN 81 MG/1
TABLET, DELAYED RELEASE ORAL
Qty: 90 TABLET | Refills: 1 | Status: SHIPPED | OUTPATIENT
Start: 2021-04-13 | End: 2021-06-08

## 2021-04-13 RX ORDER — CHOLECALCIFEROL (VITAMIN D3) 50 MCG
50 TABLET ORAL DAILY
Qty: 90 TABLET | Refills: 1 | Status: SHIPPED | OUTPATIENT
Start: 2021-04-13 | End: 2021-06-08

## 2021-04-13 RX ORDER — LISINOPRIL 40 MG/1
40 TABLET ORAL DAILY
Qty: 90 TABLET | Refills: 1 | Status: SHIPPED | OUTPATIENT
Start: 2021-04-13 | End: 2021-06-08

## 2021-04-13 RX ORDER — ATORVASTATIN CALCIUM 80 MG/1
80 TABLET, FILM COATED ORAL DAILY
Qty: 90 TABLET | Refills: 1 | Status: SHIPPED | OUTPATIENT
Start: 2021-04-13 | End: 2021-06-08

## 2021-04-13 RX ORDER — METFORMIN HCL 500 MG
TABLET, EXTENDED RELEASE 24 HR ORAL
Qty: 360 TABLET | Refills: 1 | Status: SHIPPED | OUTPATIENT
Start: 2021-04-13 | End: 2021-06-08

## 2021-04-13 NOTE — TELEPHONE ENCOUNTER
Medication Change Request    Message: Original Bydureon 2mg pen is no longer available from our vendor. May we change to newer Bydureon BCise 2mg pen? Thx    Please advise.     Neris Saravia CMA

## 2021-04-15 ENCOUNTER — TELEPHONE (OUTPATIENT)
Dept: PHARMACY | Facility: CLINIC | Age: 61
End: 2021-04-15

## 2021-04-15 DIAGNOSIS — M1A.09X0 IDIOPATHIC CHRONIC GOUT OF MULTIPLE SITES WITHOUT TOPHUS: ICD-10-CM

## 2021-04-15 RX ORDER — FEBUXOSTAT 40 MG/1
TABLET, FILM COATED ORAL
Qty: 90 TABLET | Refills: 1 | Status: SHIPPED | OUTPATIENT
Start: 2021-04-15 | End: 2021-07-27

## 2021-04-15 NOTE — TELEPHONE ENCOUNTER
Call patient through language line and was not able to reach.  Additionally called son through language line and left message for patient or son to call clinic.  Patient was previously on Bydureon pen for diabetes, which is now no longer manufactured.  We attempted to switch to the Bydureon BCise and this is not covered by insurance.  I wanted to call and talk to patient about options for his diabetes management.  Options include:    Switch to Victoza daily    Discontinue Bydureon and increase empagliflozin to 25 mg daily    Discontinue Bydureon and start sitagliptin    Of these options my personal preference would be the first or second.    Savi Lema, Pharm.D.    Due to patient being non-English speaking/uses sign language, an  was used for this visit. Only for face-to-face interpretation by an external agency, date and length of interpretation can be found on the scanned worksheet.     name: Liya [id number]  Agency: [employer] - telephone  Language: Liam   Telephone number: [phone]  Type of interpretation: Telephone, spoken

## 2021-04-21 NOTE — TELEPHONE ENCOUNTER
Called patient at both numbers in chart, no answer. Will discuss with  Taye Ferrell to see if he is able to contact patient. ./LR

## 2021-04-23 NOTE — TELEPHONE ENCOUNTER
Spoke with Taye Ferrell, he will assist patient in setting up appointment with pharmacy team to discuss medication options. ./LR

## 2021-04-27 DIAGNOSIS — L29.9 EAR ITCHING: ICD-10-CM

## 2021-04-27 RX ORDER — HYDROCORTISONE AND ACETIC ACID 20.75; 10.375 MG/ML; MG/ML
3 SOLUTION AURICULAR (OTIC) 2 TIMES DAILY PRN
Qty: 10 ML | Refills: 3 | Status: SHIPPED | OUTPATIENT
Start: 2021-04-27 | End: 2021-08-19

## 2021-05-12 DIAGNOSIS — E11.65 TYPE 2 DIABETES MELLITUS WITH HYPERGLYCEMIA, WITHOUT LONG-TERM CURRENT USE OF INSULIN (H): ICD-10-CM

## 2021-05-12 DIAGNOSIS — H04.123 DRY EYES: ICD-10-CM

## 2021-05-12 RX ORDER — ISOPROPYL ALCOHOL 70 ML/100ML
SWAB TOPICAL
Qty: 100 EACH | Refills: 1 | Status: SHIPPED | OUTPATIENT
Start: 2021-05-12 | End: 2021-07-28

## 2021-05-12 RX ORDER — CARBOXYMETHYLCELLULOSE SODIUM 5 MG/ML
SOLUTION/ DROPS OPHTHALMIC
Qty: 30 EACH | Refills: 3 | Status: SHIPPED | OUTPATIENT
Start: 2021-05-12 | End: 2021-10-08

## 2021-05-12 RX ORDER — LANCETS 33 GAUGE
EACH MISCELLANEOUS
Qty: 100 EACH | Refills: 5 | Status: SHIPPED | OUTPATIENT
Start: 2021-05-12 | End: 2022-08-25

## 2021-05-18 DIAGNOSIS — L30.1 POMPHOLYX: ICD-10-CM

## 2021-05-18 RX ORDER — BETAMETHASONE DIPROPIONATE 0.5 MG/G
OINTMENT, AUGMENTED TOPICAL
Qty: 30 G | Refills: 3 | Status: SHIPPED | OUTPATIENT
Start: 2021-05-18 | End: 2021-06-03

## 2021-05-26 ENCOUNTER — TRANSFERRED RECORDS (OUTPATIENT)
Dept: HEALTH INFORMATION MANAGEMENT | Facility: CLINIC | Age: 61
End: 2021-05-26

## 2021-05-28 ENCOUNTER — RECORDS - HEALTHEAST (OUTPATIENT)
Dept: ADMINISTRATIVE | Facility: CLINIC | Age: 61
End: 2021-05-28

## 2021-05-30 ENCOUNTER — RECORDS - HEALTHEAST (OUTPATIENT)
Dept: ADMINISTRATIVE | Facility: CLINIC | Age: 61
End: 2021-05-30

## 2021-05-31 ENCOUNTER — RECORDS - HEALTHEAST (OUTPATIENT)
Dept: ADMINISTRATIVE | Facility: CLINIC | Age: 61
End: 2021-05-31

## 2021-06-03 DIAGNOSIS — L30.1 POMPHOLYX: ICD-10-CM

## 2021-06-03 RX ORDER — BETAMETHASONE DIPROPIONATE 0.5 MG/G
OINTMENT, AUGMENTED TOPICAL
Qty: 5 G | Refills: 1 | Status: SHIPPED | OUTPATIENT
Start: 2021-06-03 | End: 2022-04-29

## 2021-06-07 DIAGNOSIS — M1A.09X0 IDIOPATHIC CHRONIC GOUT OF MULTIPLE SITES WITHOUT TOPHUS: ICD-10-CM

## 2021-06-07 DIAGNOSIS — E11.65 TYPE 2 DIABETES MELLITUS WITH HYPERGLYCEMIA, WITHOUT LONG-TERM CURRENT USE OF INSULIN (H): ICD-10-CM

## 2021-06-07 DIAGNOSIS — E78.5 HYPERLIPIDEMIA LDL GOAL <100: ICD-10-CM

## 2021-06-07 DIAGNOSIS — E55.9 HYPOVITAMINOSIS D: ICD-10-CM

## 2021-06-07 DIAGNOSIS — I10 ESSENTIAL HYPERTENSION WITH GOAL BLOOD PRESSURE LESS THAN 140/90: ICD-10-CM

## 2021-06-08 RX ORDER — METFORMIN HCL 500 MG
TABLET, EXTENDED RELEASE 24 HR ORAL
Qty: 120 TABLET | Refills: 1 | Status: SHIPPED | OUTPATIENT
Start: 2021-06-08 | End: 2021-07-27

## 2021-06-08 RX ORDER — CHOLECALCIFEROL (VITAMIN D3) 50 MCG
50 TABLET ORAL DAILY
Qty: 30 TABLET | Refills: 1 | Status: SHIPPED | OUTPATIENT
Start: 2021-06-08 | End: 2021-08-19

## 2021-06-08 RX ORDER — IBUPROFEN 800 MG/1
800 TABLET, FILM COATED ORAL EVERY 8 HOURS PRN
Qty: 90 TABLET | Refills: 3 | Status: SHIPPED | OUTPATIENT
Start: 2021-06-08 | End: 2021-06-11

## 2021-06-08 RX ORDER — GLIPIZIDE 10 MG/1
20 TABLET, FILM COATED, EXTENDED RELEASE ORAL DAILY
Qty: 60 TABLET | Refills: 1 | Status: SHIPPED | OUTPATIENT
Start: 2021-06-08 | End: 2021-07-27

## 2021-06-08 RX ORDER — ATORVASTATIN CALCIUM 80 MG/1
80 TABLET, FILM COATED ORAL DAILY
Qty: 30 TABLET | Refills: 1 | Status: SHIPPED | OUTPATIENT
Start: 2021-06-08 | End: 2021-07-27

## 2021-06-08 RX ORDER — LISINOPRIL 40 MG/1
40 TABLET ORAL DAILY
Qty: 30 TABLET | Refills: 1 | Status: SHIPPED | OUTPATIENT
Start: 2021-06-08 | End: 2021-07-27

## 2021-06-08 RX ORDER — ASPIRIN 81 MG/1
TABLET, COATED ORAL
Qty: 30 TABLET | Refills: 1 | Status: SHIPPED | OUTPATIENT
Start: 2021-06-08 | End: 2021-08-19

## 2021-06-09 RX ORDER — BLOOD SUGAR DIAGNOSTIC
STRIP MISCELLANEOUS
Qty: 100 STRIP | Refills: 3 | Status: SHIPPED | OUTPATIENT
Start: 2021-06-09 | End: 2021-10-01

## 2021-06-10 NOTE — PROGRESS NOTES
[facility]   Knee Initial Evaluation    Patient Name: Sa KENIA Mackey  Date of evaluation: 4/11/2017  Referral Diagnosis: Patellofemoral syndrome of both knees [M22.2X1, M22.2X2]  - Primary   Referring provider: Jason Siddiqui MD  Visit Diagnosis:     ICD-10-CM    1. Acute bilateral knee pain M25.561     M25.562    2. Patellofemoral stress syndrome of both knees M22.2X1     M22.2X2    3. Weakness of both hips M62.81    4. Quadricep tightness M62.89        Assessment:       Sa KENIA Mackey is a 56 y.o. male who presents to therapy today with chief complaints of L>R knee pain which worsened in March 2017. It has worsened his ability to go from sit to stand and with walking. With examination, the patient demonstrates painful functional mobility with transfers, antalgic gait pattern, glut weakness, tight quads bilaterally, and + patellarfemoral testing. The pt will likely benefit from skilled PT to improve patellar and LE mobility, LE and glut strength, and overall gait, function, and pain.    Pt. is appropriate for skilled PT intervention as outlined in the Plan of Care (POC).  Pt. is a good candidate for skilled PT services to improve pain levels and function.    Goals:  Pt. will be independent with home exercise program in : 4 weeks  Pt. will report decreased intensity, frequency of : Pain;in 4 weeks;Comment  Comment:: 50%  Pt will: sit to stand without increase in pain in 4 weeks:  Pt will: be able to return to exercise at the gym without increase in pain in 6 weeks:    Patient's expectations/goals are realistic.    Barriers to Learning or Achieving Goals:  Language barriers.       Plan / Patient Instructions:      Plan of Care:   Authorization / Certification Start Date: 04/11/17  Authorization / Certification End Date: 07/11/17  Communication with: Referral Source  Patient Related Instruction: Nature of Condition;Self Care instruction;Body mechanics;Posture;Next steps;Precautions;Treatment plan and  rationale;Expected outcome;Basis of treatment  Times per Week: 1  Number of Weeks: 8  Number of Visits: 8  Discharge Planning: pt will meet all PT goals or reach a plateau with PT  Precautions / Restrictions : none  Therapeutic Exercise: ROM;Stretching;Strengthening  Neuromuscular Reeducation: kinesio tape;posture;core;balance/proprioception  Manual Therapy: soft tissue mobilization;joint mobilization;muscle energy;myofascial release  Modalities: TENS;ultrasound;cold pack;hot pack;other  Modalities: LIMITED PRN  Gait Training: as needed to normalize gait from antalgia    Plan for next visit: Review HEP, MT to improve patellar mobility, glut, quad and hamstring strengthening, check gastroc length.     Subjective:        Social information:   Occupation:NA   Work Status:NA    Equipment: Cane    History of Present Illness:      Pain started about a month ago with pain in the left knee. When he takes steps it hurts in the knees. Reports no injury to the knees. Just started one day. He reports similar pain before and they told him it was due to gout, pain did get better. This time he was told it was not gout. He had an injection for the knee without benefit yet (was done 2 days ago). Since the pain started reports that the pain is the same. Has use a cane since he started getting gout about 9 years ago.    Pain described as left knee pain> right knee pain, deep pain with walking (feels like two things are hitting)  Worse with walking, sit to stands, stairs, lifting, squatting,/kneeling, house/yardwork, carrying objects, exercise.  Better with nothing  Previous treatment injection for the knee.   History of gout.  He does report history of exercise at a gym.       Pain Ratin  Pain rating at best: 6  Pain rating at worst: 8  Pain description:pain    Functional limitations are described as occurring with:   walking, sit to stands, stairs, lifting, squatting,/kneeling, house/yardwork, carrying objects, exercise.        Objective:      Note: Items left blank indicates the item was not performed or not indicated at the time of the evaluation.    Knee Examination  1. Acute bilateral knee pain     2. Patellofemoral stress syndrome of both knees     3. Weakness of both hips     4. Quadricep tightness       Precautions/Restrictions:  None  Involved Side: Bilateral and L>R  Assistive Device: SEC  Gait Observation: Patient displays difficulty with sit to stands, requiring use of UE to push up due to pain. Gait is antalgic on the L with slowed pace and use of cane.  Lumbar Clearing: Does not provoke symptoms  Hip Clearing: Does not provoke symptoms    Knee ROM:   Date: 4/11/17      Knee ROM ( ) AROM in degrees AROM in degrees AROM in degrees    Right Left Right Left Right Left   Knee Flexion (0-130 ) 123 125       Knee extension (0 ) 10 extension 10 extension        PROM in degrees PROM in degrees PROM in degrees    Right Left Right Left Right Left   Knee Flexion (0-130 )         Knee extension (0 )           SLR 50/60 stretch in back of legs     Hip/Knee Strength     Date: 4/11/17     Hip/Knee Strength (/5) MMT MMT MMT    Right Left Right Left Right Left   Hip Flexion 5 5       Hip Abduction 4 4       Hip Adduction         Hip Extension 4 4       Hip External Rotation         Hip Internal Rotation         Knee Extension 5 5 pain       Knee Flexion 5 5         Flexibility: limited prone knee bend and SLR  Palpation: Tenderness to bilateral medial/lateral joint lines, medial and lateral patellofemoral facets, and patellar tendon bilaterally.     Knee Special Tests:     Meniscal Tests Right (+/-) Left (+/-) Ligament Tests Right (+/-) Left (+/-)   Dony s - - Lachman - -   Joint Line Tenderness + + Anterior Drawer     Thessaly - - Posterior Drawer     Apley s - - Posterior sag     Knee OA Right (+/-) Left (+/-) Valgus Stress - -   1. > 49 y/o  2. Stiffness > 30 minutes  3. Crepitus   4. Bony tenderness  5. Bone enlargement  6. No warmth  to the touch - - Varus Stress - -   Other Right (+/-) Left (+/-) Other Patellofemoral compression + +   Ely s + + Other     Roxy            Treatment Today     TREATMENT MINUTES COMMENTS   Evaluation 30 Low complexity   Self-care/ Home management     Manual therapy 10 Patellar medial and lateral joint mobilizations, kinesiotape I strips surrounding patella.   Neuromuscular Re-education     Therapeutic Activity     Therapeutic Exercises 15 Education on PF pain and HEP   Gait training     Modality__________________                Total 55    Blank areas are intentional and mean the treatment did not include these items.     PT Evaluation Code: (Please list factors)  Patient History/Comorbidities: Gout  Examination: see objective  Clinical Presentation: stable  Clinical Decision Making: low    Patient History/  Comorbidities Examination  (body structures and functions, activity limitations, and/or participation restrictions) Clinical Presentation Clinical Decision Making (Complexity)   No documented Comorbidities or personal factors 1-2 Elements Stable and/or uncomplicated Low   1-2 documented comorbidities or personal factor 3 Elements Evolving clinical presentation with changing characteristics Moderate   3-4 documented comorbidities or personal factors 4 or more Unstable and unpredictable High                Edin KIMBROUGH  4/11/2017  3:02 PM

## 2021-06-10 NOTE — PROGRESS NOTES
[facility] Daily Progress     Patient Name: Sa KENIA Mackey  Date: 4/25/2017  Visit #: 2  PTA visit #:  -  Referral Diagnosis: Patellofemoral syndrome of both knees [M22.2X1, M22.2X2]  - Primary   Referring provider: Jason Siddiqui MD  Visit Diagnosis:     ICD-10-CM    1. Acute bilateral knee pain M25.561     M25.562    2. Patellofemoral stress syndrome of both knees M22.2X1     M22.2X2    3. Weakness of both hips M62.81    4. Quadricep tightness M62.89      Sa KENIA Mackey is a 56 y.o. male who presents to therapy today with chief complaints of L>R knee pain which worsened in March 2017. It has worsened his ability to go from sit to stand and with walking. With examination, the patient demonstrates painful functional mobility with transfers, antalgic gait pattern, glut weakness, tight quads bilaterally, and + patellarfemoral testing. The pt will likely benefit from skilled PT to improve patellar and LE mobility, LE and glut strength, and overall gait, function, and pain.    Assessment:     HEP/POC compliance is  good .     Patient with continue difficulty with gait 2/2 knee pain. He does tolerate the exercises well and will benefit from continuing to progress strengthening and MT for loosening up his legs.     Goal Status:  Pt. will be independent with home exercise program in : 4 weeks  Pt. will report decreased intensity, frequency of : Pain;in 4 weeks;Comment  Comment:: 50%  Pt will: sit to stand without increase in pain in 4 weeks:  Pt will: be able to return to exercise at the gym without increase in pain in 6 weeks:    Plan / Patient Education:     Continue with initial plan of care.     Plan for next visit: Review HEP, MT to improve patellar mobility, glut, quad and hamstring strengthening, check gastroc length.  Subjective:     Pain Rating: Not Met     Not much improved yet. The stretch on his stomach is very hard to do because of position.    Objective:     Discussed ways to modify stretches.  Pain with  step ups to large step- discussed trying a phone book at home.  Unable to sit to stand without using hands.    Exercises:  Exercise #1: LAQ and SLR  Comment #1: X 10  Exercise #2: Prone Quad   Comment #2: X 30 seconds  Exercise #3: Seated Hamstring Stretch  Comment #3: X 10  Exercise #4: Wall Squats  Comment #4: X 10  Exercise #5: Step Ups  Comment #5: X 10     Treatment Today     TREATMENT MINUTES COMMENTS   Evaluation     Self-care/ Home management     Manual therapy 15 TASTM to bilateral quad (vastus medialis and lateralis) patellar distraction with plunger bilaterally.   Neuromuscular Re-education     Therapeutic Activity     Therapeutic Exercises 15 Review and progression of HEP   Gait training     Modality__________________                Total 30    Blank areas are intentional and mean the treatment did not include these items.       Edin KIMBROUGH  4/25/2017

## 2021-06-10 NOTE — PROGRESS NOTES
[facility] Daily Progress     Patient Name: Sa KENIA Mackey  Date: 5/2/2017  Visit #: 3  PTA visit #:  -  Referral Diagnosis: Patellofemoral syndrome of both knees [M22.2X1, M22.2X2]  - Primary   Referring provider: Jason Siddiqui MD  Visit Diagnosis:     ICD-10-CM    1. Acute bilateral knee pain M25.561     M25.562    2. Patellofemoral stress syndrome of both knees M22.2X1     M22.2X2    3. Weakness of both hips M62.81    4. Quadricep tightness M62.89      Sa KENIA Mackey is a 56 y.o. male who presents to therapy today with chief complaints of L>R knee pain which worsened in March 2017. It has worsened his ability to go from sit to stand and with walking. With examination, the patient demonstrates painful functional mobility with transfers, antalgic gait pattern, glut weakness, tight quads bilaterally, and + patellarfemoral testing. The pt will likely benefit from skilled PT to improve patellar and LE mobility, LE and glut strength, and overall gait, function, and pain.    Assessment:     HEP/POC compliance is  good .     Patient with continued difficulty with gait 2/2 knee pain. He does tolerate the exercises well and will benefit from continuing to progress strengthening and MT for loosening up his legs. The patient had good relief of pain for the first time today with seated knee distraction with small circles. Reinforced the importance of icing today.    Goal Status:  Pt. will be independent with home exercise program in : 4 weeks  Pt. will report decreased intensity, frequency of : Pain;in 4 weeks;Comment  Comment:: 50%  Pt will: sit to stand without increase in pain in 4 weeks:  Pt will: be able to return to exercise at the gym without increase in pain in 6 weeks:    Plan / Patient Education:     Continue with initial plan of care.     Plan for next visit: Review HEP, MT to improve patellar mobility, glut, quad and hamstring strengthening, check gastroc length.    Subjective:     Pain Rating: No change.      Still not seeing much improvements. The exercises themselves are going well. Forgot to do any icing.    Objective:     Decreased pain with seated knee distraction with circles as well as plunger patellar distraction mobilizations.  Discussed options for machines to use at the gym and trial of leg press on pilates reformer today.  He has not done any icing.    Exercises:  Exercise #1: LAQ and SLR  Comment #1: X 10  Exercise #2: Prone Quad   Comment #2: X 30 seconds  Exercise #3: Seated Hamstring Stretch  Comment #3: X 10  Exercise #4: Wall Squats  Comment #4: X 10  Exercise #5: Step Ups  Comment #5: X 10     Treatment Today     TREATMENT MINUTES COMMENTS   Evaluation     Self-care/ Home management     Manual therapy 18 TASTM to bilateral quad (vastus medialis and lateralis) patellar distraction with plunger bilaterally, also seated tibial distraction with circles..   Neuromuscular Re-education     Therapeutic Activity     Therapeutic Exercises 13 Review and progression of HEP   Gait training     Modality__________________                Total 31    Blank areas are intentional and mean the treatment did not include these items.       Edin KIMBROUGH  5/2/2017   [Sexually Active] : The patient is sexually active [Monogamous] : monogamous [Condom Use] : using condoms [Condom Use - All Encounters] : for every encounter [Women] : with women [Female ___] : [unfilled] female [FreeTextEntry1] : Doing very well on current ART. No missed doses.\par Continues working in pizza delivery.\ella Had gained a great deal of weight.  Trying to lose. Weight up slightly more now.\par Quit smoking again for several weeks..\par Pain is improved somewhat with medications on a tapering schedule; however, Jay Jay has been having more symptoms and requests that the pace of opioid taper be stopped for now.  Will maintain current dose for this month.  Referred to Dr. Arie Aquino for pain management consultation. Jay Jay has an appointment for March.\par Followed up with Nephrology.  Serum creatinine is up slightly again.  Advised to continue current medications and followup with Nephrology.\par Being followed by PCP, Dr. Nicholas.\par Will also followup with his cardiologist in light of his occasional dyspnea on exertion and desire to increase his exercise.\par \par  [de-identified] : None recently [de-identified] : Disabled [de-identified] : Negative [de-identified] : Partner [de-identified] : Partner

## 2021-06-10 NOTE — PROGRESS NOTES
[facility] Daily Progress     Patient Name: Sa KENIA Mackey  Date: 5/9/2017  Visit #: 4  PTA visit #:  -  Referral Diagnosis: Patellofemoral syndrome of both knees [M22.2X1, M22.2X2]  - Primary   Referring provider: Jason Siddiqui MD  Visit Diagnosis:     ICD-10-CM    1. Acute bilateral knee pain M25.561     M25.562    2. Patellofemoral stress syndrome of both knees M22.2X1     M22.2X2    3. Weakness of both hips M62.81    4. Quadricep tightness M62.89      Sa KENIA Mackey is a 56 y.o. male who presents to therapy today with chief complaints of L>R knee pain which worsened in March 2017. It has worsened his ability to go from sit to stand and with walking. With examination, the patient demonstrates painful functional mobility with transfers, antalgic gait pattern, glut weakness, tight quads bilaterally, and + patellarfemoral testing. The pt will likely benefit from skilled PT to improve patellar and LE mobility, LE and glut strength, and overall gait, function, and pain.    Assessment:     HEP/POC compliance is  good .     Patient with continued difficulty with gait 2/2 knee pain, though he does show improvements with decreased antalgia. He is showing improved function with ability to go down stairs with less pain. The patient is independent with his HEP and would like to trial continuing independently, and return if needed.     Goal Status:  Pt. will be independent with home exercise program in : 4 weeks ongoing  Pt. will report decreased intensity, frequency of : Pain;in 4 weeks;Comment  Comment:: 50% not met  Pt will: sit to stand without increase in pain in 4 weeks: some improvements  Pt will: be able to return to exercise at the gym without increase in pain in 6 weeks: plans to do in future.    Plan / Patient Education:     Continue with initial plan of care.     Plan for next visit: Patient to trial independent management of pain and will return as needed. D/C in 4 weeks.    Subjective:     Pain Rating: No  change.     Knees are about the same for pain. The strength are about the same as well. Distraction last session felt good during it. Doing down the stairs has gotten better, pain is still there, but has gotten better. He has not gotten to the gym yet, would like to go to the [facility], but it is too far.    Objective:     Decreased pain with seated knee distraction with circles as well as plunger patellar distraction mobilizations.  Discussed options for machines to use at the gym and possible use of the pool if he ever goes to the [facility].  Decreased pain with gastroc stretch.      Exercises:  Exercise #1: LAQ and SLR  Comment #1: X 10  Exercise #2: Prone Quad   Comment #2: X 30 seconds  Exercise #3: Seated Hamstring Stretch  Comment #3: X 10  Exercise #4: Wall Squats  Comment #4: X 10  Exercise #5: Step Ups  Comment #5: X 10 6 inch step  Exercise #6: Pilates Leg Press  Comment #6: X 10 [place]  Exercise #7: Clamshells  Comment #7: X 10 orange  Exercise #8: Side Stepping Bands  Comment #8: X 10  Exercise #9: Gastroc Stretch  Comment #9: X 30 seconds  Exercise #10: Standing Knee Flexion  Comment #10: X 10     Treatment Today     TREATMENT MINUTES COMMENTS   Evaluation     Self-care/ Home management     Manual therapy 10 Patellar distraction with plunger bilaterally, also seated tibial distraction with circles..   Neuromuscular Re-education     Therapeutic Activity     Therapeutic Exercises 17 Review and progression of HEP   Gait training     Modality__________________                Total 27    Blank areas are intentional and mean the treatment did not include these items.       Edin KIMBROUGH  5/9/2017

## 2021-06-11 ENCOUNTER — OFFICE VISIT (OUTPATIENT)
Dept: FAMILY MEDICINE | Facility: CLINIC | Age: 61
End: 2021-06-11
Payer: COMMERCIAL

## 2021-06-11 VITALS
RESPIRATION RATE: 18 BRPM | SYSTOLIC BLOOD PRESSURE: 130 MMHG | OXYGEN SATURATION: 95 % | DIASTOLIC BLOOD PRESSURE: 81 MMHG | BODY MASS INDEX: 40.27 KG/M2 | TEMPERATURE: 98.2 F | WEIGHT: 234.6 LBS | HEART RATE: 75 BPM

## 2021-06-11 DIAGNOSIS — S82.852D CLOSED TRIMALLEOLAR FRACTURE OF LEFT ANKLE WITH ROUTINE HEALING, SUBSEQUENT ENCOUNTER: ICD-10-CM

## 2021-06-11 DIAGNOSIS — M25.572 PAIN IN JOINT INVOLVING ANKLE AND FOOT, LEFT: Primary | ICD-10-CM

## 2021-06-11 PROCEDURE — 99213 OFFICE O/P EST LOW 20 MIN: CPT | Mod: GC | Performed by: STUDENT IN AN ORGANIZED HEALTH CARE EDUCATION/TRAINING PROGRAM

## 2021-06-11 NOTE — PROGRESS NOTES
Assessment & Plan     Pain in joint involving ankle and foot, left  Closed trimalleolar fracture of left ankle with routine healing, subsequent encounter  S/p repair of closed trimalleolar ankle fracture in February, 2021 with persistent pain. Has been getting relief with ibuprofen, but no improvement with tylenol. Patient currently taking 800 mg ibuprofen 2-3x per day and also colchicine for gout. He does have a history of hypertension as well. As no improvement with acetaminophen, will discontinue ibuprofen and start naproxen twice daily as needed with food. Discussed at length the importance of discontinuing ibuprofen and no other OTC NSAIDs as well as potential for GI bleed and s/sx of GI bleeding. Recommended follow up with [facility] sooner than next month and continuing PT. Called patients pharmacy and discontinued ibuprofen refills.   - naproxen (NAPROSYN) 375 MG tablet; Take 1 tablet (375 mg) by mouth 2 times daily as needed for moderate pain Take with food        Review of prior external note(s) from - Outside records from [facility] and PT  6}       Follow up if symptoms failing to improve     Patient discussed with Dr. Shelbi Henriquez who agrees with the above assessment and plan.     Aniya Montejo MD, PGY3  [facility]        Subjective   Sa is a 60 year old who presents for the following health issues: left leg pain  accompanied by his :    HPI       Patient had surgery on the left ankle in January and he notes that he has had the pain since after his surgery. He has been doing physical therapy, but still feels like he is having pain. He has noticed some swelling sometimes. Notes a throbbing pain in the area sometimes and sometimes the pain is sharp. This pain doesn't always get worse with activity. Sometimes the pain feels better after PT, but sometimes it feels worse. Pain is worse with walking around before PT. He is ambulating with a cane. He notes that  sometimes when he takes pain medication, It makes him feel happier and sometimes makes him feel short of breath. He is no longer taking this medication because of this. He does take ibuprofen as needed for aching pain. He uses 1 tabs at one time 2-3x per day. Notes no improvement with tylenol at all. No abdominal pain or change in color of stool.      Surgery was completed on 2/1/21 for trimalleolar ankle fracture. He notes that he is scheduled to see [place] on July 15th, but can't remember when his last appointment was.         Review of Systems   As described in HPI       Objective    /81 (BP Location: Left arm, Patient Position: Sitting, Cuff Size: Adult Regular)   Pulse 75   Temp 98.2  F (36.8  C) (Oral)   Resp 18   Wt 106.4 kg (234 lb 9.6 oz)   SpO2 95%   BMI 40.27 kg/m    Body mass index is 40.27 kg/m .  Physical Exam   GENERAL: healthy, alert and no distress  RESP: lungs clear to auscultation - no rales, rhonchi or wheezes  CV: regular rate and rhythm, normal S1 S2, no S3 or S4  Lower extremities: Bilateral lower extremities with trace edema. Scar over right medial malleolus well healed with no drainage. Left lower extremity feels slightly warmer to touch than right, but no surrounding erythema. Mild TTP over medial malleolus. 1+ bilateral DP pulses.

## 2021-06-11 NOTE — PATIENT INSTRUCTIONS
STOP taking ibuprofen    Make sure you don't take any OTC ibuprofen, Aleve or extra aspirin    Start naproxen 375 mg twice daily as needed with food     Schedule follow up with [facility] sooner than 1 month from now     Continue physical therapy    If you develop abdominal pain, dark stool or blood in your stool --> Stop medication right away and call the clinic

## 2021-06-11 NOTE — PROGRESS NOTES
Preceptor attestation:  Vital signs reviewed: /81 (BP Location: Left arm, Patient Position: Sitting, Cuff Size: Adult Regular)   Pulse 75   Temp 98.2  F (36.8  C) (Oral)   Resp 18   Wt 106.4 kg (234 lb 9.6 oz)   SpO2 95%   BMI 40.27 kg/m      Patient seen, evaluated, and discussed with the resident.  I have verified the content of the note, which accurately reflects my assessment of the patient and the plan of care.    Supervising physician: Shelbi Henriquez MD  [facility]

## 2021-06-11 NOTE — NURSING NOTE
Due to patient being non-English speaking/uses sign language, an  was used for this visit. Only for face-to-face interpretation by an external agency, date and length of interpretation can be found on the scanned worksheet.       name:  AMY SORENSON  Agency: [employer]  Language: Ashley  Telephone number:   [phone]  Type of interpretation: PERSON  Spoken      ANATOLIY Pope  1:08 PM  6/11/2021

## 2021-06-11 NOTE — PROGRESS NOTES
[facility] Discharge Summary  Patient Name: Sa KENIA Mackey  Date: 6/15/2017  Referral Diagnosis:   Patellofemoral syndrome of both knees [M22.2X1, M22.2X2]  - Primary   Referring provider: Jason Siddiqui MD  Visit Diagnosis:   1. Acute bilateral knee pain     2. Patellofemoral stress syndrome of both knees     3. Weakness of both hips     4. Quadricep tightness         Goals:  Pt. will be independent with home exercise program in : 4 weeks ongoing  Pt. will report decreased intensity, frequency of : Pain;in 4 weeks;Comment  Comment:: 50% not met  Pt will: sit to stand without increase in pain in 4 weeks: some improvements  Pt will: be able to return to exercise at the gym without increase in pain in 6 weeks: plans to do in future.     Patient was seen for 4 visits from 4/11/17 to 5/9/17 with - missed appointments.  The patient was started on a well rounded HEP which he was compliant with performing. In his last session he still was having knee pain. Discussed continuing with HEP independently and return to PT or referring as needed.    Therapy will be discontinued at this time.  The patient will need a new referral to resume.    Thank you for your referral.  Edin KIMBROUGH  6/15/2017  11:19 AM

## 2021-06-16 NOTE — PROGRESS NOTES
[facility]   Shoulder Initial Evaluation    Patient Name: Sa KENIA Mackey  Date of evaluation: 3/19/2018  Referral Diagnosis: Pain in joint, upper arm, right  Referring provider: Jason Siddiqui MD  Visit Diagnosis:     ICD-10-CM    1. Right elbow pain M25.521    2. Generalized muscle weakness M62.81    3. Chronic right shoulder pain M25.511     G89.29    4. Shoulder impingement, right M75.41    5. Myofascial pain M79.1    6. Poor posture R29.3        Assessment:      Sa KENIA Mackey is a 57 y.o. male who presents to therapy today with chief complaints of right arm pain. Symptoms began 10 years ago after hitting his elbow on a metal pole while at work. He had surgery on his R lateral elbow and he's had consistent pain every since.  Patient has a PMH that is positive for obesity, gout, HTN, DM2. Difficulty with Lifting, reaching, grooming/dressing, yard/house work, wash/shower, typing/writing, /hold object due to pain, weakness, and decreased ROM.  Pain symptoms are not improving.  Patient demonstrates signs and sx consistent with positive neural tension testing, myofascial pain, poor posture, R shoulder impingement, decreased ROM, and weakness. PT POC and goals have been discussed with patient and He  is agreeable to these. Patient is appropriate for skilled therapy services.    Goals:  Pt. will demonstrate/verbalize independence in self-management of condition in : 6 weeks  Pt. will be independent with home exercise program in : 6 weeks  Pt will: have negative median nerve tension testing on his R UE within 8 weeks in order to improve QOL.  Pt will: have 50% of contralateral  strength within 8 weeks in order to improve QOL.  Pt will: have negative R ulnar nerve tension testing on R UE within 8 weeks in order to improve QOL.    Patient's prognosis is fair.    Barriers to Learning or Achieving Goals:  Chronicity of the problem.  Co-morbidities or other medical factors.  see PMH       Plan / Patient  Instructions:        Plan of Care:   Authorization / Certification Start Date: 03/19/18  Authorization / Certification End Date: 06/19/18  Authorization / Certification Number of Visits: 12-14  Communication with: Referral Source  Patient Related Instruction: Nature of Condition;Treatment plan and rationale;Self Care instruction;Basis of treatment;Body mechanics;Posture;Precautions;Next steps;Expected outcome  Times per Week: 1-2  Number of Weeks: 6-8  Number of Visits: 12-14  Discharge Planning: when goals are met  Precautions / Restrictions : DM2  Therapeutic Exercise: ROM;Stretching;Strengthening  Neuromuscular Reeducation: posture;kinesio tape;core  Manual Therapy: myofascial release;joint mobilization;muscle energy  Modalities: iontophoresis;ultrasound  Equipment: theraband    POC and pathology of condition were reviewed with patient.  Pt. is in agreement with the Plan of Care  A Home Exercise Program (HEP) was initiated today.  Pt. was instructed in exercises by PT and patient was given a handout with detailed instructions.     Exercise #1: median nerve glides (palms against each other)  Comment #1: gentle, x20, 5x/day  Exercise #2: R elbow AROM  Comment #2: x10-15 reps       Plan for next visit: add ulnar nerve exs if able (maybe butterfly?), pec stretch, scap set, US R lateral epicondyle  .   Subjective:       Social information:   Work Status: unemployed   Hobbies: reading and writing      History of Present Illness:     is a 57 y.o. male who presents to therapy today with complaints of chronic R arm pain. Pain radiates from his shoulder to R forearm. Date of onset/duration of symptoms is 10 years ago when he bumped his elbow on a metal frame. Pain is worst in the winter time. Pain is better in the summer time. He was told that his nerve was stuck and had surgery 10 years ago. He states that his brain has memorized the pain so it'll take awhile to get better. Symptoms are constant and not improving.  Patient had therapy 10 years ago after his surgery. Avoids using R UE    Pain Ratin  Pain rating at best: 4  Pain rating at worst: 10  Pain description: achy, burning    Functional limitations are described as occurring with: Lifting, reaching, grooming/dressing, yard/house work, wash/shower, typing/writing, /hold object       Objective:      Note: Items left blank indicates the item was not performed or not indicated at the time of the evaluation.    Patient 20' late to appt    Shoulder Examination  1. Right elbow pain     2. Generalized muscle weakness     3. Chronic right shoulder pain     4. Shoulder impingement, right     5. Myofascial pain     6. Poor posture       Involved side: Right  Posture Observation: poor. Protracted head and shoulders  Cervical Clearing: Normal     Gait: severely antalgic gait, no AD    Shoulder/Elbow ROM    Date:      Shoulder and Elbow ROM ( )   AROM in degrees AROM in degrees AROM in degrees    Right Left (WFL, pain-free) Right Left Right Left   Shoulder Flexion (0-180 ) 165, limited by tightness        Shoulder Abduction (0-180 ) 170, limited by tightness        Shoulder Extension (0-60 )         Shoulder ER (0-90 ) 60, limited by pain in elbow        Shoulder IR (0-70 )         Shoulder IR/Ext         Elbow Flexion (150 ) 130, pain at end-range        Elbow Extension (0 ) 0, pain-free         PROM in degrees PROM in degrees PROM in degrees    Right Left Right Left Right Left   Shoulder Flexion (0-180 )         Shoulder Abduction (0-180 )         Shoulder Extension (0-60 )         Shoulder ER (0-90 )         Shoulder IR (0-70 )         Elbow Flexion (150 )         Elbow Extension (0 )           R elbow sup/pronation: WFL, pain-free    R UE strength: 4-/5, limited by pain. L UE: 4+/5 strength  R : 10, 10, 11#, limited by pain  L : 45, 50, 50#    Palpation: severely tender R lateral epicondyle and superior of  Edema superior of scar on R elbow    +median and ulnar  "nerve testing on R  \"tightness\" with radial nerve testing on R      Treatment Today    TREATMENT MINUTES COMMENTS   Evaluation 20    Self-care/ Home management     Manual therapy     Neuromuscular Re-education     Therapeutic Activity     Therapeutic Exercises 15 Discussed PT POC and pathology of condition. Answered patient questions. Began HEP-see flowsheet. Recommend patient ice- see AVS. Encouraged patient to move his R UE within tolerable ranges.   Gait training     Modality__________________                Total 35    Blank areas are intentional and mean the treatment did not include these items.     PT Evaluation Code: (Please list factors)  Patient History/Comorbidities: obesity, gout, HTN, DM2  Examination: R elbow pain, R shoulder impingement  Clinical Presentation: stable   Clinical Decision Making: low    Patient History/  Comorbidities Examination  (body structures and functions, activity limitations, and/or participation restrictions) Clinical Presentation Clinical Decision Making (Complexity)   No documented Comorbidities or personal factors 1-2 Elements Stable and/or uncomplicated Low   1-2 documented comorbidities or personal factor 3 Elements Evolving clinical presentation with changing characteristics Moderate   3-4 documented comorbidities or personal factors 4 or more Unstable and unpredictable High Chalino Cason, PT, DPT  3/19/2018  3:22 PM               "

## 2021-06-16 NOTE — PROGRESS NOTES
"[facility] Daily Progress     Patient Name: Sa KENIA Mackey  Date: 3/22/2018  Visit #: 2  PTA visit #:  -  Referral Diagnosis: Pain in joint, upper arm, right  Referring provider: Jason Siddiqui MD  Visit Diagnosis:     ICD-10-CM    1. Right elbow pain M25.521    2. Generalized muscle weakness M62.81    3. Chronic right shoulder pain M25.511     G89.29    4. Shoulder impingement, right M75.41    5. Myofascial pain M79.1    6. Poor posture R29.3          Assessment:   Patient is here for his first f/u appt. He is moving his R elbow significantly more than last session and has less tenderness upon palpation. He's not doing his HEP as much as prescribed, but he is doing it daily.    HEP/POC compliance is  fair .  Patient demonstrates understanding/independence with home program.  Patient is benefitting from skilled physical therapy and is making steady progress toward functional goals.  Patient is appropriate to continue with skilled physical therapy intervention, as indicated by initial plan of care.    Goal Status:  Pt. will demonstrate/verbalize independence in self-management of condition in : 6 weeks  Pt. will be independent with home exercise program in : 6 weeks  Pt will: have negative median nerve tension testing on his R UE within 8 weeks in order to improve QOL.  Pt will: have 50% of contralateral  strength within 8 weeks in order to improve QOL.  Pt will: have negative R ulnar nerve tension testing on R UE within 8 weeks in order to improve QOL.    Plan / Patient Education:     Continue with initial plan of care.  Progress with home program as tolerated.     Exercises:  Exercise #1: median nerve glides (palms against each other)  Comment #1: gentle, x20, 5x/day  Exercise #2: R elbow AROM  Comment #2: x10-15 reps  Exercise #3: butterfly nerve glides   Comment #3: x20  Exercise #4: doorway pec stretch  Comment #4: 30\" holds, 3x  Exercise #5: scap sets  Comment #5: x10 with 5\" holds     Plan for " next visit: review all of HEP, do sit to stand test, KT lateral forearm? Try progressing ulnar and median nerve glides to hand on ear and extending arm out to the side. US R lateral epicondyle?    Subjective:     Pain Ratin/10    Exercises are hard. Doing exercises 2x/day, 10 reps each.      helping with today's session: Ana H.      Objective:     Able to perform butterfly nerve glides with increased ease today. Unable to do ulnar glides on ear  No pain with resisted wrist ext  Tender superior R lateral epicondyle  Tender distal R wrist ext tenson    Treatment Today     TREATMENT MINUTES COMMENTS   Evaluation     Self-care/ Home management     Manual therapy 11 MFR to R elbow area (soft tissue) to wrist extensors and just superior to R epicondyle.   Neuromuscular Re-education     Therapeutic Activity     Therapeutic Exercises 13 Encouraged patient to increase reps and sessions per day of nerve glides. Progressed HEP- see flow sheet.   Gait training     Modality__________________                Total 10    Blank areas are intentional and mean the treatment did not include these items.       Chalino Cason, PT, DPT  3/22/2018

## 2021-06-16 NOTE — PROGRESS NOTES
"[facility] Daily Progress     Patient Name: Sa KENIA Mackey  Date: 3/26/2018  Visit #: 3  PTA visit #:  -  Referral Diagnosis: Pain in joint, upper arm, right  Referring provider: Jason Siddiqui MD  Visit Diagnosis:     ICD-10-CM    1. Right elbow pain M25.521    2. Generalized muscle weakness M62.81    3. Chronic right shoulder pain M25.511     G89.29    4. Shoulder impingement, right M75.41    5. Myofascial pain M79.1    6. Poor posture R29.3          Assessment:   Patient comes in today for his second follow up. He is not compliant with his HEP and is inconsistent with his symptoms throughout today's session. Educated patient on the importance of compliance, otherwise his symptoms will not improve. Patient seems to be self-limiting with his exercises.    HEP/POC compliance is  poor.  Patient demonstrates understanding/independence with home program.  Patient is benefitting from skilled physical therapy and is making steady progress toward functional goals.  Patient is appropriate to continue with skilled physical therapy intervention, as indicated by initial plan of care.    Goal Status:  Pt. will demonstrate/verbalize independence in self-management of condition in : 6 weeks  Pt. will be independent with home exercise program in : 6 weeks  Pt will: have negative median nerve tension testing on his R UE within 8 weeks in order to improve QOL.  Pt will: have 50% of contralateral  strength within 8 weeks in order to improve QOL.  Pt will: have negative R ulnar nerve tension testing on R UE within 8 weeks in order to improve QOL.    Plan / Patient Education:     Continue with initial plan of care.  Progress with home program as tolerated.     Exercises:  Exercise #1: median nerve glides (palms against each other)  Comment #1: gentle, x20, 5x/day  Exercise #2: R elbow AROM  Comment #2: x10-15 reps  Exercise #3: butterfly nerve glides   Comment #3: x20  Exercise #4: doorway pec stretch  Comment #4: 30\" " "holds, 3x  Exercise #5: scap sets  Comment #5: x10 with 5\" holds     Plan for next visit: review all of HEP, do sit to stand test, explain that he's going to have fatigue with exs, try elbow flex/ext again. Try shoulder shoulder rows and ext.     Subjective:     Pain Ratin-8/10    Still painful and a little worse. Thinks he may be over-doing the exercises. He gets more pain, stretch, and burning sensation after doing the exercises. Has to take breaks with his exercises. Only does his exercises 2x/day, 10 reps     helping with today's session: Taye from [employer]      Objective:   \"achiness and fatigue\" with UBE, but no pain  Severely protracted head and shoulders  Able to perform butterfly nerve glides with increased ease today. Unable to do ulnar glides on ear  No pain with resisted wrist ext  Tender superior R lateral epicondyle  Butterfly exs makes his posterior R shoulder hurt  Neg median nerve testing   Positive ulnar nerve testing with hand on ear  Negative R ulnar nerve testing  Full elbow AROM  L  strength: 40, 45, 50#  R  strength: 10, 15, 5#, limited by pain in anterior wrist    Treatment Today     TREATMENT MINUTES COMMENTS   Evaluation     Self-care/ Home management     Manual therapy     Neuromuscular Re-education     Therapeutic Activity     Therapeutic Exercises 25 UBE L1 2' forward/backward. Discussed progress. Reviewed HEP. Discussed the importance of good posture and compliance with his HEP.   Gait training     Modality__________________                Total 25    Blank areas are intentional and mean the treatment did not include these items.       Chalino Cason, PT, DPT  3/26/2018    "

## 2021-06-17 NOTE — PROGRESS NOTES
[facility] Daily Progress     Patient Name: Sa KENIA Mackey  Date: 4/19/2018  Visit #: 5  PTA visit #:  -  Referral Diagnosis: Pain in joint, upper arm, right  Referring provider: Jason Siddiqui MD  Visit Diagnosis:     ICD-10-CM    1. Right elbow pain M25.521    2. Generalized muscle weakness M62.81    3. Chronic right shoulder pain M25.511     G89.29    4. Shoulder impingement, right M75.41    5. Myofascial pain M79.1    6. Poor posture R29.3          Assessment:   Patient comes in and states that he feels about the same overall. However, his  strength is 2-3x better than his initial evaluation. His neural tension testing is now negative in his right upper extremity. I'm not sure why he doesn't feel any better, but we discussed how his tests are significantly better. He wishes to continue PT at this time.     HEP/POC compliance is  fair .  Patient demonstrates understanding/independence with home program.  Patient is benefitting from skilled physical therapy and is making steady progress toward functional goals.  Patient is appropriate to continue with skilled physical therapy intervention, as indicated by initial plan of care.    Goal Status:  Pt. will demonstrate/verbalize independence in self-management of condition in : 6 weeks  Pt. will be independent with home exercise program in : 6 weeks  Pt will: have negative median nerve tension testing on his R UE within 8 weeks in order to improve QOL.  Pt will: have 50% of contralateral  strength within 8 weeks in order to improve QOL.  Pt will: have negative R ulnar nerve tension testing on R UE within 8 weeks in order to improve QOL.    Plan / Patient Education:     Continue with initial plan of care.  Progress with home program as tolerated.     Exercises:  Exercise #1: median nerve glides (looking at hand 90 degrees of shoulder abd)  Comment #1: gentle, x20, 5x/day  Exercise #2: R elbow AROM with 4# dumbbell  Comment #2: x10-15 reps  Exercise #3:  "butterfly nerve glides   Comment #3: x20  Exercise #4: doorway pec stretch  Comment #4: 30\" holds, 3x  Exercise #5: shoulder row L3 and ext with L2  Comment #5: until fatigue, 5\" holds  Exercise #6: elbow distraction with 7# weight  Comment #6: 2-3#     Plan for next visit: add wrist/flex exs, re-test nerve and decrease amount of nerve glides if able, foam exs for . Stop elbow distraction. Check  strength    Subjective:     Pain Ratin-6/10, ache/burning    Feels the same with a little bit of progress. Exercise isn't too painful, but it was a little painful before. ADL's are the same as before. Doing his HEP 5x/day     helping with today's session: Mitch from [employer]      Objective:   Max A needed with scap retraction and elbow AROM exs    R elbow AROM: \"stretch\" end range of motion, WFL  Poor posture  Severely protracted head and shoulders  Negative median, ulnar, and radial nerve testing on R UE today  L  strength: 50, 45#  R  strength: 25, 26, 35#, limited by weakness    UBE increases pain; terminated.     Treatment Today     TREATMENT MINUTES COMMENTS   Evaluation     Self-care/ Home management     Manual therapy     Neuromuscular Re-education     Therapeutic Activity     Therapeutic Exercises 25 Discussed progress. UBE for warm up: 2.5' forward/ 2' backward L3.0. Reviewed HEP. Discussed the importance of good posture and compliance with his HEP. objective measures taken. Progressed HEP- see flow sheet.   Gait training     Modality__________________                Total 25    Blank areas are intentional and mean the treatment did not include these items.       Chalino Cason, PT, DPT  2018    "

## 2021-06-17 NOTE — PROGRESS NOTES
"Assessment: pt seen for initial visit today.  is present. Pt notes he has been diabetic for 3-4 years. He brings in BG meter, checks usually twice per day. Readings for the past couple weeks:   FB, 196  10-11am: 166, 160, 175, 182, 153, 196, 157  2pm: 235, 172, 331, 351, 282, 290, 386  5pm: 193, 117, 190  8pm: 183, 188  10pm: 270, 182, 392    Pt eats 2 meals/day around 11am-noon and 6-8pm. Meals consist of about 2 cups of rice, 1/2 cup of gallegos and non starchy vegetables. He will sometimes substitute corn for his rice as he thought this would be helpful for his BG.  Pt will snack sometimes but not on a regular basis. He has fruit \"sometimes\" but again not on a regular basis. He is only drinking water and a vegetable juice he makes. He notes he stopped drinking coke and apple juice as of .   We reviewed CHO foods and portions.     Plan: try to decrease rice portion to 1.5 cups/meal. Fruit in between meals as a snack. Work on increasing activity, even increasing by 10 mins/day. Continue to check BG BID rotating times.     Subjective and Objective:      Sa T Key is referred by Dr. Jason Siddiqui - [place] for Diabetes Education.     No results found for: HGBA1C - A1c per records 3/12/18 at 11.9%      Current diabetes medications:  Metformin 1g BID. Glipizide 10 mg/day and Januvia 100 mg/day      Follow up:   Will f/u with PCP mid April, f/u here as needed       Education:     Monitoring   Meter (per above goals): Assessed and Discussed  Monitoring: Assessed and Discussed  BG goals: Discussed    Nutrition Management  Nutrition Management: Assessed, Discussed and Literature provided  Weight: Discussed  Portions/Balance: Assessed, Discussed and Literature provided  Carb ID/Count: Assessed, Discussed and Literature provided  Label Reading: Assessed, Discussed and Literature provided  Heart Healthy Fats: Assessed, Discussed and Literature provided  Menu Planning: Assessed and Discussed  Dining Out: " Assessed and Discussed  Physical Activity: Assessed and Discussed  Medications: Discussed  Orals: Discussed    Diabetes Disease Process: Discussed    Acute Complications: Prevent, Detect, Treat:  Hypoglycemia: Assessed and Discussed  Hyperglycemia: Assessed and Discussed  Sick Days: Needs instruction/review at follow-up  Driving: Needs instruction/review at follow-up    Chronic Complications  Foot Care:Needs instruction/review at follow-up  Skin Care: Needs instruction/review at follow-up  Eye: Needs instruction/review at follow-up  ABC: Discussed  Teeth:Needs instruction/review at follow-up  Goal Setting and Problem Solving: Assessed and Discussed  Barriers: Assessed and Discussed  Psychosocial Adjustments: Assessed and Discussed      Time spent with the patient: 45 mins for diabetes education and counseling.   Previous Education: no  Visit Type:JESSIKA Croft  3/29/2018

## 2021-06-17 NOTE — PROGRESS NOTES
[facility] Daily Progress     Patient Name: Sa KENIA Mackey  Date: 4/5/2018  Visit #: 4  PTA visit #:  -  Referral Diagnosis: Pain in joint, upper arm, right  Referring provider: Jason Siddiqui MD  Visit Diagnosis:     ICD-10-CM    1. Right elbow pain M25.521    2. Generalized muscle weakness M62.81    3. Chronic right shoulder pain M25.511     G89.29    4. Shoulder impingement, right M75.41    5. Myofascial pain M79.1    6. Poor posture R29.3          Assessment:   Patient comes in today for his third follow up. He states that he feels the same, but he demonstrated improvements in strength and nerve glide exercises. His compliance is better than the previous session, but still poor. I'm going to ask referring provider for a referral to pain specialist, Concepcion (Occupational Therapist), to work on some pain science education. I think he would benefit from this.    HEP/POC compliance is  poor.  Patient demonstrates understanding/independence with home program.  Patient is benefitting from skilled physical therapy and is making steady progress toward functional goals.  Patient is appropriate to continue with skilled physical therapy intervention, as indicated by initial plan of care.    Goal Status:  Pt. will demonstrate/verbalize independence in self-management of condition in : 6 weeks  Pt. will be independent with home exercise program in : 6 weeks  Pt will: have negative median nerve tension testing on his R UE within 8 weeks in order to improve QOL.  Pt will: have 50% of contralateral  strength within 8 weeks in order to improve QOL.  Pt will: have negative R ulnar nerve tension testing on R UE within 8 weeks in order to improve QOL.    Plan / Patient Education:     Continue with initial plan of care.  Progress with home program as tolerated.     Exercises:  Exercise #1: median nerve glides (looking at hand 90 degrees of shoulder abd)  Comment #1: gentle, x20, 5x/day  Exercise #2: R elbow AROM  Comment  "#2: x10-15 reps  Exercise #3: butterfly nerve glides   Comment #3: x20  Exercise #4: doorway pec stretch  Comment #4: 30\" holds, 3x  Exercise #5: scap sets  Comment #5: x10 with 5\" holds  Exercise #6: elbow distraction with 7# weight  Comment #6: 2-3#     Plan for next visit: try median nerve glides higher, try hand on ear ulnar nerve again, add foam exs, manual ulna distraction? Try shoulder shoulder rows and ext.     Subjective:     Pain Ratin/10    Feels the same. Doing his HEP 3x/day. Unable to do his HEP more than this because he has pain. Gets numbness into R elbow with doorway pec stretch     helping with today's session: Taye from [employer]      Objective:   Max A needed with scap retraction and elbow AROM exs  Increased ease with nerve exercises today  R elbow AROM: WFL  Slightly improved posture today  Positive R ulnar nerve testing  Severely protracted head and shoulders  Unable to do ulnar glides on ear  Full elbow AROM  L  strength: 40, 45, 42#  R  strength: 20, 15, 10#, limited by weakness    Treatment Today     TREATMENT MINUTES COMMENTS   Evaluation     Self-care/ Home management     Manual therapy     Neuromuscular Re-education     Therapeutic Activity     Therapeutic Exercises 25 Discussed progress. Reviewed HEP. Discussed the importance of good posture and compliance with his HEP. Discussed referral to pain specialist. Progressed HEP- see flow sheet.   Gait training     Modality__________________                Total 25    Blank areas are intentional and mean the treatment did not include these items.       Chalino Cason, PT, DPT  2018    "

## 2021-06-17 NOTE — PROGRESS NOTES
[facility] Daily Progress/Discharge summary     Patient Name: Sa KENIA Mackey  Date: 5/3/2018  Visit #: 6  PTA visit #:  -  Referral Diagnosis: Pain in joint, upper arm, right  Referring provider: Jason Siddiqui MD  Visit Diagnosis:     ICD-10-CM    1. Right elbow pain M25.521    2. Generalized muscle weakness M62.81    3. Chronic right shoulder pain M25.511     G89.29    4. Shoulder impingement, right M75.41    5. Myofascial pain M79.1    6. Poor posture R29.3          Assessment:   Patient feels 60% better since beginning PT. He went from having 10# of  strength to 58# of  strength on his R UE. Patient no longer has positive neural tension testing in his R UE.He would like to discharge from PT today and start OT with Cnocepcion to learning some pain science education. He is now discharged and will need a new order to resume in the future.    HEP/POC compliance is  fair .  Patient demonstrates understanding/independence with home program.    Goal Status:  Pt. will demonstrate/verbalize independence in self-management of condition in : 6 weeks;Partially Met (MET FOR PT manangement but seeing OT for some pain science education)  Pt. will be independent with home exercise program in : 6 weeks;Met  Pt will: have negative median nerve tension testing on his R UE within 8 weeks in order to improve QOL. (GOAL MET)  Pt will: have 50% of contralateral  strength within 8 weeks in order to improve QOL. (GOAL MET)  Pt will: have negative R ulnar nerve tension testing on R UE within 8 weeks in order to improve QOL. (GOAL MET)    Plan / Patient Education:     Continue with initial plan of care.  Progress with home program as tolerated.     Exercises:  Exercise #1: median nerve glides (looking at hand 90 degrees of shoulder abd)  Comment #1: gentle, x20, 5x/day  Exercise #2: R elbow AROM with 4# dumbbell  Comment #2: x10-15 reps  Exercise #3: butterfly nerve glides   Comment #3: x20  Exercise #4: doorway pec  "stretch  Comment #4: 30\" holds, 3x  Exercise #5: shoulder row L3 and ext with L2  Comment #5: until fatigue, 5\" holds  Exercise #6:  strengthening with blue foam   Comment #6: until fatigue on R          Subjective:     Pain Ratin    Feels the same since last session. Overall, having less aching pain since beginning PT. Doing his HEP about 60-70% of what he was instructed to do. Feels that he's made about 60% progress thus far since beginning PT. Would like to \"take a break\" from PT.      helping with today's session: Taye Jimenes      Objective:   R elbow AROM: \"tightness\" end range of extension, WFL both flex and ext  improved posture  Severely protracted head and shoulders  Negative median, ulnar, and radial nerve testing on R UE  L  strength: 55, 55#  R  strength: 55, 58    No difficulty or pain with UBE    Treatment Today     TREATMENT MINUTES COMMENTS   Evaluation     Self-care/ Home management     Manual therapy     Neuromuscular Re-education     Therapeutic Activity     Therapeutic Exercises 13 Discussed progress, PT goals, and discharge plans. UBE for warm up: 2.5' forward/ 2' backward L3.0. Objective measures taken.   Gait training     Modality__________________                Total 13    Blank areas are intentional and mean the treatment did not include these items.       Chalino Cason, PT, DPT  5/3/2018    "

## 2021-06-19 NOTE — PROGRESS NOTES
[facility]   Hip Initial Evaluation    Patient Name: Sa KENIA Mackey  Date of evaluation: 8/13/2018  Referral Diagnosis: IT band syndrome, L  Referring provider: Latanya Grimm MD  Visit Diagnosis:     ICD-10-CM    1. It band syndrome, left M76.32    2. Muscle weakness (generalized) M62.81        Assessment:      Sa KENIA Mackey is a 58 y.o. male who presents to therapy today with chief complaints of L thigh pain. Onset date of sx was 2-3 months ago.  Pt reported h/o obesity, gout, HTN, and DM2.  Pain symptoms are  burning, itching, and tingling.  Functional impairments include lying on his L side.  Pt demo's signs and sx consistent with ITB syndrome L.   Pt. is appropriate for skilled PT intervention as outlined in the Plan of Care (POC).  Pt. is a good candidate for skilled PT services to improve pain levels and function.    Goals:  Pt. will be independent with home exercise program in : 6 weeks  Pt will: be able to lie on the L side without pain; in 8 weeks  Pt will: report <2/10 pain x 3 consecutive visits; in 8 weeks    Patient's expectations/goals are realistic.    Barriers to Learning or Achieving Goals:  Language barriers.       Plan / Patient Instructions:        Plan of Care:   Authorization / Certification Start Date: 08/13/18  Authorization / Certification End Date: 11/11/18  Authorization / Certification Number of Visits: 12  Communication with: Referral Source  Patient Related Instruction: Nature of Condition;Treatment plan and rationale;Self Care instruction;Basis of treatment;Body mechanics;Posture  Times per Week: 1  Number of Weeks: 12  Number of Visits: 12  Precautions / Restrictions : None  Therapeutic Exercise: ROM;Stretching;Strengthening  Neuromuscular Reeducation: kinesio tape;posture;balance/proprioception;core  Manual Therapy: soft tissue mobilization;myofascial release;joint mobilization;muscle energy;strain counterstrain  Modalities: electrical stimulation;ultrasound    POC and  pathology of condition were reviewed with patient.  Pt. is in agreement with the Plan of Care  A Home Exercise Program (HEP) was initiated today.  Pt. was instructed in exercises by PT and patient was given a handout with detailed instructions.    Plan for next visit: Hip strengthening.  MT PRN.     Subjective:      The patient reports that at first he noticed numbness, then it felt like itchiness, and now it is more of a burning sensation with tingling.  When he touches the areas of pain, it is very painful.  He reports walking more in the neighborhood prior to the pain starting.  He does have a hx of LBP.    Social information:   Living Situation:apartment   Occupation:retired   Work Status:NA   Equipment Available: None    Pain Ratin  Pain rating at best: 5  Pain rating at worst: 9  Pain description: burning, itching, tingling    Functional limitations are described as occurring with:   sleeping on the L side    Patient reports no benefit from  rest  , movement or exercise , pain medication, heat, cold       Objective:      Note: Items left blank indicates the item was not performed or not indicated at the time of the evaluation.    Balance Assessment: 6 STS in 30 seconds.    Hip Examination  1. It band syndrome, left     2. Muscle weakness (generalized)       Precautions/Restrictions: None  Involved Side: Left  Posture Observation:      General standing posture is poor.  Lumbopelvic complex: Moderately increased lumbar lordosis  Assistive Device: None  Gait Observation: Mild lateral sway  Lumbar Clearing: Does not provoke symptoms; pain with lumbar flexion and bilateral side bending on the L side.  (-) repeated motion testing  (-) slump bilaterally    Hip ROM:    Date: 18     Hip ROM( ) AROM in degrees AROM in degrees AROM in degrees    Right Left Right Left Right Left   Hip Flexion (0-120 ) WNL WNL       Hip Abduction (0-45 ) WNL WNL       Hip External Rotation (0-50 ) WNL WNL       Hip Internal  Rotation (0-40 ) WNL WNL       Hip Extension (0-15 ) WNL WNL        PROM in degrees PROM in degrees PROM in degrees    Right Left Right Left Right Left   Hip Flexion (0-120 )         Hip Abduction (0-45 )         Hip External Rotation (0-50 )         Hip Internal Rotation (0-40 )         Hip Extension (0-15 )           Hip/Knee Strength     Date: 08/13/18     Hip/Knee Strength (/5) MMT MMT MMT    Right Left Right Left Right Left   Hip Flexion 5 5       Hip Abduction 4 4       Hip Adduction         Hip Extension         Hip External Rotation         Hip Internal Rotation         Knee Extension 5 5       Knee Flexion 5 5         Ankle/Foot Strength (/5) MMT MMT MMT    Right Left Right Left Right Left   Dorsiflexion 5 5          Flexibility: Fair  Palpation: Tenderness bilateral hip rotators R > L, L ITB    Hip Special Tests     OA Right (+/-) Left (+/-) Intra Articular Right (+/-) Left (+/-)   Hip Scour - - CONCEPCION     Test Cluster  -Hip pain  -Hip IR <15   -Hip Flex <115  - - FADIR     Test Cluster  -Painful Hip IR  ->50 years old  -Morning Stiffness <60 min - - Passive Supine Rotation Test     Misc. Right (+/-) Left (+/-) Stinchfield Test (SLR Against Resistance)     Ely s   DEXRIT     Roxy s   DIRI     Trendelenburg   Posterior Rim Impingement     SIJ Right (+/-) Left (+/-) Lateral Rim Impingement     SIJ Compression   Other     SIJ Distraction   Other     POSH Test   Other     Sacral Thrust   Other       Appt time: 8:10AM - 8:58AM    Treatment Today     TREATMENT MINUTES COMMENTS   Evaluation 23 Low complexity hip evaluation   Self-care/ Home management     Manual therapy 10 R S/L L hip rotator TP release   Neuromuscular Re-education     Therapeutic Activity     Therapeutic Exercises 15 Demo/performance of HEP  Patient educated on pathology  Discussed POC  Patient to do TP release with tennis ball at home   Gait training     Modality__________________                Total 48    Blank areas are intentional and mean  the treatment did not include these items.     PT Evaluation Code: (Please list factors)  Patient History/Comorbidities: obesity, gout, HTN, and DM2  Examination: Tender ITB  Clinical Presentation: Stable  Clinical Decision Making: Low complexity    Patient History/  Comorbidities Examination  (body structures and functions, activity limitations, and/or participation restrictions) Clinical Presentation Clinical Decision Making (Complexity)   No documented Comorbidities or personal factors 1-2 Elements Stable and/or uncomplicated Low   1-2 documented comorbidities or personal factor 3 Elements Evolving clinical presentation with changing characteristics Moderate   3-4 documented comorbidities or personal factors 4 or more Unstable and unpredictable High                Coretta Ramirez, PT, DPT  8/13/2018  10:09 AM

## 2021-06-20 NOTE — PROGRESS NOTES
[facility] Daily Progress     Patient Name: Sa KENIA Mackey  Date: 9/11/2018  Visit #: 3  PTA visit #:  NA  Referral Diagnosis: It band syndrome, left  Referring provider: Latanya Grimm MD  Visit Diagnosis:     ICD-10-CM    1. It band syndrome, left M76.32    2. Muscle weakness (generalized) M62.81      Sa KENIA Mackey is a 58 y.o. male who presents to therapy today with chief complaints of L thigh pain. Onset date of sx was 2-3 months ago.  Pt reported h/o obesity, gout, HTN, and DM2.  Pain symptoms are  burning, itching, and tingling.  Functional impairments include lying on his L side.  Pt demo's signs and sx consistent with ITB syndrome L.     Precautions / Restrictions : None    Assessment:     HEP/POC compliance is  good .  The patient has been working on his HEP and has noticed improvements in his pain level and function.  He is progressing toward goals and is appropriate to continue with PT services at this time.    Goal Status:  Pt. will be independent with home exercise program in : 6 weeks  Pt will: be able to lie on the L side without pain; in 8 weeks  Pt will: report <2/10 pain x 3 consecutive visits; in 8 weeks    Plan / Patient Education:     Continue with initial plan of care.  Progress with home program as tolerated.  Hip strengthening.    Subjective:     Pain Rating: Not much pain, max 3/10.  The patient reports that everything has been pretty good since last session.  Exercises have been going well and he is slowly feeling better.    Objective:     TPs noted in L hip musculature.    Exercise #1: Piriformis stretch  Comment #1: HEP  Exercise #2: ITB stretch  Comment #2: HEP  Exercise #3: Hamstring stretch  Comment #3: Supine with stretch strap straight LE x 30 seconds bilaterally  Exercise #4: Bridging  Comment #4: x 10  Exercise #5: S/L hip abduction  Comment #5: x 10    Appt time: 3:00PM - 3:25PM    Treatment Today     TREATMENT MINUTES COMMENTS   Evaluation     Self-care/ Home management      Manual therapy 15 R S/L L hip TP release   Neuromuscular Re-education     Therapeutic Activity     Therapeutic Exercises 10 NUSTEP x 5 minutes WL 5.0; subjective measures taken  See flowsheet   Gait training     Modality__________________                Total 25    Blank areas are intentional and mean the treatment did not include these items.       Coretta Ramirez, PT, DPT  9/11/2018

## 2021-06-20 NOTE — PROGRESS NOTES
[facility] Daily Progress     Patient Name: Sa KENIA Mackey  Date: 2018  Visit #: 4  PTA visit #:  NA  Referral Diagnosis: It band syndrome, left  Referring provider: Latanya Grimm MD  Visit Diagnosis:     ICD-10-CM    1. It band syndrome, left M76.32    2. Muscle weakness (generalized) M62.81      Sa KENIA Mackey is a 58 y.o. male who presents to therapy today with chief complaints of L thigh pain. Onset date of sx was 2-3 months ago.  Pt reported h/o obesity, gout, HTN, and DM2.  Pain symptoms are  burning, itching, and tingling.  Functional impairments include lying on his L side.  Pt demo's signs and sx consistent with ITB syndrome L.     Precautions / Restrictions : None    Assessment:     HEP/POC compliance is  good .  The patient has been working on his HEP and has noticed improvements in his pain level and function.  He is progressing toward goals and is appropriate to continue with PT services for follow up in 2 weeks.    Goal Status:  Pt. will be independent with home exercise program in : 6 weeks  Pt will: be able to lie on the L side without pain; in 8 weeks  Pt will: report <2/10 pain x 3 consecutive visits; in 8 weeks    Plan / Patient Education:     Continue with initial plan of care.  Progress with home program as tolerated.  Hip strengthening.  Follow up in 2 weeks.    Subjective:     Pain Ratin  The patient reports that his pain has been better.  The exercises are going well, but feels fatigued with them.    Objective:     TPs noted in L hip musculature.    Exercise #1: Piriformis stretch  Comment #1: HEP  Exercise #2: ITB stretch  Comment #2: HEP  Exercise #3: Hamstring stretch  Comment #3: Supine with stretch strap straight LE HEP  Exercise #4: Bridging  Comment #4: HEP  Exercise #5: S/L hip abduction  Comment #5: HEP  Exercise #6: Monster walks  Comment #6: L3 x 15 feet x 2 bilaterally    Appt time: 3:06PM - 3:30PM    Treatment Today     TREATMENT MINUTES COMMENTS   Evaluation      Self-care/ Home management     Manual therapy 14 R S/L L hip TP release   Neuromuscular Re-education     Therapeutic Activity     Therapeutic Exercises 10 NUSTEP x 5 minutes WL 5.0; subjective measures taken  See flowsheet   Gait training     Modality__________________                Total 24    Blank areas are intentional and mean the treatment did not include these items.       Coretta Ramirez, PT, DPT  9/20/2018

## 2021-06-20 NOTE — PROGRESS NOTES
[facility] Daily Progress     Patient Name: Sa KENIA Mackey  Date: 8/28/2018  Visit #: 2  PTA visit #:  NA  Referral Diagnosis: It band syndrome, left  Referring provider: Latanya Grimm MD  Visit Diagnosis:     ICD-10-CM    1. It band syndrome, left M76.32    2. Muscle weakness (generalized) M62.81      Sa KENIA Mackey is a 58 y.o. male who presents to therapy today with chief complaints of L thigh pain. Onset date of sx was 2-3 months ago.  Pt reported h/o obesity, gout, HTN, and DM2.  Pain symptoms are  burning, itching, and tingling.  Functional impairments include lying on his L side.  Pt demo's signs and sx consistent with ITB syndrome L.     Precautions / Restrictions : None    Assessment:     HEP/POC compliance is  poor.  The patient has not been doing his HEP.  He was able to tolerate exercises well in clinic today and is appropriate to continue with PT services at this time.    Goal Status:  Pt. will be independent with home exercise program in : 6 weeks  Pt will: be able to lie on the L side without pain; in 8 weeks  Pt will: report <2/10 pain x 3 consecutive visits; in 8 weeks    Plan / Patient Education:     Continue with initial plan of care.  Progress with home program as tolerated.  Hip strengthening.    Subjective:     Pain Rating: 3  The patient reports not much change since last session.  Today, the pain is not that bad.  He has not done much exercise since last session.  He felt some soreness after last session.    Objective:     Patient demonstrates significant muscular tightness in hamstring and ITB bilaterally.    Exercise #1: Piriformis stretch  Comment #1: 30 seconds  Exercise #2: ITB stretch  Comment #2: 30 seconds  Exercise #3: Hamstring stretch  Comment #3: Supine with stretch strap straight LE x 30 seconds bilaterally  Exercise #4: Bridging  Comment #4: x 10  Exercise #5: S/L hip abduction  Comment #5: x 10    Appt time: 3:02PM - 3:26PM    Treatment Today     TREATMENT MINUTES  COMMENTS   Evaluation     Self-care/ Home management     Manual therapy     Neuromuscular Re-education     Therapeutic Activity     Therapeutic Exercises 24 NUSTEP x 5 minutes WL 5.0; subjective measures taken  See flowsheet   Gait training     Modality__________________                Total 24    Blank areas are intentional and mean the treatment did not include these items.       Coretta Ramirez, PT, DPT  8/28/2018

## 2021-06-22 NOTE — PROGRESS NOTES
[facility] Discharge Summary  Patient Name: Sa KENIA Mackey  Date: 12/18/2018  Referral Diagnosis: It band syndrome, left  Referring provider: Latanya Grimm MD  Visit Diagnosis:     ICD-10-CM    1. It band syndrome, left M76.32    2. Muscle weakness (generalized) M62.81        Goals:  Pt. will be independent with home exercise program in : 6 weeks;Met    Pt will: be able to lie on the L side without pain; in 8 weeks; Improved  Pt will: report <2/10 pain x 3 consecutive visits; in 8 weeks; Improved      Patient was seen for 4 visits from 8/13/18 to 9/20/18 with 3 missed appointments.  The patient attended therapy initially, but did not finish the therapy sessions prescribed.  Goals were not fully achieved. Explanation for goals not achieved: Did not return to PT services.  The patient discontinued therapy, did not return.  No further therapy is required at this time.    Home exercise program given to patient:  Exercise #1: Piriformis stretch  Comment #1: HEP  Exercise #2: ITB stretch  Comment #2: HEP  Exercise #3: Hamstring stretch  Comment #3: Supine with stretch strap straight LE HEP  Exercise #4: Bridging  Comment #4: HEP  Exercise #5: S/L hip abduction  Comment #5: HEP  Exercise #6: Monster walks  Comment #6: L3 x 15 feet HEP      Therapy will be discontinued at this time.  The patient will need a new referral to resume.    Thank you for your referral.  Coretta Ramirez, PT, DPT  12/18/2018  3:16 PM     Spoke with the patient  He is doing better today  His wife reminded him that he had taken Amoxicillin in [facility] 4 years and had a similar reaction  He has simply forgotten  His right ear is doing much better  Will FU with a call in 2 days

## 2021-07-23 ENCOUNTER — IMMUNIZATION (OUTPATIENT)
Dept: FAMILY MEDICINE | Facility: CLINIC | Age: 61
End: 2021-07-23
Payer: COMMERCIAL

## 2021-07-23 ENCOUNTER — OFFICE VISIT (OUTPATIENT)
Dept: FAMILY MEDICINE | Facility: CLINIC | Age: 61
End: 2021-07-23
Payer: COMMERCIAL

## 2021-07-23 VITALS
SYSTOLIC BLOOD PRESSURE: 136 MMHG | WEIGHT: 235.2 LBS | TEMPERATURE: 98.4 F | BODY MASS INDEX: 40.37 KG/M2 | OXYGEN SATURATION: 97 % | RESPIRATION RATE: 18 BRPM | DIASTOLIC BLOOD PRESSURE: 69 MMHG | HEART RATE: 79 BPM

## 2021-07-23 DIAGNOSIS — M1A.09X0 IDIOPATHIC CHRONIC GOUT OF MULTIPLE SITES WITHOUT TOPHUS: ICD-10-CM

## 2021-07-23 DIAGNOSIS — E78.5 HYPERLIPIDEMIA LDL GOAL <100: ICD-10-CM

## 2021-07-23 DIAGNOSIS — M79.89 CALF SWELLING: Primary | ICD-10-CM

## 2021-07-23 DIAGNOSIS — I10 ESSENTIAL HYPERTENSION WITH GOAL BLOOD PRESSURE LESS THAN 140/90: ICD-10-CM

## 2021-07-23 DIAGNOSIS — E11.65 TYPE 2 DIABETES MELLITUS WITH HYPERGLYCEMIA, WITHOUT LONG-TERM CURRENT USE OF INSULIN (H): ICD-10-CM

## 2021-07-23 LAB
ANION GAP SERPL CALCULATED.3IONS-SCNC: 10 MMOL/L (ref 5–18)
BUN SERPL-MCNC: 19 MG/DL (ref 8–22)
CALCIUM SERPL-MCNC: 9.9 MG/DL (ref 8.5–10.5)
CHLORIDE BLD-SCNC: 105 MMOL/L (ref 98–107)
CHOLEST SERPL-MCNC: 228 MG/DL
CO2 SERPL-SCNC: 25 MMOL/L (ref 22–31)
CREAT SERPL-MCNC: 0.84 MG/DL (ref 0.7–1.3)
CREAT UR-MCNC: 32 MG/DL
FASTING STATUS PATIENT QL REPORTED: ABNORMAL
GFR SERPL CREATININE-BSD FRML MDRD: >90 ML/MIN/1.73M2
GLUCOSE BLD-MCNC: 114 MG/DL (ref 70–125)
HBA1C MFR BLD: 6.8 % (ref 0–5.6)
HDLC SERPL-MCNC: 40 MG/DL
LDLC SERPL CALC-MCNC: 135 MG/DL
MICROALBUMIN UR-MCNC: <0.5 MG/DL (ref 0–1.99)
MICROALBUMIN/CREAT UR: NORMAL MG/G{CREAT}
POTASSIUM BLD-SCNC: 4.5 MMOL/L (ref 3.5–5)
SODIUM SERPL-SCNC: 140 MMOL/L (ref 136–145)
TRIGL SERPL-MCNC: 264 MG/DL
URATE SERPL-MCNC: 9.4 MG/DL (ref 3–8)

## 2021-07-23 PROCEDURE — 99214 OFFICE O/P EST MOD 30 MIN: CPT | Performed by: FAMILY MEDICINE

## 2021-07-23 PROCEDURE — 0011A PR COVID VAC MODERNA 100 MCG/0.5 ML IM: CPT

## 2021-07-23 PROCEDURE — 82043 UR ALBUMIN QUANTITATIVE: CPT | Performed by: FAMILY MEDICINE

## 2021-07-23 PROCEDURE — 80048 BASIC METABOLIC PNL TOTAL CA: CPT | Performed by: FAMILY MEDICINE

## 2021-07-23 PROCEDURE — 83036 HEMOGLOBIN GLYCOSYLATED A1C: CPT | Performed by: FAMILY MEDICINE

## 2021-07-23 PROCEDURE — 36415 COLL VENOUS BLD VENIPUNCTURE: CPT | Performed by: FAMILY MEDICINE

## 2021-07-23 PROCEDURE — 91301 PR COVID VAC MODERNA 100 MCG/0.5 ML IM: CPT

## 2021-07-23 PROCEDURE — 80061 LIPID PANEL: CPT | Performed by: FAMILY MEDICINE

## 2021-07-23 PROCEDURE — 84550 ASSAY OF BLOOD/URIC ACID: CPT | Performed by: FAMILY MEDICINE

## 2021-07-23 NOTE — NURSING NOTE
Due to patient being non-English speaking/uses sign language, an  was used for this visit. Only for face-to-face interpretation by an external agency, date and length of interpretation can be found on the scanned worksheet.       name: Key Otero  Language: Liam  Agency: [employer]  Phone number: [phone]    ANATOLIY Pope  11:14 AM  7/23/2021

## 2021-07-23 NOTE — PATIENT INSTRUCTIONS
Can schedule 40 min ingrown toenail removal     07/27/21   ORDER: US Lower Extremity Venous Duplex Left    [facility]   [address] [place] [address]    Phone: [phone]    Fax: [phone]     FAXED ORDER AND DEMOGRAPHICS. THEY WILL CONTACT PATIENT TO SCHEDULE.

## 2021-07-23 NOTE — LETTER
July 28, 2021       KENIA Key  [address] [place] [address]        Dear MrKana,    We are writing to inform you of your test results.    Good to see you in person.  As we discussed your diabetes control is excellent - congratulations on the good work you have done to limit your rice portions and take your medications!     Two tests that are more abnormal than expected are that your bad cholesterol LDL level is higher than expected as is your gout lab, uric acid.  Make sure to take both ATORVASTATIN and ULORIC every day.       No protein loss in your urine and kidneys working normally is very good news - keep up the good work!       Resulted Orders   Uric acid   Result Value Ref Range    Uric Acid 9.4 (H) 3.0 - 8.0 mg/dL   Lipid Profile   Result Value Ref Range    Cholesterol 228 (H) <=199 mg/dL    Triglycerides 264 (H) <=149 mg/dL    Direct Measure HDL 40 >=40 mg/dL      Comment:      HDL Cholesterol Reference Range:     0-2 years:   No reference ranges established for patients under 2 years old  at [facility] for lipid analytes.    2-8 years:  Greater than 45 mg/dL     18 years and older:   Female: Greater than or equal to 50 mg/dL   Male:   Greater than or equal to 40 mg/dL    LDL Cholesterol Calculated 135 (H) <=129 mg/dL    Patient Fasting > 8hrs? Unknown    Basic metabolic panel   Result Value Ref Range    Sodium 140 136 - 145 mmol/L    Potassium 4.5 3.5 - 5.0 mmol/L    Chloride 105 98 - 107 mmol/L    Carbon Dioxide (CO2) 25 22 - 31 mmol/L    Anion Gap 10 5 - 18 mmol/L    Urea Nitrogen 19 8 - 22 mg/dL    Creatinine 0.84 0.70 - 1.30 mg/dL    Calcium 9.9 8.5 - 10.5 mg/dL    Glucose 114 70 - 125 mg/dL    GFR Estimate >90 >60 mL/min/1.73m2      Comment:      As of July 11, 2021, eGFR is calculated by the CKD-EPI creatinine equation, without race adjustment. eGFR can be influenced by muscle mass, exercise, and diet. The reported eGFR is an estimation only and is only applicable if the renal  function is stable.   Hemoglobin A1c   Result Value Ref Range    Hemoglobin A1C 6.8 (H) 0.0 - 5.6 %      Comment:      Normal <5.7%   Prediabetes 5.7-6.4%    Diabetes 6.5% or higher     Note: Adopted from ADA consensus guidelines.   Albumin Random Urine Quantitative with Creat Ratio   Result Value Ref Range    Microalbumin Urine mg/dL <0.50 0.00 - 1.99 mg/dL    Creatinine Urine mg/dL 32 mg/dL    Microalbumin Urine mg/g Cr        Comment:      Unable to calculate:  Urine creatinine or microalbumin value below detectable level    Narrative    Microalbumin, Random Urine   <2.0 mg/dL . . . . . . . . Normal   3.0-30.0 mg/dL . . . . . . Microalbuminuria   >30.0 mg/dL . . . . . .  . Clinical Proteinuria     Microalbumin/Creatinine Ratio, Random Urine   <20 mg/g . . . . .. . . . Normal    mg/g . . . . . . . Microalbuminuria   >300 mg/g . . . . . . . . Clinical Proteinuria       If you have any questions or concerns, please call the clinic at the number listed above.     Sincerely,   Jason Siddiqui MD

## 2021-07-27 DIAGNOSIS — M1A.09X0 IDIOPATHIC CHRONIC GOUT OF MULTIPLE SITES WITHOUT TOPHUS: ICD-10-CM

## 2021-07-27 DIAGNOSIS — L30.1 POMPHOLYX: ICD-10-CM

## 2021-07-27 DIAGNOSIS — E11.65 TYPE 2 DIABETES MELLITUS WITH HYPERGLYCEMIA, WITHOUT LONG-TERM CURRENT USE OF INSULIN (H): ICD-10-CM

## 2021-07-27 RX ORDER — ATORVASTATIN CALCIUM 80 MG/1
80 TABLET, FILM COATED ORAL DAILY
Qty: 90 TABLET | Refills: 1 | Status: SHIPPED | OUTPATIENT
Start: 2021-07-27 | End: 2021-11-09

## 2021-07-27 RX ORDER — FEBUXOSTAT 40 MG/1
TABLET, FILM COATED ORAL
Qty: 90 TABLET | Refills: 1 | Status: SHIPPED | OUTPATIENT
Start: 2021-07-27 | End: 2021-11-15

## 2021-07-27 RX ORDER — LISINOPRIL 40 MG/1
40 TABLET ORAL DAILY
Qty: 90 TABLET | Refills: 1 | Status: SHIPPED | OUTPATIENT
Start: 2021-07-27 | End: 2021-11-09

## 2021-07-27 RX ORDER — FLASH GLUCOSE SENSOR
KIT MISCELLANEOUS
Qty: 2 EACH | Refills: 6 | Status: SHIPPED | OUTPATIENT
Start: 2021-07-27 | End: 2021-11-15

## 2021-07-27 RX ORDER — METFORMIN HCL 500 MG
1000 TABLET, EXTENDED RELEASE 24 HR ORAL 2 TIMES DAILY WITH MEALS
Qty: 120 TABLET | Refills: 1 | Status: SHIPPED | OUTPATIENT
Start: 2021-07-27 | End: 2021-10-01

## 2021-07-27 RX ORDER — GLIPIZIDE 10 MG/1
10 TABLET, FILM COATED, EXTENDED RELEASE ORAL DAILY
Qty: 90 TABLET | Refills: 1 | Status: SHIPPED | OUTPATIENT
Start: 2021-07-27 | End: 2021-11-15

## 2021-07-27 NOTE — PROGRESS NOTES
Sa was seen today for ankle pain and recheck.    Diagnoses and all orders for this visit:    Calf swelling  -     US Lower Extremity Venous Duplex Left; Future    Type 2 diabetes mellitus with hyperglycemia, without long-term current use of insulin (H)  -     Albumin Random Urine Quantitative with Creat Ratio; Future  -     Hemoglobin A1c; Future  -     Basic metabolic panel; Future  -     Lipid Profile; Future  -     Lipid Profile  -     Basic metabolic panel  -     Hemoglobin A1c  -     Albumin Random Urine Quantitative with Creat Ratio  -     Discontinue: exenatide ER (BYDUREON BCISE) 2 MG/0.85ML auto-injector; discontinued  -     glipiZIDE (GLUCOTROL XL) 10 MG 24 hr tablet; Take 1 tablet (10 mg) by mouth daily  -     metFORMIN (GLUCOPHAGE-XR) 500 MG 24 hr tablet; Take 2 tablets (1,000 mg) by mouth 2 times daily (with meals)  -     empagliflozin (JARDIANCE) 10 MG TABS tablet; Take 1 tablet (10 mg) by mouth daily  -     atorvastatin (LIPITOR) 80 MG tablet; Take 1 tablet (80 mg) by mouth daily  -     Continuous Blood Gluc Sensor ([employer] JOSE 14 DAY SENSOR) [facility]; Use to test blood sugar daily per 's instructions.  Remove and replace every 14 days.    Hyperlipidemia LDL goal <100  -     Lipid Profile; Future  -     Lipid Profile    Idiopathic chronic gout of multiple sites without tophus  -     Uric acid; Future  -     Uric acid  -     febuxostat (ULORIC) 40 MG TABS tablet; TAKE ONE TABLET BY MOUTH DAILY    Essential hypertension with goal blood pressure less than 140/90  -     lisinopril (ZESTRIL) 40 MG tablet; Take 1 tablet (40 mg) by mouth daily      Given that he is relatively sedentary because of his ankle pain, DVT is a possibility and I recommended that we ultrasound his left calf.  Transportation is an ongoing challenge for him and his son can only bring him to the [facility] on 1 week day per week.  It is easier for him to get to a local facility near his home and we will attempt to  schedule a venous ultrasound at the [facility].    His A1c reflects his excellent freestyle marielle printout and we therefore agreed to reduce his glipizide dose to 1 tablet daily.  I have refilled his other medications and will follow up with the results letter concerning his other lab results.  His LDL is higher than expected on atorvastatin 80 mg and I will make sure that he is taking this medication daily.    I will schedule him for a partial IGTN removal on a date that suits him.    Total visit time with patient was 25 mins, all of which was face to face MD time, and over 50% of this time was spent in counseling and coordination of care.  Including post-encounter documentation and orders, total encounter time was 32 mins.      Subjective:  This is a 61-year-old male who is well-known to me.  Today he attends in follow-up to several ongoing health issues:  1.  Status post left ankle fracture.  He continues to complain of pain in his left ankle.  His orthopedist has told him this may take up to 1 year to fully recover.  He is receiving physical therapy at an [facility] facility in [place] which is closer to his home.  He notes that he has some left ankle swelling greater than the right.  2.  He believes that his diabetes control is improved.  He is using a freestyle marielle and really appreciates the feedback information it provides him about his diet and effective medications.  He no longer is taking the GLP-1 agonist.  3.  He reports that his gout is well controlled and as long as he takes Uloric daily he does not get gout attacks.  4.  His mood is better although he remains concerned about civil unrest in his [place] [facility].    Objective:  /69 (BP Location: Left arm, Patient Position: Sitting, Cuff Size: Adult Large)   Pulse 79   Temp 98.4  F (36.9  C) (Oral)   Resp 18   Wt 106.7 kg (235 lb 3.2 oz)   SpO2 97%   BMI 40.37 kg/m    His blood pressure is good.  He remains  morbidly obese for his height although has lost about 10 pounds.  His calves are examined and there is a 1 cm difference in circumference with the left being greater than the right.  There is also tenderness to palpation of the belly of the left calf muscle raising a concern for possible DVT.  The left ankle is examined and the scars appear well-healed.    He does have a right ingrown toenail which is tender.  He is interested in having this removed.    Results for orders placed or performed in visit on 07/23/21   Uric acid     Status: Abnormal   Result Value Ref Range    Uric Acid 9.4 (H) 3.0 - 8.0 mg/dL   Lipid Profile     Status: Abnormal   Result Value Ref Range    Cholesterol 228 (H) <=199 mg/dL    Triglycerides 264 (H) <=149 mg/dL    Direct Measure HDL 40 >=40 mg/dL    LDL Cholesterol Calculated 135 (H) <=129 mg/dL    Patient Fasting > 8hrs? Unknown    Basic metabolic panel     Status: Normal   Result Value Ref Range    Sodium 140 136 - 145 mmol/L    Potassium 4.5 3.5 - 5.0 mmol/L    Chloride 105 98 - 107 mmol/L    Carbon Dioxide (CO2) 25 22 - 31 mmol/L    Anion Gap 10 5 - 18 mmol/L    Urea Nitrogen 19 8 - 22 mg/dL    Creatinine 0.84 0.70 - 1.30 mg/dL    Calcium 9.9 8.5 - 10.5 mg/dL    Glucose 114 70 - 125 mg/dL    GFR Estimate >90 >60 mL/min/1.73m2   Hemoglobin A1c     Status: Abnormal   Result Value Ref Range    Hemoglobin A1C 6.8 (H) 0.0 - 5.6 %   Albumin Random Urine Quantitative with Creat Ratio     Status: None   Result Value Ref Range    Microalbumin Urine mg/dL <0.50 0.00 - 1.99 mg/dL    Creatinine Urine mg/dL 32 mg/dL    Microalbumin Urine mg/g Cr      Narrative    Microalbumin, Random Urine   <2.0 mg/dL . . . . . . . . Normal   3.0-30.0 mg/dL . . . . . . Microalbuminuria   >30.0 mg/dL . . . . . .  . Clinical Proteinuria     Microalbumin/Creatinine Ratio, Random Urine   <20 mg/g . . . . .. . . . Normal    mg/g . . . . . . . Microalbuminuria   >300 mg/g . . . . . . . . Clinical Proteinuria

## 2021-07-28 RX ORDER — COLCHICINE 0.6 MG/1
TABLET, FILM COATED ORAL
Qty: 0.1 TABLET | Refills: 0 | Status: SHIPPED | OUTPATIENT
Start: 2021-07-28 | End: 2021-07-29

## 2021-07-28 RX ORDER — BETAMETHASONE DIPROPIONATE 0.05 %
OINTMENT (GRAM) TOPICAL
Qty: 30 G | Refills: 0 | Status: SHIPPED | OUTPATIENT
Start: 2021-07-28 | End: 2021-08-26

## 2021-07-28 RX ORDER — ALCOHOL ANTISEPTIC PADS
PADS, MEDICATED (EA) TOPICAL
Qty: 100 EACH | Refills: 1 | Status: SHIPPED | OUTPATIENT
Start: 2021-07-28 | End: 2021-08-20

## 2021-07-29 DIAGNOSIS — M1A.09X0 IDIOPATHIC CHRONIC GOUT OF MULTIPLE SITES WITHOUT TOPHUS: ICD-10-CM

## 2021-07-29 RX ORDER — COLCHICINE 0.6 MG/1
TABLET, FILM COATED ORAL
Qty: 0.1 TABLET | Refills: 0 | Status: SHIPPED | OUTPATIENT
Start: 2021-07-29 | End: 2021-11-15

## 2021-08-18 DIAGNOSIS — E55.9 HYPOVITAMINOSIS D: ICD-10-CM

## 2021-08-18 DIAGNOSIS — E78.5 HYPERLIPIDEMIA LDL GOAL <100: ICD-10-CM

## 2021-08-18 DIAGNOSIS — L29.9 EAR ITCHING: ICD-10-CM

## 2021-08-19 RX ORDER — CHOLECALCIFEROL (VITAMIN D3) 50 MCG
50 TABLET ORAL DAILY
Qty: 90 TABLET | Refills: 1 | Status: SHIPPED | OUTPATIENT
Start: 2021-08-19 | End: 2021-08-20

## 2021-08-19 RX ORDER — HYDROCORTISONE AND ACETIC ACID 20.75; 10.375 MG/ML; MG/ML
3 SOLUTION AURICULAR (OTIC) 2 TIMES DAILY PRN
Qty: 10 ML | Refills: 0 | Status: SHIPPED | OUTPATIENT
Start: 2021-08-19 | End: 2021-08-20

## 2021-08-19 RX ORDER — ASPIRIN 81 MG/1
TABLET, COATED ORAL
Qty: 90 TABLET | Refills: 1 | Status: SHIPPED | OUTPATIENT
Start: 2021-08-19 | End: 2021-08-20

## 2021-08-20 ENCOUNTER — OFFICE VISIT (OUTPATIENT)
Dept: FAMILY MEDICINE | Facility: CLINIC | Age: 61
End: 2021-08-20
Payer: COMMERCIAL

## 2021-08-20 VITALS
SYSTOLIC BLOOD PRESSURE: 132 MMHG | BODY MASS INDEX: 40.68 KG/M2 | HEART RATE: 73 BPM | DIASTOLIC BLOOD PRESSURE: 85 MMHG | WEIGHT: 237 LBS | TEMPERATURE: 97.7 F | OXYGEN SATURATION: 97 % | RESPIRATION RATE: 18 BRPM

## 2021-08-20 DIAGNOSIS — Z23 HIGH PRIORITY FOR 2019-NCOV VACCINE: ICD-10-CM

## 2021-08-20 DIAGNOSIS — L29.9 EAR ITCHING: ICD-10-CM

## 2021-08-20 DIAGNOSIS — S82.852D CLOSED TRIMALLEOLAR FRACTURE OF LEFT ANKLE WITH ROUTINE HEALING, SUBSEQUENT ENCOUNTER: ICD-10-CM

## 2021-08-20 DIAGNOSIS — M25.572 PAIN IN JOINT INVOLVING ANKLE AND FOOT, LEFT: ICD-10-CM

## 2021-08-20 DIAGNOSIS — E78.5 HYPERLIPIDEMIA LDL GOAL <100: ICD-10-CM

## 2021-08-20 DIAGNOSIS — E55.9 HYPOVITAMINOSIS D: ICD-10-CM

## 2021-08-20 DIAGNOSIS — L60.0 IGTN (INGROWING TOE NAIL): Primary | ICD-10-CM

## 2021-08-20 DIAGNOSIS — E11.65 TYPE 2 DIABETES MELLITUS WITH HYPERGLYCEMIA, WITHOUT LONG-TERM CURRENT USE OF INSULIN (H): ICD-10-CM

## 2021-08-20 PROCEDURE — 0012A COVID-19,PF,MODERNA: CPT | Performed by: FAMILY MEDICINE

## 2021-08-20 PROCEDURE — 91301 COVID-19,PF,MODERNA: CPT | Performed by: FAMILY MEDICINE

## 2021-08-20 PROCEDURE — 11750 EXCISION NAIL&NAIL MATRIX: CPT | Performed by: FAMILY MEDICINE

## 2021-08-20 RX ORDER — CHOLECALCIFEROL (VITAMIN D3) 50 MCG
50 TABLET ORAL DAILY
Qty: 90 TABLET | Refills: 1 | Status: SHIPPED | OUTPATIENT
Start: 2021-08-20 | End: 2022-04-29

## 2021-08-20 RX ORDER — HYDROCORTISONE AND ACETIC ACID 20.75; 10.375 MG/ML; MG/ML
3 SOLUTION AURICULAR (OTIC) 2 TIMES DAILY PRN
Qty: 10 ML | Refills: 0 | Status: SHIPPED | OUTPATIENT
Start: 2021-08-20 | End: 2021-11-15

## 2021-08-20 RX ORDER — ISOPROPYL ALCOHOL 70 ML/100ML
SWAB TOPICAL
Qty: 300 EACH | Refills: 1 | Status: SHIPPED | OUTPATIENT
Start: 2021-08-20 | End: 2022-04-29

## 2021-08-20 NOTE — PROGRESS NOTES
Procedure:    Patient attends for a right ingrown toenail partial removal.  He has been having problems with this ingrown toenail for several months and it remains sore occasionally bleeding and with occasional drainage on the outer aspect of the nail.    Informed consent is obtained.  Sterile procedure is observed.  A partial ingrown toenail removal is performed in the usual fashion.  Hemostasis is achieved.  Phenol ablation is applied to the nailbed.  A dressing is applied.    Patient instructed to change his dressing and keep the foot elevated over the next 3 days and anticipate full recovery.    Sa was seen today for minor surgery and imm/inj.    Diagnoses and all orders for this visit:    IGTN (ingrowing toe nail)  -     acetaminophen-codeine (TYLENOL #3) 300-30 MG tablet; Take 1 tablet by mouth every 6 hours as needed for severe pain  -     REMOVAL NAIL/NAIL BED, PARTIAL OR COMPLETE    High priority for 2019-nCoV vaccine  -     COVID-19,PF,MODERNA

## 2021-08-20 NOTE — PATIENT INSTRUCTIONS
"Change the dressing later today - you can take off the \"sock\" type dressing first and replace it after you change the gauze.    Keep your foot elevated and walk on your heel over the next few days.    Call if you have any concerns but usually do not need to see you again for this.    "

## 2021-08-20 NOTE — NURSING NOTE
Due to patient being non-English speaking/uses sign language, an  was used for this visit. Only for face-to-face interpretation by an external agency, date and length of interpretation can be found on the scanned worksheet.     name: Vasile Lozano  Language: Liam  Agency: [employer]  Phone number: [phone]      ANATOLIY Pope  10:09 AM  8/20/2021

## 2021-08-25 DIAGNOSIS — L30.1 POMPHOLYX: ICD-10-CM

## 2021-08-26 RX ORDER — BETAMETHASONE DIPROPIONATE 0.05 %
OINTMENT (GRAM) TOPICAL
Qty: 30 G | Refills: 0 | Status: SHIPPED | OUTPATIENT
Start: 2021-08-26 | End: 2023-03-17

## 2021-09-29 ENCOUNTER — TELEPHONE (OUTPATIENT)
Dept: FAMILY MEDICINE | Facility: CLINIC | Age: 61
End: 2021-09-29

## 2021-09-29 NOTE — TELEPHONE ENCOUNTER
Prior Authorization Retail Medication Request    Medication/Dose: (RENEWAL) - febuxostat (ULORIC) 40 MG TABS tablet  ICD code (if different than what is on RX):  Idiopathic chronic gout of multiple sites without tophus [M1A.09X0]   Previously Tried and Failed:    Rationale:      Insurance Name:  [employer]  Insurance ID:  [id number]    Pharmacy Information (if different than what is on RX)  Name:  [address]  Phone: [phone]

## 2021-09-30 NOTE — TELEPHONE ENCOUNTER
Central Prior Authorization Team   Phone: [phone]    PA Initiation    Medication: (RENEWAL) - febuxostat (ULORIC) 40 MG TABS tablet  Insurance Company:    Pharmacy Filling the Rx: [place], [place] - [address] [place] [address]  Filling Pharmacy Phone: [phone]  Filling Pharmacy Fax: [phone]  Start Date: 9/30/2021

## 2021-10-01 DIAGNOSIS — M25.572 PAIN IN JOINT INVOLVING ANKLE AND FOOT, LEFT: ICD-10-CM

## 2021-10-01 DIAGNOSIS — S82.852D CLOSED TRIMALLEOLAR FRACTURE OF LEFT ANKLE WITH ROUTINE HEALING, SUBSEQUENT ENCOUNTER: ICD-10-CM

## 2021-10-01 DIAGNOSIS — E11.65 TYPE 2 DIABETES MELLITUS WITH HYPERGLYCEMIA, WITHOUT LONG-TERM CURRENT USE OF INSULIN (H): ICD-10-CM

## 2021-10-01 RX ORDER — METFORMIN HCL 500 MG
1000 TABLET, EXTENDED RELEASE 24 HR ORAL 2 TIMES DAILY WITH MEALS
Qty: 120 TABLET | Refills: 1 | Status: SHIPPED | OUTPATIENT
Start: 2021-10-01 | End: 2021-11-15

## 2021-10-01 RX ORDER — BLOOD SUGAR DIAGNOSTIC
STRIP MISCELLANEOUS
Qty: 100 STRIP | Refills: 3 | Status: SHIPPED | OUTPATIENT
Start: 2021-10-01 | End: 2023-03-17

## 2021-10-01 NOTE — TELEPHONE ENCOUNTER
Prior Authorization Approval    Authorization Effective Date: 8/31/2021  Authorization Expiration Date: 9/30/2022  Medication: (RENEWAL) - febuxostat (ULORIC) 40 MG-APPROVED  Approved Dose/Quantity:    Reference #:     Insurance Company:    Expected CoPay:       CoPay Card Available:      Foundation Assistance Needed:    Which Pharmacy is filling the prescription (Not needed for infusion/clinic administered): [place], [place] - [address] [place] [address]  Pharmacy Notified: Yes  Patient Notified: Yes  **Instructed pharmacy to notify patient when script is ready to /ship.**

## 2021-10-08 ENCOUNTER — TRANSFERRED RECORDS (OUTPATIENT)
Dept: HEALTH INFORMATION MANAGEMENT | Facility: CLINIC | Age: 61
End: 2021-10-08
Payer: COMMERCIAL

## 2021-10-08 DIAGNOSIS — H04.123 DRY EYES: ICD-10-CM

## 2021-10-08 RX ORDER — CARBOXYMETHYLCELLULOSE SODIUM 0.5 G/100ML
SOLUTION/ DROPS OPHTHALMIC
Qty: 30 EACH | Refills: 3 | Status: SHIPPED | OUTPATIENT
Start: 2021-10-08 | End: 2021-11-15

## 2021-10-27 ENCOUNTER — TELEPHONE (OUTPATIENT)
Dept: FAMILY MEDICINE | Facility: CLINIC | Age: 61
End: 2021-10-27

## 2021-10-27 NOTE — TELEPHONE ENCOUNTER
"[facility] phone call message- general phone call:    Reason for call: Patients care coordinator is reaching out to to help arrange an ultrasound for the patient. Patient told her \"someone said I may have a blood clot\"   Return call to Carolyn at [phone] to help arrange appointments for the patient.     Return call needed: Yes    OK to leave a message on voice mail? Yes    Primary language: Liam      needed? Yes    Call taken on October 27, 2021 at 8:36 AM by Liliana Colindres  "

## 2021-10-27 NOTE — TELEPHONE ENCOUNTER
Per Dr. Siddiqui, patient should be re assessed in clinic if swelling has persisted. Called both numbers in chart with Martiniquais language line , no answer.     Left message with Carolyn, care coordinator with this information. Requested she assist him with scheduling appointment with Dr. Siddiqui if she is able to reach him before I do. ./LR

## 2021-11-09 DIAGNOSIS — I10 ESSENTIAL HYPERTENSION WITH GOAL BLOOD PRESSURE LESS THAN 140/90: ICD-10-CM

## 2021-11-09 DIAGNOSIS — E11.65 TYPE 2 DIABETES MELLITUS WITH HYPERGLYCEMIA, WITHOUT LONG-TERM CURRENT USE OF INSULIN (H): ICD-10-CM

## 2021-11-09 RX ORDER — LISINOPRIL 40 MG/1
40 TABLET ORAL DAILY
Qty: 90 TABLET | Refills: 1 | Status: SHIPPED | OUTPATIENT
Start: 2021-11-09 | End: 2022-04-29

## 2021-11-09 RX ORDER — ATORVASTATIN CALCIUM 80 MG/1
80 TABLET, FILM COATED ORAL DAILY
Qty: 90 TABLET | Refills: 1 | Status: SHIPPED | OUTPATIENT
Start: 2021-11-09 | End: 2021-11-15

## 2021-11-15 ENCOUNTER — OFFICE VISIT (OUTPATIENT)
Dept: FAMILY MEDICINE | Facility: CLINIC | Age: 61
End: 2021-11-15
Payer: COMMERCIAL

## 2021-11-15 VITALS
BODY MASS INDEX: 39.14 KG/M2 | SYSTOLIC BLOOD PRESSURE: 143 MMHG | DIASTOLIC BLOOD PRESSURE: 83 MMHG | RESPIRATION RATE: 18 BRPM | TEMPERATURE: 98.4 F | OXYGEN SATURATION: 97 % | WEIGHT: 228 LBS | HEART RATE: 76 BPM

## 2021-11-15 DIAGNOSIS — L60.3 DYSTROPHIC NAIL: ICD-10-CM

## 2021-11-15 DIAGNOSIS — M1A.09X0 IDIOPATHIC CHRONIC GOUT OF MULTIPLE SITES WITHOUT TOPHUS: ICD-10-CM

## 2021-11-15 DIAGNOSIS — E78.5 HYPERLIPIDEMIA LDL GOAL <100: ICD-10-CM

## 2021-11-15 DIAGNOSIS — G44.219 EPISODIC TENSION-TYPE HEADACHE, NOT INTRACTABLE: ICD-10-CM

## 2021-11-15 DIAGNOSIS — Z23 NEED FOR PROPHYLACTIC VACCINATION AND INOCULATION AGAINST INFLUENZA: ICD-10-CM

## 2021-11-15 DIAGNOSIS — M79.662 PAIN OF LEFT CALF: ICD-10-CM

## 2021-11-15 DIAGNOSIS — H04.123 DRY EYES: ICD-10-CM

## 2021-11-15 DIAGNOSIS — E11.65 TYPE 2 DIABETES MELLITUS WITH HYPERGLYCEMIA, WITHOUT LONG-TERM CURRENT USE OF INSULIN (H): ICD-10-CM

## 2021-11-15 DIAGNOSIS — I10 ESSENTIAL HYPERTENSION WITH GOAL BLOOD PRESSURE LESS THAN 140/90: ICD-10-CM

## 2021-11-15 DIAGNOSIS — L29.9 EAR ITCHING: ICD-10-CM

## 2021-11-15 DIAGNOSIS — L84 CORNS AND CALLOSITIES: Primary | ICD-10-CM

## 2021-11-15 DIAGNOSIS — E11.65 TYPE 2 DIABETES MELLITUS WITH HYPERGLYCEMIA, WITHOUT LONG-TERM CURRENT USE OF INSULIN (H): Primary | ICD-10-CM

## 2021-11-15 LAB
ANION GAP SERPL CALCULATED.3IONS-SCNC: 11 MMOL/L (ref 5–18)
BUN SERPL-MCNC: 14 MG/DL (ref 8–22)
CALCIUM SERPL-MCNC: 9.6 MG/DL (ref 8.5–10.5)
CHLORIDE BLD-SCNC: 104 MMOL/L (ref 98–107)
CO2 SERPL-SCNC: 24 MMOL/L (ref 22–31)
CREAT SERPL-MCNC: 0.88 MG/DL (ref 0.7–1.3)
GFR SERPL CREATININE-BSD FRML MDRD: >90 ML/MIN/1.73M2
GLUCOSE BLD-MCNC: 263 MG/DL (ref 70–125)
HBA1C MFR BLD: 8.2 % (ref 0–5.6)
POTASSIUM BLD-SCNC: 4.3 MMOL/L (ref 3.5–5)
SODIUM SERPL-SCNC: 139 MMOL/L (ref 136–145)
URATE SERPL-MCNC: 9.1 MG/DL (ref 3–8)

## 2021-11-15 PROCEDURE — 11056 PARNG/CUTG B9 HYPRKR LES 2-4: CPT | Performed by: FAMILY MEDICINE

## 2021-11-15 PROCEDURE — 80048 BASIC METABOLIC PNL TOTAL CA: CPT | Performed by: FAMILY MEDICINE

## 2021-11-15 PROCEDURE — 84550 ASSAY OF BLOOD/URIC ACID: CPT | Performed by: FAMILY MEDICINE

## 2021-11-15 PROCEDURE — 83036 HEMOGLOBIN GLYCOSYLATED A1C: CPT | Performed by: FAMILY MEDICINE

## 2021-11-15 PROCEDURE — 99214 OFFICE O/P EST MOD 30 MIN: CPT | Mod: 25 | Performed by: FAMILY MEDICINE

## 2021-11-15 PROCEDURE — 90686 IIV4 VACC NO PRSV 0.5 ML IM: CPT | Performed by: FAMILY MEDICINE

## 2021-11-15 PROCEDURE — 36415 COLL VENOUS BLD VENIPUNCTURE: CPT | Performed by: FAMILY MEDICINE

## 2021-11-15 PROCEDURE — 90471 IMMUNIZATION ADMIN: CPT | Performed by: FAMILY MEDICINE

## 2021-11-15 RX ORDER — GLIPIZIDE 10 MG/1
TABLET, FILM COATED, EXTENDED RELEASE ORAL
Qty: 0.1 TABLET | Refills: 0 | Status: SHIPPED | OUTPATIENT
Start: 2021-11-15 | End: 2021-11-15

## 2021-11-15 RX ORDER — FEBUXOSTAT 40 MG/1
TABLET, FILM COATED ORAL
Qty: 90 TABLET | Refills: 1 | Status: SHIPPED | OUTPATIENT
Start: 2021-11-15 | End: 2022-04-04

## 2021-11-15 RX ORDER — ATORVASTATIN CALCIUM 80 MG/1
80 TABLET, FILM COATED ORAL DAILY
Qty: 90 TABLET | Refills: 1 | Status: SHIPPED | OUTPATIENT
Start: 2021-11-15 | End: 2022-04-29

## 2021-11-15 RX ORDER — FLASH GLUCOSE SENSOR
KIT MISCELLANEOUS
Qty: 2 EACH | Refills: 6 | Status: SHIPPED | OUTPATIENT
Start: 2021-11-15 | End: 2022-04-29

## 2021-11-15 RX ORDER — HYDROCORTISONE AND ACETIC ACID 20.75; 10.375 MG/ML; MG/ML
3 SOLUTION AURICULAR (OTIC) 2 TIMES DAILY PRN
Qty: 10 ML | Refills: 3 | Status: SHIPPED | OUTPATIENT
Start: 2021-11-15 | End: 2022-04-29

## 2021-11-15 RX ORDER — CARBOXYMETHYLCELLULOSE SODIUM 5 MG/ML
SOLUTION/ DROPS OPHTHALMIC
Qty: 30 EACH | Refills: 3 | Status: SHIPPED | OUTPATIENT
Start: 2021-11-15 | End: 2022-04-29

## 2021-11-15 RX ORDER — ACETAMINOPHEN 500 MG
1000 TABLET ORAL EVERY 8 HOURS PRN
Qty: 100 TABLET | Refills: 3 | Status: SHIPPED | OUTPATIENT
Start: 2021-11-15 | End: 2022-04-29

## 2021-11-15 RX ORDER — METFORMIN HCL 500 MG
1000 TABLET, EXTENDED RELEASE 24 HR ORAL
Qty: 180 TABLET | Refills: 1 | Status: SHIPPED | OUTPATIENT
Start: 2021-11-15 | End: 2022-01-21

## 2021-11-15 NOTE — LETTER
November 18, 2021       KENIA Mackey  [address] [address]        Dear ,    We are writing to inform you of your test results.    Hi Sa Peña - we discussed your average sugar A1c and increased one of your diabetes medication doses.  Your other labs show that your gout result (uric acid) is mildly increased - make sure to take the febuxostat (uloric) pills every day.  Your kidneys are working normally.  Take care, Dr Jason Siddiqui       Resulted Orders   Uric acid   Result Value Ref Range    Uric Acid 9.1 (H) 3.0 - 8.0 mg/dL   Basic metabolic panel   Result Value Ref Range    Sodium 139 136 - 145 mmol/L    Potassium 4.3 3.5 - 5.0 mmol/L    Chloride 104 98 - 107 mmol/L    Carbon Dioxide (CO2) 24 22 - 31 mmol/L    Anion Gap 11 5 - 18 mmol/L    Urea Nitrogen 14 8 - 22 mg/dL    Creatinine 0.88 0.70 - 1.30 mg/dL    Calcium 9.6 8.5 - 10.5 mg/dL    Glucose 263 (H) 70 - 125 mg/dL    GFR Estimate >90 >60 mL/min/1.73m2      Comment:      As of July 11, 2021, eGFR is calculated by the CKD-EPI creatinine equation, without race adjustment. eGFR can be influenced by muscle mass, exercise, and diet. The reported eGFR is an estimation only and is only applicable if the renal function is stable.   Hemoglobin A1c   Result Value Ref Range    Hemoglobin A1C 8.2 (H) 0.0 - 5.6 %      Comment:      Normal <5.7%   Prediabetes 5.7-6.4%    Diabetes 6.5% or higher     Note: Adopted from ADA consensus guidelines.       If you have any questions or concerns, please call the clinic at the number listed above.       Sincerely,      Jason Siddiqui MD

## 2021-11-15 NOTE — NURSING NOTE
Due to patient being non-English speaking/uses sign language, an  was used for this visit. Only for face-to-face interpretation by an external agency, date and length of interpretation can be found on the scanned worksheet.       name:  AMY SORENSON  Agency: [employer]  Language: Ashlye  Telephone number:   [phone]  Type of interpretation:  TELEPHONE, Spoken    ANATOLIY Pope  10:51 AM  11/15/2021

## 2021-11-16 NOTE — PROGRESS NOTES
Sa was seen today for recheck, referral and imm/inj.    Diagnoses and all orders for this visit:    Corns and callosities  -     Orthopedic  Referral; Future    Idiopathic chronic gout of multiple sites without tophus  -     Uric acid  -     febuxostat (ULORIC) 40 MG TABS tablet; TAKE ONE TABLET BY MOUTH DAILY    Essential hypertension with goal blood pressure less than 140/90  -     Basic metabolic panel    Type 2 diabetes mellitus with hyperglycemia, without long-term current use of insulin (H)  -     Hemoglobin A1c  -     atorvastatin (LIPITOR) 80 MG tablet; Take 1 tablet (80 mg) by mouth daily  -     Continuous Blood Gluc Sensor (FREESTYLE JOSE 14 DAY SENSOR) [facility]; Use to test blood sugar daily per 's instructions.  Remove and replace every 14 days.  -     Discontinue: glipiZIDE (GLUCOTROL XL) 10 MG 24 hr tablet; discontinued  -     empagliflozin (JARDIANCE) 25 MG TABS tablet; Take 1 tablet (25 mg) by mouth daily  -     metFORMIN (GLUCOPHAGE-XR) 500 MG 24 hr tablet; Take 2 tablets (1,000 mg) by mouth daily with food  -     Orthopedic  Referral; Future    Episodic tension-type headache, not intractable  -     acetaminophen (TYLENOL) 500 MG tablet; Take 2 tablets (1,000 mg) by mouth every 8 hours as needed for mild pain    Ear itching  -     acetic acid-hydrocortisone (VOSOL-HC) 1-2 % otic solution; Place 3 drops into both ears 2 times daily as needed (itching)    Hyperlipidemia LDL goal <100  -     aspirin (ASPIRIN LOW DOSE) 81 MG EC tablet; TAKE 1 TABLET (81 MG) BY MOUTH DAILY    Dry eyes  -     carboxymethylcellulose PF (LUBRICATING PLUS EYE DROPS) 0.5 % ophthalmic solution; PLACE 1 DROP INTO BOTH EYES DAILY AS NEEDED FOR DRY EYES    Dystrophic nail  -     Orthopedic  Referral; Future    Pain of left calf  -     US Lower Extremity Venous Duplex Left; Future    Need for prophylactic vaccination and inoculation against influenza  -     INFLUENZA VACCINE IM > 6 MONTHS VALENT  IIV4 (AFLURIA/FLUZONE)      I refilled the medications which he needs refilled.  I placed a referral for nonsurgical podiatry to trim his toenails and manage his corns and calluses in the future.    I placed another lower extremity venous ultrasound order to rule out DVT in the left calf.    I suggested he discuss with his surgeon whether removal of the hardware might diminish the pain he is experiencing in his left ankle.    We increased the empagliflozin and discontinued glipizide.    Total visit time with patient was 25 mins, all of which was face to face MD time, and over 50% of this time was spent in counseling and coordination of care.  Including post-encounter documentation and orders, total encounter time was 32 mins.      Subjective:  This is a 69-year-old Greek speaking male who is well-known to me.  He schedules today's visit in follow-up.  He reports that he is continuing to have left leg pain around the site of his internally fixed trimalleolar ankle fracture.  He had been getting physical therapy but stopped doing this because he felt it was not really helping.  He also has had transportation obstacles.  He is also complaining of some pain in the sole of his left foot.  He wonders if he could also get a referral to podiatry for assistance with trimming his toenails which she finds difficult to perform himself both due to the thickness of the nails and difficulty reaching his toenails.    ROS:  DM2: He says his sugars have been a bit higher than they had been and knows he could be more observant with his diet.  He neglected to bring his freestyle marielle meter with him today to download the data.  MSK: In addition to the above he says his gout is doing okay at present.    Objective:  BP (!) 143/83 (BP Location: Left arm, Patient Position: Sitting, Cuff Size: Adult Large)   Pulse 76   Temp 98.4  F (36.9  C) (Oral)   Resp 18   Wt 103.4 kg (228 lb)   SpO2 97%   BMI 39.14 kg/m    His systolic BP is  just above goal.  He is morbidly obese.  I examined his left ankle.  He remains quite tender at the surgical scars and palpation of the scar seems to provoke an uncomfortable pain for him.  He is tender on palpation of his calf.  I had previously ordered a venous ultrasound to rule out DVT which he was not able to have performed due to transportation barriers but agrees that he could now attend.    Procedure:  In addition he has a callus in the midfoot and I offered to pare this which he permits.  I pared it without complication and fully resolved it.  He also had 2 smaller corns on the third and fourth toes of the left foot which I also pared for a total of 3.    I reviewed his medications in detail and refilled many of these.  He appreciates empagliflozin and we therefore agreed to increase this dose to 25 mg daily and cancel the glipizide prescription which he does not like to take.    Results for orders placed or performed in visit on 11/15/21   Uric acid     Status: Abnormal   Result Value Ref Range    Uric Acid 9.1 (H) 3.0 - 8.0 mg/dL   Basic metabolic panel     Status: Abnormal   Result Value Ref Range    Sodium 139 136 - 145 mmol/L    Potassium 4.3 3.5 - 5.0 mmol/L    Chloride 104 98 - 107 mmol/L    Carbon Dioxide (CO2) 24 22 - 31 mmol/L    Anion Gap 11 5 - 18 mmol/L    Urea Nitrogen 14 8 - 22 mg/dL    Creatinine 0.88 0.70 - 1.30 mg/dL    Calcium 9.6 8.5 - 10.5 mg/dL    Glucose 263 (H) 70 - 125 mg/dL    GFR Estimate >90 >60 mL/min/1.73m2   Hemoglobin A1c     Status: Abnormal   Result Value Ref Range    Hemoglobin A1C 8.2 (H) 0.0 - 5.6 %

## 2021-11-17 NOTE — RESULT ENCOUNTER NOTE
Stuart Peña - we discussed your average sugar A1c and increased one of your diabetes medication doses.  Your other labs show that your gout result (uric acid) is mildly increased - make sure to take the febuxostat (uloric) pills every day.  Your kidneys are working normally.  Take care, Dr Jason Siddiqui

## 2021-11-19 ENCOUNTER — HOSPITAL ENCOUNTER (OUTPATIENT)
Dept: ULTRASOUND IMAGING | Facility: CLINIC | Age: 61
Discharge: HOME | End: 2021-11-19
Attending: FAMILY MEDICINE | Admitting: FAMILY MEDICINE
Payer: COMMERCIAL

## 2021-11-19 DIAGNOSIS — M79.662 PAIN OF LEFT CALF: ICD-10-CM

## 2021-11-19 PROCEDURE — 93971 EXTREMITY STUDY: CPT | Mod: LT

## 2021-11-19 NOTE — LETTER
November 22, 2021      Sa T Key  [address] [place] [address]        Dear ,    We are writing to inform you of your test results.    Hello Sa Than, there is no clot in the veins of your leg - this is good!  Dr Jason Siddiqui       Resulted Orders   US Lower Extremity Venous Duplex Left    Narrative    US LOWER EXTREMITY VENOUS DUPLEX LEFT  11/19/2021 2:59 PM    CLINICAL HISTORY/INDICATION: persistent left calf pain x months, s/p  ORIF left ankle; Pain of left calf    COMPARISON: None relevant    TECHNIQUE:   Grayscale, color-flow, and spectral waveform analysis were performed  of the deep veins of the left lower extremity    FINDINGS:   The left common femoral, femoral, popliteal, posterior tibial and  peroneal veins demonstrate normal compressibility, spectral waveform,  color flow and augmentation.    The contralateral right common femoral vein demonstrates normal  compressibility, spectral waveform, color flow and augmentation.      Impression    IMPRESSION:   No evidence of deep venous thrombosis in the left lower extremity    UNA HUGHES DO         SYSTEM ID:  [id number]       If you have any questions or concerns, please call the clinic at the number listed above.       Sincerely,      Jason Siddiqui MD

## 2021-12-03 ENCOUNTER — TELEPHONE (OUTPATIENT)
Dept: FAMILY MEDICINE | Facility: CLINIC | Age: 61
End: 2021-12-03

## 2021-12-03 NOTE — TELEPHONE ENCOUNTER
Prior Authorization Retail Medication Request    Medication/Dose: Continuous Blood Gluc Sensor (FREESTYLE JOSE 14 DAY SENSOR[facility]  ICD code (if different than what is on RX):  Type 2 diabetes mellitus with hyperglycemia, without long-term current use of insulin (H) [E11.65]   Previously Tried and Failed:  N/A  Rationale:  PATIENT REQUIRES TO  MONITOR BLOOD GLUCOSE LEVELS    Insurance Name:  ARE CONNECT MA ONLY [[id number]]  Insurance ID:  [id number]       Pharmacy Information (if different than what is on RX)  Name:  [place], [place] - [address] [place] [address]  Phone:  [phone]

## 2021-12-03 NOTE — TELEPHONE ENCOUNTER
Prior Authorization Not Needed per Insurance    Medication: Continuous Blood Gluc Sensor (FREESTYLE JOSE 14 DAY SENSOR) MISC- PA NOT NEEDED  Insurance Company: [employer] - Phone [phone] Fax [phone]  Expected CoPay:      Pharmacy Filling the Rx: [place], [place] - [address] [place] [address]  Pharmacy Notified: Yes, pharmacy has a paid claim  Patient Notified: Pharmacy will notify patient when ready      Central Prior Authorization Team  Phone: [phone]

## 2021-12-08 ENCOUNTER — TRANSFERRED RECORDS (OUTPATIENT)
Dept: HEALTH INFORMATION MANAGEMENT | Facility: CLINIC | Age: 61
End: 2021-12-08
Payer: COMMERCIAL

## 2021-12-11 DIAGNOSIS — M25.572 PAIN IN JOINT INVOLVING ANKLE AND FOOT, LEFT: ICD-10-CM

## 2021-12-11 DIAGNOSIS — S82.852D CLOSED TRIMALLEOLAR FRACTURE OF LEFT ANKLE WITH ROUTINE HEALING, SUBSEQUENT ENCOUNTER: ICD-10-CM

## 2022-01-21 ENCOUNTER — OFFICE VISIT (OUTPATIENT)
Dept: FAMILY MEDICINE | Facility: CLINIC | Age: 62
End: 2022-01-21
Payer: COMMERCIAL

## 2022-01-21 ENCOUNTER — OFFICE VISIT (OUTPATIENT)
Dept: PHARMACY | Facility: CLINIC | Age: 62
End: 2022-01-21
Payer: COMMERCIAL

## 2022-01-21 VITALS
WEIGHT: 233 LBS | TEMPERATURE: 97.9 F | OXYGEN SATURATION: 96 % | DIASTOLIC BLOOD PRESSURE: 75 MMHG | SYSTOLIC BLOOD PRESSURE: 127 MMHG | RESPIRATION RATE: 18 BRPM | HEART RATE: 79 BPM | BODY MASS INDEX: 39.99 KG/M2

## 2022-01-21 DIAGNOSIS — Z71.89 ENCOUNTER FOR MEDICATION REVIEW AND COUNSELING: Primary | ICD-10-CM

## 2022-01-21 DIAGNOSIS — Z23 HIGH PRIORITY FOR 2019-NCOV VACCINE: ICD-10-CM

## 2022-01-21 DIAGNOSIS — E66.01 MORBID OBESITY (H): ICD-10-CM

## 2022-01-21 DIAGNOSIS — L40.9 PSORIASIS: ICD-10-CM

## 2022-01-21 DIAGNOSIS — E11.65 TYPE 2 DIABETES MELLITUS WITH HYPERGLYCEMIA, WITHOUT LONG-TERM CURRENT USE OF INSULIN (H): Primary | ICD-10-CM

## 2022-01-21 DIAGNOSIS — M75.101 ROTATOR CUFF SYNDROME, RIGHT: ICD-10-CM

## 2022-01-21 PROCEDURE — 0064A COVID-19,PF,MODERNA (18+ YRS BOOSTER .25ML): CPT | Performed by: FAMILY MEDICINE

## 2022-01-21 PROCEDURE — 91306 COVID-19,PF,MODERNA (18+ YRS BOOSTER .25ML): CPT | Performed by: FAMILY MEDICINE

## 2022-01-21 PROCEDURE — 99207 PR NO CHARGE LOS: CPT | Performed by: PHARMACIST

## 2022-01-21 PROCEDURE — 99215 OFFICE O/P EST HI 40 MIN: CPT | Performed by: FAMILY MEDICINE

## 2022-01-21 RX ORDER — METFORMIN HCL 500 MG
1000 TABLET, EXTENDED RELEASE 24 HR ORAL 2 TIMES DAILY WITH MEALS
Qty: 360 TABLET | Refills: 1 | Status: SHIPPED | OUTPATIENT
Start: 2022-01-21 | End: 2022-04-29

## 2022-01-21 RX ORDER — FLUOCINONIDE TOPICAL SOLUTION USP, 0.05% 0.5 MG/ML
SOLUTION TOPICAL 2 TIMES DAILY
Qty: 60 ML | Refills: 3 | Status: SHIPPED | OUTPATIENT
Start: 2022-01-21 | End: 2022-07-27

## 2022-01-21 ASSESSMENT — PATIENT HEALTH QUESTIONNAIRE - PHQ9: SUM OF ALL RESPONSES TO PHQ QUESTIONS 1-9: 5

## 2022-01-21 NOTE — PATIENT INSTRUCTIONS
I'm happy to do cortisone shot into your right shoulder whenever you want.    Come back in about 2 months for labs.

## 2022-01-21 NOTE — PROGRESS NOTES
Clinical Pharmacy Consult:                                                    Sa Mackey is a 61 year old male seen for a clinical pharmacist consult.  He was referred to me from Dr. Siddiqui. Today was a co-visit with Dr. Siddiqui.     Reason for Consult: [employer] assistance    Discussion: Dr. Siddiqui requested assistance in downloading [employer] device in patient room. Upon reviewing the report, would benefit from GLP-1 given spikes are occurring after meals and GLP-1s primarily target PPG. It appears patient has been on Bydureon in the past but was discontinued last year for reasons unclear.    Plan:  1. Assisted in downloading report to [employer] while in the patient room with Dr. Siddiqui. See Dr. Siddiqui's note for ambulatory glucose profile report.   2. Briefly outlined goals with patient and Dr. Siddiqui, including time in range > 70%, glucose management indicator < 7%, and time active > 70%.  3. Reviewed daily glucose profile with patient and identified spikes are occurring consistently after lunch and dinner the past 2 weeks. GLP-1 recommendation deferred to next visit due to time.     Raúl Albright, PharmD, [facility]  PGY1 Ambulatory Care Pharmacy Resident

## 2022-01-21 NOTE — PROGRESS NOTES
Sa was seen today for recheck, other, medication reconciliation and imm/inj.    Diagnoses and all orders for this visit:    Type 2 diabetes mellitus with hyperglycemia, without long-term current use of insulin (H)  -     metFORMIN (GLUCOPHAGE-XR) 500 MG 24 hr tablet; Take 2 tablets (1,000 mg) by mouth 2 times daily (with meals)    Psoriasis  -     fluocinonide (LIDEX) 0.05 % external solution; Apply topically 2 times daily new    High priority for 2019-nCoV vaccine  -     COVID-19,PF,MODERNA (18+ Yrs BOOSTER .25mL)    Morbid obesity (H)    Rotator cuff syndrome, right      I recommended he increase his metformin and he would like to take 1000 mg twice daily.  We also discussed his diet and I have encouraged him to have a reasonably consistent schedule and eat similar types of food 3 times a day.  This should help him avoid hypoglycemic episodes as well as rebound hyperglycemia.  I have suggested that if he does get low sugars but is not very symptomatic he could simply eat a piece of fruit which will more gradually increase his blood sugar and hopefully avoid rebounding into a high number.    For his psoriasis I have prescribed a higher potency steroid.  If this worsens, I would refer to dermatology for evaluation of newer psoriatic treatments.    I have recommended he get a cortisone shot into his right shoulder but he declines today.  He generally does not like injections nor surgery and would prefer to see if he can resolve his pain with exercise first.    He has just past 5 months since his primary COVID immunizations and is therefore eligible for a booster today which he agrees to receive in his other arm.    I recommend that he return in about 2 months time for repeat labs.    Total visit time with patient was 45 mins, all of which was face to face MD time, and over 50% of this time was spent in counseling and coordination of care through a [employer] .  Including post-encounter documentation and  orders, total encounter time was 52 mins.      Subjective:  This is a 61-year-old who is well-known to me who attends for a number of reasons:  1.  He wants to follow-up on his diabetes.  He has been using the freestyle marielle and is very satisfied with the information it gives him.  He is concerned that he has had occasional very low blood sugar readings.  He says he feels weak and gives himself some juice which oftentimes overcorrects the sugar such that he is then high.  He is interested in a recommendation for how he should deal with low blood sugars.  He is just taking empagliflozin 25 mg and metformin 1000 mg daily.  He did take an injectable but prefers to avoid that if possible.    2.  He reports pain in his right shoulder ever since receiving the second COVID-vaccine in August 2021.  He cannot recall any redness or swelling at the site of the injection but says that since then he is unable to elevate his right arm above shoulder level.  He hasn't injured it in other ways to his knowledge.    3.  He reports that he went back to orthopedics who took an x-ray of his ankle and said everything looks great and they did not recommend removing hardware at this point.  He says that for the most part the pain has improved in the ankle.    4.  Over the past month he has developed a rash on both elbows his scalp and other parts of his body.  It is itchy.    Objective:  /75 (BP Location: Left arm, Patient Position: Sitting, Cuff Size: Adult Large)   Pulse 79   Temp 97.9  F (36.6  C) (Tympanic)   Resp 18   Wt 105.7 kg (233 lb)   SpO2 96%   BMI 39.99 kg/m    Exam of his skin reveals a rash very consistent with psoriasis.  This would be a new diagnosis for him.  Exam of his right shoulder reveals positive impingement signs consistent with a diagnosis of rotator cuff syndrome.    I reviewed his labs and he is not yet due to recheck average sugar.  His last uric acid was elevated but he tells me he is taking all  his medications.    See below for the printout from his freestyle marielle.  He has only had brief minutes below 70 whereas he is not yet at goal of 70% or more in target range.

## 2022-01-21 NOTE — NURSING NOTE
Due to patient being non-English speaking/uses sign language, an  was used for this visit. Only for face-to-face interpretation by an external agency, date and length of interpretation can be found on the scanned worksheet.     name: Taye Ferrell  Agency: [employer]  Language: Liam   Telephone number: [phone]  Type of interpretation: Face-to-face, spoken

## 2022-01-23 NOTE — PROGRESS NOTES
I have verified the content of the note, which accurately reflects my assessment of the patient and the plan of care.   Magalie Manjarrez, [facility], PharmD

## 2022-04-04 DIAGNOSIS — M1A.09X0 IDIOPATHIC CHRONIC GOUT OF MULTIPLE SITES WITHOUT TOPHUS: ICD-10-CM

## 2022-04-04 RX ORDER — FEBUXOSTAT 40 MG/1
TABLET, FILM COATED ORAL
Qty: 90 TABLET | Refills: 1 | Status: SHIPPED | OUTPATIENT
Start: 2022-04-04 | End: 2022-04-29

## 2022-04-28 DIAGNOSIS — M1A.9XX0 CHRONIC GOUT WITHOUT TOPHUS, UNSPECIFIED CAUSE, UNSPECIFIED SITE: Primary | ICD-10-CM

## 2022-04-28 DIAGNOSIS — I10 ESSENTIAL HYPERTENSION WITH GOAL BLOOD PRESSURE LESS THAN 140/90: ICD-10-CM

## 2022-04-28 DIAGNOSIS — E11.65 TYPE 2 DIABETES MELLITUS WITH HYPERGLYCEMIA, WITHOUT LONG-TERM CURRENT USE OF INSULIN (H): ICD-10-CM

## 2022-04-29 ENCOUNTER — OFFICE VISIT (OUTPATIENT)
Dept: FAMILY MEDICINE | Facility: CLINIC | Age: 62
End: 2022-04-29
Payer: COMMERCIAL

## 2022-04-29 VITALS
DIASTOLIC BLOOD PRESSURE: 85 MMHG | SYSTOLIC BLOOD PRESSURE: 148 MMHG | RESPIRATION RATE: 16 BRPM | WEIGHT: 224 LBS | HEART RATE: 71 BPM | OXYGEN SATURATION: 98 % | TEMPERATURE: 98.1 F | BODY MASS INDEX: 38.45 KG/M2

## 2022-04-29 DIAGNOSIS — H04.123 DRY EYES: ICD-10-CM

## 2022-04-29 DIAGNOSIS — E55.9 HYPOVITAMINOSIS D: ICD-10-CM

## 2022-04-29 DIAGNOSIS — H91.91 DECREASED HEARING OF RIGHT EAR: ICD-10-CM

## 2022-04-29 DIAGNOSIS — M1A.09X0 IDIOPATHIC CHRONIC GOUT OF MULTIPLE SITES WITHOUT TOPHUS: ICD-10-CM

## 2022-04-29 DIAGNOSIS — G44.219 EPISODIC TENSION-TYPE HEADACHE, NOT INTRACTABLE: ICD-10-CM

## 2022-04-29 DIAGNOSIS — M25.572 PAIN IN JOINT INVOLVING ANKLE AND FOOT, LEFT: ICD-10-CM

## 2022-04-29 DIAGNOSIS — S82.852D CLOSED TRIMALLEOLAR FRACTURE OF LEFT ANKLE WITH ROUTINE HEALING, SUBSEQUENT ENCOUNTER: ICD-10-CM

## 2022-04-29 DIAGNOSIS — E11.65 TYPE 2 DIABETES MELLITUS WITH HYPERGLYCEMIA, WITHOUT LONG-TERM CURRENT USE OF INSULIN (H): Primary | ICD-10-CM

## 2022-04-29 DIAGNOSIS — L30.1 POMPHOLYX: ICD-10-CM

## 2022-04-29 DIAGNOSIS — E78.5 HYPERLIPIDEMIA LDL GOAL <100: ICD-10-CM

## 2022-04-29 DIAGNOSIS — L29.9 EAR ITCHING: ICD-10-CM

## 2022-04-29 DIAGNOSIS — L40.9 PSORIASIS: ICD-10-CM

## 2022-04-29 DIAGNOSIS — Z00.00 ROUTINE GENERAL MEDICAL EXAMINATION AT A HEALTH CARE FACILITY: ICD-10-CM

## 2022-04-29 DIAGNOSIS — M1A.9XX0 CHRONIC GOUT WITHOUT TOPHUS, UNSPECIFIED CAUSE, UNSPECIFIED SITE: ICD-10-CM

## 2022-04-29 DIAGNOSIS — I10 ESSENTIAL HYPERTENSION WITH GOAL BLOOD PRESSURE LESS THAN 140/90: ICD-10-CM

## 2022-04-29 LAB
ANION GAP SERPL CALCULATED.3IONS-SCNC: 13 MMOL/L (ref 5–18)
BUN SERPL-MCNC: 17 MG/DL (ref 8–22)
CALCIUM SERPL-MCNC: 9.8 MG/DL (ref 8.5–10.5)
CHLORIDE BLD-SCNC: 103 MMOL/L (ref 98–107)
CHOLEST SERPL-MCNC: 234 MG/DL
CO2 SERPL-SCNC: 23 MMOL/L (ref 22–31)
CREAT SERPL-MCNC: 1.04 MG/DL (ref 0.7–1.3)
CREAT UR-MCNC: 43 MG/DL
FASTING STATUS PATIENT QL REPORTED: ABNORMAL
GFR SERPL CREATININE-BSD FRML MDRD: 82 ML/MIN/1.73M2
GLUCOSE BLD-MCNC: 173 MG/DL (ref 70–125)
HBA1C MFR BLD: 8.4 % (ref 0–5.6)
HDLC SERPL-MCNC: 43 MG/DL
LDLC SERPL CALC-MCNC: 148 MG/DL
MICROALBUMIN UR-MCNC: <0.5 MG/DL (ref 0–1.99)
MICROALBUMIN/CREAT UR: NORMAL MG/G{CREAT}
POTASSIUM BLD-SCNC: 4.6 MMOL/L (ref 3.5–5)
SODIUM SERPL-SCNC: 139 MMOL/L (ref 136–145)
TRIGL SERPL-MCNC: 217 MG/DL
URATE SERPL-MCNC: 9.7 MG/DL (ref 3–8)

## 2022-04-29 PROCEDURE — 82043 UR ALBUMIN QUANTITATIVE: CPT | Performed by: FAMILY MEDICINE

## 2022-04-29 PROCEDURE — 83036 HEMOGLOBIN GLYCOSYLATED A1C: CPT | Performed by: FAMILY MEDICINE

## 2022-04-29 PROCEDURE — 99215 OFFICE O/P EST HI 40 MIN: CPT | Performed by: FAMILY MEDICINE

## 2022-04-29 PROCEDURE — 36415 COLL VENOUS BLD VENIPUNCTURE: CPT | Performed by: FAMILY MEDICINE

## 2022-04-29 PROCEDURE — 80048 BASIC METABOLIC PNL TOTAL CA: CPT | Performed by: FAMILY MEDICINE

## 2022-04-29 PROCEDURE — 80061 LIPID PANEL: CPT | Performed by: FAMILY MEDICINE

## 2022-04-29 PROCEDURE — 84550 ASSAY OF BLOOD/URIC ACID: CPT | Performed by: FAMILY MEDICINE

## 2022-04-29 RX ORDER — BETAMETHASONE DIPROPIONATE 0.5 MG/G
OINTMENT, AUGMENTED TOPICAL 2 TIMES DAILY
Qty: 100 G | Refills: 1 | Status: SHIPPED | OUTPATIENT
Start: 2022-04-29 | End: 2022-11-02

## 2022-04-29 RX ORDER — AMLODIPINE BESYLATE 5 MG/1
5 TABLET ORAL DAILY
Qty: 90 TABLET | Refills: 1 | Status: SHIPPED | OUTPATIENT
Start: 2022-04-29 | End: 2022-08-25

## 2022-04-29 RX ORDER — HYDROCORTISONE AND ACETIC ACID 20.75; 10.375 MG/ML; MG/ML
3 SOLUTION AURICULAR (OTIC) 2 TIMES DAILY PRN
Qty: 10 ML | Refills: 3 | Status: SHIPPED | OUTPATIENT
Start: 2022-04-29 | End: 2023-03-17

## 2022-04-29 RX ORDER — POLYETHYLENE GLYCOL 3350 17 G/17G
17 POWDER, FOR SOLUTION ORAL DAILY PRN
Qty: 510 G | Refills: 1 | Status: SHIPPED | OUTPATIENT
Start: 2022-04-29 | End: 2022-08-25

## 2022-04-29 RX ORDER — METFORMIN HCL 500 MG
2000 TABLET, EXTENDED RELEASE 24 HR ORAL DAILY
Qty: 360 TABLET | Refills: 1 | Status: SHIPPED | OUTPATIENT
Start: 2022-04-29 | End: 2022-08-02

## 2022-04-29 RX ORDER — CHOLECALCIFEROL (VITAMIN D3) 50 MCG
50 TABLET ORAL DAILY
Qty: 90 TABLET | Refills: 1 | Status: SHIPPED | OUTPATIENT
Start: 2022-04-29 | End: 2022-07-11

## 2022-04-29 RX ORDER — LISINOPRIL 40 MG/1
40 TABLET ORAL DAILY
Qty: 90 TABLET | Refills: 1 | Status: SHIPPED | OUTPATIENT
Start: 2022-04-29 | End: 2022-10-10

## 2022-04-29 RX ORDER — FLASH GLUCOSE SENSOR
KIT MISCELLANEOUS
Qty: 2 EACH | Refills: 6 | Status: SHIPPED | OUTPATIENT
Start: 2022-04-29 | End: 2022-12-16

## 2022-04-29 RX ORDER — FEBUXOSTAT 40 MG/1
40 TABLET, FILM COATED ORAL DAILY
Qty: 90 TABLET | Refills: 1 | Status: SHIPPED | OUTPATIENT
Start: 2022-04-29 | End: 2022-09-16

## 2022-04-29 RX ORDER — ISOPROPYL ALCOHOL 70 ML/100ML
1 SWAB TOPICAL 3 TIMES DAILY PRN
Qty: 300 EACH | Refills: 1 | Status: SHIPPED | OUTPATIENT
Start: 2022-04-29 | End: 2023-10-16

## 2022-04-29 RX ORDER — CARBOXYMETHYLCELLULOSE SODIUM 5 MG/ML
SOLUTION/ DROPS OPHTHALMIC
Qty: 30 EACH | Refills: 3 | Status: SHIPPED | OUTPATIENT
Start: 2022-04-29 | End: 2023-03-17

## 2022-04-29 RX ORDER — ATORVASTATIN CALCIUM 80 MG/1
80 TABLET, FILM COATED ORAL DAILY
Qty: 90 TABLET | Refills: 1 | Status: SHIPPED | OUTPATIENT
Start: 2022-04-29 | End: 2022-08-02

## 2022-04-29 RX ORDER — ACETAMINOPHEN 500 MG
1000 TABLET ORAL EVERY 8 HOURS PRN
Qty: 100 TABLET | Refills: 3 | Status: SHIPPED | OUTPATIENT
Start: 2022-04-29 | End: 2023-04-19

## 2022-04-29 NOTE — PATIENT INSTRUCTIONS
Results for orders placed or performed in visit on 04/29/22   Hemoglobin A1c     Status: Abnormal   Result Value Ref Range    Hemoglobin A1C 8.4 (H) 0.0 - 5.6 %     We agreed that you will take ALL 4 METFORMIN PILLS at SAME TIME every morning      05/06/22   OTOLARYNGOLOGY REFERRAL     [facility]  Phone: [phone]  Fax: [phone]    Referral, demographics and office note faxed to [phone].     Estela Valadez

## 2022-04-29 NOTE — PROGRESS NOTES
[facility]    Assessment/Plan:    Type 2 diabetes mellitus with hyperglycemia, without long-term current use of insulin (H)  Today A1c is 8.4%. He's forgotten to take the increased dose of metformin for a few weeks since making the switch. Is able to take the Jardiance as prescribed. Agreed that today we would continue another 3 months of metformin 2000mg, but with optimal dosing (switch to taking all 4 pills in the morning with all other meds). Also discussed diet and exercise modifications, which he's hoping to make with the improvement in weather. Refilled meds.  Scan freestyle more often (<50% data recorded).  - Hemoglobin A1c  - Lipid Panel  - Basic metabolic panel  - Albumin Random Urine Quantitative with Creat Ratio  - Alcohol Swabs (EASY TOUCH ALCOHOL PREP MEDIUM) 70 % PADS  Dispense: 300 each; Refill: 1  - atorvastatin (LIPITOR) 80 MG tablet  Dispense: 90 tablet; Refill: 1  - Continuous Blood Gluc Sensor (FREESTYLE JOSE 14 DAY SENSOR) MISC  Dispense: 2 each; Refill: 6  - empagliflozin (JARDIANCE) 25 MG TABS tablet  Dispense: 90 tablet; Refill: 1  - metFORMIN (GLUCOPHAGE-XR) 500 MG 24 hr tablet  Dispense: 360 tablet; Refill: 1    Psoriasis  Has ongoing psoriasis which has not abated with diprolene cream. On exam, the rash has worsened and has spread to new sites. Recommend referral to dermatology for further treatment options. Refilled cream for interim with direction to not use for >14 days on face.  - Adult Dermatology Referral  - augmented betamethasone dipropionate (DIPROLENE-AF) 0.05 % external ointment  Dispense: 100 g; Refill: 1    Essential hypertension with goal blood pressure less than 140/90  Has been having some elevated bp readings in clinic and at home. Will add amlodipine 5 mg to current routine to get better control of HTN.  Determined to avoid adding thiazides due to chronic gout.  - Basic metabolic panel  - lisinopril (ZESTRIL) 40 MG tablet  Dispense: 90 tablet;  Refill: 1  - amLODIPine (NORVASC) 5 MG tablet  Dispense: 90 tablet; Refill: 1    Ear itching  Decreased hearing of right ear  On exam, right ear canal and TM look irritated. He has a history of an episode of otitis media and is using ear drops intermittently. He is also having decreased hearing on the right ear. He is open to an ENT referral for further workup of chronic right ear otitis media and hearing loss.  - Otolaryngology Referral  - acetic acid-hydrocortisone (VOSOL-HC) 1-2 % otic solution  Dispense: 10 mL; Refill: 3    Chronic gout without tophus, unspecified cause, unspecified site  No recurrence of gout flares. Refilled medications.  - Uric acid  - febuxostat (ULORIC) 40 MG TABS tablet  Dispense: 90 tablet; Refill: 1    Episodic tension-type headache, not intractable  Refilled med  - acetaminophen (TYLENOL) 500 MG tablet  Dispense: 100 tablet; Refill: 3    Hyperlipidemia LDL goal <100  Refilled med.  - aspirin (ASPIRIN LOW DOSE) 81 MG EC tablet  Dispense: 90 tablet; Refill: 1    Dry eyes  Refilled med.  - carboxymethylcellulose PF (LUBRICATING PLUS EYE DROPS) 0.5 % ophthalmic solution  Dispense: 30 each; Refill: 3    Routine general medical examination at a health care facility  Some constipation, so wants miralax on hand, but doesn't use regularly. Refilled med.  - polyethylene glycol (MIRALAX) 17 GM/Dose powder  Dispense: 510 g; Refill: 1    Hypovitaminosis D  Refilled med.  - vitamin D3 (CHOLECALCIFEROL) 50 mcg (2000 units) tablet  Dispense: 90 tablet; Refill: 1    Kirk Nguyen MS3    Total visit time with patient was 45 mins, all of which was face to face MD time, and over 50% of this time was spent in counseling and coordination of care.  Including post-encounter documentation and orders, total encounter time was 52 mins.      Preceptor Attestation:   I was present with the medical student who participated in the service and in the documentation of this note. I have verified the history and  personally performed the physical exam and medical decision making. I have verified the content of the note, which accurately reflects my assessment of the patient and the plan of care.   Supervising Physician:  Jaosn Siddiqui MD.        Subjective  Sa KENIA Mackey is a 61 year old male with a PMH of DM2, dyshydrotic eczema, psoriasis, BEV, HTN who presents to clinic for follow-up of diabetes and concern for worsening rash.      was last seen in clinic on 01/22/22. At that visit, his metformin was increased to 2000mg daily. He was having episodic hypoglycemia at that time and was counseled to eat consistent meals and small snacks for correction. For his psoriasis, he was prescribed a higher potency steroid.     Today,  states his blood sugars vary from low 100s-200s. Once in 3-5 months, he gets a hypoglycemic episode with symptoms of shakiness, sweat, and fatigue. To resolve these symptoms, he is uses apple juice and sugar, which then causes his sugars to go up to 300. He has not had an episode since Feb/March of this year. He has been taking the increased metformin and empagflozin, but sometimes will forget for a week at a time to take the increased dose of metformin. He sets up a pill box himself for these meds. He notes his diet has changed to increase carbs over the last 3 month and also has reduced exercise, but is hoping to change these things with the warmer weather.    He had two elevated  blood pressure today. He is taking his bp meds regularly without side effects. He will check his blood pressure if he is feeling symptomatic. Readings at home have been 130s systolic usually, if he is dizzy then the blood pressure will increase to 140s to 150s.     Rash all over his body consistent with previous diagnosis of psorasis. Has used the high potency steriod cream for the last few months which provides relief from itching, but the rashes persist and have spread to new sites. He would like to try seeing a  dermatologist today.      Current Outpatient Medications   Medication Sig Dispense Refill     acetaminophen (TYLENOL) 500 MG tablet Take 2 tablets (1,000 mg) by mouth every 8 hours as needed for mild pain 100 tablet 3     acetic acid-hydrocortisone (VOSOL-HC) 1-2 % otic solution Place 3 drops into both ears 2 times daily as needed (itching) 10 mL 3     Alcohol Swabs (EASY TOUCH ALCOHOL PREP MEDIUM) 70 % PADS 1 pad by In Vitro route 3 times daily as needed 300 each 1     amLODIPine (NORVASC) 5 MG tablet Take 1 tablet (5 mg) by mouth daily 90 tablet 1     aspirin (ASPIRIN LOW DOSE) 81 MG EC tablet TAKE 1 TABLET (81 MG) BY MOUTH DAILY 90 tablet 1     atorvastatin (LIPITOR) 80 MG tablet Take 1 tablet (80 mg) by mouth daily 90 tablet 1     augmented betamethasone dipropionate (DIPROLENE-AF) 0.05 % external ointment Apply topically 2 times daily No more than 14 days around face 100 g 1     carboxymethylcellulose PF (LUBRICATING PLUS EYE DROPS) 0.5 % ophthalmic solution PLACE 1 DROP INTO BOTH EYES DAILY AS NEEDED FOR DRY EYES 30 each 3     Continuous Blood Gluc Sensor (FREESTYLE JOSE 14 DAY SENSOR) [facility] Use to test blood sugar daily per 's instructions.  Remove and replace every 14 days. 2 each 6     empagliflozin (JARDIANCE) 25 MG TABS tablet Take 1 tablet (25 mg) by mouth daily 90 tablet 1     febuxostat (ULORIC) 40 MG TABS tablet Take 1 tablet (40 mg) by mouth daily 90 tablet 1     lisinopril (ZESTRIL) 40 MG tablet Take 1 tablet (40 mg) by mouth daily 90 tablet 1     metFORMIN (GLUCOPHAGE-XR) 500 MG 24 hr tablet Take 4 tablets (2,000 mg) by mouth daily 360 tablet 1     polyethylene glycol (MIRALAX) 17 GM/Dose powder Take 17 g by mouth daily as needed for constipation 510 g 1     vitamin D3 (CHOLECALCIFEROL) 50 mcg (2000 units) tablet Take 1 tablet (50 mcg) by mouth daily 90 tablet 1     ACCU-CHEK NIKKIE PLUS test strip USE UP TO 3 TIMES A  strip 3     betamethasone dipropionate (DIPROSONE) 0.05 %  external ointment APPLY TOPICALLY AND SPARINGLY TO AFFECTED AREA TWICE DAILY AS NEEDED. DO NOT APPLY TO FACE. 30 g 0     blood glucose (FREESTYLE PRECISION LUIS TEST) test strip Use to test blood sugar 1-2 times daily or as directed. 50 strip 11     blood glucose (NO BRAND SPECIFIED) test strip Use up to 3 times a day 1 Box 5     Blood Glucose Monitoring Suppl (BLOOD GLUCOSE MONITOR SYSTEM) w/Device KIT 1 kit daily 1 kit 0     Continuous Blood Gluc  (FREESTYLE JOSE 14 DAY READER) DARIEN 1 EACH DAILY USE TO TEST BLOOD SUGAR DAILY PER 'S INSTRUCTIONS. 1 each 0     Easy Touch Lancets 33G/Twist MISC USE TO TEST BLOOD SUGAR 3 TIMES DAILY OR AS DIRECTED. **33 DAYS SUPPLY** 100 each 5     fluocinonide (LIDEX) 0.05 % external solution Apply topically 2 times daily new 60 mL 3     Lancet Devices (EASY TOUCH LANCING DEVICE) MISC USE TO TEST BLOOD SUGARS 1 each 0     naproxen (NAPROSYN) 375 MG tablet TAKE 1 TABLET (375 MG) BY MOUTH 2 TIMES DAILY AS NEEDED FOR MODERATE PAIN TAKE WITH FOOD 60 tablet 1     order for DME Equipment being ordered: 1 seated 4-wheel walker with brakes 1 Device 0     ORDER FOR DME  (Continuous Positive Airway Pressure) nightly       Skin Protectants, Misc. (VASELINE CONSTANT CARE) OINT Externally apply topically 3 times daily 1 Tube 3       O: BP (!) 148/85 (BP Location: Left arm, Patient Position: Sitting, Cuff Size: Adult Large)   Pulse 71   Temp 98.1  F (36.7  C) (Oral)   Resp 16   Wt 101.6 kg (224 lb)   SpO2 98%   BMI 38.45 kg/m     Gen:  Well nourished and in no acute distress   HEENT: PERRL;  oropharynx pink and moist; right TM and canal look irritated and cobblestone texture; decreased hearing of right ear  Skin: scaly plaques with silver scale over forearms, face, scalp, and legs.   Neck: supple without lymphadenopathy  CV:  RRR  - no murmurs noted   Pulm:  CTAB, no wheezes or crackles noted, good air entry   ABD: soft, nontender, no masses, no rebound, bowel sounds  present  Extrem: trace peripheral edema bilaterally  Psych: Euthymic

## 2022-04-29 NOTE — NURSING NOTE
Due to patient being non-English speaking/uses sign language, an  was used for this visit. Only for face-to-face interpretation by an external agency, date and length of interpretation can be found on the scanned worksheet.       name:  AMY SORENSON  Agency: [employer]  Language: Ashley  Telephone number:   [phone]  Type of interpretation:  FACE TO FACE, Spoken    ANATOLIY Pope  8:21 AM  4/29/2022

## 2022-04-29 NOTE — LETTER
May 4, 2022        KENIA Mackey  [address] [place] [address]        Dear ,    We are writing to inform you of your test results.    As we discussed your average sugar was still a little high - goal is get A1c under 8.0.  We agreed you would take all 4 Metformin pills at the same time each day and continue Empagliflozin (Jardiance).       Your other labs show that your kidneys are working well - good!  Your uric acid (gout) and bad cholesterol (LDL) are both higher than I would expect for someone taking medications.  So, please make sure you are taking Febuxostat (ULORIC) and Atorvastatin (LIPITOR) EVERY day and do not miss doses - OK?       Take care!  See you again in about 3 months - or sooner if needed, Jason Will   Hemoglobin A1c   Result Value Ref Range    Hemoglobin A1C 8.4 (H) 0.0 - 5.6 %      Comment:      Normal <5.7%   Prediabetes 5.7-6.4%    Diabetes 6.5% or higher     Note: Adopted from ADA consensus guidelines.   Lipid Panel   Result Value Ref Range    Cholesterol 234 (H) <=199 mg/dL    Triglycerides 217 (H) <=149 mg/dL    Direct Measure HDL 43 >=40 mg/dL      Comment:      HDL Cholesterol Reference Range:     0-2 years:   No reference ranges established for patients under 2 years old  at [facility] for lipid analytes.    2-8 years:  Greater than 45 mg/dL     18 years and older:   Female: Greater than or equal to 50 mg/dL   Male:   Greater than or equal to 40 mg/dL    LDL Cholesterol Calculated 148 (H) <=129 mg/dL    Patient Fasting > 8hrs? Unknown    Basic metabolic panel   Result Value Ref Range    Sodium 139 136 - 145 mmol/L    Potassium 4.6 3.5 - 5.0 mmol/L    Chloride 103 98 - 107 mmol/L    Carbon Dioxide (CO2) 23 22 - 31 mmol/L    Anion Gap 13 5 - 18 mmol/L    Urea Nitrogen 17 8 - 22 mg/dL    Creatinine 1.04 0.70 - 1.30 mg/dL    Calcium 9.8 8.5 - 10.5 mg/dL    Glucose 173 (H) 70 - 125 mg/dL    GFR Estimate 82 >60 mL/min/1.73m2      Comment:       Effective December 21, 2021 eGFRcr in adults is calculated using the 2021 CKD-EPI creatinine equation which includes age and gender (Pinky et al., NEJ, DOI: 10.1056/DNVTyg5470270)   Albumin Random Urine Quantitative with Creat Ratio   Result Value Ref Range    Microalbumin Urine mg/dL <0.50 0.00 - 1.99 mg/dL    Creatinine Urine mg/dL 43 mg/dL    Microalbumin Urine mg/g Cr        Comment:      Unable to calculate:  Urine creatinine or microalbumin value below detectable level    Narrative    Microalbumin, Random Urine   <2.0 mg/dL . . . . . . . . Normal   3.0-30.0 mg/dL . . . . . . Microalbuminuria   >30.0 mg/dL . . . . . .  . Clinical Proteinuria     Microalbumin/Creatinine Ratio, Random Urine   <20 mg/g . . . . .. . . . Normal    mg/g . . . . . . . Microalbuminuria   >300 mg/g . . . . . . . . Clinical Proteinuria   Uric acid   Result Value Ref Range    Uric Acid 9.7 (H) 3.0 - 8.0 mg/dL       If you have any questions or concerns, please call the clinic at the number listed above.       Sincerely,      Jason Siddiqui MD

## 2022-05-04 NOTE — RESULT ENCOUNTER NOTE
Clyde Peña and family -   As we discussed your average sugar was still a little high - goal is get A1c under 8.0.  We agreed you would take all 4 Metformin pills at the same time each day and continue Empagliflozin (Jardiance).      Your other labs show that your kidneys are working well - good!  Your uric acid (gout) and bad cholesterol (LDL) are both higher than I would expect for someone taking medications.  So, please make sure you are taking Febuxostat (ULORIC) and Atorvastatin (LIPITOR) EVERY day and do not miss doses - OK?      Take care!  See you again in about 3 months - or sooner if needed, Jason Siddiqui

## 2022-05-06 ENCOUNTER — TRANSCRIBE ORDERS (OUTPATIENT)
Dept: FAMILY MEDICINE | Facility: CLINIC | Age: 62
End: 2022-05-06
Payer: COMMERCIAL

## 2022-05-06 ENCOUNTER — TELEPHONE (OUTPATIENT)
Dept: FAMILY MEDICINE | Facility: CLINIC | Age: 62
End: 2022-05-06
Payer: COMMERCIAL

## 2022-05-06 DIAGNOSIS — H91.91 DECREASED HEARING OF RIGHT EAR: Primary | ICD-10-CM

## 2022-05-06 NOTE — TELEPHONE ENCOUNTER
Prior Authorization Retail Medication Request    Medication/Dose: Continuous Blood Gluc Sensor (FREESTYLE JOSE 14 DAY SENSOR)     ICD code (if different than what is on RX):  Type 2 diabetes mellitus with hyperglycemia, without long-term current use of insulin (H) [E11.65]  - Primary  Previously Tried and Failed:  n/a  Rationale:  Patient requires script to actively monitor blood glucose levels.     Insurance Name:  [facility] [[id number]]  Insurance ID:  [id number]       Pharmacy Information (if different than what is on RX)  Name:  [place], [place] - [address] [place] [address]  Phone:  [phone]

## 2022-05-11 NOTE — TELEPHONE ENCOUNTER
Central Prior Authorization Team   Phone: [phone]    PA Initiation    Medication: Continuous Blood Gluc Sensor (FREESTYLE JOSE 14 DAY SENSOR)   Insurance Company: [employer] - Phone [phone] Fax [phone]  Pharmacy Filling the Rx: [place], [place] - [address] [place] [address]   Filling Pharmacy Phone: [phone]  Filling Pharmacy Fax: [phone]  Start Date: 5/11/2022

## 2022-07-11 DIAGNOSIS — E55.9 HYPOVITAMINOSIS D: ICD-10-CM

## 2022-07-11 RX ORDER — CHOLECALCIFEROL (VITAMIN D3) 50 MCG
50 TABLET ORAL DAILY
Qty: 90 TABLET | Refills: 1 | Status: SHIPPED | OUTPATIENT
Start: 2022-07-11 | End: 2023-06-01

## 2022-07-25 DIAGNOSIS — L40.9 PSORIASIS: ICD-10-CM

## 2022-07-27 RX ORDER — FLUOCINONIDE TOPICAL SOLUTION USP, 0.05% 0.5 MG/ML
SOLUTION TOPICAL
Qty: 60 ML | Refills: 0 | Status: SHIPPED | OUTPATIENT
Start: 2022-07-27 | End: 2022-10-03

## 2022-07-27 NOTE — TELEPHONE ENCOUNTER
Refill request for Lidex.  Derm referral placed at last visit (4/29/22), but do not see that was completed yet.  Approved, and sent message to referral coordinator.    Shelbi Henriquez MD  (covering for Dr. Siddiqui while out of office)

## 2022-07-28 NOTE — TELEPHONE ENCOUNTER
Referral coordinator helped set-up derm appt on 8/8:    Future Appointments   Date Time Provider Department Center   8/1/2022 11:20 AM Vianney Perez MD [facility]   8/8/2022  2:15 PM Kelli Lynch, ANTOINETTE Henriquez MD  (covering for Dr. Siddiqui while out of office)

## 2022-08-01 ENCOUNTER — OFFICE VISIT (OUTPATIENT)
Dept: FAMILY MEDICINE | Facility: CLINIC | Age: 62
End: 2022-08-01
Payer: COMMERCIAL

## 2022-08-01 VITALS
BODY MASS INDEX: 37.8 KG/M2 | TEMPERATURE: 98.9 F | OXYGEN SATURATION: 97 % | WEIGHT: 220.2 LBS | SYSTOLIC BLOOD PRESSURE: 146 MMHG | DIASTOLIC BLOOD PRESSURE: 88 MMHG | RESPIRATION RATE: 20 BRPM | HEART RATE: 91 BPM

## 2022-08-01 DIAGNOSIS — R07.89 CHEST TIGHTNESS: Primary | ICD-10-CM

## 2022-08-01 DIAGNOSIS — L40.9 PSORIASIS: ICD-10-CM

## 2022-08-01 PROCEDURE — 93000 ELECTROCARDIOGRAM COMPLETE: CPT | Mod: GC | Performed by: STUDENT IN AN ORGANIZED HEALTH CARE EDUCATION/TRAINING PROGRAM

## 2022-08-01 PROCEDURE — 99214 OFFICE O/P EST MOD 30 MIN: CPT | Mod: GC | Performed by: STUDENT IN AN ORGANIZED HEALTH CARE EDUCATION/TRAINING PROGRAM

## 2022-08-01 ASSESSMENT — PATIENT HEALTH QUESTIONNAIRE - PHQ9: SUM OF ALL RESPONSES TO PHQ QUESTIONS 1-9: 3

## 2022-08-01 NOTE — PROGRESS NOTES
Assessment & Plan     Chest tightness  Chest tightness is concerning for ACS.  He does have significant risk factors for ACS/MI given that he has diabetes, hypertension, hyperlipidemia, obesity.  Reassuringly, despite having symptoms at this time, his EKG is negative for any STEMI findings.  I counseled him that he should have a stress test completed soon to help further characterize this pain, and to help with management going forward.  He expressed understanding of this.  Symptoms do not seem to be consistent with pericarditis, GERD, acute illness, pulm.  - EKG 12-lead, tracing only  - Nuclear Med with Lexiscan (Stress Test); Future    Psoriasis  Unfortunately, it does not appear that he has had significant improvement of his psoriasis with the new medication that was prescribed to him during his last visit.  Fortunately however, he does have follow-up with dermatology 1 week from now.  Strongly encouraged him to keep this appointment and to follow-up with them for further management of psoriasis.      Review of prior external note(s) from - previous office visits  30 minutes spent on the date of the encounter doing chart review, history and exam, documentation and further activities per the note         Vianney Perez MD PGY3  [facility]  Precepted with Dr. Henriquez    Subjective   Sa KENIA Mackey is a 62 year old who presents for the following health issues  accompanied by his     HPI     Presents to discuss chest tightness. Started 6/12/22 when he was laying down, felt as though it was a throbbing pain. It disappeared on its own. Had dull aching pain in July. Unassociated with activity, and is experiencing it now. Notes that he was fairly active yesterday and did not have similar pain. No diaphoresis, lightheadedness, syncope. No association with meals, no nausea, vomiting, diarrhea. Feeling constipation.     Also wants to discuss rash on his hands. Reviewed previous office  visits, noting that he has been prescribed treatments, and has been referred to derm. Appears that he has an appointment scheduled for 8/8. Feels as though the cream has not helped, and believes that the rash has spread.     Review of Systems   Complete ROS normal aside noted in HPI      Objective    BP (!) 146/88   Pulse 91   Temp 98.9  F (37.2  C) (Oral)   Resp 20   Wt 99.9 kg (220 lb 3.2 oz)   SpO2 97%   BMI 37.80 kg/m    Body mass index is 37.8 kg/m .    Physical Exam   GENERAL: healthy, alert and no distress  RESP: lungs clear to auscultation - no rales, rhonchi or wheezes  CV: regular rate and rhythm, normal S1 S2, no S3 or S4, no murmur, click or rub, no peripheral edema and peripheral pulses strong, no reproducible TTP with palpation of chest wall  ABDOMEN: soft, nontender, no hepatosplenomegaly, no masses and bowel sounds normal  MS: no gross musculoskeletal defects noted, no edema  SKIN: Multiple patches of psoriasis throughout chest, abdomen, back, arms, hands, scalp.  No active bleeding, bruising.    EKG - Reviewed and interpreted by me appears normal, NSR, normal axis, normal intervals, no acute ST/T changes c/w ischemia, no LVH by voltage criteria, unchanged from previous tracings    ----- Service Performed and Documented by Resident or Fellow ------

## 2022-08-01 NOTE — PATIENT INSTRUCTIONS
EKG looks reassuring against heart attack today    Regarding the rash, please follow up with dermatology on 8/8 2:00pm at [facility]

## 2022-08-01 NOTE — PROGRESS NOTES
Preceptor attestation:  Vital signs reviewed: BP (!) 146/88   Pulse 91   Temp 98.9  F (37.2  C) (Oral)   Resp 20   Wt 99.9 kg (220 lb 3.2 oz)   SpO2 97%   BMI 37.80 kg/m      Patient seen, evaluated, and discussed with the resident.  I have verified the content of the note, which accurately reflects my assessment of the patient and the plan of care.    I reviewed the EKG, and I agree with Dr. Perez's interpretation.    Supervising physician: Shelbi Henriquez MD  [facility]

## 2022-08-01 NOTE — NURSING NOTE
Due to patient being non-English speaking/uses sign language, an  was used for this visit. Only for face-to-face interpretation by an external agency, date and length of interpretation can be found on the scanned worksheet.     name: Taye Ferrell  Agency: [employer]  Language: Liam   Telephone number: [phone]  Type of interpretation: Face-to-face, spoken

## 2022-08-02 DIAGNOSIS — E11.65 TYPE 2 DIABETES MELLITUS WITH HYPERGLYCEMIA, WITHOUT LONG-TERM CURRENT USE OF INSULIN (H): ICD-10-CM

## 2022-08-02 RX ORDER — METFORMIN HCL 500 MG
2000 TABLET, EXTENDED RELEASE 24 HR ORAL DAILY
Qty: 360 TABLET | Refills: 1 | Status: SHIPPED | OUTPATIENT
Start: 2022-08-02 | End: 2023-10-16

## 2022-08-02 RX ORDER — ATORVASTATIN CALCIUM 80 MG/1
80 TABLET, FILM COATED ORAL DAILY
Qty: 90 TABLET | Refills: 1 | Status: SHIPPED | OUTPATIENT
Start: 2022-08-02 | End: 2022-10-03

## 2022-08-08 ENCOUNTER — OFFICE VISIT (OUTPATIENT)
Dept: DERMATOLOGY | Facility: CLINIC | Age: 62
End: 2022-08-08
Attending: FAMILY MEDICINE
Payer: COMMERCIAL

## 2022-08-08 DIAGNOSIS — L40.9 PSORIASIS: ICD-10-CM

## 2022-08-08 DIAGNOSIS — Z51.81 THERAPEUTIC DRUG MONITORING: Primary | ICD-10-CM

## 2022-08-08 LAB
ALBUMIN SERPL-MCNC: 3.9 G/DL (ref 3.4–5)
ALP SERPL-CCNC: 125 U/L (ref 40–150)
ALT SERPL W P-5'-P-CCNC: 45 U/L (ref 0–70)
ANION GAP SERPL CALCULATED.3IONS-SCNC: 6 MMOL/L (ref 3–14)
AST SERPL W P-5'-P-CCNC: 29 U/L (ref 0–45)
BILIRUB SERPL-MCNC: 0.5 MG/DL (ref 0.2–1.3)
BUN SERPL-MCNC: 21 MG/DL (ref 7–30)
CALCIUM SERPL-MCNC: 9.2 MG/DL (ref 8.5–10.1)
CHLORIDE BLD-SCNC: 101 MMOL/L (ref 94–109)
CO2 SERPL-SCNC: 28 MMOL/L (ref 20–32)
CREAT SERPL-MCNC: 0.81 MG/DL (ref 0.66–1.25)
ERYTHROCYTE [DISTWIDTH] IN BLOOD BY AUTOMATED COUNT: 19.1 % (ref 10–15)
GFR SERPL CREATININE-BSD FRML MDRD: >90 ML/MIN/1.73M2
GLUCOSE BLD-MCNC: 98 MG/DL (ref 70–99)
HCT VFR BLD AUTO: 42.6 % (ref 40–53)
HGB BLD-MCNC: 13.7 G/DL (ref 13.3–17.7)
MCH RBC QN AUTO: 21.1 PG (ref 26.5–33)
MCHC RBC AUTO-ENTMCNC: 32.2 G/DL (ref 31.5–36.5)
MCV RBC AUTO: 66 FL (ref 78–100)
PLATELET # BLD AUTO: 285 10E3/UL (ref 150–450)
POTASSIUM BLD-SCNC: 4.5 MMOL/L (ref 3.4–5.3)
PROT SERPL-MCNC: 8.5 G/DL (ref 6.8–8.8)
RBC # BLD AUTO: 6.48 10E6/UL (ref 4.4–5.9)
SODIUM SERPL-SCNC: 135 MMOL/L (ref 133–144)
WBC # BLD AUTO: 10 10E3/UL (ref 4–11)

## 2022-08-08 PROCEDURE — 87340 HEPATITIS B SURFACE AG IA: CPT | Performed by: PHYSICIAN ASSISTANT

## 2022-08-08 PROCEDURE — 36415 COLL VENOUS BLD VENIPUNCTURE: CPT | Performed by: PHYSICIAN ASSISTANT

## 2022-08-08 PROCEDURE — 86803 HEPATITIS C AB TEST: CPT | Performed by: PHYSICIAN ASSISTANT

## 2022-08-08 PROCEDURE — 99204 OFFICE O/P NEW MOD 45 MIN: CPT | Performed by: PHYSICIAN ASSISTANT

## 2022-08-08 PROCEDURE — 85027 COMPLETE CBC AUTOMATED: CPT | Performed by: PHYSICIAN ASSISTANT

## 2022-08-08 PROCEDURE — 86481 TB AG RESPONSE T-CELL SUSP: CPT | Performed by: PHYSICIAN ASSISTANT

## 2022-08-08 PROCEDURE — 80053 COMPREHEN METABOLIC PANEL: CPT | Performed by: PHYSICIAN ASSISTANT

## 2022-08-08 RX ORDER — TRIAMCINOLONE ACETONIDE 1 MG/G
CREAM TOPICAL
Qty: 454 G | Refills: 2 | Status: SHIPPED | OUTPATIENT
Start: 2022-08-08 | End: 2022-10-27

## 2022-08-08 RX ORDER — METHOTREXATE 2.5 MG/1
TABLET ORAL
Qty: 28 TABLET | Refills: 0 | Status: SHIPPED | OUTPATIENT
Start: 2022-08-08 | End: 2022-08-08

## 2022-08-08 RX ORDER — FOLIC ACID 1 MG/1
TABLET ORAL
Qty: 90 TABLET | Refills: 3 | Status: SHIPPED | OUTPATIENT
Start: 2022-08-08 | End: 2022-08-08

## 2022-08-08 RX ORDER — FOLIC ACID 1 MG/1
TABLET ORAL
Qty: 90 TABLET | Refills: 3 | Status: SHIPPED | OUTPATIENT
Start: 2022-08-08 | End: 2022-12-08

## 2022-08-08 RX ORDER — METHOTREXATE 2.5 MG/1
TABLET ORAL
Qty: 28 TABLET | Refills: 0 | Status: SHIPPED | OUTPATIENT
Start: 2022-08-08 | End: 2022-12-08

## 2022-08-08 ASSESSMENT — PAIN SCALES - GENERAL: PAINLEVEL: NO PAIN (0)

## 2022-08-08 NOTE — LETTER
8/8/2022         RE: Sa KENIA Mackey  [address] [place] [address]        Dear Colleague,    Thank you for referring your patient, Sa KENIA Mackey, to the [facility]. Please see a copy of my visit note below.    HPI:   Chief complaints: Sa KENIA Mackey is a pleasant 62 year old male who presents for evaluation of a persistent rash. He has had a scaly, itchy rash all over his body for the past 8-9 months. It did start about 1 month after he got the covid vaccine. He has tried topical betamethasone, lidex but these have not helped. He has never had a rash like this before. No fhx of psoriasis.       PHYSICAL EXAM:    There were no vitals taken for this visit.  Skin exam performed as follows: Type 3 skin. Mood appropriate  Alert and Oriented X 3. Well developed, well nourished in no distress.  General appearance: Normal  Head including face: Normal  Eyes: conjunctiva and lids: Normal  Mouth: Lips, teeth, gums: Normal  Neck: Normal  Cardiovascular: Exam of peripheral vascular system by observation for swelling, varicosities, edema: Normal  Right upper extremity: Normal  Left upper extremity: Normal  Right lower extremity: Normal  Left lower extremity: Normal  Skin: Scalp and body hair: See below    Psoriasiform dermatitis on the arms, legs, trunk, scalp > 20% tbsa    ASSESSMENT/PLAN:     1. Psoriasis - discussed diagnosis and treatment options. Discussed topicals vs NBUVB vs methotrexate. He prefers to try methotrexate.     --3 tablets of methotrexate all at once week #1; increase to 5 tablets starting week #2  --Folic acid on all other days  --CBC, CMP, hep panel checked today  --Recheck CBC and CMP in 1 week  --Advised on potential side effects of increased infection (decreased immunity), liver dysfunction/damage, oncogenesis, decreased blood counts. Advised to avoid EtOH and acetaminophen.   --Recheck in 1 month            Follow-up: 1 month  CC:   Scribed By: Kelli Lynch, MS, PA-C          Again,  thank you for allowing me to participate in the care of your patient.        Sincerely,        Kelli Dee PA-C

## 2022-08-08 NOTE — LETTER
August 24, 2022       KENIA Key  [address] [place] [address]        Dear ,    We are writing to inform you of your test results.    His TB test is positive. I would like him to see infectious diseases urgently to ensure they feel it's safe for him to proceed with methotrexate. He should hold methotrexate in the mean time.       Resulted Orders   Hepatitis B surface antigen   Result Value Ref Range    Hepatitis B Surface Antigen Nonreactive Nonreactive   Hepatitis C antibody   Result Value Ref Range    Hepatitis C Antibody Nonreactive Nonreactive    Narrative    Assay performance characteristics have not been established for newborns, infants, and children.   CBC with platelets   Result Value Ref Range    WBC Count 10.0 4.0 - 11.0 10e3/uL    RBC Count 6.48 (H) 4.40 - 5.90 10e6/uL    Hemoglobin 13.7 13.3 - 17.7 g/dL    Hematocrit 42.6 40.0 - 53.0 %    MCV 66 (L) 78 - 100 fL    MCH 21.1 (L) 26.5 - 33.0 pg    MCHC 32.2 31.5 - 36.5 g/dL    RDW 19.1 (H) 10.0 - 15.0 %    Platelet Count 285 150 - 450 10e3/uL   Comprehensive metabolic panel   Result Value Ref Range    Sodium 135 133 - 144 mmol/L    Potassium 4.5 3.4 - 5.3 mmol/L    Chloride 101 94 - 109 mmol/L    Carbon Dioxide (CO2) 28 20 - 32 mmol/L    Anion Gap 6 3 - 14 mmol/L    Urea Nitrogen 21 7 - 30 mg/dL    Creatinine 0.81 0.66 - 1.25 mg/dL    Calcium 9.2 8.5 - 10.1 mg/dL    Glucose 98 70 - 99 mg/dL    Alkaline Phosphatase 125 40 - 150 U/L    AST 29 0 - 45 U/L    ALT 45 0 - 70 U/L    Protein Total 8.5 6.8 - 8.8 g/dL    Albumin 3.9 3.4 - 5.0 g/dL    Bilirubin Total 0.5 0.2 - 1.3 mg/dL    GFR Estimate >90 >60 mL/min/1.73m2      Comment:      Effective December 21, 2021 eGFRcr in adults is calculated using the 2021 CKD-EPI creatinine equation which includes age and gender (Pinky xiao al., NEJM, DOI: 10.1056/CPMTbl6122187)   Quantiferon TB Gold Plus Grey Tube   Result Value Ref Range    Quantiferon Nil Tube 0.60 IU/mL   Quantiferon TB Gold Plus Green  Tube   Result Value Ref Range    Quantiferon TB1 Tube 1.18 IU/mL   Quantiferon TB Gold Plus Yellow Tube   Result Value Ref Range    Quantiferon TB2 Tube 0.87    Quantiferon TB Gold Plus Purple Tube   Result Value Ref Range    Quantiferon Mitogen 10.00 IU/mL   Quantiferon TB Gold Plus   Result Value Ref Range    Quantiferon-TB Gold Plus Positive (A) Negative      Comment:      Interferon gamma response to M.tuberculosis antigens was detected,suggesting infection with M.tuberculosis. Positive results in patients at low risk for infection should be interpreted with caution and repeat testing on a new sample should be considered as recommended by the 2017 ATS/IDSA/CDC Clinical Prac  michelle Guidelines for Diagnosis of Tuberculosis in Adults and Children     TB1 Ag minus Nil Value 0.58 IU/mL    TB2 Ag minus Nil Value 0.27 IU/mL    Mitogen minus Nil Result 9.40 IU/mL    Nil Result 0.60 IU/mL       If you have any questions or concerns, please call the clinic at the number listed above.       Sincerely,      Kelli Lynch PA-C

## 2022-08-08 NOTE — PROGRESS NOTES
HPI:   Chief complaints: Sa KENIA Mackey is a pleasant 62 year old male who presents for evaluation of a persistent rash. He has had a scaly, itchy rash all over his body for the past 8-9 months. It did start about 1 month after he got the covid vaccine. He has tried topical betamethasone, lidex but these have not helped. He has never had a rash like this before. No fhx of psoriasis.       PHYSICAL EXAM:    There were no vitals taken for this visit.  Skin exam performed as follows: Type 3 skin. Mood appropriate  Alert and Oriented X 3. Well developed, well nourished in no distress.  General appearance: Normal  Head including face: Normal  Eyes: conjunctiva and lids: Normal  Mouth: Lips, teeth, gums: Normal  Neck: Normal  Cardiovascular: Exam of peripheral vascular system by observation for swelling, varicosities, edema: Normal  Right upper extremity: Normal  Left upper extremity: Normal  Right lower extremity: Normal  Left lower extremity: Normal  Skin: Scalp and body hair: See below    Psoriasiform dermatitis on the arms, legs, trunk, scalp > 20% tbsa    ASSESSMENT/PLAN:     1. Psoriasis - discussed diagnosis and treatment options. Discussed topicals vs NBUVB vs methotrexate. He prefers to try methotrexate.     --3 tablets of methotrexate all at once week #1; increase to 5 tablets starting week #2  --Folic acid on all other days  --CBC, CMP, hep panel checked today  --Recheck CBC and CMP in 1 week  --Advised on potential side effects of increased infection (decreased immunity), liver dysfunction/damage, oncogenesis, decreased blood counts. Advised to avoid EtOH and acetaminophen.   --Recheck in 1 month            Follow-up: 1 month  CC:   Scribed By: Kelli Lynch, MS, PA-C

## 2022-08-08 NOTE — PATIENT INSTRUCTIONS
Methotrexate is a medication used in low doses to treat inflammatory skin conditions such as psoriasis and eczema/dermatitis. It is also prescribed for rheumatoid arthritis, psoriatic arthritis, and increasingly, other inflammatory and autoimmune disorders (off-label). In much higher doses, it is used as a chemotherapy agent for leukemia and some other forms of cancer.    For responding skin diseases, methotrexate usually shows some benefit within 6 to 8 weeks. Maximum effects are generally achieved within 5 to 6 months, depending on dose escalation.    Side effects of methotrexate  Side effects can occur at any time during treatment with methotrexate, but are most common in the first few weeks. Folic acid supplements, is thought to reduce some of the side effects of methotrexate.     If the side effects described below or other problems trouble you, or should you develop any signs of infection or unusual bleeding, notify your doctor promptly and before your next dose of methotrexate is due.      The most common side effects of methotrexate are loss of appetite, nausea and diarrhea, and affect about one in 12 patients. These side effects are usually temporary, but changes in dose and/or supplemental folic acid tablets may be helpful.    An overdose of methotrexate or deficiency of the vitamin folic acid may result in anemia , reduced white cell count, risking serious infections, and low platelet count, resulting in bruising and bleeding.    Methotrexate is stored by the liver. Transaminase liver enzyme levels may rise for a few days after treatment but they quickly return to normal.     Long term therapy may be associated with scarring (fibrosis or cirrhosis) of the liver. This is more commonly due to other reasons such as fatty liver, diabetes, hyperlipidemia, and obesity (ie metabolic syndrome), but can also develop from viral hepatitis and alcohol.    Methotrexate can rarely cause a lung reaction similar to  pneumonia called acute pneumonitis or interstitial pneumonia.      Today  -  Some baseline laboratory tests will be checked  - A prescription for folic acid will be sent to pharmacy for you to start.  (This is a vitamin that can help reduce the side effects of methotrexate)  -  A test dose of the medication to the pharmacy - this will be 3 pills  -  Take all of the pills in the methotrexate prescription on the same day  -  Recheck your labs at any [facility] 7 days from the date you take the test dose  -  Once your blood work is complete, our office we will call and let you know if you can continue methotrexate - then you will increase to 5 tablets once per week

## 2022-08-09 LAB
HBV SURFACE AG SERPL QL IA: NONREACTIVE
HCV AB SERPL QL IA: NONREACTIVE

## 2022-08-10 LAB
GAMMA INTERFERON BACKGROUND BLD IA-ACNC: 0.6 IU/ML
M TB IFN-G BLD-IMP: POSITIVE
M TB IFN-G CD4+ BCKGRND COR BLD-ACNC: 9.4 IU/ML
MITOGEN IGNF BCKGRD COR BLD-ACNC: 0.27 IU/ML
MITOGEN IGNF BCKGRD COR BLD-ACNC: 0.58 IU/ML
QUANTIFERON MITOGEN: 10 IU/ML
QUANTIFERON NIL TUBE: 0.6 IU/ML
QUANTIFERON TB1 TUBE: 1.18 IU/ML
QUANTIFERON TB2 TUBE: 0.87

## 2022-08-11 ENCOUNTER — TELEPHONE (OUTPATIENT)
Dept: DERMATOLOGY | Facility: CLINIC | Age: 62
End: 2022-08-11

## 2022-08-11 DIAGNOSIS — L40.9 PSORIASIS: Primary | ICD-10-CM

## 2022-08-11 NOTE — TELEPHONE ENCOUNTER
----- Message from Kelli Lynch PA-C sent at 8/10/2022  4:01 PM CDT -----  His TB test is positive. I would like him to see infectious diseases urgently to ensure they feel it's safe for him to proceed with methotrexate. He should hold methotrexate in the mean time.

## 2022-08-15 ENCOUNTER — OFFICE VISIT (OUTPATIENT)
Dept: FAMILY MEDICINE | Facility: CLINIC | Age: 62
End: 2022-08-15
Payer: COMMERCIAL

## 2022-08-15 ENCOUNTER — ANCILLARY PROCEDURE (OUTPATIENT)
Dept: GENERAL RADIOLOGY | Facility: CLINIC | Age: 62
End: 2022-08-15
Attending: FAMILY MEDICINE
Payer: COMMERCIAL

## 2022-08-15 VITALS
SYSTOLIC BLOOD PRESSURE: 147 MMHG | HEART RATE: 80 BPM | BODY MASS INDEX: 38.31 KG/M2 | WEIGHT: 223.2 LBS | OXYGEN SATURATION: 96 % | TEMPERATURE: 97.9 F | RESPIRATION RATE: 16 BRPM | DIASTOLIC BLOOD PRESSURE: 76 MMHG

## 2022-08-15 DIAGNOSIS — E11.65 TYPE 2 DIABETES MELLITUS WITH HYPERGLYCEMIA, WITHOUT LONG-TERM CURRENT USE OF INSULIN (H): Primary | ICD-10-CM

## 2022-08-15 DIAGNOSIS — L40.9 PSORIASIS: ICD-10-CM

## 2022-08-15 DIAGNOSIS — F33.1 MODERATE EPISODE OF RECURRENT MAJOR DEPRESSIVE DISORDER (H): ICD-10-CM

## 2022-08-15 DIAGNOSIS — R76.12 POSITIVE QUANTIFERON-TB GOLD TEST: ICD-10-CM

## 2022-08-15 LAB
CHOLEST SERPL-MCNC: 218 MG/DL
HBA1C MFR BLD: 8 % (ref 0–5.6)
HDLC SERPL-MCNC: 45 MG/DL
LDLC SERPL CALC-MCNC: 127 MG/DL
NONHDLC SERPL-MCNC: 173 MG/DL
TRIGL SERPL-MCNC: 232 MG/DL

## 2022-08-15 PROCEDURE — 99214 OFFICE O/P EST MOD 30 MIN: CPT | Performed by: FAMILY MEDICINE

## 2022-08-15 PROCEDURE — 71046 X-RAY EXAM CHEST 2 VIEWS: CPT | Mod: TC | Performed by: RADIOLOGY

## 2022-08-15 PROCEDURE — 36415 COLL VENOUS BLD VENIPUNCTURE: CPT | Performed by: FAMILY MEDICINE

## 2022-08-15 PROCEDURE — 83036 HEMOGLOBIN GLYCOSYLATED A1C: CPT | Performed by: FAMILY MEDICINE

## 2022-08-15 PROCEDURE — 80061 LIPID PANEL: CPT | Performed by: FAMILY MEDICINE

## 2022-08-15 NOTE — LETTER
August 17, 2022      Sa T Eky  [address] [place] [address]        Dear MrKana,    We are writing to inform you of your test results.    Sa Than - your bad cholesterol LDL is high again - this looks like you are not taking Atorvastatin.  Can you make sure to take that medication every day?  It protects you from having a heart attack.  Take care, Dr Jason Siddiqui       Resulted Orders   Lipid Profile   Result Value Ref Range    Cholesterol 218 (H) <200 mg/dL    Triglycerides 232 (H) <150 mg/dL    Direct Measure HDL 45 >=40 mg/dL    LDL Cholesterol Calculated 127 (H) <=100 mg/dL    Non HDL Cholesterol 173 (H) <130 mg/dL    Narrative    Cholesterol  Desirable:  <200 mg/dL    Triglycerides  Normal:  Less than 150 mg/dL  Borderline High:  150-199 mg/dL  High:  200-499 mg/dL  Very High:  Greater than or equal to 500 mg/dL    Direct Measure HDL  Female:  Greater than or equal to 50 mg/dL   Male:  Greater than or equal to 40 mg/dL    LDL Cholesterol  Desirable:  <100mg/dL  Above Desirable:  100-129 mg/dL   Borderline High:  130-159 mg/dL   High:  160-189 mg/dL   Very High:  >= 190 mg/dL    Non HDL Cholesterol  Desirable:  130 mg/dL  Above Desirable:  130-159 mg/dL  Borderline High:  160-189 mg/dL  High:  190-219 mg/dL  Very High:  Greater than or equal to 220 mg/dL   Hemoglobin A1c   Result Value Ref Range    Hemoglobin A1C 8.0 (H) 0.0 - 5.6 %      Comment:      Normal <5.7%   Prediabetes 5.7-6.4%    Diabetes 6.5% or higher     Note: Adopted from ADA consensus guidelines.       If you have any questions or concerns, please call the clinic at the number listed above.       Sincerely,      Jason Siddiqui MD

## 2022-08-15 NOTE — PROGRESS NOTES
Sa was seen today for diabetes and derm problem.    Diagnoses and all orders for this visit:    Type 2 diabetes mellitus with hyperglycemia, without long-term current use of insulin (H)  -     Lipid Profile  -     Hemoglobin A1c    Psoriasis    Moderate episode of recurrent major depressive disorder (H)    Positive QuantiFERON-TB Gold test      Given his lack of symptoms and negative chest x-ray and the fact that he has not recently arrived refugee, we would ordinarily treat him with 4 months of rifampin.  However there is a national shortage of this medication.  I consulted with pharmacy about Best practice.    The dermatologist is interested in receiving clearance to use methotrexate and I cannot provide this and agree that processing a referral to ID is appropriate for this purpose.    I have encouraged patient to continue his current management of diabetes given his reasonable average sugar today.    He is sad about his father's illness and I verified that he is receiving therapy at this time and will continue to do so weekly.    Total visit time with patient was 25 mins, all of which was face to face MD time, and over 50% of this time was spent in counseling and coordination of care.  Including post-encounter documentation and orders, total encounter time was 32 mins.      Subjective:  Is a 62-year-old Greenlandic speaking male who is well-known to me.  He attends today primarily to follow-up on his diabetes but has several other concerns.    1.  Diabetes: He believes this is going well.  He is adherent with his treatment and has no concerns at this time.    2.  Psoriasis: Since I have last seen him he has had a massive eruption in his psoriasis which now covers his entire trunk.  He was referred by another provider to dermatology who want to start methotrexate however as part of the work-up, QuantiFERON gold has come back positive.  They have asked him to stop methotrexate and have referred him to infectious  diseases for a consult.  Patient actually never got the message to stop methotrexate since his usual phone number is temporarily not functioning.    3.  Positive QuantiFERON gold: Patient reports that when he came to the [place] about 25 years ago, his PPD was negative.  As part of a job application over 10 years ago he had a repeat test which was also negative.  He is not aware of having been exposed to anyone with TB.  He denies any cough or hemoptysis or fever or any other symptoms suggestive of active TB.    4.  Depressed mood: He is very upset about his father's illness.  He [place] about 1 hour away to live with his father.  He tells me that his father becomes lonely at night and wants him to sleep with him.  He also talks a lot at night.    Objective:  BP (!) 147/76   Pulse 80   Temp 97.9  F (36.6  C) (Oral)   Resp 16   Wt 101.2 kg (223 lb 3.2 oz)   SpO2 96%   BMI 38.31 kg/m    His blood pressure is mildly elevated.  He remains morbidly obese.  He has a very angry dermatitis with multiple plaques evident across his trunk and forearms.  He is using what appears to be turmeric to help calm his skin.    I reviewed the recent dermatology notes and lab results that have been ordered.    I performed a chest x-ray and personally reviewed this.  The inspiration is not ideal but I cannot see any lesions in his lung fields and his heart size appears normal.    Results for orders placed or performed in visit on 08/15/22   XR CHEST 2 VW     Status: None    Narrative    EXAM: XR CHEST 2 VIEWS  LOCATION: [facility]  DATE/TIME: 8/15/2022 9:04 AM    INDICATION: Positive QuantiFeron gold.  COMPARISON: None.      Impression    IMPRESSION: Lungs are clear. No pleural effusion. Heart size and pulmonary vascularity within normal limits.   Results for orders placed or performed in visit on 08/15/22   Hemoglobin A1c     Status: Abnormal   Result Value  Ref Range    Hemoglobin A1C 8.0 (H) 0.0 - 5.6 %

## 2022-08-15 NOTE — LETTER
August 17, 2022      Sa KENIA Mackey  [address] [place] [address]        Dear ,    We are writing to inform you of your test results.        There is no sign of TB on your chest X-ray.  Jason Siddiqui       Resulted Orders   XR CHEST 2 VW    Narrative    EXAM: XR CHEST 2 VIEWS  LOCATION: [facility]  DATE/TIME: 8/15/2022 9:04 AM    INDICATION: Positive QuantiFeron gold.  COMPARISON: None.      Impression    IMPRESSION: Lungs are clear. No pleural effusion. Heart size and pulmonary vascularity within normal limits.       If you have any questions or concerns, please call the clinic at the number listed above.       Sincerely,      Jason Siddiqui MD

## 2022-08-15 NOTE — NURSING NOTE
Due to patient being non-English speaking/uses sign language, an  was used for this visit. Only for face-to-face interpretation by an external agency, date and length of interpretation can be found on the scanned worksheet.     name: Taye Ferrell  Agency: [employer]  Language: Liam   Telephone number: [phone]  Type of interpretation: Face-to-face, spoken

## 2022-08-15 NOTE — PATIENT INSTRUCTIONS
Diabetes control slightly improved - good!    Dermatologist does not want you to take METHOTREXATE because your blood test for TB exposure was positive.  We are referring you to [facility] clinic to get cleared.      Hemoglobin A1C   Date Value Ref Range Status   08/15/2022 8.0 (H) 0.0 - 5.6 % Final     Comment:     Normal <5.7%   Prediabetes 5.7-6.4%    Diabetes 6.5% or higher     Note: Adopted from ADA consensus guidelines.   04/29/2022 8.4 (H) 0.0 - 5.6 % Final     Comment:     Normal <5.7%   Prediabetes 5.7-6.4%    Diabetes 6.5% or higher     Note: Adopted from ADA consensus guidelines.   11/15/2021 8.2 (H) 0.0 - 5.6 % Final     Comment:     Normal <5.7%   Prediabetes 5.7-6.4%    Diabetes 6.5% or higher     Note: Adopted from ADA consensus guidelines.   01/29/2021 7.8 (H) 4.1 - 5.7 % Final   12/04/2020 9.0 (H) 4.1 - 5.7 % Final   03/05/2020 10.2 (H) 4.1 - 5.7 % Final

## 2022-08-16 NOTE — RESULT ENCOUNTER NOTE
Sa Than - your bad cholesterol LDL is high again - this looks like you are not taking Atorvastatin.  Can you make sure to take that medication every day?  It protects you from having a heart attack.  Take care, Dr Jason Siddiqui

## 2022-08-18 ENCOUNTER — DOCUMENTATION ONLY (OUTPATIENT)
Dept: PHARMACY | Facility: CLINIC | Age: 62
End: 2022-08-18

## 2022-08-18 NOTE — PROGRESS NOTES
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  Rifampin is currently on backorder so [facility] is limiting its use to those at highest risk to convert to active TB, which are -       Age < 5     Cancer/chemotherapy     HIV     Recent transplant or transplant candidate     Contact within the last 2 years with a known active case    Behavioral issues (addiction, homelessness) that need supervision/close follow-up to complete therapy     Arrival to the [place] within the past 5 years.     Spoke with Dr. Siddiqui and patient does not meet this criteria.  However, patient is to start methotrexate for severe psoriasis, so he does fall into an immunocompromise state.  I reached out to Shavon at [facility] and she stated the patient would qualify for rifampin given his methotrexate use.  She requested that in the \"other disease states\" section on the request form we put in that he is to start methotrexate for psoriasis.    I looked at drug interactions with rifampin and this patient as he is on a number of them.  The 2 that stood out to me are that the rifampin can decrease the effectiveness of his amlodipine but increase the effectiveness of his metoprolol.  I think it will be fine to  start him on rifampin the patient should be educated to test his blood pressure especially when he first starts the rifampin.  He should also be followed closely by pharmacy.    Finally, patient should come in for a specific LTBI visit.  The dotphrase that would be used is [dot][id number].      Savi Lema, Pharm.D.      "

## 2022-08-18 NOTE — PROGRESS NOTES
That sounds good to me. I would like to make sure ID feels it's ok for him to be on methotrexate since his psoriasis is severe.

## 2022-08-19 ENCOUNTER — HOSPITAL ENCOUNTER (OUTPATIENT)
Dept: NUCLEAR MEDICINE | Facility: HOSPITAL | Age: 62
Discharge: HOME | End: 2022-08-19
Attending: FAMILY MEDICINE
Payer: COMMERCIAL

## 2022-08-19 ENCOUNTER — HOSPITAL ENCOUNTER (OUTPATIENT)
Dept: CARDIOLOGY | Facility: HOSPITAL | Age: 62
Discharge: HOME | End: 2022-08-19
Attending: FAMILY MEDICINE
Payer: COMMERCIAL

## 2022-08-19 DIAGNOSIS — R07.89 CHEST TIGHTNESS: ICD-10-CM

## 2022-08-19 LAB
CV STRESS CURRENT BP HE: NORMAL
CV STRESS CURRENT HR HE: 66
CV STRESS CURRENT HR HE: 72
CV STRESS CURRENT HR HE: 74
CV STRESS CURRENT HR HE: 79
CV STRESS CURRENT HR HE: 81
CV STRESS CURRENT HR HE: 83
CV STRESS CURRENT HR HE: 83
CV STRESS CURRENT HR HE: 86
CV STRESS CURRENT HR HE: 87
CV STRESS CURRENT HR HE: 87
CV STRESS CURRENT HR HE: 91
CV STRESS CURRENT HR HE: 94
CV STRESS CURRENT HR HE: 97
CV STRESS DEVIATION TIME HE: NORMAL
CV STRESS ECHO PERCENT HR HE: NORMAL
CV STRESS EXERCISE STAGE HE: NORMAL
CV STRESS FINAL RESTING BP HE: NORMAL
CV STRESS FINAL RESTING HR HE: 81
CV STRESS MAX HR HE: 97
CV STRESS MAX TREADMILL GRADE HE: 0
CV STRESS MAX TREADMILL SPEED HE: 0
CV STRESS PEAK DIA BP HE: NORMAL
CV STRESS PEAK SYS BP HE: NORMAL
CV STRESS PHASE HE: NORMAL
CV STRESS PROTOCOL HE: NORMAL
CV STRESS RESTING PT POSITION HE: NORMAL
CV STRESS ST DEVIATION AMOUNT HE: NORMAL
CV STRESS ST DEVIATION ELEVATION HE: NORMAL
CV STRESS ST EVELATION AMOUNT HE: NORMAL
CV STRESS TEST TYPE HE: NORMAL
CV STRESS TOTAL STAGE TIME MIN 1 HE: NORMAL
NUC STRESS EJECTION FRACTION: 70 %
RATE PRESSURE PRODUCT: NORMAL
STRESS ECHO BASELINE DIASTOLIC HE: 92
STRESS ECHO BASELINE HR: 72
STRESS ECHO BASELINE SYSTOLIC BP: 184
STRESS ECHO CALCULATED PERCENT HR: 61 %
STRESS ECHO LAST STRESS DIASTOLIC BP: 86
STRESS ECHO LAST STRESS HR: 83
STRESS ECHO LAST STRESS SYSTOLIC BP: 182
STRESS ECHO TARGET HR: 159

## 2022-08-19 PROCEDURE — 250N000011 HC RX IP 250 OP 636: Performed by: FAMILY MEDICINE

## 2022-08-19 PROCEDURE — 78452 HT MUSCLE IMAGE SPECT MULT: CPT

## 2022-08-19 PROCEDURE — A9500 TC99M SESTAMIBI: HCPCS | Performed by: FAMILY MEDICINE

## 2022-08-19 PROCEDURE — 343N000001 HC RX 343: Performed by: FAMILY MEDICINE

## 2022-08-19 PROCEDURE — 93018 CV STRESS TEST I&R ONLY: CPT | Performed by: INTERNAL MEDICINE

## 2022-08-19 PROCEDURE — 93016 CV STRESS TEST SUPVJ ONLY: CPT | Performed by: INTERNAL MEDICINE

## 2022-08-19 PROCEDURE — 78452 HT MUSCLE IMAGE SPECT MULT: CPT | Mod: 26 | Performed by: INTERNAL MEDICINE

## 2022-08-19 PROCEDURE — 93017 CV STRESS TEST TRACING ONLY: CPT

## 2022-08-19 RX ORDER — REGADENOSON 0.08 MG/ML
0.4 INJECTION, SOLUTION INTRAVENOUS ONCE
Status: COMPLETED | OUTPATIENT
Start: 2022-08-19 | End: 2022-08-19

## 2022-08-19 RX ORDER — CAFFEINE CITRATE 20 MG/ML
60 SOLUTION INTRAVENOUS
Status: DISCONTINUED | OUTPATIENT
Start: 2022-08-19 | End: 2022-08-19 | Stop reason: HOSPADM

## 2022-08-19 RX ORDER — AMINOPHYLLINE 25 MG/ML
50 INJECTION, SOLUTION INTRAVENOUS
Status: DISCONTINUED | OUTPATIENT
Start: 2022-08-19 | End: 2022-08-19 | Stop reason: HOSPADM

## 2022-08-19 RX ORDER — ALBUTEROL SULFATE 0.83 MG/ML
2.5 SOLUTION RESPIRATORY (INHALATION)
Status: DISCONTINUED | OUTPATIENT
Start: 2022-08-19 | End: 2022-08-19 | Stop reason: HOSPADM

## 2022-08-19 RX ORDER — CAFFEINE 200 MG
200 TABLET ORAL
Status: DISCONTINUED | OUTPATIENT
Start: 2022-08-19 | End: 2022-08-19 | Stop reason: HOSPADM

## 2022-08-19 RX ADMIN — Medication 36.2 MILLICURIE: at 15:21

## 2022-08-19 RX ADMIN — REGADENOSON 0.4 MG: 0.08 INJECTION, SOLUTION INTRAVENOUS at 15:08

## 2022-08-19 RX ADMIN — Medication 8.4 MCI.: at 13:21

## 2022-08-21 NOTE — TELEPHONE ENCOUNTER
RECORDS RECEIVED FROM: Internal   DATE RECEIVED: 08.31.2022   NOTES (Gather within 2 years) STATUS DETAILS   OFFICE NOTE from referring provider   Internal 08.11.2022 Kelli Lynch PA-C   OFFICE NOTE from other specialist Internal  08.15.2022 Jason Siddiqui MD    08.01.2022    Vianney Perez MD          DISCHARGE SUMMARY from hospital     DISCHARGE REPORT from the ER     LABS (any labs) Internal    MEDICATION LIST Internal    IMAGING  (NEED IMAGES AND REPORTS)     Osteomyelitis: Foot imaging      Liver Abscess: Abdominal imaging     Other (anything related to diagnoses

## 2022-08-25 ENCOUNTER — OFFICE VISIT (OUTPATIENT)
Dept: PHARMACY | Facility: CLINIC | Age: 62
End: 2022-08-25
Payer: COMMERCIAL

## 2022-08-25 ENCOUNTER — OFFICE VISIT (OUTPATIENT)
Dept: FAMILY MEDICINE | Facility: CLINIC | Age: 62
End: 2022-08-25
Payer: COMMERCIAL

## 2022-08-25 VITALS
RESPIRATION RATE: 12 BRPM | WEIGHT: 226.4 LBS | DIASTOLIC BLOOD PRESSURE: 72 MMHG | HEART RATE: 84 BPM | BODY MASS INDEX: 38.65 KG/M2 | TEMPERATURE: 98.4 F | SYSTOLIC BLOOD PRESSURE: 122 MMHG | OXYGEN SATURATION: 93 % | HEIGHT: 64 IN

## 2022-08-25 DIAGNOSIS — E11.65 TYPE 2 DIABETES MELLITUS WITH HYPERGLYCEMIA, WITHOUT LONG-TERM CURRENT USE OF INSULIN (H): Primary | ICD-10-CM

## 2022-08-25 DIAGNOSIS — I10 ESSENTIAL HYPERTENSION WITH GOAL BLOOD PRESSURE LESS THAN 140/90: ICD-10-CM

## 2022-08-25 DIAGNOSIS — M1A.9XX0 CHRONIC GOUT WITHOUT TOPHUS, UNSPECIFIED CAUSE, UNSPECIFIED SITE: ICD-10-CM

## 2022-08-25 DIAGNOSIS — L29.9 ITCHING: ICD-10-CM

## 2022-08-25 DIAGNOSIS — Z22.7 LATENT TUBERCULOSIS BY BLOOD TEST: ICD-10-CM

## 2022-08-25 DIAGNOSIS — Z22.7 LATENT TUBERCULOSIS BY BLOOD TEST: Primary | ICD-10-CM

## 2022-08-25 DIAGNOSIS — L40.9 PSORIASIS: ICD-10-CM

## 2022-08-25 PROCEDURE — 99607 MTMS BY PHARM ADDL 15 MIN: CPT | Performed by: PHARMACIST

## 2022-08-25 PROCEDURE — 99214 OFFICE O/P EST MOD 30 MIN: CPT | Performed by: FAMILY MEDICINE

## 2022-08-25 PROCEDURE — 99605 MTMS BY PHARM NP 15 MIN: CPT | Performed by: PHARMACIST

## 2022-08-25 RX ORDER — PREDNISONE 20 MG/1
TABLET ORAL
Qty: 20 TABLET | Refills: 0 | Status: SHIPPED | OUTPATIENT
Start: 2022-08-25 | End: 2022-09-15

## 2022-08-25 RX ORDER — RIFAMPIN 300 MG/1
600 CAPSULE ORAL DAILY
Qty: 60 CAPSULE | Refills: 3 | Status: SHIPPED | OUTPATIENT
Start: 2022-08-25 | End: 2022-09-15

## 2022-08-25 RX ORDER — DIPHENHYDRAMINE HCL 25 MG
25 TABLET ORAL EVERY 6 HOURS PRN
Qty: 100 TABLET | Refills: 3 | Status: SHIPPED | OUTPATIENT
Start: 2022-08-25 | End: 2023-01-27

## 2022-08-25 RX ORDER — AMLODIPINE BESYLATE 5 MG/1
10 TABLET ORAL DAILY
Qty: 180 TABLET | Refills: 1 | Status: SHIPPED | OUTPATIENT
Start: 2022-08-25 | End: 2022-11-25

## 2022-08-25 NOTE — PROGRESS NOTES
Latent TB Evaluation                                                        is seen today for evaluation of latent TB.      Subjective                                                        Patient had a positive IGRA (Quantiferon Gold) on 8/8/22.    The patient denies Fever  Cough  Hemoptysis  Weight Loss  Night Sweats  Fatigue  Decreased Appetite.  There is not a history of exposure to someone with active tuberculosis.      There is not a history of previous treatment for LTBI or a history of intolerance to either isoniazid or rifampin.     Country of origin: [facility] Date of arrival in the [place]: Approximately 1997     TB Risk Factor Questions:  No - Is a refugee who arrived within the last two years?  No - Has a concerning chest xray?  No - Needs for sputum samples for TB?  No - Is a contact to an active TB case?  No - Is a child less than 5 years old?  No - Is without insurance and needs free visits and medications?    Risk of Liver Disease Questions:  History of:  No - liver disease  No - hepatitis B  No - hepatitis C  No - alcoholic hepatitis  No - regular alcohol use  No - HIV  No - pregnant or within 3 months post partum  No - another complicating medical condition (requiring LFTs)  No - other risk factors for liver disease      Review of systems                                                       CONSTITUTIONAL: no fatigue, no unexpected change in weight  SKIN: See photos - very fulminant and angry psoriasis, constantly itching - is very distressed.  Prevents him from sleeping.  Desires some treatment.  EYES: no acute vision problems or changes  ENT: no ear problems, no mouth problems, no throat problems  RESP: no significant cough, no shortness of breath  CV: no chest pain, no palpitations, no new or worsening peripheral edema  GI: no nausea, no vomiting, no constipation, no diarrhea      Objective                                                       Vitals:    08/25/22 1041 08/25/22 1045   BP: (!) 151/78  "122/72   BP Location: Left arm Left arm   Patient Position: Sitting Sitting   Cuff Size: Adult Large Adult Large   Pulse: 84    Resp: 12    Temp: 98.4  F (36.9  C)    TempSrc: Oral    SpO2: 93%    Weight: 102.7 kg (226 lb 6.4 oz)    Height: 1.626 m (5' 4\")      Body mass index is 38.86 kg/m .   appearing stated age, moderate distress, normal affect  Cardiac: NORMAL - regular rate and rhythm without murmur.  Pulmonary: Clear to auscultation without rales or rhonchi, Good diaphragmatic excursion. Lungs clear.   , Percussion normal.  Abdomen: Abdomen soft, non-tender. BS normal. No masses, organomegaly  SKIN:            Chest Xray on 8/15/22: Normal- no infiltrates, effusions, pneumothoraces, cardiomegaly or masses    Assessment                                                        was seen today for recheck and medication reconciliation.    Diagnoses and all orders for this visit:    Latent tuberculosis by blood test  -     rifampin (RIFADIN) 300 MG capsule; Take 2 capsules (600 mg) by mouth daily    Psoriasis  -     predniSONE (DELTASONE) 20 MG tablet; Take 3 pills (60mgs) for 3 days, 2 pills (40mgs) for 3 days, 1 pill (20mgs) for 3 days then 0.5 pill (10mgs) for 4 days    Itching  -     diphenhydrAMINE (BENADRYL) 25 MG tablet; Take 1 tablet (25 mg) by mouth every 6 hours as needed for itching    Essential hypertension with goal blood pressure less than 140/90  -     amLODIPine (NORVASC) 5 MG tablet; Take 2 tablets (10 mg) by mouth daily        Plan                                                       The patient has the following high risk conditions:  None, so we will treat the patient in our clinic.     Sa KENIA Mackey is referred to the clinical pharmacist for medication management and education concerning medications.     Recent liver function tests are normal.     After discussion with our clinical pharmacist, we have elected to prescribe Rifampin at the adult dose of 10 mg/kg with a maximum of 600 mg per day " #30 with 3 RF . I have printed this prescription and given it to Sonia Chávez RN.     Additionally Pharm D concern that antihypertensive effects may be diminished while taking rifampin and methotrexate and suggests increasing Amlodipine dose during the 4 months of treatment - explained to patient who agrees.    Given how symptomatic he is with psoriatic eruption, and he requests Rx - elected to treat with 10 day course of Prednisone and given Benadryl for PRN use.  Will notify derm provider.    Follow-up                                                       Patient should follow up in about 10 days once Rifampin is available - with the PharmD.      I will notify Derm provider of how symptomatic patient is - and my treatment today.    Has appt 1 week with ID and 2 weeks with Derm - patient is aware and says he will attend both.    Total visit time with patient was 25 mins, all of which was face to face MD time, and over 50% of this time was spent in counseling and coordination of care.  Including post-encounter documentation and orders, total encounter time was 32 mins.      Jason Siddiqui MD

## 2022-08-25 NOTE — NURSING NOTE
Due to patient being non-English speaking/uses sign language, an  was used for this visit. Only for face-to-face interpretation by an external agency, date and length of interpretation can be found on the scanned worksheet.     name: Taye Ferrell  Agency: [employer]  Language: Liam   Telephone number:   Type of interpretation: Face-to-face, spoken

## 2022-08-25 NOTE — PROGRESS NOTES
Medication Therapy Management (MTM) Encounter    ASSESSMENT:                            Medication Adherence/Access: No issues identified.     LTBI: Patient had a positive TB test and will be starting Rifampin 600 mg daily for 4 months. We educated patient on TB diagnoses as well as directions and side effects of this medication. Although we discussed much of the medication today, I think patient could benefit from a short interaction with the PharmD when the medication arrives to reinforce blood pressure issue as outlined below.     Hypertension: Needs improvement. Patients blood pressure is not at goal of <140/90 mmHg. Patient will be starting a 4 month course of Rifampin, which can decrease the effectiveness of Amlodipine. Given the patients elevated blood pressure and drug interaction between Rifampin and amlodipine, the patient would benefit from increasing the amlodipine dose from 5 mg to 10 mg daily.     Type 2 Diabetes: Needs improvement. Patient A1c was 8% on 8/15/22, which is not at goal of <7%. Patient would benefit from additional therapy. An effective option would be Bydureon since it is a once weekly injection. The CGM is active only 39% of the time, so the patient would benefit from scanning more often.     Psoriasis: Needs improvement. Patient is desirous of something he can take since it will be ~ 3 weeks before he can start the methotrexate. Patient could benefit from a short course of prednisone.    Gout: Stable.  Continue current medications.    PLAN:                              Start Rifampin 600 mg daily     Start prednisone (DELTASONE) 20 MG tablet; Take 3 pills (60mgs) for 3 days, 2 pills (40mgs) for 3 days, 1 pill (20mgs) for 3 days then 0.5 pill (10mgs) for 4 days    Increase amlodipine to 10mg daily    Monitor blood pressure at home. Call clinic if your top number is ever over 160.     Scan freestyle marielle every 8 hours (3 times a day)    Follow-up: 1 month with PharmD      SUBJECTIVE/OBJECTIVE:                          Sa Mackey is a 62 year old male seen for an initial vist.  Patient is seeing Dr. Siddiqui after our visit. Patient is seen with an .      Reason for visit: LTBI initial visit     Allergies/ADRs: Reviewed in chart  Past Medical History: Reviewed in chart  Tobacco: He reports that he has never smoked. He has never used smokeless tobacco.  Alcohol: Not assessed     Medication Adherence/Access: No issues identified. Patient sets up his own pillbox. It is unclear how many times a week he misses doses, but he tries to take them as directed.     LTBI: Patient had a positive Quantiferon TB test at dermatology.  This was tested in anticipation of starting on methotrexate for psoriasis.     Hypertension: Patient is currently taking amlodipine 5 mg daily and Lisinopril 40 mg daily. Patient denies any side effects with these medications. Patient monitors blood pressure at home and reports typical systolic numbers to range in the 140's.    Type 2 Diabetes: Currently taking Jardiance 10 mg daily and Metformin 2000 mg daily. Patient denies any side effects with these medications. Patient uses freestyle marielle and reports his blood sugar levels at home to range from 100-300 mg/dL. If need to start additional therapy, patient would prefer oral medications, but is open to trying a weekly injection if it will help manage diabetes.         Aspirin: Yes, 81 mg daily   Statin: Yes: Atorvastatin 80 mg daily    ACEi/ARB: Yes, Lisinopril 40 mg daily   Lab Results   Component Value Date    UMALCR 15.6 03/05/2020      Lab Results   Component Value Date    A1C 8.0 08/15/2022    A1C 8.4 04/29/2022    A1C 8.2 11/15/2021    A1C 6.8 07/23/2021    A1C 7.8 01/29/2021    A1C 9.0 12/04/2020    A1C 10.2 03/05/2020    A1C 7.0 09/27/2019    A1C 11.2 06/28/2019     Psoriasis: Patient is currently taking triamcinolone 0.1% cream, fluocinonide 0.05% solution, and augmented betamethasone dipropionate  "0.05% ointment. Patient says he uses triamcinolone more often than the betamethasone ointment. Patient is instructed to start methotrexate 2.5 mg daily approximately 2 weeks after treatment for LTBi starts. Patient complains of severe itchiness and pain and would like additional treatment for this.     Gout: Current medication is fubuxostat.  Patient cannot take allopurinol due to an allergy.  Patient has not had a gout reaction in the past.     Today's Vitals:   BP Readings from Last 1 Encounters:   08/25/22 122/72     Pulse Readings from Last 1 Encounters:   08/25/22 84     Wt Readings from Last 1 Encounters:   08/25/22 226 lb 6.4 oz (102.7 kg)     Ht Readings from Last 1 Encounters:   08/25/22 5' 4\" (1.626 m)     Estimated body mass index is 38.86 kg/m  as calculated from the following:    Height as of an earlier encounter on 8/25/22: 5' 4\" (1.626 m).    Weight as of an earlier encounter on 8/25/22: 226 lb 6.4 oz (102.7 kg).    Temp Readings from Last 1 Encounters:   08/25/22 98.4  F (36.9  C) (Oral)     ----------------    Due to time constraints, I was only able to assess the above with the patient today. Will follow-up on other disease states in future encounters    I spent 30 minutes with this patient today. All changes were made via collaborative practice agreement with Dr Siddiqui. A copy of the visit note was provided to the patient's provider(s).    The patient was given a summary of these recommendations. See Provider note/AVS from today.     Savi Lema, PharmD  Brenda Edwards, Pharmacy student        Medication Therapy Recommendations  Essential hypertension with goal blood pressure less than 140/90    Current Medication: amLODIPine (NORVASC) 5 MG tablet   Rationale: Dose too low - Dosage too low - Effectiveness   Recommendation: Increase Dose   Status: Accepted per Provider         Latent tuberculosis by blood test    Current Medication: rifampin (RIFADIN) 300 MG capsule   Rationale: Untreated condition " - Needs additional medication therapy - Indication   Recommendation: Start Medication - rifampin 300 MG capsule   Status: Accepted per Provider         Psoriasis    Rationale: Synergistic therapy - Needs additional medication therapy - Indication   Recommendation: Start Medication - PREDNISONE   Status: Accepted per Provider

## 2022-08-25 NOTE — Clinical Note
FYI only - pharmacy department requires that I route this note to you.  We discussed in person today.  corby

## 2022-08-25 NOTE — PATIENT INSTRUCTIONS
For your itching:  PREDNISONE: Take 3 pills (60mgs) for 3 days, 2 pills (40mgs) for 3 days, 1 pill (20mgs) for 3 days then 0.5 pill (10mgs) for 4 days    Use Benadryl (diphenhydramine) 1 tab every 4 to 6 hours as needed.    Pharmacist or Nurse will call when TB medicine (rifampin) is available - approximately 10 days.

## 2022-08-26 ENCOUNTER — DOCUMENTATION ONLY (OUTPATIENT)
Dept: FAMILY MEDICINE | Facility: CLINIC | Age: 62
End: 2022-08-26

## 2022-08-30 NOTE — PROGRESS NOTES
[facility]    Division of Infectious Diseases and International Medicine    CLINICAL INFECTIOUS DISEASE OUTPATIENT CONSULTATION NOTE     Patient:  Sa KENIA Mackey, Date of birth 1960, Medical record number [id number]  Date of Visit:  August 30, 2022  Referred by: Kelli Lynch   Reason for Visit: LTBI         Assessment and Plan   1. LTBI   2. Psoriasis requiring immunosuppression with Methotrexate  3. T2DM    Agree with treatment of LTBI prior to initiation of Methotrexate for psoriasis. Rifampin has been ordered from [facility] and patient is going to  at [facility]. If he is unable to obtain a rx this way, we are happy to write for it. Plan will be for 4 months total therapy with screening CBC, CMP after 1 month.     Given the degree of his psoriasis it would be reasonable to start Methotrexate after 2-3 weeks of Rifampin therapy.     PLAN:   - 4 months Rifampin   - Screening CBC, CMP after 1 month   - Ok to start Methotrexate after 2-3 weeks of Rifampin therapy        History of Present Illness:   [employer]  was used for entire clinic encounter.     Sa KENIA Mackey is a 62 year old y.o man with PMH of DM, HTN, psoriasis who presents to clinic for evaluation of LTBI in the setting of severe psoriasis requiring start of Methotrexate.     Has been seen in Dermatology clinic for his psoriasis, which has been present about 8-9 months. Had tried topical treatments without relief. Is incredibly itchy to the point where his PCP placed him on a steroid taper to help with pruritis. Plan to initiate Methotrexate.     In workup prior to starting immunosuppression  was found to have a positive Quantiferon. Denies prior h/o TB or TB treatment. Was born in [facility] and arrived [place] the [place]. Denies current sx including fevers, chills, night sweats, weight changes, LAD, cough, SOB, abdominal pain, n/v/d/c, joint pains, back pain. His main clinical concern is how itchy his rash is. Per  discussion with patient's PCP it was arranged for him to start on Rifampin for management of his LTBI, and to attempt to wait at least 2 weeks on the Rifampin prior to the initiation of his Methotrexate. PCP has requested for Rifampin through [facility]. He will be picking up at [facility].     With regards to TB risk factors:  Born in [place], lived in [place], came to the [place] approx 20 years ago.   Not currently working   Living in [place], visiting father who is elderly and unwell, living with daughter  No sick contacts   No known TB contacts  No prior dx or treatments for TB   No halfway exposures or periods of homelessness   No smoking        Key Prior Lab/Imaging and other data   8/8/22   WBC 10  Hgb 13.7  Plt 285  Na 135  K 4.5  Cl 101   HCO3 28   BUN 21  Cr 0.81   Ca 9.2  Alk phos 125  AST 29   ALT 45  T prot 8.5  Alb 3.9   T bili 0.5     Hep C antibody NONREACTIVE   Hep B surface antigen NONREACTIVE     8/15/22   Hgb A1C 8.0     8/15/22 CXR   IMPRESSION: Lungs are clear. No pleural effusion. Heart size and pulmonary vascularity within normal limits.         Review of Systems:     The following systems were reviewed with the patient as they pertain to the case and are negative unless noted here or above in the HPI. The patient was  able to participate in the following review of systems    REVIEW OF SYSTEMS:    Constitutional: No fevers, no chills, no sweats  Cardiac: No history of chest pain, No palpitations  Respiratory: No shortness of breath, no wheeze, no cough  Gastro Intestinal: No history of abdominal pain, no vomiting, no diarrhea  Neurological: No headaches, no new or changing weakness, no new or changing numbness  Musculoskeletal: No joint pain or swelling HEENT: No congestion No stridor  Psychiatric: No reported hallucinations, No obvious delusions  Allergic: No Hives No Rash  Hematologic: No Easy Bruising, No Nosebleeds,   Genitourinary: No Dysuria, No Frequency  Endocrine: No Polyuria, No  Polydipsia  Skin/Integumentary: + Rash, No Ulcer  Opthalmologic: No Diplopia, No Flashes        Other Medical History:     Attempt was made to collect past, family and social history during this encounter,  this information was reviewed with the patient and updated    Allergies Allopurinol, Cortisporin [neomycin-polymyxin-hc], Indomethacin, Salsalate, Tetracycline, and Tramadol    Past Medical History  Past Medical History:   Diagnosis Date     Depressive disorder      Diabetes (H)      Hypertension     PastSurgical History   has a past surgical history that includes ORIF trimalleolar ankle fracture (Left, 02/02/2021).   Family History  Family History   Problem Relation Age of Onset     Other - See Comments Mother      Cancer Mother      Coronary Artery Disease Father      Heart Disease Father      No Known Problems Maternal Grandmother      No Known Problems Maternal Grandfather      No Known Problems Paternal Grandmother      No Known Problems Paternal Grandfather      No Known Problems Brother      No Known Problems Sister      No Known Problems Son      No Known Problems Daughter      No Known Problems Maternal Half-Brother      No Known Problems Maternal Half-Sister      No Known Problems Paternal Half-Brother      No Known Problems Paternal Half-Sister      No Known Problems Niece      No Known Problems Nephew      No Known Problems Cousin      No Known Problems Other      Diabetes No family hx of      Hypertension No family hx of      Hyperlipidemia No family hx of      Kidney Disease No family hx of      Cerebrovascular Disease No family hx of      Obesity No family hx of      Thrombosis No family hx of      Asthma No family hx of      Arthritis No family hx of      Thyroid Disease No family hx of      Depression No family hx of      Mental Illness No family hx of      Substance Abuse No family hx of      Cystic Fibrosis No family hx of      Early Death No family hx of      Coronary Artery Disease Early  Onset No family hx of      Heart Failure No family hx of      Bleeding Diathesis No family hx of      Dementia No family hx of      Breast Cancer No family hx of      Ovarian Cancer No family hx of      Uterine Cancer No family hx of      Prostate Cancer No family hx of      Colorectal Cancer No family hx of      Pancreatic Cancer No family hx of      Lung Cancer No family hx of      Melanoma No family hx of      Autoimmune Disease No family hx of      Unknown/Adopted No family hx of      Genetic Disorder No family hx of     Social History  He reports that he has never smoked. He has never used smokeless tobacco. He reports that he does not drink alcohol and does not use drugs.   Notable Exposures Listed below if pertinent    Vaccination History:  Immunization History   Administered Date(s) Administered     COVID-19,PF,Moderna 07/23/2021, 08/20/2021     COVID-19,PF,Moderna Booster 01/21/2022     Influenza (IIV3) PF 09/10/2012, 09/19/2013     Influenza Vaccine IM > 6 months Valent IIV4 (Alfuria,Fluzone) 12/04/2020, 11/15/2021     Influenza Vaccine, 6+MO IM (QUADRIVALENT W/PRESERVATIVES) 10/30/2014, 12/31/2015, 11/04/2016, 10/26/2018     Pneumococcal 23 valent 07/11/2012     TD (ADULT, 7+) 02/16/2009, 12/31/2015     TDAP Vaccine (Boostrix) 10/17/2013             Physical Exam:     VITAL SIGNS:  Blood pressure (!) 142/79, pulse 78, temperature 98  F (36.7  C), weight 101.2 kg (223 lb), SpO2 95 %.     GENERAL APPEARANCE: Not in acute distress  PHYSICAL EXAM:  Eyes:     no ptosis, no discharge, no scleral icterus  Mouth, Throat:     mucous membranes moist, pharynx normal without lesions, no cervical LAD  Cardiovascular:    Inspection: No Cyanosis   Auscultation:  S1, S2 normal, regular rate and rhythm  Respiratory:     Inspection: Not in respiratory distress, Chest expansion symmetrical   Auscultation: 4 point auscultation done clear to auscultation bilaterally, no wheezes, no rales, and no rhonchi  Gastrointestinal:       soft, non-tender; bowel sounds normal; no masses,  no organomegaly  Musculoskeletal:     no elbow wrist knee or ankle tenderness, deformity or swelling, no quadriceps calf or upper arm muscular tenderness noted  Skin:     Dry and intact, diffuse plaque psoriasis involving trunk and abdomen with scattered patches over scalp and dorsum of hands  Neurologic:     Higher Mental Function: Conversant, AOx4   Facial asymmetry grossly absent   Patient is ambulatory  Psychiatric:     Appropriate        Signature:     Kay Purcell MD   PGY-4 Infectious Disease Fellow    Case discussed with Supervising attending ID physician, Dr Doe    This dictation was prepared in part using Dragon recognition software.  As a result errors may occur. When identified these transcription errors have been corrected.  While every attempt is made to correct errors during dictation, errors may still exist

## 2022-08-31 ENCOUNTER — OFFICE VISIT (OUTPATIENT)
Dept: INFECTIOUS DISEASES | Facility: CLINIC | Age: 62
End: 2022-08-31
Attending: PHYSICIAN ASSISTANT
Payer: COMMERCIAL

## 2022-08-31 ENCOUNTER — PRE VISIT (OUTPATIENT)
Dept: INFECTIOUS DISEASES | Facility: CLINIC | Age: 62
End: 2022-08-31

## 2022-08-31 VITALS
BODY MASS INDEX: 38.28 KG/M2 | DIASTOLIC BLOOD PRESSURE: 79 MMHG | WEIGHT: 223 LBS | SYSTOLIC BLOOD PRESSURE: 142 MMHG | HEART RATE: 78 BPM | TEMPERATURE: 98 F | OXYGEN SATURATION: 95 %

## 2022-08-31 DIAGNOSIS — L40.9 PSORIASIS: ICD-10-CM

## 2022-08-31 PROCEDURE — G0463 HOSPITAL OUTPT CLINIC VISIT: HCPCS

## 2022-08-31 PROCEDURE — 99204 OFFICE O/P NEW MOD 45 MIN: CPT | Performed by: STUDENT IN AN ORGANIZED HEALTH CARE EDUCATION/TRAINING PROGRAM

## 2022-08-31 ASSESSMENT — PAIN SCALES - GENERAL: PAINLEVEL: NO PAIN (0)

## 2022-08-31 NOTE — PATIENT INSTRUCTIONS
We will follow up with Dr Siddiqui regarding your Rifampin prescription. You will take this for 4 months total. We will check labs after about 4 weeks on the medicine. After 2-3 weeks it is safe to start your Methotrexate.

## 2022-08-31 NOTE — PROGRESS NOTES
No cough, SOB, fevers, chills, night sweats, weight changes, n/v/d, no joint pains or back pains

## 2022-08-31 NOTE — NURSING NOTE
Chief Complaint   Patient presents with     Consult     New pt. Consult.     Blood pressure (!) 142/79, pulse 78, temperature 98  F (36.7  C), weight 101.2 kg (223 lb), SpO2 95 %.    ASAEL RIDDLE

## 2022-08-31 NOTE — LETTER
8/31/2022       RE: Sa KENIA Mackey  [address]  [place] [address]     Dear Colleague,    Thank you for referring your patient, Sa KENIA Mackey, to the [facility] INFECTIOUS DISEASE [facility]INIC [place] [facility]. Please see a copy of my visit note below.     [facility]    Division of Infectious Diseases and International Medicine    CLINICAL INFECTIOUS DISEASE OUTPATIENT CONSULTATION NOTE     Patient:  Sa KENIA Mackey, Date of birth 1960, Medical record number [id number]  Date of Visit:  August 30, 2022  Referred by: Kelli Lynch   Reason for Visit: LTBI         Assessment and Plan   1. LTBI   2. Psoriasis requiring immunosuppression with Methotrexate  3. T2DM    Agree with treatment of LTBI prior to initiation of Methotrexate for psoriasis. Rifampin has been ordered from [facility] and patient is going to  at [facility]. If he is unable to obtain a rx this way, we are happy to write for it. Plan will be for 4 months total therapy with screening CBC, CMP after 1 month.     Given the degree of his psoriasis it would be reasonable to start Methotrexate after 2-3 weeks of Rifampin therapy.     PLAN:   - 4 months Rifampin   - Screening CBC, CMP after 1 month   - Ok to start Methotrexate after 2-3 weeks of Rifampin therapy        History of Present Illness:   MENA PRESTIGE  was used for entire clinic encounter.     Sa KENIA Mackey is a 62 year old y.o man with PMH of DM, HTN, psoriasis who presents to clinic for evaluation of LTBI in the setting of severe psoriasis requiring start of Methotrexate.     Has been seen in Dermatology clinic for his psoriasis, which has been present about 8-9 months. Had tried topical treatments without relief. Is incredibly itchy to the point where his PCP placed him on a steroid taper to help with pruritis. Plan to initiate Methotrexate.     In workup prior to starting immunosuppression  was found  to have a positive Quantiferon. Denies prior h/o TB or TB treatment. Was born in [place] and arrived [place] the [place]. Denies current sx including fevers, chills, night sweats, weight changes, LAD, cough, SOB, abdominal pain, n/v/d/c, joint pains, back pain. His main clinical concern is how itchy his rash is. Per discussion with patient's PCP it was arranged for him to start on Rifampin for management of his LTBI, and to attempt to wait at least 2 weeks on the Rifampin prior to the initiation of his Methotrexate. PCP has requested for Rifampin through [facility]. He will be picking up at [facility].     With regards to TB risk factors:  Born in [place], lived in [place] [place], came to the [place] approx 20 years ago.   Not currently working   Living in [place], visiting father who is elderly and unwell, living with daughter  No sick contacts   No known TB contacts  No prior dx or treatments for TB   No USP exposures or periods of homelessness   No smoking        Key Prior Lab/Imaging and other data   8/8/22   WBC 10  Hgb 13.7  Plt 285  Na 135  K 4.5  Cl 101   HCO3 28   BUN 21  Cr 0.81   Ca 9.2  Alk phos 125  AST 29   ALT 45  T prot 8.5  Alb 3.9   T bili 0.5     Hep C antibody NONREACTIVE   Hep B surface antigen NONREACTIVE     8/15/22   Hgb A1C 8.0     8/15/22 CXR   IMPRESSION: Lungs are clear. No pleural effusion. Heart size and pulmonary vascularity within normal limits.         Review of Systems:     The following systems were reviewed with the patient as they pertain to the case and are negative unless noted here or above in the HPI. The patient was  able to participate in the following review of systems    REVIEW OF SYSTEMS:    Constitutional: No fevers, no chills, no sweats  Cardiac: No history of chest pain, No palpitations  Respiratory: No shortness of breath, no wheeze, no cough  Gastro Intestinal: No history of abdominal pain, no vomiting, no diarrhea  Neurological: No headaches, no new or changing weakness,  no new or changing numbness  Musculoskeletal: No joint pain or swelling HEENT: No congestion No stridor  Psychiatric: No reported hallucinations, No obvious delusions  Allergic: No Hives No Rash  Hematologic: No Easy Bruising, No Nosebleeds,   Genitourinary: No Dysuria, No Frequency  Endocrine: No Polyuria, No Polydipsia  Skin/Integumentary: + Rash, No Ulcer  Opthalmologic: No Diplopia, No Flashes        Other Medical History:     Attempt was made to collect past, family and social history during this encounter,  this information was reviewed with the patient and updated    Allergies Allopurinol, Cortisporin [neomycin-polymyxin-hc], Indomethacin, Salsalate, Tetracycline, and Tramadol    Past Medical History  Past Medical History:   Diagnosis Date     Depressive disorder      Diabetes (H)      Hypertension     PastSurgical History   has a past surgical history that includes ORIF trimalleolar ankle fracture (Left, 02/02/2021).   Family History  Family History   Problem Relation Age of Onset     Other - See Comments Mother      Cancer Mother      Coronary Artery Disease Father      Heart Disease Father      No Known Problems Maternal Grandmother      No Known Problems Maternal Grandfather      No Known Problems Paternal Grandmother      No Known Problems Paternal Grandfather      No Known Problems Brother      No Known Problems Sister      No Known Problems Son      No Known Problems Daughter      No Known Problems Maternal Half-Brother      No Known Problems Maternal Half-Sister      No Known Problems Paternal Half-Brother      No Known Problems Paternal Half-Sister      No Known Problems Niece      No Known Problems Nephew      No Known Problems Cousin      No Known Problems Other      Diabetes No family hx of      Hypertension No family hx of      Hyperlipidemia No family hx of      Kidney Disease No family hx of      Cerebrovascular Disease No family hx of      Obesity No family hx of      Thrombosis No family hx  of      Asthma No family hx of      Arthritis No family hx of      Thyroid Disease No family hx of      Depression No family hx of      Mental Illness No family hx of      Substance Abuse No family hx of      Cystic Fibrosis No family hx of      Early Death No family hx of      Coronary Artery Disease Early Onset No family hx of      Heart Failure No family hx of      Bleeding Diathesis No family hx of      Dementia No family hx of      Breast Cancer No family hx of      Ovarian Cancer No family hx of      Uterine Cancer No family hx of      Prostate Cancer No family hx of      Colorectal Cancer No family hx of      Pancreatic Cancer No family hx of      Lung Cancer No family hx of      Melanoma No family hx of      Autoimmune Disease No family hx of      Unknown/Adopted No family hx of      Genetic Disorder No family hx of     Social History  He reports that he has never smoked. He has never used smokeless tobacco. He reports that he does not drink alcohol and does not use drugs.   Notable Exposures Listed below if pertinent    Vaccination History:  Immunization History   Administered Date(s) Administered     COVID-19,PF,Moderna 07/23/2021, 08/20/2021     COVID-19,PF,Moderna Booster 01/21/2022     Influenza (IIV3) PF 09/10/2012, 09/19/2013     Influenza Vaccine IM > 6 months Valent IIV4 (Alfuria,Fluzone) 12/04/2020, 11/15/2021     Influenza Vaccine, 6+MO IM (QUADRIVALENT W/PRESERVATIVES) 10/30/2014, 12/31/2015, 11/04/2016, 10/26/2018     Pneumococcal 23 valent 07/11/2012     TD (ADULT, 7+) 02/16/2009, 12/31/2015     TDAP Vaccine (Boostrix) 10/17/2013             Physical Exam:     VITAL SIGNS:  Blood pressure (!) 142/79, pulse 78, temperature 98  F (36.7  C), weight 101.2 kg (223 lb), SpO2 95 %.     GENERAL APPEARANCE: Not in acute distress  PHYSICAL EXAM:  Eyes:     no ptosis, no discharge, no scleral icterus  Mouth, Throat:     mucous membranes moist, pharynx normal without lesions, no cervical  LAD  Cardiovascular:    Inspection: No Cyanosis   Auscultation:  S1, S2 normal, regular rate and rhythm  Respiratory:     Inspection: Not in respiratory distress, Chest expansion symmetrical   Auscultation: 4 point auscultation done clear to auscultation bilaterally, no wheezes, no rales, and no rhonchi  Gastrointestinal:      soft, non-tender; bowel sounds normal; no masses,  no organomegaly  Musculoskeletal:     no elbow wrist knee or ankle tenderness, deformity or swelling, no quadriceps calf or upper arm muscular tenderness noted  Skin:     Dry and intact, diffuse plaque psoriasis involving trunk and abdomen with scattered patches over scalp and dorsum of hands  Neurologic:     Higher Mental Function: Conversant, AOx4   Facial asymmetry grossly absent   Patient is ambulatory  Psychiatric:     Appropriate        Signature:     Kay Purcell MD   PGY-4 Infectious Disease Fellow    Case discussed with Supervising attending ID physician, Dr Doe    This dictation was prepared in part using Dragon recognition software.  As a result errors may occur. When identified these transcription errors have been corrected.  While every attempt is made to correct errors during dictation, errors may still exist     Attestation signed by Ed Doe MD at 9/1/2022  8:03 PM:  Physician Attestation   I saw this patient with the resident and agree with the resident/fellow's findings and plan of care as documented in the note.      I personally reviewed vital signs, medications, labs, and imaging.    Key findings:   Seen and examined in joint visit with Dr. Purcell. Referred for treatment of LTBI diagnosed during work-up prior to starting methotrexate for significant psoriasis. Reviewed possible adverse effects, details of regimen. Recommend finishing 3 weeks of therapy before initiating methotrexate to reduce risk of TB reactivation.    Ed Doe MD  Date of Service (when I saw the patient):  8/31/22

## 2022-09-01 ENCOUNTER — TELEPHONE (OUTPATIENT)
Dept: PHARMACY | Facility: CLINIC | Age: 62
End: 2022-09-01

## 2022-09-01 NOTE — TELEPHONE ENCOUNTER
[place].  I will be out of town tomorrow and we are heading into a long weekend I wanted to make sure that the patient got started on this medication soon because he needs to be on it for 2 weeks before he can start the methotrexate for his psoriasis.  I left the medication for the patient downstairs at the .  I asked Taye Ferrell to call the patient and have him  the medication at his convenience.  I also asked Taye Ferrell to schedule a follow-up with me in approximately 1 week to make sure that his blood pressure is okay on the rifampin.  Also instructed Taye Ferrell to have the patient call the clinic if his blood pressure at home ever gets above 160 systolic.    Savi Lema, Pharm.D.

## 2022-09-07 ENCOUNTER — TELEPHONE (OUTPATIENT)
Dept: FAMILY MEDICINE | Facility: CLINIC | Age: 62
End: 2022-09-07

## 2022-09-07 NOTE — TELEPHONE ENCOUNTER
[facility] phone call message- general phone call:    Reason for call: Pt was prescribed rifampin (RIFADIN) 300 MG capsule.  He was given a prescription and he cannot find it. Does he need to pick this up or can it be called in.    Return call needed: Yes    OK to leave a message on voice mail? Yes    Primary language: Liam      needed? Yes    Call taken on September 7, 2022 at 9:49 AM by Emiliano Presley

## 2022-09-08 ENCOUNTER — OFFICE VISIT (OUTPATIENT)
Dept: DERMATOLOGY | Facility: CLINIC | Age: 62
End: 2022-09-08
Payer: COMMERCIAL

## 2022-09-08 DIAGNOSIS — L40.9 PSORIASIS: Primary | ICD-10-CM

## 2022-09-08 PROCEDURE — 99213 OFFICE O/P EST LOW 20 MIN: CPT | Mod: 25 | Performed by: PHYSICIAN ASSISTANT

## 2022-09-08 PROCEDURE — 11900 INJECT SKIN LESIONS </W 7: CPT | Performed by: PHYSICIAN ASSISTANT

## 2022-09-08 RX ORDER — TRIAMCINOLONE ACETONIDE 40 MG/ML
60 INJECTION, SUSPENSION INTRA-ARTICULAR; INTRAMUSCULAR ONCE
Status: COMPLETED | OUTPATIENT
Start: 2022-09-08 | End: 2022-09-08

## 2022-09-08 RX ADMIN — TRIAMCINOLONE ACETONIDE 60 MG: 40 INJECTION, SUSPENSION INTRA-ARTICULAR; INTRAMUSCULAR at 15:17

## 2022-09-08 ASSESSMENT — PAIN SCALES - GENERAL: PAINLEVEL: MODERATE PAIN (4)

## 2022-09-08 NOTE — PROGRESS NOTES
HPI:   Chief complaints: Sa KENIA Mackey is a pleasant 62 year old male who presents for recheck psoriasis. He was going to start methotrexate after LOV but unfortunately had a postivie quantiferon gold. He saw ID who felt this was latent TB and was started on rifampin. He will start his rifampin tomorrow. ID felt it was safe to start methotrexate after being on rifampin for 2 weeks so he has been holding this. He is quite uncomfortable with profound psoriasis everywhere. Topicals are of no help.       PHYSICAL EXAM:    There were no vitals taken for this visit.  Skin exam performed as follows: Type 3 skin. Mood appropriate  Alert and Oriented X 3. Well developed, well nourished in no distress.  General appearance: Normal  Head including face: Normal  Eyes: conjunctiva and lids: Normal  Mouth: Lips, teeth, gums: Normal  Neck: Normal  Cardiovascular: Exam of peripheral vascular system by observation for swelling, varicosities, edema: Normal  Right upper extremity: Normal  Left upper extremity: Normal  Right lower extremity: Normal  Left lower extremity: Normal  Skin: Scalp and body hair: See below    Psoriasiform dermatitis on the arms, legs, trunk, scalp > 20% tbsa    ASSESSMENT/PLAN:     1. Psoriasis, latent TB. Discussed IM kenalog and he is amenable to trying this.   --Kenalog 60 mg split and injected into each buttocks. Advised on risk of atrophy, failure to respond, and rarely, avascular necrosis of the hip.  --Start Rifampin  --Start methotrexate 2 weeks after starting rifampin              Follow-up: 6 weeks  CC:   Scribed By: Kelli Lynch, MS, PA-C

## 2022-09-08 NOTE — PATIENT INSTRUCTIONS
Hoping you get your TB medication and can start tomorrow.   I want to see you back in 6 weeks. Please start methotrexate 2 weeks after your TB medication.     Kenalog injections given today should help in 5-7 days with itching for a temporarily relief.

## 2022-09-08 NOTE — LETTER
9/8/2022         RE: Sa KENIA Mackey  [address] [place] [address]        Dear Colleague,    Thank you for referring your patient, Sa KENIA Mackey, to the [facility]. Please see a copy of my visit note below.    HPI:   Chief complaints: Sa KENIA Mackey is a pleasant 62 year old male who presents for recheck psoriasis. He was going to start methotrexate after LOV but unfortunately had a postivie quantiferon gold. He saw ID who felt this was latent TB and was started on rifampin. He will start his rifampin tomorrow. ID felt it was safe to start methotrexate after being on rifampin for 2 weeks so he has been holding this. He is quite uncomfortable with profound psoriasis everywhere. Topicals are of no help.       PHYSICAL EXAM:    There were no vitals taken for this visit.  Skin exam performed as follows: Type 3 skin. Mood appropriate  Alert and Oriented X 3. Well developed, well nourished in no distress.  General appearance: Normal  Head including face: Normal  Eyes: conjunctiva and lids: Normal  Mouth: Lips, teeth, gums: Normal  Neck: Normal  Cardiovascular: Exam of peripheral vascular system by observation for swelling, varicosities, edema: Normal  Right upper extremity: Normal  Left upper extremity: Normal  Right lower extremity: Normal  Left lower extremity: Normal  Skin: Scalp and body hair: See below    Psoriasiform dermatitis on the arms, legs, trunk, scalp > 20% tbsa    ASSESSMENT/PLAN:     1. Psoriasis, latent TB. Discussed IM kenalog and he is amenable to trying this.   --Kenalog 60 mg split and injected into each buttocks. Advised on risk of atrophy, failure to respond, and rarely, avascular necrosis of the hip.  --Start Rifampin  --Start methotrexate 2 weeks after starting rifampin              Follow-up: 6 weeks  CC:   Scribed By: Kelli Lynch, MS, PA-C          Again, thank you for allowing me to participate in the care of your patient.        Sincerely,        Kelli Lynch,  [place]TE

## 2022-09-15 ENCOUNTER — OFFICE VISIT (OUTPATIENT)
Dept: FAMILY MEDICINE | Facility: CLINIC | Age: 62
End: 2022-09-15
Payer: COMMERCIAL

## 2022-09-15 ENCOUNTER — OFFICE VISIT (OUTPATIENT)
Dept: PHARMACY | Facility: CLINIC | Age: 62
End: 2022-09-15
Payer: COMMERCIAL

## 2022-09-15 VITALS
RESPIRATION RATE: 20 BRPM | SYSTOLIC BLOOD PRESSURE: 131 MMHG | OXYGEN SATURATION: 96 % | TEMPERATURE: 98.2 F | HEART RATE: 77 BPM | HEIGHT: 64 IN | DIASTOLIC BLOOD PRESSURE: 75 MMHG | WEIGHT: 218.2 LBS | BODY MASS INDEX: 37.25 KG/M2

## 2022-09-15 DIAGNOSIS — Z23 NEED FOR VACCINATION: ICD-10-CM

## 2022-09-15 DIAGNOSIS — Z22.7 LATENT TUBERCULOSIS BY BLOOD TEST: ICD-10-CM

## 2022-09-15 DIAGNOSIS — Z12.11 SCREEN FOR COLON CANCER: ICD-10-CM

## 2022-09-15 DIAGNOSIS — E11.65 TYPE 2 DIABETES MELLITUS WITH HYPERGLYCEMIA, WITHOUT LONG-TERM CURRENT USE OF INSULIN (H): ICD-10-CM

## 2022-09-15 DIAGNOSIS — Z22.7 LATENT TUBERCULOSIS BY BLOOD TEST: Primary | ICD-10-CM

## 2022-09-15 DIAGNOSIS — L40.9 PSORIASIS: Primary | ICD-10-CM

## 2022-09-15 PROCEDURE — 99214 OFFICE O/P EST MOD 30 MIN: CPT | Mod: 25 | Performed by: FAMILY MEDICINE

## 2022-09-15 PROCEDURE — 99207 PR NO CHARGE LOS: CPT | Performed by: PHARMACIST

## 2022-09-15 PROCEDURE — 90471 IMMUNIZATION ADMIN: CPT | Performed by: FAMILY MEDICINE

## 2022-09-15 PROCEDURE — 90677 PCV20 VACCINE IM: CPT | Performed by: FAMILY MEDICINE

## 2022-09-15 PROCEDURE — 90472 IMMUNIZATION ADMIN EACH ADD: CPT | Performed by: FAMILY MEDICINE

## 2022-09-15 PROCEDURE — 90686 IIV4 VACC NO PRSV 0.5 ML IM: CPT | Performed by: FAMILY MEDICINE

## 2022-09-15 RX ORDER — RIFAMPIN 300 MG/1
600 CAPSULE ORAL DAILY
Qty: 60 CAPSULE | Refills: 3
Start: 2022-09-15 | End: 2023-03-06

## 2022-09-15 ASSESSMENT — ANXIETY QUESTIONNAIRES
3. WORRYING TOO MUCH ABOUT DIFFERENT THINGS: NOT AT ALL
6. BECOMING EASILY ANNOYED OR IRRITABLE: SEVERAL DAYS
2. NOT BEING ABLE TO STOP OR CONTROL WORRYING: NOT AT ALL
1. FEELING NERVOUS, ANXIOUS, OR ON EDGE: NOT AT ALL
7. FEELING AFRAID AS IF SOMETHING AWFUL MIGHT HAPPEN: NOT AT ALL
GAD7 TOTAL SCORE: 1
IF YOU CHECKED OFF ANY PROBLEMS ON THIS QUESTIONNAIRE, HOW DIFFICULT HAVE THESE PROBLEMS MADE IT FOR YOU TO DO YOUR WORK, TAKE CARE OF THINGS AT HOME, OR GET ALONG WITH OTHER PEOPLE: NOT DIFFICULT AT ALL
GAD7 TOTAL SCORE: 1
5. BEING SO RESTLESS THAT IT IS HARD TO SIT STILL: NOT AT ALL

## 2022-09-15 ASSESSMENT — PATIENT HEALTH QUESTIONNAIRE - PHQ9
SUM OF ALL RESPONSES TO PHQ QUESTIONS 1-9: 1
5. POOR APPETITE OR OVEREATING: NOT AT ALL

## 2022-09-15 NOTE — PROGRESS NOTES
Clinical Pharmacy Consult:                                                    Sa Mackey is a 62 year old male seen for a clinical pharmacist consult.  He was referred to me from Dr Siddiqui.     Reason for Consult: LTBI medication question    Discussion: Patient here to see Dr. Siddiqui and I saw him for follow up on questions that he had on rifampin.  Patient's daughter picked up the medication for him Monday, but patient has not started it.  Patient was wondering if it would interact with his medications.  Told him that it may make his blood pressure medications less effective, which is why we want him to check his blood pressure at home.  He should call the clinic if his top number is greater than 160.  Also reminded patient that the medication can cause his urine to turn dark orange or red, so to be aware of that.  Finally, I told patient that he could start the methotrexate and folic acid 2 weeks after he starts the rifampin.  Patient voiced understanding.    Plan:  1.  Start rifampin 600 mg daily for LTBI.  2.  Take your blood pressure daily, call pharmacy if top number is greater than 160  3.  Start methotrexate and folic acid 2 weeks after you start the rifampin.    Savi Lema, Pharm.D.

## 2022-09-15 NOTE — PATIENT INSTRUCTIONS
Start the rifampin - 2 capsules once daily  Start the metotrexate and folic acid 2 weeks after you start the rifampin  Test your blood pressure daily and call clinic if the top number is over 160

## 2022-09-16 DIAGNOSIS — M1A.09X0 IDIOPATHIC CHRONIC GOUT OF MULTIPLE SITES WITHOUT TOPHUS: ICD-10-CM

## 2022-09-16 RX ORDER — FEBUXOSTAT 40 MG/1
TABLET, FILM COATED ORAL
Qty: 90 TABLET | Refills: 1 | Status: SHIPPED | OUTPATIENT
Start: 2022-09-16 | End: 2023-06-01

## 2022-09-20 NOTE — PROGRESS NOTES
Sa was seen today for derm problem and medication reconciliation.    Diagnoses and all orders for this visit:    Psoriasis    Screen for colon cancer  -     Fecal colorectal cancer screen FIT - Future (S+30); Future    Latent tuberculosis by blood test  -     rifampin (RIFADIN) 300 MG capsule; Take 2 capsules (600 mg) by mouth daily    Need for vaccination  -     PNEUMOCOCCAL 20 VALENT CONJUGATE (PREVNAR 20)    Type 2 diabetes mellitus with hyperglycemia, without long-term current use of insulin (H)    Other orders  -     INFLUENZA VACCINE IM > 6 MONTHS VALENT IIV4 (AFLURIA/FLUZONE)      I trust that the methotrexate will help his psoriasis once he starts on it.  He has a follow-up appointment in just over 1 month with dermatology.  We gave him shots and will follow up with his diabetes and other chronic health conditions in 3 to 6 months time or sooner if needed.  Subjective:  This is a 62-year-old who is well-known to me.  He attends today for a number of reasons.  These include the fact that he is not sleeping well because of his psoriasis.  I had given him an extended course of prednisone and he says this helped a little.  Dermatology had given him two IM injections of steroid and he says that he has not noticed any improvement with those.  He remains very itchy.    He did  the rifampin a few days ago but says he did not want to start taking it until he visited with me.  This is frustrating as this has further delayed his start of methotrexate for his psoriasis.    He is agreeable to have some immunizations today but declines a colonoscopy.    Total visit time with patient was 25 mins, all of which was face to face MD time, and over 50% of this time was spent in counseling and coordination of care.  Including post-encounter documentation and orders, total encounter time was 32 mins.      Objective:  /75 (BP Location: Left arm, Patient Position: Sitting, Cuff Size: Adult Large)   Pulse 77   Temp  "98.2  F (36.8  C) (Oral)   Resp 20   Ht 1.626 m (5' 4\")   Wt 99 kg (218 lb 3.2 oz)   SpO2 96%   BMI 37.45 kg/m    131/75  His BP is satisfactory, he has lost a little weight but remains obese.  His affect appears fair and he makes good eye contact.  He continues to see a psychologist once a week at home and says he is doing okay despite his father's serious illness.    He brings with him some medications that he wants refilled.  Pharmacy visited with him and reinforced that he should start taking rifampin and that after 2 weeks he can start taking methotrexate.  The dosing of methotrexate was not entirely clear and I read this out to him and wrote it down in his after's visit summary so he can follow it.  "

## 2022-09-28 ENCOUNTER — TELEPHONE (OUTPATIENT)
Dept: DERMATOLOGY | Facility: CLINIC | Age: 62
End: 2022-09-28

## 2022-09-28 DIAGNOSIS — L40.9 PSORIASIS: Primary | ICD-10-CM

## 2022-09-28 NOTE — TELEPHONE ENCOUNTER
I can offer prednisone but it may ultimately make him flare worse when he is done with the taper. I will pend if he wants to try, however.      When will he be staring rifampin? It will be difficult to help him without addressing his latent TB

## 2022-09-28 NOTE — TELEPHONE ENCOUNTER
[facility] Call Center    Phone Message    May a detailed message be left on voicemail: yes     Reason for Call: Other: Pt has upcoming appointment on 10/20 but his psoriasis is severely inflamed due to recent starting TB medication. Please call to advise. [phone] Pt needs a Citizen of [place]      Action Taken: Message routed to:  Other: [facility]    Travel Screening: Not Applicable

## 2022-09-28 NOTE — TELEPHONE ENCOUNTER
Please see note below and advise. Pt had not yet started the Rifampin as of 9/16/22 when he was seen at [facility]. Therefore pt has not started the Methotrexate yet either. Pt reported in the past prednisone had helped, but he did not see any improvement with injections.   Susan MCGARRY RN BSN PHN  Specialty Clinics

## 2022-09-29 RX ORDER — PREDNISONE 10 MG/1
TABLET ORAL
Qty: 75 TABLET | Refills: 0 | Status: SHIPPED | OUTPATIENT
Start: 2022-09-29 | End: 2023-03-06

## 2022-09-29 RX ORDER — HYDROXYZINE HYDROCHLORIDE 25 MG/1
TABLET, FILM COATED ORAL
Qty: 90 TABLET | Refills: 3 | Status: SHIPPED | OUTPATIENT
Start: 2022-09-29 | End: 2023-03-17

## 2022-09-29 NOTE — TELEPHONE ENCOUNTER
Called patient through  and advised on providers note. Pt stated he started the Rifampin and has started the methotrexate. He stated he is itching so bad and has some open sores. He would like something to help with the itching at night so he can sleep and a different topical in the meantime until the prednisone and methotrexate starts working. Please send to [facility]. Thank you.  Susan MCGARRY RN BSN PHN  Specialty Clinics

## 2022-09-29 NOTE — TELEPHONE ENCOUNTER
The prednisone will work the fastest and he is already on the strongest topical. Please have him start the prednisone

## 2022-10-03 DIAGNOSIS — E11.65 TYPE 2 DIABETES MELLITUS WITH HYPERGLYCEMIA, WITHOUT LONG-TERM CURRENT USE OF INSULIN (H): ICD-10-CM

## 2022-10-03 DIAGNOSIS — L40.9 PSORIASIS: ICD-10-CM

## 2022-10-03 RX ORDER — ATORVASTATIN CALCIUM 80 MG/1
80 TABLET, FILM COATED ORAL DAILY
Qty: 90 TABLET | Refills: 1 | Status: SHIPPED | OUTPATIENT
Start: 2022-10-03 | End: 2022-12-21

## 2022-10-03 RX ORDER — FLUOCINONIDE TOPICAL SOLUTION USP, 0.05% 0.5 MG/ML
SOLUTION TOPICAL 2 TIMES DAILY
Qty: 60 ML | Refills: 1 | Status: SHIPPED | OUTPATIENT
Start: 2022-10-03 | End: 2022-12-16

## 2022-10-10 DIAGNOSIS — E11.65 TYPE 2 DIABETES MELLITUS WITH HYPERGLYCEMIA, WITHOUT LONG-TERM CURRENT USE OF INSULIN (H): ICD-10-CM

## 2022-10-10 DIAGNOSIS — I10 ESSENTIAL HYPERTENSION WITH GOAL BLOOD PRESSURE LESS THAN 140/90: ICD-10-CM

## 2022-10-10 RX ORDER — LISINOPRIL 40 MG/1
40 TABLET ORAL DAILY
Qty: 90 TABLET | Refills: 1 | Status: SHIPPED | OUTPATIENT
Start: 2022-10-10 | End: 2023-03-17

## 2022-10-12 NOTE — PROGRESS NOTES
Latent TB Management Note: Follow-Up Visit    ASSESSMENT/PLAN:                            Latent TB medications were reviewed and found to be indicated, effective, safe and convenient/affordable unless drug therapy problem(s) was/were identified, as are described below.      Adherence assessment: good  Misses doses: never  Using pill box: yes  Total number of tablets left in bottle and pill box: 0  Total number of doses taken since last visit: 30  Total number of doses taken since start of treatment: 30    Adverse effect assessment:    None    Liver function test needed: No    Liver function test ordered: No    Referral to physician needed: No    Psoriasis: Needs improvement, but is under management by dermatologist.  Seems that patient may just need time to allow the methotrexate and prednisone to work.    Hypertension: Patient currently is not needing blood pressure goal of <140/90 based on clinic blood pressure today, however home blood pressures tend to be good.  We will continue to watch this.     Completed at this visit:    Continue current latent TB treatment    Provided patient with a 1 month supply of LTBI medication from [facility]    Instructed patient to return for follow up visit in 1 month and bring pill box/pill bottle    Addressed patient's questions/ concerns about latent TB meds:    none      Rifampin treatment completed after at least 120 doses completed within 6 consecutive months (ideally 5 months) no    Return to clinic in 1 month for next follow-up.      SUBJECTIVE/OBJECTIVE:                            Sa was referred by Dr. Siddiqui for management of latent TB.  Today is his second (first follow up) visit.  Patient has a diagnosis of latent TB and has been referred to the clinical pharmacist for medication management and education concerning medications.    REVIEW:  Patient brought in latent TB meds and pill box? yes  Date of last MD visit: 9/15/22  Date of last PharmD visit: 9/15/22    Latent TB  treatment and duration: Rifampin for 4 months  Start date of treatment: 9/15/22    TB Symptoms? (i.e. Weight loss, night sweats, prolonged cough, bloody sputum, fever): No    Alcohol use since last LTBI visit: No     Medication side effect review:  Poor appetite: No  Nausea/Vomiting: No  RUQ abdominal tenderness: No  Tea/coffee colored urine: No  Rash/itching: Yes - probably because of psoriasis  Yellow skin/eyes: No  Fever for 3 days: No  IRIF Only  Easy bruising or bleeding: No  RIF Only  Severe weakness or tiredness: Yes, mostly because of prednisone  RIF Only  Severe head or body aches: No  RIF Only   Any new medications started: Yes - prednisone, methotrexate, hydroxyzine for sleep  RIF, RPT or MOXI  Severe dizziness: No    Psoriasis: Patient continues to  complain of itchy skin.  Patient reports that he started methotrexate therapy approximately 2 weeks ago.  He was also prescribed a prednisone taper by his dermatologist.  Patient is starting on 50 mg for 5 days, then 40 mg for 4 days and so on.  Patient reports that this is day 2 of 40 mg daily.  Patient also reports that he is having trouble sleeping because of the prednisone and itching.  For this his dermatologist has given him hydroxyzine 50 mg to take at bedtime which helps somewhat.     He has a follow-up appointment on October 20 with dermatology and he request that we help arrange override for that.    Hypertension:  Patient has hypertension and current medications are amlodipine 5mg daily and lisinopril 40mg daily.  There is a drug interaction between amlodipine and rifampin that rifampin can make amlodipine less effective.  Patient reports home blood pressures of 130s/70s.  Patient attributes today's elevated blood pressure due to symptoms of psoriasis as well as prednisone therapy.    Today's Vitals:   BP Readings from Last 1 Encounters:   10/13/22 (!) 157/79     Pulse Readings from Last 1 Encounters:   10/13/22 82     Wt Readings from Last 1  "Encounters:   10/13/22 221 lb (100.2 kg)     Ht Readings from Last 1 Encounters:   09/15/22 5' 4\" (1.626 m)     Estimated body mass index is 37.93 kg/m  as calculated from the following:    Height as of 9/15/22: 5' 4\" (1.626 m).    Weight as of this encounter: 221 lb (100.2 kg).    Temp Readings from Last 1 Encounters:   10/13/22 97.5  F (36.4  C) (Tympanic)     ----------------      I spent 20 minutes with this patient today. All changes were made via collaborative practice agreement with Dr Siddiqui.  was engaged and actively involved in the decision making process.  Understanding of all the issues discussed was verbalized, and the patient was satisfied with the final plan. A copy of the visit note was provided to the patient's provider(s).    The patient was given a summary of these recommendations.     Savi Lema [facility], PharmD    Due to patient being non-English speaking/uses sign language, an  was used for this visit. Only for face-to-face interpretation by an external agency, date and length of interpretation can be found on the scanned worksheet.     name: Taye Ferrell  Agency: [employer]  Language: Liam   Telephone number:   Type of interpretation: Face-to-face, spoken      Medication Therapy Recommendations  No medication therapy recommendations to display      "

## 2022-10-13 ENCOUNTER — OFFICE VISIT (OUTPATIENT)
Dept: PHARMACY | Facility: CLINIC | Age: 62
End: 2022-10-13
Payer: COMMERCIAL

## 2022-10-13 VITALS
WEIGHT: 221 LBS | DIASTOLIC BLOOD PRESSURE: 79 MMHG | RESPIRATION RATE: 20 BRPM | HEART RATE: 82 BPM | OXYGEN SATURATION: 96 % | BODY MASS INDEX: 37.93 KG/M2 | SYSTOLIC BLOOD PRESSURE: 157 MMHG | TEMPERATURE: 97.5 F

## 2022-10-13 DIAGNOSIS — L40.9 PSORIASIS: ICD-10-CM

## 2022-10-13 DIAGNOSIS — I10 ESSENTIAL HYPERTENSION WITH GOAL BLOOD PRESSURE LESS THAN 140/90: ICD-10-CM

## 2022-10-13 DIAGNOSIS — Z22.7 LATENT TUBERCULOSIS BY BLOOD TEST: Primary | ICD-10-CM

## 2022-10-13 PROCEDURE — 99606 MTMS BY PHARM EST 15 MIN: CPT | Performed by: PHARMACIST

## 2022-10-13 NOTE — NURSING NOTE
Due to patient being non-English speaking/uses sign language, an  was used for this visit. Only for face-to-face interpretation by an external agency, date and length of interpretation can be found on the scanned worksheet.     name: Taye Ferrell  Agency: [employer]  Language: Liam   Telephone number: [phone]  Type of interpretation: Face-to-face, spoken

## 2022-10-18 ENCOUNTER — TELEPHONE (OUTPATIENT)
Dept: FAMILY MEDICINE | Facility: CLINIC | Age: 62
End: 2022-10-18

## 2022-10-18 NOTE — LETTER
October 18, 2022      Sa T Key  [address]  [place] [address]        Dear ,  I am one of the Care Coordinators at [facility], located at: [address] [place] [address]. [place] [address]. Phone: [phone]. I am writing you to remind you of two upcoming appointments you have.      Appointment Date: 10/20/2022 @ 2:00pm   time between: 12:40 pm -1: 10 pm  Return trip: Will call [employer] [phone]  [facility]  [address] [place] [address]    Appointment Date: 11/10/2022 @ 11:30 am   time between: 10:30 am - 11:00 am   Return trip: Will call [employer] [phone]  [employer] [address] [place] [address]        Sincerely,      Rakesh Foster Sr.  Social Work  [place] [address]  [email or web address]   Office: [phone]  Direct: [phone]  [facility]

## 2022-10-18 NOTE — TELEPHONE ENCOUNTER
10/18/2022: Care Coordination     CC: was asked to arrange transportation for upcoming appointments the patient has been notified by phone and mail.    Appointment Date: 10/20/2022 @ 2:00pm   time between: 12:40 pm -1: 10 pm  Return trip: Will call [employer] [phone]  [facility]  [address] [place] [address]    Appointment Date: 11/10/2022 @ 11:30 am   time between: 10:30 am - 11:00 am   Return trip: Will call [employer] [phone]  [employer] [address] [place] [address]      Rakesh Foster Sr.  Social Work  Care Coordination  [address] [address]  [email or web address]   Office: [phone]  Direct: [phone]  [facility]

## 2022-10-20 ENCOUNTER — OFFICE VISIT (OUTPATIENT)
Dept: DERMATOLOGY | Facility: CLINIC | Age: 62
End: 2022-10-20
Payer: COMMERCIAL

## 2022-10-20 DIAGNOSIS — L40.9 PSORIASIS: ICD-10-CM

## 2022-10-20 DIAGNOSIS — Z51.81 THERAPEUTIC DRUG MONITORING: Primary | ICD-10-CM

## 2022-10-20 PROCEDURE — 99214 OFFICE O/P EST MOD 30 MIN: CPT | Performed by: PHYSICIAN ASSISTANT

## 2022-10-20 ASSESSMENT — PAIN SCALES - GENERAL: PAINLEVEL: NO PAIN (0)

## 2022-10-20 NOTE — PROGRESS NOTES
HPI:   Chief complaints: Sa KENIA Mackey is a pleasant 62 year old male who presents for recheck psoriasis on 5 tablets of methotrexate which he started about 2-3 weeks ago due to delays in starting rifampin. He was cleared by ID to start methotrexate after being on rifampin x 2 weeks. He notes some improvement in a few areas but he has worsening psoriasis in others. He is very itchy.       PHYSICAL EXAM:    There were no vitals taken for this visit.  Skin exam performed as follows: Type 3 skin. Mood appropriate  Alert and Oriented X 3. Well developed, well nourished in no distress.  General appearance: Normal  Head including face: Normal  Eyes: conjunctiva and lids: Normal  Mouth: Lips, teeth, gums: Normal  Neck: Normal  Cardiovascular: Exam of peripheral vascular system by observation for swelling, varicosities, edema: Normal  Right upper extremity: Normal  Left upper extremity: Normal  Right lower extremity: Normal  Left lower extremity: Normal  Skin: Scalp and body hair: See below    Psoriasiform dermatitis on the arms, legs, trunk, scalp >approx 70% tbsa    ASSESSMENT/PLAN:     1. Psoriasis, latent TB - flaring, although he does have improvement on the arms; trunk is worse. Discussed prednisone taper; however he has DM and is not well controlled.     --Continue methotrexate and increase to 8 tablets weekly  --Folic acid on all other days  --Check CBC and CMP today              Follow-up: 4 weeks  CC:   Scribed By: Kelli Lynch, MS, PA-C

## 2022-10-20 NOTE — LETTER
10/20/2022         RE: Sa KENIA Mackey  [address] [place] [address]        Dear Colleague,    Thank you for referring your patient, Sa KENIA Mackey, to the [facility]. Please see a copy of my visit note below.    HPI:   Chief complaints: Sa KENIA Mackey is a pleasant 62 year old male who presents for recheck psoriasis on 5 tablets of methotrexate which he started about 2-3 weeks ago due to delays in starting rifampin. He was cleared by ID to start methotrexate after being on rifampin x 2 weeks. He notes some improvement in a few areas but he has worsening psoriasis in others. He is very itchy.       PHYSICAL EXAM:    There were no vitals taken for this visit.  Skin exam performed as follows: Type 3 skin. Mood appropriate  Alert and Oriented X 3. Well developed, well nourished in no distress.  General appearance: Normal  Head including face: Normal  Eyes: conjunctiva and lids: Normal  Mouth: Lips, teeth, gums: Normal  Neck: Normal  Cardiovascular: Exam of peripheral vascular system by observation for swelling, varicosities, edema: Normal  Right upper extremity: Normal  Left upper extremity: Normal  Right lower extremity: Normal  Left lower extremity: Normal  Skin: Scalp and body hair: See below    Psoriasiform dermatitis on the arms, legs, trunk, scalp >approx 70% tbsa    ASSESSMENT/PLAN:     1. Psoriasis, latent TB - flaring, although he does have improvement on the arms; trunk is worse. Discussed prednisone taper; however he has DM and is not well controlled.     --Continue methotrexate and increase to 8 tablets weekly  --Folic acid on all other days  --Check CBC and CMP today              Follow-up: 4 weeks  CC:   Scribed By: Kelli Lynch, MS, PAShannonC          Again, thank you for allowing me to participate in the care of your patient.        Sincerely,        Kelli Lynch PA-C

## 2022-10-27 DIAGNOSIS — L40.9 PSORIASIS: ICD-10-CM

## 2022-10-27 RX ORDER — TRIAMCINOLONE ACETONIDE 1 MG/G
CREAM TOPICAL
Qty: 454 G | Refills: 2 | Status: SHIPPED | OUTPATIENT
Start: 2022-10-27 | End: 2023-01-27

## 2022-10-27 NOTE — TELEPHONE ENCOUNTER
Requested Prescriptions   Pending Prescriptions Disp Refills     triamcinolone (KENALOG) 0.1 % external cream 454 g 2     Sig: Apply to AA BID x 1-2 week then PRN only       There is no refill protocol information for this order        Last office visit: 10/20/2022 with prescribing provider:  KELLI LYNCH    Future Office Visit:   Next 5 appointments (look out 90 days)    Nov 14, 2022 11:15 AM  (Arrive by 11:00 AM)  Return Visit with Kelli Lynch PA-C  [facility] ([facility] ) [address] [facility] [address]  [phone]               [facility]

## 2022-11-02 DIAGNOSIS — L30.1 POMPHOLYX: ICD-10-CM

## 2022-11-02 RX ORDER — BETAMETHASONE DIPROPIONATE 0.5 MG/G
OINTMENT, AUGMENTED TOPICAL 2 TIMES DAILY
Qty: 100 G | Refills: 1 | Status: SHIPPED | OUTPATIENT
Start: 2022-11-02 | End: 2023-03-17

## 2022-11-09 NOTE — PROGRESS NOTES
Latent TB Management Note: Follow-Up Visit    ASSESSMENT/PLAN:                            Latent TB medications were reviewed and found to be indicated, effective, safe and convenient/affordable unless drug therapy problem(s) was/were identified, as are described below.      Adherence assessment:   Misses doses: never  Using pill box: yes  Total number of tablets left in bottle and pill box: 6 (3 doses)  Total number of doses taken since last visit: 27  Total number of doses taken since start of treatment: 57    Adverse effect assessment:    Referred to Dr. Stewart for assessment of body rash/worsening psoriasis.     Liver function test needed: No    Liver function test ordered: Yes    Referral to physician needed: Yes    Psoriasis: needs improvement.  Referred to Dr Stewart for medical assessment.    Hypertension: At goal of <130/80.    Completed at this visit:    Continue current latent TB treatment    Provided patient with a 1 month supply of LTBI medication from [facility]    Instructed patient to return for follow up visit in 1 month and bring pill box/pill bottle    Addressed patient's questions/ concerns about latent TB meds:    none      Rifampin treatment completed after at least 120 doses completed within 6 consecutive months (ideally 5 months) no      SUBJECTIVE/OBJECTIVE:                            Sa was referred by Dr. Siddiqui for management of latent TB.  Today is his third (second follow up) visit.  Patient has a diagnosis of latent TB and has been referred to the clinical pharmacist for medication management and education concerning medications.    REVIEW:  Patient brought in latent TB meds and pill box? no  Date of last MD visit: 9/15/22  Date of last PharmD visit: 10/13/22    Latent TB treatment and duration: Rifampin for 4 months  Start date of treatment: 9/15/22    TB Symptoms? (i.e. Weight loss, night sweats, prolonged cough, bloody sputum, fever): No    Alcohol use since last LTBI visit: No     Medication  "side effect review:  Poor appetite: No  Nausea/Vomiting: No  RUQ abdominal tenderness: No  Tea/coffee colored urine: No  Rash/itching: No  Yellow skin/eyes: No  Fever for 3 days: No  RIF Only  Easy bruising or bleeding: No  RIF Only  Severe weakness or tiredness: No  RIF Only  Severe head or body aches: No  RIF Only   Any new medications started: No  RIF, RPT or MOXI  Severe dizziness: No    Psoriasis: Methotrexate increased to 20mg on 10/20/22. Prednisone is done. Patient prescribed augmented betamethasone diproprianate on 11/2.  Patient states that itching seems to be getting worse.     Hypertension:  Patient has hypertension and current medications are amlodipine 5mg daily and lisinopril 40mg daily.  Patient has not bee measuring home blood pressures because of current life stressors (patient's was on hospice and passed away Sunday).       Today's Vitals:   BP Readings from Last 1 Encounters:   11/10/22 124/77     Pulse Readings from Last 1 Encounters:   11/10/22 89     Wt Readings from Last 1 Encounters:   11/10/22 215 lb 9.6 oz (97.8 kg)     Ht Readings from Last 1 Encounters:   09/15/22 5' 4\" (1.626 m)     Estimated body mass index is 37.01 kg/m  as calculated from the following:    Height as of 9/15/22: 5' 4\" (1.626 m).    Weight as of an earlier encounter on 11/10/22: 215 lb 9.6 oz (97.8 kg).    Temp Readings from Last 1 Encounters:   11/10/22 98.4  F (36.9  C) (Oral)       ----------------      I spent 20 minutes with this patient today. All changes were made via collaborative practice agreement with Dr Siddiqui. Sa was engaged and actively involved in the decision making process.  Understanding of all the issues discussed was verbalized, and the patient was satisfied with the final plan. A copy of the visit note was provided to the patient's provider(s).    The patient was given a summary of these recommendations. See Provider note/AVS from today.     Savi Lema, [facility], PharmD    Due to patient being " non-English speaking/uses sign language, an  was used for this visit. Only for face-to-face interpretation by an external agency, date and length of interpretation can be found on the scanned worksheet.     name: Taye Ferrell  Agency: [employer]  Language: Liam   Telephone number:   Type of interpretation: Face-to-face, spoken

## 2022-11-10 ENCOUNTER — OFFICE VISIT (OUTPATIENT)
Dept: PHARMACY | Facility: CLINIC | Age: 62
End: 2022-11-10
Payer: COMMERCIAL

## 2022-11-10 ENCOUNTER — OFFICE VISIT (OUTPATIENT)
Dept: FAMILY MEDICINE | Facility: CLINIC | Age: 62
End: 2022-11-10
Payer: COMMERCIAL

## 2022-11-10 VITALS
RESPIRATION RATE: 20 BRPM | HEART RATE: 89 BPM | SYSTOLIC BLOOD PRESSURE: 124 MMHG | BODY MASS INDEX: 37.01 KG/M2 | OXYGEN SATURATION: 97 % | DIASTOLIC BLOOD PRESSURE: 77 MMHG | WEIGHT: 215.6 LBS | TEMPERATURE: 98.4 F

## 2022-11-10 DIAGNOSIS — L40.9 PSORIASIS: Primary | ICD-10-CM

## 2022-11-10 DIAGNOSIS — Z22.7 LATENT TUBERCULOSIS BY BLOOD TEST: Primary | ICD-10-CM

## 2022-11-10 DIAGNOSIS — L40.9 PSORIASIS: ICD-10-CM

## 2022-11-10 DIAGNOSIS — L29.9 ITCHING: ICD-10-CM

## 2022-11-10 DIAGNOSIS — Z22.7 LATENT TUBERCULOSIS: ICD-10-CM

## 2022-11-10 DIAGNOSIS — I10 ESSENTIAL HYPERTENSION WITH GOAL BLOOD PRESSURE LESS THAN 140/90: ICD-10-CM

## 2022-11-10 LAB
ALBUMIN SERPL BCG-MCNC: 3.9 G/DL (ref 3.5–5.2)
ALP SERPL-CCNC: 145 U/L (ref 40–129)
ALT SERPL W P-5'-P-CCNC: 23 U/L (ref 10–50)
ANION GAP SERPL CALCULATED.3IONS-SCNC: 11 MMOL/L (ref 7–15)
AST SERPL W P-5'-P-CCNC: 20 U/L (ref 10–50)
BILIRUB SERPL-MCNC: 0.3 MG/DL
BUN SERPL-MCNC: 12 MG/DL (ref 8–23)
CALCIUM SERPL-MCNC: 9.5 MG/DL (ref 8.8–10.2)
CHLORIDE SERPL-SCNC: 101 MMOL/L (ref 98–107)
CREAT SERPL-MCNC: 0.82 MG/DL (ref 0.67–1.17)
DEPRECATED HCO3 PLAS-SCNC: 26 MMOL/L (ref 22–29)
ERYTHROCYTE [DISTWIDTH] IN BLOOD BY AUTOMATED COUNT: 19 % (ref 10–15)
GFR SERPL CREATININE-BSD FRML MDRD: >90 ML/MIN/1.73M2
GLUCOSE SERPL-MCNC: 279 MG/DL (ref 70–99)
HCT VFR BLD AUTO: 38.1 % (ref 40–53)
HGB BLD-MCNC: 11.8 G/DL (ref 13.3–17.7)
MCH RBC QN AUTO: 20.7 PG (ref 26.5–33)
MCHC RBC AUTO-ENTMCNC: 31 G/DL (ref 31.5–36.5)
MCV RBC AUTO: 67 FL (ref 78–100)
PLATELET # BLD AUTO: 282 10E3/UL (ref 150–450)
POTASSIUM SERPL-SCNC: 4.9 MMOL/L (ref 3.4–5.3)
PROT SERPL-MCNC: 7.1 G/DL (ref 6.4–8.3)
RBC # BLD AUTO: 5.7 10E6/UL (ref 4.4–5.9)
SODIUM SERPL-SCNC: 138 MMOL/L (ref 136–145)
WBC # BLD AUTO: 6.8 10E3/UL (ref 4–11)

## 2022-11-10 PROCEDURE — 99214 OFFICE O/P EST MOD 30 MIN: CPT | Mod: GC

## 2022-11-10 PROCEDURE — 80053 COMPREHEN METABOLIC PANEL: CPT

## 2022-11-10 PROCEDURE — 99606 MTMS BY PHARM EST 15 MIN: CPT | Performed by: PHARMACIST

## 2022-11-10 PROCEDURE — 85027 COMPLETE CBC AUTOMATED: CPT

## 2022-11-10 PROCEDURE — 36415 COLL VENOUS BLD VENIPUNCTURE: CPT

## 2022-11-10 NOTE — PROGRESS NOTES
[facility] Medicine Clinic Visit    Assessment & Plan   Sa KENIA Mackey is a 62 year old male with the past medical history of psoriasis, current LTBI treatment with rifampin, hyperlipidemia, type 2 diabetes mellitus, hypertension, and gout. He presents to [facility] for follow-up of rifampin treatment for LTBI and worsening psoriasis throughout the body.    Psoriasis  Itching  Patient has a diagnosis of psoriasis.  He is currently being treated by the dermatology specialist with topical betamethasone ointment, oral methotrexate, and oral prednisone.  Patient states that he takes these medications as prescribed.  He also uses Vaseline after he showers on top of his steroid cream to help with the dryness.  He was prescribed hydroxyzine in the past for pruritus.  However, he stopped taking this due to it not helping much with itching and making him more tired.  - Patient still has hydroxyzine at home.  He was encouraged to take 25 mg 2 times during the day and 25 to 50 mg before bedtime for itching.  - Continue home medications for psoriasis including betamethasone, methotrexate, oral prednisone  - Continue showering and putting Vaseline on after showering for pruritis.  - Patient has dermatologist appointment on 11/14/2022.  Recommended to follow-up with dermatology at this time. Patient may also follow-up with the [facility] in 1 week.    Latent tuberculosis  Appointment today with Dr. Lema, pharmacist. Please see her note for further information.  Patient is currently 2 months into his 4-month treatment of latent tuberculosis with rifampin.  We will do liver function tests and a CBC to see if patient is having an increased reaction of rifampin.  If LFTs are 2 times the normal limit, or consider stopping rifampin and changing medication per pharmacist.  If liver functions are within normal or slightly elevated, medication can continue.  - Comprehensive metabolic panel; Future  - CBC with platelets;  Future      Patient was staffed with supervising physician, Dr. Jason Allen.    Tiarra Stewart MD, PGY2  [facility] Medicine      Subjective   Sa KENIA Mackey is a 62 year old male with the past medical history of psoriasis, current LTBI treatment with rifampin, hyperlipidemia, type 2 diabetes mellitus, hypertension, and gout. He presents to [facility] for follow-up of rifampin treatment for LTBI and worsening psoriasis throughout the body.    HPI  I was asked to see Sa by pharmacist, Dr. Lema, for worsening rash/psoriasis throughout the patient's body. He is currently on rifampin for latent tuberculosis infection.  He is 2 months into treatment.  He states that in the last 1-2 months, his rash became worse.  He is currently using betamethasone ointment, methotrexate, and prednisone for his psoriasis.  He endorses severe pruritus.  He will take a shower and put Vaseline on top of the reddened areas.  This helps for a short amount of time, but then the itching comes back.  He was prescribed hydroxyzine 25-50 mg at bedtime.  He states that this did not help his itching and only made him more tired so he stopped taking the medication.  Patient states he does not like to look at it.  Patient does have dermatologist appointment on 11/14.    His last liver enzymes were completed on 8/8/2022 with an AST of 29 and an ALT of 45.    Review of Systems   - Remaining 10 point system is negative other than what is noted in the HPI.    Objective   /77   Pulse 89   Temp 98.4  F (36.9  C) (Oral)   Resp 20   Wt 97.8 kg (215 lb 9.6 oz)   SpO2 97%   BMI 37.01 kg/m    Body mass index is 37.01 kg/m .    General appearance: alert, in no distress, cooperative  Head: normocephalic, without obvious abnormalities, atraumatic  Ears: hearing grossly intact  Lung: Speaking in full sentences, no cough, no use of accessory muscles  Skin: See photo below, psoriasis plaques presented extensively on  chest, abdomen, back, upper and lower extremities, silver patches with scaling skin  Neurologic: Ambulatory, spontaneously moving upper and lower extremities without pain             ----- Service Performed and Documented by Resident or Fellow ------

## 2022-11-10 NOTE — PATIENT INSTRUCTIONS
Thank you for coming to see me today in clinic!    Today we discussed:  - Psoriasis with itching:   You may use hydroxyzine 25 mg as needed throughout the day for itching. It may make you tired. You may also use 25-50mg at bedtime to help with itching and sleep.  Continue showers, with steroid use (by mouth and ointments), and Vaseline.  Follow up with dermatology on 11/14/2022.  Will check liver function today.    If you have any further questions, please call the clinic!    Have a wonderful rest of your day!

## 2022-11-12 NOTE — PROGRESS NOTES
Preceptor Attestation:    I discussed the patient with the resident and evaluated the patient in person. I have verified the content of the note, which accurately reflects my assessment of the patient and the plan of care.   Supervising Physician:  Jason Allen MD.

## 2022-11-15 ENCOUNTER — TELEPHONE (OUTPATIENT)
Dept: FAMILY MEDICINE | Facility: CLINIC | Age: 62
End: 2022-11-15

## 2022-11-15 NOTE — LETTER
November 15, 2022      Sa T Key  [address] [place] [address]        Dear ,  I am one of the Care Coordinators at [facility]. I am writing to remind you of two upcoming appointments that you have.      Appointments:    12/8/2022 @ 11: 00 am  [address] [place] [address]    [employer] will pick the patient up between:  10:00 am - 10:30 am     Return trip: The patient will need to activate the will call service by calling [employer] at: [phone]      12/28/2022 @ 10:00 am  [facility]   [address] [address]     [employer] will pick the patient up between:  9:00 am - 9:30 am    Return trip: The patient will need to activate the will call service by calling [employer] at: [phone]                      Sincerely,            Rakesh Foster Sr.  Social Work  Care Coordination  [address] [address]  [email or web address]   Office: [phone]  Direct: [phone]  [facility]

## 2022-11-15 NOTE — TELEPHONE ENCOUNTER
11/15/2022: Care Coordination    CC: Arranged transportation for two upcoming appointments the patient has. The patient has been notified via phone and mail.    Appointments:    12/8/2022 @ 11: 00 am  [facility] [address] [place] [address]    [employer] will pick the patient up between:  10:00 am - 10:30 am     Return trip: The patient will need to activate the will call service by calling [employer] at: [phone]      12/28/2022 @ 10:00 am  [facility]   [address] [place] [address]. [address] [address]     [employer] will pick the patient up between:  9:00 am - 9:30 am    Return trip: The patient will need to activate the will call service by calling [employer] at: [phone]      Rakesh Foster Sr.  Social Work  Care Coordination  [address] [address]  [email or web address]   Office: [phone]  Direct: [phone]  [facility] Physicians

## 2022-11-25 DIAGNOSIS — I10 ESSENTIAL HYPERTENSION WITH GOAL BLOOD PRESSURE LESS THAN 140/90: ICD-10-CM

## 2022-11-25 RX ORDER — AMLODIPINE BESYLATE 5 MG/1
10 TABLET ORAL DAILY
Qty: 180 TABLET | Refills: 1 | Status: SHIPPED | OUTPATIENT
Start: 2022-11-25 | End: 2023-03-08

## 2022-12-07 ENCOUNTER — TELEPHONE (OUTPATIENT)
Dept: DERMATOLOGY | Facility: CLINIC | Age: 62
End: 2022-12-07

## 2022-12-07 NOTE — TELEPHONE ENCOUNTER
Called patient's child Santo back. Patient was scheduled for a hold slot in January with Laura Castro LPN

## 2022-12-07 NOTE — TELEPHONE ENCOUNTER
[facility] Call Center    Phone Message    May a detailed message be left on voicemail: yes     Reason for Call: Other: Patient returning call re getting in earlier to see Kelli Lynch. No cancellations showing up on schedule - 1st avail is in April   Please call back  Thank you    Action Taken: Other: [place] [facility]    Travel Screening: Not Applicable

## 2022-12-07 NOTE — PROGRESS NOTES
Latent TB Management Note: Follow-Up Visit    ASSESSMENT/PLAN:                            Latent TB medications were reviewed and found to be indicated, effective, safe and convenient/affordable unless drug therapy problem(s) was/were identified, as are described below.      Adherence assessment:  Misses doses: never  Using pill box: yes  Total number of tablets left in bottle and pill box: 4 pills/2 days  Total number of doses taken since last visit: 31  Total number of doses taken since start of treatment: 88    Adverse effect assessment:    None    Liver function test needed: No    Liver function test ordered: No    Referral to physician needed: No    Completed at this visit:    Continue current latent TB treatment    Provided patient with a 1 month supply of LTBI medication from [facility]    Instructed patient to return for follow up visit in 1 month and bring pill box/pill bottle    Addressed patient's questions/ concerns about latent TB meds:    none      Rifampin treatment completed after at least 120 doses completed within 6 consecutive months (ideally 5 months) no      SUBJECTIVE/OBJECTIVE:                            Sa was referred by Dr. Siddiqui for management of latent TB.  Today is his 4th visit (start of month 4).  Patient has a diagnosis of latent TB and has been referred to the clinical pharmacist for medication management and education concerning medications.    REVIEW:  Patient brought in latent TB meds and pill box? yes  Date of last MD visit: 11/10/22  Date of last PharmD visit: 11/10/22    Latent TB treatment and duration: Rifampin for 4 months  Start date of treatment: 9/15/22    TB Symptoms? (i.e. Weight loss, night sweats, prolonged cough, bloody sputum, fever): No    Alcohol use since last LTBI visit: No     Medication side effect review:  Poor appetite: No  Nausea/Vomiting: No  RUQ abdominal tenderness: No  Tea/coffee colored urine: No  Rash/itching: Yes - patient has psoriasis  Yellow skin/eyes:  No  Fever for 3 days: No  RIF Only  Easy bruising or bleeding: No  RIF Only  Severe weakness or tiredness: No  RIF Only  Severe head or body aches: No  RIF Only   Any new medications started: No  RIF, RPT or MOXI  Severe dizziness: No    Today's Vitals: There were no vitals taken for this visit.  ----------------      I spent 15 minutes with this patient today. All changes were made via collaborative practice agreement with Dr. Siddiqui.  was engaged and actively involved in the decision making process.  Understanding of all the issues discussed was verbalized, and the patient was satisfied with the final plan. A copy of the visit note was provided to the patient's provider(s).    The patient was given to the patient a summary of these recommendations.     Savi Lema [facility], PharmD    Due to patient being non-English speaking/uses sign language, an  was used for this visit. Only for face-to-face interpretation by an external agency, date and length of interpretation can be found on the scanned worksheet.     name: Taye Ferrell  Agency: [employer]  Language: Liam   Telephone number:   Type of interpretation: Face-to-face, spoken

## 2022-12-08 ENCOUNTER — OFFICE VISIT (OUTPATIENT)
Dept: PHARMACY | Facility: CLINIC | Age: 62
End: 2022-12-08
Payer: COMMERCIAL

## 2022-12-08 DIAGNOSIS — L40.9 PSORIASIS: ICD-10-CM

## 2022-12-08 PROCEDURE — 99207 PR NO CHARGE LOS: CPT | Performed by: PHARMACIST

## 2022-12-08 RX ORDER — FOLIC ACID 1 MG/1
TABLET ORAL
Qty: 90 TABLET | Refills: 3 | Status: SHIPPED | OUTPATIENT
Start: 2022-12-08 | End: 2023-03-17

## 2022-12-12 ENCOUNTER — TELEPHONE (OUTPATIENT)
Dept: FAMILY MEDICINE | Facility: CLINIC | Age: 62
End: 2022-12-12

## 2022-12-12 NOTE — TELEPHONE ENCOUNTER
12/12/2022: Care Coordination    CC: Arranged transportation for three upcoming appointments.     Transportation provider: [employer] [phone]    Appointment Date & Time: 12/28/2022 @ 10:00am  : Between: 8:30 am - 9:00 am  Return Trip: Will call  Traveling to:[facility]UDIOLOG[facility] [address]   [place]  [address]    Appointment Date & Time: 1/6/2023 @ 11:00 am  : Between: 10:00 am - 10:30 am  Return Trip: Will call [employer] at [phone]  Traveling to:[facility] [address] [place] [address]    Transportation provider: [employer]  Appointment Date & Time: 1/12/2023  @ 10:00 am  : Between: 8:30 am - 9:00 am  Return Trip: Will call [employer] @ [phone]  Traveling to: [facility] [address] [place] [address]. [place] [address]       Rakesh Foster Sr.  Social Work  Care Coordination  [address] [address]  [email or web address]   Office: [phone]  Direct: [phone]  [facility]

## 2022-12-12 NOTE — LETTER
December 12, 2022      Sa T Key  [address]  [place] [address]        Dear ,  I am one of the Care Coordinators at [facility]. I am writing to remind you of three upcoming appointments and transportation. Patient has been notified via phone and mail.    Transportation provider: [employer] [phone]    Appointment Date & Time: 12/2//2022 @ 10:00am  : Between: 8:30 am - 9:00 am  Return Trip: Will call  Traveling to: [facility] AUDIOLOGY [address]   [place]  [address]    Appointment Date & Time: 1/6/2023 @ 11:00 am  : Between: 10:00 am - 10:30 am  Return Trip: Will call [employer] at [phone]  Traveling to:[address] [place] [address]    Transportation provider: [employer]  Appointment Date & Time: 1/12/2023  @ 10:00 am  : Between: 8:30 am - 9:00 am  Return Trip: Will call [employer] @ [phone]  Traveling to:[facility] [address] [place] [address]       Sincerely,    Rakesh Foster Sr.  Social Work  Care Coordination  [address] [address]  [email or web address]   Office: [phone]  Direct: [phone]  [facility]

## 2022-12-15 DIAGNOSIS — L40.9 PSORIASIS: ICD-10-CM

## 2022-12-15 DIAGNOSIS — E78.5 HYPERLIPIDEMIA LDL GOAL <100: ICD-10-CM

## 2022-12-15 DIAGNOSIS — E11.65 TYPE 2 DIABETES MELLITUS WITH HYPERGLYCEMIA, WITHOUT LONG-TERM CURRENT USE OF INSULIN (H): ICD-10-CM

## 2022-12-16 RX ORDER — FLASH GLUCOSE SENSOR
KIT MISCELLANEOUS
Qty: 2 EACH | Refills: 6 | Status: SHIPPED | OUTPATIENT
Start: 2022-12-16 | End: 2023-05-30

## 2022-12-16 RX ORDER — FLUOCINONIDE TOPICAL SOLUTION USP, 0.05% 0.5 MG/ML
SOLUTION TOPICAL 2 TIMES DAILY
Qty: 60 ML | Refills: 1 | Status: SHIPPED | OUTPATIENT
Start: 2022-12-16 | End: 2023-04-20

## 2022-12-21 DIAGNOSIS — E11.65 TYPE 2 DIABETES MELLITUS WITH HYPERGLYCEMIA, WITHOUT LONG-TERM CURRENT USE OF INSULIN (H): ICD-10-CM

## 2022-12-21 RX ORDER — ATORVASTATIN CALCIUM 80 MG/1
80 TABLET, FILM COATED ORAL DAILY
Qty: 90 TABLET | Refills: 1 | Status: SHIPPED | OUTPATIENT
Start: 2022-12-21 | End: 2023-05-08

## 2022-12-28 ENCOUNTER — OFFICE VISIT (OUTPATIENT)
Dept: AUDIOLOGY | Facility: CLINIC | Age: 62
End: 2022-12-28
Attending: FAMILY MEDICINE
Payer: COMMERCIAL

## 2022-12-28 ENCOUNTER — OFFICE VISIT (OUTPATIENT)
Dept: OTOLARYNGOLOGY | Facility: CLINIC | Age: 62
End: 2022-12-28
Attending: FAMILY MEDICINE
Payer: COMMERCIAL

## 2022-12-28 DIAGNOSIS — H90.5 SENSORINEURAL HEARING LOSS (SNHL), UNSPECIFIED LATERALITY: Primary | ICD-10-CM

## 2022-12-28 DIAGNOSIS — H90.A32 MIXED CONDUCTIVE AND SENSORINEURAL HEARING LOSS OF LEFT EAR WITH RESTRICTED HEARING OF RIGHT EAR: Primary | ICD-10-CM

## 2022-12-28 DIAGNOSIS — H90.6 MIXED HEARING LOSS, BILATERAL: Primary | ICD-10-CM

## 2022-12-28 DIAGNOSIS — H90.A21 SENSORINEURAL HEARING LOSS (SNHL) OF RIGHT EAR WITH RESTRICTED HEARING OF LEFT EAR: ICD-10-CM

## 2022-12-28 DIAGNOSIS — H90.5 SENSORINEURAL HEARING LOSS (SNHL), UNSPECIFIED LATERALITY: ICD-10-CM

## 2022-12-28 PROCEDURE — 92565 STENGER TEST PURE TONE: CPT | Performed by: AUDIOLOGIST

## 2022-12-28 PROCEDURE — 92550 TYMPANOMETRY & REFLEX THRESH: CPT | Performed by: AUDIOLOGIST

## 2022-12-28 PROCEDURE — 92557 COMPREHENSIVE HEARING TEST: CPT | Performed by: AUDIOLOGIST

## 2022-12-28 PROCEDURE — 99243 OFF/OP CNSLTJ NEW/EST LOW 30: CPT | Performed by: OTOLARYNGOLOGY

## 2022-12-28 NOTE — PROGRESS NOTES
AUDIOLOGY REPORT    SUMMARY: Audiology visit completed. See audiogram for results.      RECOMMENDATIONS: Follow-up with ENT.    Nasrin Garsia, CCC-A  [place] Licensed Audiologist #[id number]

## 2022-12-28 NOTE — PROGRESS NOTES
HPI: This patient is a 63yo M who presents for evaluation of hearing at the request of Dr. Siddiqui. There has been a gradual loss of hearing over the past few years in both ears. Denies otalgia, otorrhea, vertigo, and other major medical issues. Has been around moderate noise and has no relevant family history. There is bilateral, non-pulsatile tinnitus, most noticeable when it is quiet. He does report that he has had ear drainage from both ears, but none currently.    Past medical history, surgical history, social history, family history, medications, and allergies have been reviewed with the patient and are documented above.    Review of Systems: a 10-system review was performed. Pertinent positives are noted in the HPI and on a separate scanned document in the chart.    PHYSICAL EXAMINATION:  GEN: no acute distress, normocephalic  EYES: extraocular movements are intact, pupils are equal and round. Sclera clear.   EARS: auricles are normally formed. The external auditory canals are clear with minimal to no cerumen. Tympanic membranes are intact bilaterally and notably thick but with no signs of infection.  NOSE: anterior nares are patent. There are no masses or lesions. The septum is non-obstructing.  OC/OP: clear, dentition is in good repair. The tongue and palate are fully mobile and symmetric. No masses or lesions.  NECK: soft and supple. No lymphadenopathy or masses. Airway is midline.  NEURO: CN VII and XII symmetric. alert and oriented. No spontaneous nystagmus. Gait is normal.  PULM: breathing comfortably on room air, normal chest expansion with respiration  CARDS: no cyanosis or clubbing, normal carotid pulses    AUDIOGRAM: mild to severe SNHL right, moderate to severe MHL left. Type As tymps  TYMPS 2018: flat right, type as left    MEDICAL DECISION-MAKING: This patient is a 63yo F with mixed hearing loss. The conductive component can be explained by the TM thickness and pursuing this further would not  negate the need for the HAs. Discussed hearing protection. Medically clear for hearing aids should the patient desire them.

## 2022-12-28 NOTE — LETTER
12/28/2022         RE: Sa KENIA Mackey  [address] [place] [address]        Dear Colleague,    Thank you for referring your patient, Sa KENIA Mackey, to the [facility]. Please see a copy of my visit note below.    HPI: This patient is a 63yo M who presents for evaluation of hearing at the request of Dr. Siddiqui. There has been a gradual loss of hearing over the past few years in both ears. Denies otalgia, otorrhea, vertigo, and other major medical issues. Has been around moderate noise and has no relevant family history. There is bilateral, non-pulsatile tinnitus, most noticeable when it is quiet. He does report that he has had ear drainage from both ears, but none currently.    Past medical history, surgical history, social history, family history, medications, and allergies have been reviewed with the patient and are documented above.    Review of Systems: a 10-system review was performed. Pertinent positives are noted in the HPI and on a separate scanned document in the chart.    PHYSICAL EXAMINATION:  GEN: no acute distress, normocephalic  EYES: extraocular movements are intact, pupils are equal and round. Sclera clear.   EARS: auricles are normally formed. The external auditory canals are clear with minimal to no cerumen. Tympanic membranes are intact bilaterally and notably thick but with no signs of infection.  NOSE: anterior nares are patent. There are no masses or lesions. The septum is non-obstructing.  OC/OP: clear, dentition is in good repair. The tongue and palate are fully mobile and symmetric. No masses or lesions.  NECK: soft and supple. No lymphadenopathy or masses. Airway is midline.  NEURO: CN VII and XII symmetric. alert and oriented. No spontaneous nystagmus. Gait is normal.  PULM: breathing comfortably on room air, normal chest expansion with respiration  CARDS: no cyanosis or clubbing, normal carotid pulses    AUDIOGRAM: mild to severe SNHL right, moderate to severe  MHL left. Type As tymps  TYMPS 2018: flat right, type as left    MEDICAL DECISION-MAKING: This patient is a 63yo F with mixed hearing loss. The conductive component can be explained by the TM thickness and pursuing this further would not negate the need for the HAs. Discussed hearing protection. Medically clear for hearing aids should the patient desire them.        Again, thank you for allowing me to participate in the care of your patient.        Sincerely,        Angela Santiago MD

## 2023-01-06 ENCOUNTER — OFFICE VISIT (OUTPATIENT)
Dept: PHARMACY | Facility: CLINIC | Age: 63
End: 2023-01-06
Payer: COMMERCIAL

## 2023-01-06 DIAGNOSIS — Z22.7 LATENT TUBERCULOSIS BY BLOOD TEST: Primary | ICD-10-CM

## 2023-01-06 PROCEDURE — 99207 PR NO CHARGE LOS: CPT | Performed by: PHARMACIST

## 2023-01-06 NOTE — PROGRESS NOTES
Latent TB Management Note: Follow-Up Visit    ASSESSMENT/PLAN:                            Latent TB medications were reviewed and found to be indicated, effective, safe and convenient/affordable unless drug therapy problem(s) was/were identified, as are described below.      Adherence assessment:  Misses doses: never  Using pill box: yes  Total number of tablets left in bottle and pill box: 10 pills/ 5 days  Total number of doses taken since last visit: 27  Total number of doses taken since start of treatment: 115    Adverse effect assessment:    None    Liver function test needed: No    Liver function test ordered: No    Referral to physician needed: No    Completed at this visit:    Addressed patient's questions/ concerns about latent TB meds:    none      Rifampin treatment completed after at least 120 doses completed within 6 consecutive months (ideally 5 months) - Will be completed on 1/10/23.  Completion letter provided and on file in letters in this EHR.       SUBJECTIVE/OBJECTIVE:                            Sa was referred by Dr. Siddiqui for management of latent TB.  Today is his final visit.  Patient has a diagnosis of latent TB and has been referred to the clinical pharmacist for medication management and education concerning medications.    REVIEW:  Patient brought in latent TB meds and pill box? no  Date of last MD visit: 11/10/22  Date of last PharmD visit: 12/8/22    Latent TB treatment and duration: Rifampin for 4 months  Start date of treatment: 9/15/22    TB Symptoms? (i.e. Weight loss, night sweats, prolonged cough, bloody sputum, fever): No    Alcohol use since last LTBI visit: No     Medication side effect review:  Poor appetite: No  Nausea/Vomiting: No  RUQ abdominal tenderness: No  Tea/coffee colored urine: No  Rash/itching: Yes - patient has psoriasis  Yellow skin/eyes: No  Fever for 3 days: No  RIF Only  Easy bruising or bleeding: No  RIF Only  Severe weakness or tiredness: No  RIF Only  Severe  head or body aches: No  RIF Only   Any new medications started: No  RIF, RPT or MOXI  Severe dizziness: No    Today's Vitals: There were no vitals taken for this visit.  ----------------    I spent 15 minutes with this patient today. All changes were made via collaborative practice agreement with Dr. Siddiqui. Sa was engaged and actively involved in the decision making process.  Understanding of all the issues discussed was verbalized, and the patient was satisfied with the final plan. A copy of the visit note was provided to the patient's provider(s).    The patient was given to the patient a summary of these recommendations.     Savi Lema, PharmD    Due to patient being non-English speaking/uses sign language, an  was used for this visit. Only for face-to-face interpretation by an external agency, date and length of interpretation can be found on the scanned worksheet.     name: Taye Ferrell  Agency: [employer]  Language: Liam   Telephone number:   Type of interpretation: Face-to-face, spoken

## 2023-01-06 NOTE — LETTER
January 6, 2023      Sa KENIA Mackey  [address] [place] [address]    To Whom It May Concern:  The following is a record of evaluation and treatment for latent M. tuberculosis infection:    Name: Sa KENIA Mackey   YOB: 1960    IGRA: Date: 8/8/22 Result: positive  Chest radiograph: Date: 8/15/22  Results: Lungs are clear. No pleural effusion. Heart size and pulmonary vascularity within normal limits.    Medication(s):Rifampin 600mg  Date medication(s) started :9/15/22 Date completed: 1/10/23    This person is not infectious. He may always have a positive TB skin test, so there is no reason to repeat the test. If you need any further information, please contact this office.        Savi Lema [facility], Pharm.D.  Per Collaborative Practice Agreement with Jason Siddiqui MD

## 2023-01-06 NOTE — Clinical Note
Dr Siddiqui - ARELY Pereira - also ARELY, but note that patient will have completed his LTBI treatment by his appointment with you on 1/12/23 Khushi - please andrew as done for LTBI therapy.

## 2023-01-13 ENCOUNTER — TELEPHONE (OUTPATIENT)
Dept: DERMATOLOGY | Facility: CLINIC | Age: 63
End: 2023-01-13
Payer: COMMERCIAL

## 2023-01-13 NOTE — CONFIDENTIAL NOTE
I can see him sooner. Do I have any LUDY spots?           Please call patient -  was scheduled to come in yesterday but he fell off of my schedule. Does he need a fit in appointment or is he seeing an outside derm? Either is fine - I have received clearance from ID for him to start other meds if his psoriasis is not better since he has now completed treatment for LTBI.

## 2023-01-13 NOTE — TELEPHONE ENCOUNTER
Left message for daughter to call back to see if pt would like to reschedule. See provider note under confidential notes.   Susan MCGARRY RN BSN PHN  [facility]ty [facility]

## 2023-01-18 NOTE — TELEPHONE ENCOUNTER
Called patient's daughter Milton. Left message stating to call back if they would like to be seen sooner than his current appointment of March 9, 2023. Clinic number was provided.   Liliana Castro LPN

## 2023-01-26 ENCOUNTER — TELEPHONE (OUTPATIENT)
Dept: AUDIOLOGY | Facility: CLINIC | Age: 63
End: 2023-01-26

## 2023-01-26 NOTE — TELEPHONE ENCOUNTER
Health Call Center    Phone Message    May a detailed message be left on voicemail: no     Reason for Call: Other: Susannah Ha RN called and asked that writer reach out to Pt or pt's daughter for scheduling HA appointments.  LVM with patient's daughter with [employer] .  Schedule:  HA consult with Audio, HA fitting 4 weeks later, and HA initial check 3 weeks later.  Pt has been cleared for HA's.       Action Taken: Other: [facility] AUDIO    Travel Screening: Not Applicable

## 2023-01-27 DIAGNOSIS — L60.3 DYSTROPHIC NAIL: ICD-10-CM

## 2023-01-27 DIAGNOSIS — L40.9 PSORIASIS: ICD-10-CM

## 2023-01-27 DIAGNOSIS — L30.1 POMPHOLYX: ICD-10-CM

## 2023-01-27 DIAGNOSIS — L84 CORNS AND CALLOSITIES: Primary | ICD-10-CM

## 2023-01-27 DIAGNOSIS — L29.9 ITCHING: ICD-10-CM

## 2023-01-27 RX ORDER — TRIAMCINOLONE ACETONIDE 1 MG/G
CREAM TOPICAL
Qty: 454 G | Refills: 2 | Status: SHIPPED | OUTPATIENT
Start: 2023-01-27 | End: 2023-07-18

## 2023-01-27 RX ORDER — DIPHENHYDRAMINE HCL 25 MG
25 TABLET ORAL EVERY 6 HOURS PRN
Qty: 100 TABLET | Refills: 3 | Status: SHIPPED | OUTPATIENT
Start: 2023-01-27 | End: 2023-10-16

## 2023-02-14 ENCOUNTER — OFFICE VISIT (OUTPATIENT)
Dept: PODIATRY | Facility: CLINIC | Age: 63
End: 2023-02-14
Attending: STUDENT IN AN ORGANIZED HEALTH CARE EDUCATION/TRAINING PROGRAM
Payer: COMMERCIAL

## 2023-02-14 VITALS — HEIGHT: 64 IN | WEIGHT: 215 LBS | BODY MASS INDEX: 36.7 KG/M2 | HEART RATE: 83 BPM | OXYGEN SATURATION: 97 %

## 2023-02-14 DIAGNOSIS — L60.0 INGROWN TOENAIL: Primary | ICD-10-CM

## 2023-02-14 DIAGNOSIS — L84 TYLOMA: ICD-10-CM

## 2023-02-14 DIAGNOSIS — E78.5 HYPERLIPIDEMIA LDL GOAL <100: ICD-10-CM

## 2023-02-14 PROCEDURE — 11750 EXCISION NAIL&NAIL MATRIX: CPT | Mod: TA | Performed by: PODIATRIST

## 2023-02-14 PROCEDURE — 99243 OFF/OP CNSLTJ NEW/EST LOW 30: CPT | Mod: 25 | Performed by: PODIATRIST

## 2023-02-14 PROCEDURE — 11055 PARING/CUTG B9 HYPRKER LES 1: CPT | Mod: 51 | Performed by: PODIATRIST

## 2023-02-14 RX ORDER — LIDOCAINE HYDROCHLORIDE 20 MG/ML
5 INJECTION, SOLUTION INFILTRATION; PERINEURAL ONCE
Status: COMPLETED | OUTPATIENT
Start: 2023-02-14 | End: 2023-02-14

## 2023-02-14 RX ADMIN — LIDOCAINE HYDROCHLORIDE 5 ML: 20 INJECTION, SOLUTION INFILTRATION; PERINEURAL at 14:54

## 2023-02-14 ASSESSMENT — PAIN SCALES - GENERAL: PAINLEVEL: MODERATE PAIN (4)

## 2023-02-14 NOTE — PROGRESS NOTES
FOOT AND ANKLE SURGERY/PODIATRY CONSULT NOTE         ASSESSMENT:   Ingrown toenail left great toe  Tyloma left foot      TREATMENT:  Performed total nail excision and matrixectomy of the left great toenail today under local anesthesia of 2% lidocaine plain via the phenol and alcohol technique.  The patient tolerated the procedure and anesthesia well and was discharged in good condition.  He was given both written and verbal postoperative instructions.  I also debrided the painful tyloma on the plantar aspect the left foot.  The patient is to return to clinic as needed.        HPI: I was asked to see Sa KENIA Mackey today to evaluate and treat a painful ingrown toenail about the left great toe as well as a painful callus on the bottom of the left foot.  The patient indicated that he has had foot trouble for several years.  The toenail is quite painful.  The toenail is aggravated by shoe gear and ambulation.  It is an aching type pain with any type of mild trauma to the toe.  He has not had any redness, swelling, drainage or bleeding.  There are no factors which relieve his pain.  He denies any previous treatment.  The pain is severe.  The patient also complained of a very painful callus on the bottom of the left foot.  This lesion is aggravated with weightbearing and ambulation.  He has no pain while resting.  He denies any other previous treatment. The patient was seen in consultation at the request of Kay Redman MD for evaluation and treatment of left foot pain.     Past Medical History:   Diagnosis Date     Depressive disorder      Diabetes (H)      Hypertension        Social History     Socioeconomic History     Marital status:      Spouse name: Not on file     Number of children: Not on file     Years of education: Not on file     Highest education level: Not on file   Occupational History     Not on file   Tobacco Use     Smoking status: Never     Smokeless tobacco: Never   Substance and Sexual  Activity     Alcohol use: No     Drug use: No     Sexual activity: Not Currently   Other Topics Concern     Not on file   Social History Narrative     Not on file     Social Determinants of Health     Financial Resource Strain: Not on file   Food Insecurity: Not on file   Transportation Needs: Not on file   Physical Activity: Not on file   Stress: Not on file   Social Connections: Not on file   Intimate Partner Violence: Not on file   Housing Stability: Not on file          Allergies   Allergen Reactions     Allopurinol      Cortisporin [Neomycin-Polymyxin-Hc] Swelling     Indomethacin      Salsalate      Tetracycline      Tramadol      Nsaids Rash          Current Outpatient Medications:      ACCU-CHEK NIKKIE PLUS test strip, USE UP TO 3 TIMES A DAY, Disp: 100 strip, Rfl: 3     acetaminophen (TYLENOL) 500 MG tablet, Take 2 tablets (1,000 mg) by mouth every 8 hours as needed for mild pain, Disp: 100 tablet, Rfl: 3     acetic acid-hydrocortisone (VOSOL-HC) 1-2 % otic solution, Place 3 drops into both ears 2 times daily as needed (itching), Disp: 10 mL, Rfl: 3     Alcohol Swabs (EASY TOUCH ALCOHOL PREP MEDIUM) 70 % PADS, 1 pad by In Vitro route 3 times daily as needed, Disp: 300 each, Rfl: 1     amLODIPine (NORVASC) 5 MG tablet, TAKE 2 TABLETS (10 MG) BY MOUTH DAILY, Disp: 180 tablet, Rfl: 1     aspirin (ASPIRIN LOW DOSE) 81 MG EC tablet, TAKE 1 TABLET (81 MG) BY MOUTH DAILY, Disp: 90 tablet, Rfl: 0     atorvastatin (LIPITOR) 80 MG tablet, Take 1 tablet (80 mg) by mouth daily Take at same time each day as Rifampin (TB medicine), Disp: 90 tablet, Rfl: 1     augmented betamethasone dipropionate (DIPROLENE-AF) 0.05 % external ointment, Apply topically 2 times daily No more than 14 days around face, Disp: 100 g, Rfl: 1     betamethasone dipropionate (DIPROSONE) 0.05 % external ointment, APPLY TOPICALLY AND SPARINGLY TO AFFECTED AREA TWICE DAILY AS NEEDED. DO NOT APPLY TO FACE., Disp: 30 g, Rfl: 0     blood glucose  (FREESTYLE PRECISION LUIS TEST) test strip, Use to test blood sugar 1-2 times daily or as directed., Disp: 50 strip, Rfl: 11     blood glucose (NO BRAND SPECIFIED) test strip, Use up to 3 times a day, Disp: 1 Box, Rfl: 5     carboxymethylcellulose PF (LUBRICATING PLUS EYE DROPS) 0.5 % ophthalmic solution, PLACE 1 DROP INTO BOTH EYES DAILY AS NEEDED FOR DRY EYES, Disp: 30 each, Rfl: 3     Continuous Blood Gluc  (FREESTYLE JOSE 14 DAY READER) [facility], 1 EACH DAILY USE TO TEST BLOOD SUGAR DAILY PER 'S INSTRUCTIONS., Disp: 1 each, Rfl: 0     Continuous Blood Gluc Sensor (FREESTYLE JOSE 14 DAY SENSOR) [facility], USE TO TEST BLOOD SUGAR DAILY PER 'S INSTRUCTIONS. REMOVE AND REPLACE EVERY 14 DAYS., Disp: 2 each, Rfl: 6     diphenhydrAMINE (BENADRYL) 25 MG tablet, Take 1 tablet (25 mg) by mouth every 6 hours as needed for itching, Disp: 100 tablet, Rfl: 3     empagliflozin (JARDIANCE) 25 MG TABS tablet, Take 1 tablet (25 mg) by mouth daily, Disp: 90 tablet, Rfl: 1     febuxostat (ULORIC) 40 MG TABS tablet, TAKE ONE TABLET BY MOUTH DAILY, Disp: 90 tablet, Rfl: 1     fluocinonide (LIDEX) 0.05 % external solution, APPLY TOPICALLY 2 TIMES DAILY, Disp: 60 mL, Rfl: 1     folic acid (FOLVITE) 1 MG tablet, 1 tablet daily on all days not taking methotrexate, Disp: 90 tablet, Rfl: 3     hydrOXYzine (ATARAX) 25 MG tablet, 1-2 tablets PO at bedtime PRN itching, Disp: 90 tablet, Rfl: 3     Lancet Devices (EASY TOUCH LANCING DEVICE) MISC, USE TO TEST BLOOD SUGARS, Disp: 1 each, Rfl: 0     lisinopril (ZESTRIL) 40 MG tablet, Take 1 tablet (40 mg) by mouth daily, Disp: 90 tablet, Rfl: 1     metFORMIN (GLUCOPHAGE XR) 500 MG 24 hr tablet, TAKE 4 TABLETS (2,000 MG) BY MOUTH DAILY, Disp: 360 tablet, Rfl: 1     methotrexate 2.5 MG tablet, 8 tablets all at once one time per week, Disp: 32 tablet, Rfl: 0     order for DME, Equipment being ordered: 1 seated 4-wheel walker with brakes, Disp: 1 Device, Rfl: 0     ORDER FOR  DME,  (Continuous Positive Airway Pressure) nightly, Disp: , Rfl:      predniSONE (DELTASONE) 10 MG tablet, 5 tablets QAM x 5 days then 4 tablets QAM x 5 days then 3 tables QAM x 5 days then 2 tablets x 5 days then 1 tablet x 5 days, Disp: 75 tablet, Rfl: 0     rifampin (RIFADIN) 300 MG capsule, Take 2 capsules (600 mg) by mouth daily, Disp: 60 capsule, Rfl: 3     Skin Protectants, Misc. (VASELINE CONSTANT CARE) OINT, Externally apply topically 3 times daily, Disp: 1 Tube, Rfl: 3     triamcinolone (KENALOG) 0.1 % external cream, Apply to AA BID x 1-2 week then PRN only, Disp: 454 g, Rfl: 2     vitamin D3 (CHOLECALCIFEROL) 50 mcg (2000 units) tablet, TAKE 1 TABLET (50 MCG) BY MOUTH DAILY, Disp: 90 tablet, Rfl: 1     Family History   Problem Relation Age of Onset     Other - See Comments Mother      Cancer Mother      Coronary Artery Disease Father      Heart Disease Father      No Known Problems Maternal Grandmother      No Known Problems Maternal Grandfather      No Known Problems Paternal Grandmother      No Known Problems Paternal Grandfather      No Known Problems Brother      No Known Problems Sister      No Known Problems Son      No Known Problems Daughter      No Known Problems Maternal Half-Brother      No Known Problems Maternal Half-Sister      No Known Problems Paternal Half-Brother      No Known Problems Paternal Half-Sister      No Known Problems Niece      No Known Problems Nephew      No Known Problems Cousin      No Known Problems Other      Diabetes No family hx of      Hypertension No family hx of      Hyperlipidemia No family hx of      Kidney Disease No family hx of      Cerebrovascular Disease No family hx of      Obesity No family hx of      Thrombosis No family hx of      Asthma No family hx of      Arthritis No family hx of      Thyroid Disease No family hx of      Depression No family hx of      Mental Illness No family hx of      Substance Abuse No family hx of      Cystic Fibrosis No  family hx of      Early Death No family hx of      Coronary Artery Disease Early Onset No family hx of      Heart Failure No family hx of      Bleeding Diathesis No family hx of      Dementia No family hx of      Breast Cancer No family hx of      Ovarian Cancer No family hx of      Uterine Cancer No family hx of      Prostate Cancer No family hx of      Colorectal Cancer No family hx of      Pancreatic Cancer No family hx of      Lung Cancer No family hx of      Melanoma No family hx of      Autoimmune Disease No family hx of      Unknown/Adopted No family hx of      Genetic Disorder No family hx of         Social History     Socioeconomic History     Marital status:      Spouse name: Not on file     Number of children: Not on file     Years of education: Not on file     Highest education level: Not on file   Occupational History     Not on file   Tobacco Use     Smoking status: Never     Smokeless tobacco: Never   Substance and Sexual Activity     Alcohol use: No     Drug use: No     Sexual activity: Not Currently   Other Topics Concern     Not on file   Social History Narrative     Not on file     Social Determinants of Health     Financial Resource Strain: Not on file   Food Insecurity: Not on file   Transportation Needs: Not on file   Physical Activity: Not on file   Stress: Not on file   Social Connections: Not on file   Intimate Partner Violence: Not on file   Housing Stability: Not on file        Review of Systems - Patient denies fever, chills, rash, wound, stiffness, limping, numbness, weakness, heart burn, blood in stool, chest pain with activity, calf pain when walking, shortness of breath with activity, chronic cough, easy bleeding/bruising, swelling of ankles, excessive thirst, fatigue, depression, anxiety.  Patient admits to left foot pain.      OBJECTIVE:  Appearance: alert, well appearing, and in no distress.    There were no vitals taken for this visit.     There is no height or weight on  file to calculate BMI.     General appearance: Patient is alert and fully cooperative with history & exam.  No sign of distress is noted during the visit.  Psychiatric: Affect is pleasant & appropriate.  Patient appears motivated to improve health.  Respiratory: Breathing is regular & unlabored while sitting.  HEENT: Hearing is intact to spoken word.  Speech is clear.  No gross evidence of visual impairment that would impact ambulation.    Vascular: Dorsalis pedis and posterior tibial pulses are palpable. There is pedal hair growth bilaterally.  CFT < 3 sec from anterior tibial surface to distal digits bilaterally. There is no appreciable edema noted.  Dermatologic: The medial and lateral borders of the left great toenail are severely incurvated.  There is a thick round hyperkeratotic nucleated lesion subsecond metatarsal head left foot.  Turgor and texture are within normal limits. No coloration or temperature changes. No other primary or secondary lesions noted.  Neurologic: All epicritic and proprioceptive sensations are grossly intact bilaterally.  Musculoskeletal: All active and passive ankle, subtalar, midtarsal, and 1st MPJ range of motion are grossly intact without pain or crepitus, with the exception of none. Manual muscle strength is within normal limits bilaterally. All dorsiflexors, plantarflexors, invertors, evertors are intact bilaterally. Tenderness present to the medial and lateral margins of the left great toenail and the plantar aspect of the left foot on palpation.  No tenderness to the left foot or ankle with range of motion. Calf is soft/non-tender without warmth/induration    Imaging:       No images are attached to the encounter or orders placed in the encounter.     No results found.   No results found.     Shahzad Cochran DPM  [facility] Foot & Ankle Surgery/Podiatry

## 2023-02-14 NOTE — LETTER
2/14/2023         RE: Sa KENIA Mackey  [address]  [facility] [address]        Dear Colleague,    Thank you for referring your patient, Sa KENIA Mackey, to the [facility]. Please see a copy of my visit note below.    FOOT AND ANKLE SURGERY/PODIATRY CONSULT NOTE         ASSESSMENT:   Ingrown toenail left great toe  Tyloma left foot      TREATMENT:  Performed total nail excision and matrixectomy of the left great toenail today under local anesthesia of 2% lidocaine plain via the phenol and alcohol technique.  The patient tolerated the procedure and anesthesia well and was discharged in good condition.  He was given both written and verbal postoperative instructions.  I also debrided the painful tyloma on the plantar aspect the left foot.  The patient is to return to clinic as needed.        HPI: I was asked to see Sa KENIA Mackey today to evaluate and treat a painful ingrown toenail about the left great toe as well as a painful callus on the bottom of the left foot.  The patient indicated that he has had foot trouble for several years.  The toenail is quite painful.  The toenail is aggravated by shoe gear and ambulation.  It is an aching type pain with any type of mild trauma to the toe.  He has not had any redness, swelling, drainage or bleeding.  There are no factors which relieve his pain.  He denies any previous treatment.  The pain is severe.  The patient also complained of a very painful callus on the bottom of the left foot.  This lesion is aggravated with weightbearing and ambulation.  He has no pain while resting.  He denies any other previous treatment. The patient was seen in consultation at the request of Kay Redman MD for evaluation and treatment of left foot pain.     Past Medical History:   Diagnosis Date     Depressive disorder      Diabetes (H)      Hypertension        Social History     Socioeconomic History     Marital status:      Spouse name: Not on file     Number  of children: Not on file     Years of education: Not on file     Highest education level: Not on file   Occupational History     Not on file   Tobacco Use     Smoking status: Never     Smokeless tobacco: Never   Substance and Sexual Activity     Alcohol use: No     Drug use: No     Sexual activity: Not Currently   Other Topics Concern     Not on file   Social History Narrative     Not on file     Social Determinants of Health     Financial Resource Strain: Not on file   Food Insecurity: Not on file   Transportation Needs: Not on file   Physical Activity: Not on file   Stress: Not on file   Social Connections: Not on file   Intimate Partner Violence: Not on file   Housing Stability: Not on file          Allergies   Allergen Reactions     Allopurinol      Cortisporin [Neomycin-Polymyxin-Hc] Swelling     Indomethacin      Salsalate      Tetracycline      Tramadol      Nsaids Rash          Current Outpatient Medications:      ACCU-CHEK NIKKIE PLUS test strip, USE UP TO 3 TIMES A DAY, Disp: 100 strip, Rfl: 3     acetaminophen (TYLENOL) 500 MG tablet, Take 2 tablets (1,000 mg) by mouth every 8 hours as needed for mild pain, Disp: 100 tablet, Rfl: 3     acetic acid-hydrocortisone (VOSOL-HC) 1-2 % otic solution, Place 3 drops into both ears 2 times daily as needed (itching), Disp: 10 mL, Rfl: 3     Alcohol Swabs (EASY TOUCH ALCOHOL PREP MEDIUM) 70 % PADS, 1 pad by In Vitro route 3 times daily as needed, Disp: 300 each, Rfl: 1     amLODIPine (NORVASC) 5 MG tablet, TAKE 2 TABLETS (10 MG) BY MOUTH DAILY, Disp: 180 tablet, Rfl: 1     aspirin (ASPIRIN LOW DOSE) 81 MG EC tablet, TAKE 1 TABLET (81 MG) BY MOUTH DAILY, Disp: 90 tablet, Rfl: 0     atorvastatin (LIPITOR) 80 MG tablet, Take 1 tablet (80 mg) by mouth daily Take at same time each day as Rifampin (TB medicine), Disp: 90 tablet, Rfl: 1     augmented betamethasone dipropionate (DIPROLENE-AF) 0.05 % external ointment, Apply topically 2 times daily No more than 14 days around  face, Disp: 100 g, Rfl: 1     betamethasone dipropionate (DIPROSONE) 0.05 % external ointment, APPLY TOPICALLY AND SPARINGLY TO AFFECTED AREA TWICE DAILY AS NEEDED. DO NOT APPLY TO FACE., Disp: 30 g, Rfl: 0     blood glucose (FREESTYLE PRECISION LUIS TEST) test strip, Use to test blood sugar 1-2 times daily or as directed., Disp: 50 strip, Rfl: 11     blood glucose (NO BRAND SPECIFIED) test strip, Use up to 3 times a day, Disp: 1 Box, Rfl: 5     carboxymethylcellulose PF (LUBRICATING PLUS EYE DROPS) 0.5 % ophthalmic solution, PLACE 1 DROP INTO BOTH EYES DAILY AS NEEDED FOR DRY EYES, Disp: 30 each, Rfl: 3     Continuous Blood Gluc  (FREESTYLE JOSE 14 DAY READER) [facility], 1 EACH DAILY USE TO TEST BLOOD SUGAR DAILY PER 'S INSTRUCTIONS., Disp: 1 each, Rfl: 0     Continuous Blood Gluc Sensor (FREESTYLE JOSE 14 DAY SENSOR) [facility], USE TO TEST BLOOD SUGAR DAILY PER 'S INSTRUCTIONS. REMOVE AND REPLACE EVERY 14 DAYS., Disp: 2 each, Rfl: 6     diphenhydrAMINE (BENADRYL) 25 MG tablet, Take 1 tablet (25 mg) by mouth every 6 hours as needed for itching, Disp: 100 tablet, Rfl: 3     empagliflozin (JARDIANCE) 25 MG TABS tablet, Take 1 tablet (25 mg) by mouth daily, Disp: 90 tablet, Rfl: 1     febuxostat (ULORIC) 40 MG TABS tablet, TAKE ONE TABLET BY MOUTH DAILY, Disp: 90 tablet, Rfl: 1     fluocinonide (LIDEX) 0.05 % external solution, APPLY TOPICALLY 2 TIMES DAILY, Disp: 60 mL, Rfl: 1     folic acid (FOLVITE) 1 MG tablet, 1 tablet daily on all days not taking methotrexate, Disp: 90 tablet, Rfl: 3     hydrOXYzine (ATARAX) 25 MG tablet, 1-2 tablets PO at bedtime PRN itching, Disp: 90 tablet, Rfl: 3     Lancet Devices (EASY TOUCH LANCING DEVICE) MISC, USE TO TEST BLOOD SUGARS, Disp: 1 each, Rfl: 0     lisinopril (ZESTRIL) 40 MG tablet, Take 1 tablet (40 mg) by mouth daily, Disp: 90 tablet, Rfl: 1     metFORMIN (GLUCOPHAGE XR) 500 MG 24 hr tablet, TAKE 4 TABLETS (2,000 MG) BY MOUTH DAILY, Disp: 360 tablet,  Rfl: 1     methotrexate 2.5 MG tablet, 8 tablets all at once one time per week, Disp: 32 tablet, Rfl: 0     order for DME, Equipment being ordered: 1 seated 4-wheel walker with brakes, Disp: 1 Device, Rfl: 0     ORDER FOR DME,  (Continuous Positive Airway Pressure) nightly, Disp: , Rfl:      predniSONE (DELTASONE) 10 MG tablet, 5 tablets QAM x 5 days then 4 tablets QAM x 5 days then 3 tables QAM x 5 days then 2 tablets x 5 days then 1 tablet x 5 days, Disp: 75 tablet, Rfl: 0     rifampin (RIFADIN) 300 MG capsule, Take 2 capsules (600 mg) by mouth daily, Disp: 60 capsule, Rfl: 3     Skin Protectants, Misc. (VASELINE CONSTANT CARE) OINT, Externally apply topically 3 times daily, Disp: 1 Tube, Rfl: 3     triamcinolone (KENALOG) 0.1 % external cream, Apply to AA BID x 1-2 week then PRN only, Disp: 454 g, Rfl: 2     vitamin D3 (CHOLECALCIFEROL) 50 mcg (2000 units) tablet, TAKE 1 TABLET (50 MCG) BY MOUTH DAILY, Disp: 90 tablet, Rfl: 1     Family History   Problem Relation Age of Onset     Other - See Comments Mother      Cancer Mother      Coronary Artery Disease Father      Heart Disease Father      No Known Problems Maternal Grandmother      No Known Problems Maternal Grandfather      No Known Problems Paternal Grandmother      No Known Problems Paternal Grandfather      No Known Problems Brother      No Known Problems Sister      No Known Problems Son      No Known Problems Daughter      No Known Problems Maternal Half-Brother      No Known Problems Maternal Half-Sister      No Known Problems Paternal Half-Brother      No Known Problems Paternal Half-Sister      No Known Problems Niece      No Known Problems Nephew      No Known Problems Cousin      No Known Problems Other      Diabetes No family hx of      Hypertension No family hx of      Hyperlipidemia No family hx of      Kidney Disease No family hx of      Cerebrovascular Disease No family hx of      Obesity No family hx of      Thrombosis No family hx of       Asthma No family hx of      Arthritis No family hx of      Thyroid Disease No family hx of      Depression No family hx of      Mental Illness No family hx of      Substance Abuse No family hx of      Cystic Fibrosis No family hx of      Early Death No family hx of      Coronary Artery Disease Early Onset No family hx of      Heart Failure No family hx of      Bleeding Diathesis No family hx of      Dementia No family hx of      Breast Cancer No family hx of      Ovarian Cancer No family hx of      Uterine Cancer No family hx of      Prostate Cancer No family hx of      Colorectal Cancer No family hx of      Pancreatic Cancer No family hx of      Lung Cancer No family hx of      Melanoma No family hx of      Autoimmune Disease No family hx of      Unknown/Adopted No family hx of      Genetic Disorder No family hx of         Social History     Socioeconomic History     Marital status:      Spouse name: Not on file     Number of children: Not on file     Years of education: Not on file     Highest education level: Not on file   Occupational History     Not on file   Tobacco Use     Smoking status: Never     Smokeless tobacco: Never   Substance and Sexual Activity     Alcohol use: No     Drug use: No     Sexual activity: Not Currently   Other Topics Concern     Not on file   Social History Narrative     Not on file     Social Determinants of Health     Financial Resource Strain: Not on file   Food Insecurity: Not on file   Transportation Needs: Not on file   Physical Activity: Not on file   Stress: Not on file   Social Connections: Not on file   Intimate Partner Violence: Not on file   Housing Stability: Not on file        Review of Systems - Patient denies fever, chills, rash, wound, stiffness, limping, numbness, weakness, heart burn, blood in stool, chest pain with activity, calf pain when walking, shortness of breath with activity, chronic cough, easy bleeding/bruising, swelling of ankles, excessive thirst,  fatigue, depression, anxiety.  Patient admits to left foot pain.      OBJECTIVE:  Appearance: alert, well appearing, and in no distress.    There were no vitals taken for this visit.     There is no height or weight on file to calculate BMI.     General appearance: Patient is alert and fully cooperative with history & exam.  No sign of distress is noted during the visit.  Psychiatric: Affect is pleasant & appropriate.  Patient appears motivated to improve health.  Respiratory: Breathing is regular & unlabored while sitting.  HEENT: Hearing is intact to spoken word.  Speech is clear.  No gross evidence of visual impairment that would impact ambulation.    Vascular: Dorsalis pedis and posterior tibial pulses are palpable. There is pedal hair growth bilaterally.  CFT < 3 sec from anterior tibial surface to distal digits bilaterally. There is no appreciable edema noted.  Dermatologic: The medial and lateral borders of the left great toenail are severely incurvated.  There is a thick round hyperkeratotic nucleated lesion subsecond metatarsal head left foot.  Turgor and texture are within normal limits. No coloration or temperature changes. No other primary or secondary lesions noted.  Neurologic: All epicritic and proprioceptive sensations are grossly intact bilaterally.  Musculoskeletal: All active and passive ankle, subtalar, midtarsal, and 1st MPJ range of motion are grossly intact without pain or crepitus, with the exception of none. Manual muscle strength is within normal limits bilaterally. All dorsiflexors, plantarflexors, invertors, evertors are intact bilaterally. Tenderness present to the medial and lateral margins of the left great toenail and the plantar aspect of the left foot on palpation.  No tenderness to the left foot or ankle with range of motion. Calf is soft/non-tender without warmth/induration    Imaging:       No images are attached to the encounter or orders placed in the encounter.     No results  found.   No results found.     Shahzad Cochran DPM  [facility] Foot & Ankle Surgery/Podiatry         Again, thank you for allowing me to participate in the care of your patient.        Sincerely,        Shahzad Peterson DPM

## 2023-02-14 NOTE — PATIENT INSTRUCTIONS
POSTOPERATIVE INSTRUCTIONS AFTER CHEMICAL NAIL REMOVAL SURGERY    What to expect after surgery:  Your toe will be numb for around 2-8 hours after your nail procedure due to the shots that were given.  Expect some degree of soreness in your toe later today when the numbness wears off.  Rest, elevation and ice applied at the ankle will help ease the pain. Your bandage was wrapped snug to cut down on bleeding.    This may feel tight when the numbness wears off.  Please remove the bandage tomorrow morning and begin the foot soaks described below.  Warm water will feel good and helps to ease the pain  How to Care for Your Toe After Surgery  One daily foot soak will be necessary to heal properly.  Chemicals were used to kill the root of the nail.  Expect local redness, drainage and tenderness , this will last for 6-8 weeks.  Soaking helps loosen the scab and dried drainage.  Failure to soak leads to a hard scab that blocks drainage.  Back up drainage increases the pain and the scab may need to be removed with another office procedure.  You will heal much quicker and be more comfortable if you are consistent with local wound care and foot soaks.  Soak one time every day for 2 weeks.  Soaks should last 10 minutes.  Soak after taking a shower to get the germs out.  Soak in warm water with hydrogen peroxide 5 parts water to 1 part hydrogen peroxide.  A small amount of bleeding may be noted which is normal.   Clean the surgical site with a Q-tip during each soak.  Dip the Q-tip in the water and gently clean away any crusted drainage.  The area may be tender but you will not harm anything with the Q-tip.  Pat the area dry and allow a few minutes to air dry before applying any bandages.  Flexible fabric style band aids work best.  In general, the area will be wet from drainage.  A small dab of antibiotic ointment is okay but watch out for excessive moisture.  White and wrinkled skin is a sign of too much moisture and that the  skin needs to be dried out. It is ok to allow the toe some air by removing the band aid as needed.  Activity  Feel free to do normal activities as tolerated on the following day.  Wear open toe sandals if regular shoes are not comfortable.  Avoid shoes that are tight on the toe.  Medications   Finish any antibiotics if they have been prescribed.  Tylenol or ibuprofen may be used as needed for pain.  Icing and elevation also help with pain and swelling.  Risks  Watch for signs of infection.  It is normal to see redness, local tenderness, and drainage around the nail area for up to 8 weeks after permanent nail removal surgery.  Call the clinic at [phone] if you see red streaks spreading up the toe, foot, or leg.  Fever and chills are also concerning and you should notify the clinic if you are having these symptoms.  If these symptoms occur when the clinic is closed, please go to urgent care.  How Well Does Permanent Nail Surgery Work?  Permanent nail surgery means that we intend that the nail problem will not come back.  In a small percentage of patients, nail problems return in about 6 months to a year.  We would like to see you back in the office if you are experiencing problems in the future.  Please call [phone] with any additional questions.

## 2023-02-15 ENCOUNTER — TELEPHONE (OUTPATIENT)
Dept: FAMILY MEDICINE | Facility: CLINIC | Age: 63
End: 2023-02-15
Payer: COMMERCIAL

## 2023-02-15 NOTE — LETTER
February 15, 2023      Sa T Key  [address] [place] [address]        Dear ,  I am one of the Care Coordinators at [facility]. I am writing to remind you of an upcoming appointment you have on:  3/9/2023 @ 2:45pm traveling to : [facility]  [facility]. [place] [address].  [employer] will pick you up between:1:00 pm - 1:30 pm for your 2:45 pm appointment.  When ready to return home the you will need to call: [phone].     If you have questions or concerns regarding this appointment, please contact the clinic at: [phone].        Sincerely,        Rakesh Foster Sr.  Social Work  Care Coordination  [address] [address]  [email or web address]     Office: [phone]  Direct: [phone]  [facility]

## 2023-02-15 NOTE — TELEPHONE ENCOUNTER
2/15/2023: Care Coordination     CC: arranged transportation for an upcoming appointment on: 3/9/2023 @ 2:45pm traveling to : [address] [place] [address]. [place] [address].  [employer] will pick the patient up between: 1:00 pm - 1:30 pm for his 2:45 pm appointment.  When ready to return home the patient will need to call: [phone]. The patient has been notified via phone and mail.               Rakesh Foster Sr.  Social Work  Care Coordination  [address] [address]  [email or web address]   Office: [phone]  Direct: [phone]  [facility]

## 2023-03-03 DIAGNOSIS — I10 ESSENTIAL HYPERTENSION WITH GOAL BLOOD PRESSURE LESS THAN 140/90: ICD-10-CM

## 2023-03-03 RX ORDER — AMLODIPINE BESYLATE 5 MG/1
10 TABLET ORAL DAILY
Qty: 180 TABLET | Refills: 1 | Status: CANCELLED | OUTPATIENT
Start: 2023-03-03

## 2023-03-06 ENCOUNTER — OFFICE VISIT (OUTPATIENT)
Dept: FAMILY MEDICINE | Facility: CLINIC | Age: 63
End: 2023-03-06
Payer: COMMERCIAL

## 2023-03-06 VITALS
TEMPERATURE: 98.5 F | OXYGEN SATURATION: 97 % | HEART RATE: 77 BPM | WEIGHT: 216 LBS | SYSTOLIC BLOOD PRESSURE: 136 MMHG | BODY MASS INDEX: 37.08 KG/M2 | DIASTOLIC BLOOD PRESSURE: 80 MMHG | RESPIRATION RATE: 16 BRPM

## 2023-03-06 DIAGNOSIS — E11.65 TYPE 2 DIABETES MELLITUS WITH HYPERGLYCEMIA, WITHOUT LONG-TERM CURRENT USE OF INSULIN (H): ICD-10-CM

## 2023-03-06 DIAGNOSIS — M1A.09X0 IDIOPATHIC CHRONIC GOUT OF MULTIPLE SITES WITHOUT TOPHUS: ICD-10-CM

## 2023-03-06 DIAGNOSIS — L40.9 PSORIASIS: Primary | ICD-10-CM

## 2023-03-06 DIAGNOSIS — L03.032 INFECTION OF NAIL BED OF TOE OF LEFT FOOT: ICD-10-CM

## 2023-03-06 LAB
ANION GAP SERPL CALCULATED.3IONS-SCNC: 15 MMOL/L (ref 7–15)
BUN SERPL-MCNC: 18.8 MG/DL (ref 8–23)
CALCIUM SERPL-MCNC: 10.4 MG/DL (ref 8.8–10.2)
CHLORIDE SERPL-SCNC: 101 MMOL/L (ref 98–107)
CHOLEST SERPL-MCNC: 289 MG/DL
CREAT SERPL-MCNC: 0.8 MG/DL (ref 0.67–1.17)
CREAT UR-MCNC: 31.6 MG/DL
DEPRECATED HCO3 PLAS-SCNC: 24 MMOL/L (ref 22–29)
GFR SERPL CREATININE-BSD FRML MDRD: >90 ML/MIN/1.73M2
GLUCOSE SERPL-MCNC: 198 MG/DL (ref 70–99)
HBA1C MFR BLD: 9.9 % (ref 0–5.6)
HDLC SERPL-MCNC: 55 MG/DL
LDLC SERPL CALC-MCNC: 157 MG/DL
MICROALBUMIN UR-MCNC: <12 MG/L
MICROALBUMIN/CREAT UR: NORMAL MG/G{CREAT}
NONHDLC SERPL-MCNC: 234 MG/DL
POTASSIUM SERPL-SCNC: 4.7 MMOL/L (ref 3.4–5.3)
SODIUM SERPL-SCNC: 140 MMOL/L (ref 136–145)
TRIGL SERPL-MCNC: 386 MG/DL
URATE SERPL-MCNC: 9 MG/DL (ref 3.4–7)

## 2023-03-06 PROCEDURE — 80061 LIPID PANEL: CPT | Performed by: FAMILY MEDICINE

## 2023-03-06 PROCEDURE — 80048 BASIC METABOLIC PNL TOTAL CA: CPT | Performed by: FAMILY MEDICINE

## 2023-03-06 PROCEDURE — 82043 UR ALBUMIN QUANTITATIVE: CPT | Performed by: FAMILY MEDICINE

## 2023-03-06 PROCEDURE — 82570 ASSAY OF URINE CREATININE: CPT | Performed by: FAMILY MEDICINE

## 2023-03-06 PROCEDURE — 36415 COLL VENOUS BLD VENIPUNCTURE: CPT | Performed by: FAMILY MEDICINE

## 2023-03-06 PROCEDURE — 84550 ASSAY OF BLOOD/URIC ACID: CPT | Performed by: FAMILY MEDICINE

## 2023-03-06 PROCEDURE — 83036 HEMOGLOBIN GLYCOSYLATED A1C: CPT | Performed by: FAMILY MEDICINE

## 2023-03-06 PROCEDURE — 99214 OFFICE O/P EST MOD 30 MIN: CPT | Performed by: FAMILY MEDICINE

## 2023-03-06 RX ORDER — CEPHALEXIN 500 MG/1
500 CAPSULE ORAL 3 TIMES DAILY
Qty: 21 CAPSULE | Refills: 0 | Status: SHIPPED | OUTPATIENT
Start: 2023-03-06 | End: 2023-03-13

## 2023-03-06 NOTE — LETTER
March 7, 2023       KENIA Key  [address] [place] [address]        Dear ,    We are writing to inform you of your test results.    The results of your labs today make me question whether you are getting / taking many of your medications - for gout, diabetes and cholesterol?  I think it would be a VERY good idea to schedule an appointment either with me or with our pharmacists, or both, and bring in ALL YOUR MEDICATIONS to review these and see what you are missing.  OK?       Good to have seen you today.  I did communicate with the dermatologist you are scheduled to see March 31 and he wants to see you in person and see the rash before deciding if any new treatment is necessary.  OK?  Let me know if you cannot wait till then - I can refill prednisone, but of course that makes your sugars go higher.       Take care!      Resulted Orders   HEMOGLOBIN A1C   Result Value Ref Range    Hemoglobin A1C 9.9 (H) 0.0 - 5.6 %      Comment:      Normal <5.7%   Prediabetes 5.7-6.4%    Diabetes 6.5% or higher     Note: Adopted from ADA consensus guidelines.   Basic metabolic panel   Result Value Ref Range    Sodium 140 136 - 145 mmol/L    Potassium 4.7 3.4 - 5.3 mmol/L    Chloride 101 98 - 107 mmol/L    Carbon Dioxide (CO2) 24 22 - 29 mmol/L    Anion Gap 15 7 - 15 mmol/L    Urea Nitrogen 18.8 8.0 - 23.0 mg/dL    Creatinine 0.80 0.67 - 1.17 mg/dL    Calcium 10.4 (H) 8.8 - 10.2 mg/dL    Glucose 198 (H) 70 - 99 mg/dL    GFR Estimate >90 >60 mL/min/1.73m2      Comment:      eGFR calculated using 2021 CKD-EPI equation.   Albumin Random Urine Quantitative with Creat Ratio   Result Value Ref Range    Creatinine Urine mg/dL 31.6 mg/dL      Comment:      The reference ranges have not been established in urine creatinine. The results should be integrated into the clinical context for interpretation.    Albumin Urine mg/L <12.0 mg/L      Comment:      The reference ranges have not been established in urine albumin. The results  should be integrated into the clinical context for interpretation.    Albumin Urine mg/g Cr        Comment:      Unable to calculate, urine albumin and/or urine creatinine is outside detectable limits.  Microalbuminuria is defined as an albumin:creatinine ratio of 17 to 299 for males and 25 to 299 for females. A ratio of albumin:creatinine of 300 or higher is indicative of overt proteinuria.  Due to biologic variability, positive results should be confirmed by a second, first-morning random or 24-hour timed urine specimen. If there is discrepancy, a third specimen is recommended. When 2 out of 3 results are in the microalbuminuria range, this is evidence for incipient nephropathy and warrants increased efforts at glucose control, blood pressure control, and institution of therapy with an angiotensin-converting-enzyme (ACE) inhibitor (if the patient can tolerate it).     Uric acid   Result Value Ref Range    Uric Acid 9.0 (H) 3.4 - 7.0 mg/dL   Lipid Profile   Result Value Ref Range    Cholesterol 289 (H) <200 mg/dL    Triglycerides 386 (H) <150 mg/dL    Direct Measure HDL 55 >=40 mg/dL    LDL Cholesterol Calculated 157 (H) <=100 mg/dL    Non HDL Cholesterol 234 (H) <130 mg/dL    Narrative    Cholesterol  Desirable:  <200 mg/dL    Triglycerides  Normal:  Less than 150 mg/dL  Borderline High:  150-199 mg/dL  High:  200-499 mg/dL  Very High:  Greater than or equal to 500 mg/dL    Direct Measure HDL  Female:  Greater than or equal to 50 mg/dL   Male:  Greater than or equal to 40 mg/dL    LDL Cholesterol  Desirable:  <100mg/dL  Above Desirable:  100-129 mg/dL   Borderline High:  130-159 mg/dL   High:  160-189 mg/dL   Very High:  >= 190 mg/dL    Non HDL Cholesterol  Desirable:  130 mg/dL  Above Desirable:  130-159 mg/dL  Borderline High:  160-189 mg/dL  High:  190-219 mg/dL  Very High:  Greater than or equal to 220 mg/dL       If you have any questions or concerns, please call the clinic at the number listed above.        Sincerely,      Jason Siddiqui MD

## 2023-03-06 NOTE — PROGRESS NOTES
Sa was seen today for follow up.    Diagnoses and all orders for this visit:    Psoriasis  -     Adult Dermatology Referral; Future    Type 2 diabetes mellitus with hyperglycemia, without long-term current use of insulin (H)  -     HEMOGLOBIN A1C; Future  -     Basic metabolic panel; Future  -     Albumin Random Urine Quantitative with Creat Ratio; Future  -     Lipid Profile; Future  -     HEMOGLOBIN A1C  -     Basic metabolic panel  -     Albumin Random Urine Quantitative with Creat Ratio  -     Lipid Profile    Infection of nail bed of toe of left foot  -     cephALEXin (KEFLEX) 500 MG capsule; Take 1 capsule (500 mg) by mouth 3 times daily for 7 days    Idiopathic chronic gout of multiple sites without tophus  -     Uric acid; Future  -     Uric acid      I am in agreement about him receiving a second opinion and will place dermatology referral.  He did miss several treatments and it looks like he has not actually seen his previous dermatology provider for several months.  I did not start anything specifically today and hopefully we can expedite an appointment.    We will check a number of labs in relation to his chronic diseases and I will follow-up with the results.    I have encouraged him to continue to dress his cellulitis of the toes nailbed and prescribed a course of antibiotics for 1 week.    I have encouraged him to follow-up as needed.  Total visit time with patient was 25 mins, all of which was face to face MD time, and over 50% of this time was spent in counseling and coordination of care.  Including post-encounter documentation and orders, total encounter time was 32 mins.      Subjective:  This is a 62-year-old who is well-known to me.  This is my first time to see him in person since the death of his father a few months ago, also my patient.  We commiserated for a few minutes and he says that he is doing okay.  [facility]    The main  reason for today's visit is that he remains very symptomatic with his psoriasis.  He says that he really did not notice any benefit from methotrexate.  He has had some bursts of prednisone and also had a Kenalog injection and did not really get much him improvement from those either.  He is telling me he is taking a pink pill every 6 hours although I cannot identify any usually prescribed treatment for psoriasis beyond topical ointments in his med list.  He does have an appointment with the Derm provider he has seen at the [facility] in 3 days time but says he is living in [place] most of the time and is interested in getting a second opinion from a dermatologist in the [facility].    Otherwise he had an ingrown toenail removal about 1 week ago and reports that the area is still oozing pus.  He is not entirely satisfied with how the procedure went.    He believes his diabetes is going okay and says that his blood sugars fluctuate.  He is very aware that if he eats too much white rice it sends his blood sugars over 200.  He says his gout is going okay at this time.    He arrived late for today's visit and understands that I cannot address all his concerns.  I have encouraged him to follow-up to address any other concerns not covered today.    Objective:  /80 (BP Location: Left arm, Patient Position: Sitting, Cuff Size: Adult Regular)   Pulse 77   Temp 98.5  F (36.9  C) (Oral)   Resp 16   Wt 98 kg (216 lb)   SpO2 97%   BMI 37.08 kg/m    He has a generalised maculopapular rash affecting his entire body below his neck consistent with psoriasis.  There is no evidence of secondary cellulitis evident to me.  I examined his left toenail and it appears to have a mild superficial cellulitis.  We redressed it and will prescribe some antibiotics.

## 2023-03-06 NOTE — NURSING NOTE
Due to patient being non-English speaking/uses sign language, an  was used for this visit. Only for face-to-face interpretation by an external agency, date and length of interpretation can be found on the scanned worksheet.     name: Taye Ferrell  Agency: [employer]  Language: Liam   Telephone number: [phone]  Type of interpretation: Face-to-face, spoken

## 2023-03-06 NOTE — RESULT ENCOUNTER NOTE
Clyde Peña, the results of your labs today make me question whether you are getting / taking many of your medications - for gout, diabetes and cholesterol?  I think it would be a VERY good idea to schedule an appointment either with me or with our pharmacists, or both, and bring in ALL YOUR MEDICATIONS to review these and see what you are missing.  OK?      Good to have seen you today.  I did communicate with the dermatologist you are scheduled to see March 31 and he wants to see you in person and see the rash before deciding if any new treatment is necessary.  OK?  Let me know if you cannot wait till then - I can refill prednisone, but of course that makes your sugars go higher.      Take care, Dr Jason Siddiqui

## 2023-03-07 DIAGNOSIS — I10 ESSENTIAL HYPERTENSION WITH GOAL BLOOD PRESSURE LESS THAN 140/90: ICD-10-CM

## 2023-03-08 RX ORDER — AMLODIPINE BESYLATE 5 MG/1
10 TABLET ORAL DAILY
Qty: 180 TABLET | Refills: 1 | Status: SHIPPED | OUTPATIENT
Start: 2023-03-08 | End: 2023-04-13

## 2023-03-17 ENCOUNTER — OFFICE VISIT (OUTPATIENT)
Dept: PHARMACY | Facility: CLINIC | Age: 63
End: 2023-03-17
Payer: COMMERCIAL

## 2023-03-17 VITALS
OXYGEN SATURATION: 94 % | RESPIRATION RATE: 20 BRPM | SYSTOLIC BLOOD PRESSURE: 119 MMHG | HEART RATE: 77 BPM | DIASTOLIC BLOOD PRESSURE: 76 MMHG | TEMPERATURE: 98.4 F

## 2023-03-17 DIAGNOSIS — I10 ESSENTIAL HYPERTENSION WITH GOAL BLOOD PRESSURE LESS THAN 140/90: ICD-10-CM

## 2023-03-17 DIAGNOSIS — L03.032 CELLULITIS OF TOE OF LEFT FOOT: ICD-10-CM

## 2023-03-17 DIAGNOSIS — L40.9 PSORIASIS: ICD-10-CM

## 2023-03-17 DIAGNOSIS — H04.123 DRY EYES: ICD-10-CM

## 2023-03-17 DIAGNOSIS — E11.65 TYPE 2 DIABETES MELLITUS WITH HYPERGLYCEMIA, WITHOUT LONG-TERM CURRENT USE OF INSULIN (H): Primary | ICD-10-CM

## 2023-03-17 DIAGNOSIS — E78.5 HYPERLIPIDEMIA LDL GOAL <100: ICD-10-CM

## 2023-03-17 DIAGNOSIS — R52 PAIN: ICD-10-CM

## 2023-03-17 DIAGNOSIS — M1A.9XX0 CHRONIC GOUT WITHOUT TOPHUS, UNSPECIFIED CAUSE, UNSPECIFIED SITE: ICD-10-CM

## 2023-03-17 PROCEDURE — 99605 MTMS BY PHARM NP 15 MIN: CPT | Performed by: PHARMACIST

## 2023-03-17 PROCEDURE — 99607 MTMS BY PHARM ADDL 15 MIN: CPT | Performed by: PHARMACIST

## 2023-03-17 RX ORDER — CARBOXYMETHYLCELLULOSE SODIUM 5 MG/ML
SOLUTION/ DROPS OPHTHALMIC
Qty: 30 EACH | Refills: 3 | Status: SHIPPED | OUTPATIENT
Start: 2023-03-17 | End: 2023-10-16

## 2023-03-17 RX ORDER — BETAMETHASONE DIPROPIONATE 0.5 MG/G
OINTMENT, AUGMENTED TOPICAL 2 TIMES DAILY
Qty: 100 G | Refills: 3 | Status: SHIPPED | OUTPATIENT
Start: 2023-03-17 | End: 2023-07-18

## 2023-03-17 RX ORDER — LISINOPRIL 40 MG/1
40 TABLET ORAL DAILY
Qty: 90 TABLET | Refills: 3 | Status: SHIPPED | OUTPATIENT
Start: 2023-03-17 | End: 2023-10-16

## 2023-03-17 RX ORDER — HYDROCORTISONE AND ACETIC ACID 20.75; 10.375 MG/ML; MG/ML
3 SOLUTION AURICULAR (OTIC) 2 TIMES DAILY PRN
Qty: 10 ML | Refills: 3 | Status: SHIPPED | OUTPATIENT
Start: 2023-03-17 | End: 2023-10-16

## 2023-03-17 NOTE — PROGRESS NOTES
Medication Therapy Management (MTM) Encounter    ASSESSMENT:                            Medication Adherence/Access: No issues identified    Type 2 Diabetes (needs improvement): The most recent A1C of 9.9% on 3/6/23 is not at goal of <7%. At home blood glucose values could not be assessed today as the patient did not bring his CGM with him. Patient would benefit from additional therapy such as Bydureon since it is a once weekly injection and he has tried it in the past. However, the patient would only like to focus on diet and exercise at this time.      Hypertension (needs improvement): The blood pressure in clinic today (119/76 mmHg) was at goal of <130/80 mmHg. The patient's current use of lisinopril (80 mg daily) is not safe and could be harmful to the kidneys. Since he reports that his blood pressures per at home monitoring are higher when only taking 1 lisinopril 40 mg tablet (no amlodipine), restarting amlodipine at a lower dose may be necessary when the lisinopril is taken as prescribed.     Psoriasis (needs improvement): The patient is no longer taking augmented betamethasone or betamethasone ointments which are very high and high potency topical steroids, respectively. The current steroids, triamcinolone and fluocinonide are medium and high potency steroids. A very high potency topical steroid could be restarted until the patient is able to see dermatology for which he has an appointment scheduled at the end of March 2023.     Gout (needs improvement): The most recent uric acid level of 9 mg/dL is above the goal of less than 6 mg/dL. Patient could benefit from an increase in the febuxostat dose (could increase to 80 mg once daily). However, a discussion with the Dr. Siddiqui is needed due to the cardiovascular risks associated with a higher febuxostat dose.     Hypercholesterolemia (needs improvement): The LDL from 3/6/23 of 157 mg/dL is not at goal of <100 mg/dL for diabetes/primary prevention. Triglycerides  are also elevated. The patient could benefit from the addition of ezetimibe at a future visit.     Cellulitis of L toe (needs improvement): Further assessment is needed by a provider     General Pain: stable    Dry eyes (controlled): Use of Lubricating eye drops can be continued to reduce blurry vision.     PLAN:                            -Stop taking 2 Lisinopril 40 mg tablets daily. Starting taking 1 Lisinopril 40 mg tablet daily.   -Start taking amlodipine 5 mg by mouth daily   -Keep a daily log of at home blood pressure monitoring values for the next 2 weeks and bring this log to our next visit   -Start using the augmented betamethasone dipropionate 0.05% ointment     Follow-up: 2 weeks by phone for blood pressure    Medication issues to be addressed at a future visit      Discuss with Dr. Siddiqui risks/benefits of increasing febuxostat    SUBJECTIVE/OBJECTIVE:                          Sa Mackey is a 62 year old male seen for a follow-up visit. Patient seen with Liam Ferrell) . Today's visit is a follow-up MT visit from 8/25/22     Reason for visit: Follow up Labs.    Allergies/ADRs: Reviewed in chart  Past Medical History: Reviewed in chart  Tobacco: He reports that he has never smoked. He has never used smokeless tobacco.  Social History     Tobacco Use     Smoking status: Never     Smokeless tobacco: Never   Substance Use Topics     Alcohol use: No     Drug use: No       Medication Adherence/Access:  He uses a pill box which he fills himself to manage his medications. The patient reports missing one dose of his medications every 2-3 weeks. He brought the bottles of the medications with him today but not the pill box.     Type 2 Diabetes:  Currently taking metformin 2000mg daily and empagliflozin 25mg daily. Patient is not experiencing side effects.  Blood sugar monitoring: Uses a Continuous Glucose Monitor but does not have it with him today. He still has a glucometer and testing supplies but  mostly uses the CGM now. Reports that numbers are around 200-low 300s.   The patient states that he would not like to try any new medications at this time and will focus on diet and exercise. He notes that he has had numerous education sessions in the past regarding diet choices. He says that he notices blood glucose readings going higher after eating white rice, and also states that he has tried brown rice in the past but doesn't like the texture. The patient mentions some hesitancy about injections (insulin more specifically) due to hearing experiences from friends.     Aspirin: Taking 81mg daily for primary prevention   Statin: Yes: atorvastatin 80mg daily   ACEi/ARB: Yes: lisinopril 40mg daily.   Urine Albumin:   Lab Results   Component Value Date    UMALCR  03/06/2023      Comment:      Unable to calculate, urine albumin and/or urine creatinine is outside detectable limits.  Microalbuminuria is defined as an albumin:creatinine ratio of 17 to 299 for males and 25 to 299 for females. A ratio of albumin:creatinine of 300 or higher is indicative of overt proteinuria.  Due to biologic variability, positive results should be confirmed by a second, first-morning random or 24-hour timed urine specimen. If there is discrepancy, a third specimen is recommended. When 2 out of 3 results are in the microalbuminuria range, this is evidence for incipient nephropathy and warrants increased efforts at glucose control, blood pressure control, and institution of therapy with an angiotensin-converting-enzyme (ACE) inhibitor (if the patient can tolerate it).        Lab Results   Component Value Date    A1C 9.9 03/06/2023    A1C 8.0 08/15/2022    A1C 8.4 04/29/2022    A1C 8.2 11/15/2021    A1C 6.8 07/23/2021    A1C 7.8 01/29/2021    A1C 9.0 12/04/2020    A1C 10.2 03/05/2020    A1C 7.0 09/27/2019    A1C 11.2 06/28/2019     The 10-year ASCVD risk score (Jack NICE, et al., 2019) is: 27.6%    Values used to calculate the score:       "Age: 62 years      Sex: Male      Is Non- : No      Diabetic: Yes      Tobacco smoker: No      Systolic Blood Pressure: 136 mmHg      Is BP treated: Yes      HDL Cholesterol: 55 mg/dL      Total Cholesterol: 289 mg/dL      Hypertension: Current prescribed medications are written as amlodipine 10 mg and lisinopril 40 mg daily. The amlodipine dose was increased to 10 mg daily from 5 mg daily while the patient was on rifampin (but he is not longer taking rifampin). Patient states he takes 2 Lisinopril 40 mg tablets daily (80 mg total) and has not taken amlodipine for 1-2 months. It seems like he may have been confused about take \"2 blood pressure medications\" and then started taking 2 lisinopril.  Based on the amount of amlodipine left it is likely he has not been taking the medication for the stated time. He asks for a lisinopril refill though it was last filled on 1/6/23 for a 90 day supply (since he has been taking 2).  He did not have the lisinopril bottle today but says that the medication is in the pill box. The patient does do at home blood pressure monitoring but does not have a log of the values. He states that SBP readings are usually in the 120s but sometimes in the 140s. He says DBP readings are usually in the 80s but he doesn't focus on those. The patient reports that when he only takes 1 lisinopril 40 mg tablet his blood pressure is higher.     /76   Pulse 77   Temp 98.4  F (36.9  C) (Oral)   Resp 20   SpO2 94%       Last Comprehensive Metabolic Panel:  Lab Results   Component Value Date     03/06/2023    POTASSIUM 4.7 03/06/2023    CHLORIDE 101 03/06/2023    CO2 24 03/06/2023    ANIONGAP 15 03/06/2023     (H) 03/06/2023    BUN 18.8 03/06/2023    CR 0.80 03/06/2023    GFRESTIMATED >90 03/06/2023    PRETTY 10.4 (H) 03/06/2023       Psoriasis: Current medications are Triamcinolone 1% cream, fluocinonide 0.05% solution, and Vaseline. Diphenhydramine 25 mg (every 6 " "hours as needed) is used for itching. The patient states that the symptoms of psoriasis persist. He stopped using the augmented betamethasone and betamethasone ointments (sounds like he ran out of fills). He states that the fluocinonide has been causing hair loss but he continues to use it because of the relief from itching that it provides. The patient reports that he didn't notice much benefit from the methotrexate but at the end of the visit asked if it could be restarted. He also asked if a \"stronger\" cream could be added.     Gout: Current medication is Febuxostat 40 mg. When asked about previous allopurinol use, he stated the reaction he experienced was itching (mouth, throat, face). He said that his most recent gout attack was 2 months ago in the toes and knee.   Uric Acid   Date Value Ref Range Status   03/06/2023 9.0 (H) 3.4 - 7.0 mg/dL Final   03/05/2020 7.1 3.0 - 8.0 mg/dL Final     Hypercholesterolemia: Current medication is Atorvastatin 80 mg tablet daily. The patient states he has been taking the medication daily and it has been filled recently (2/8/23) for a 90 day supply which aligns with how much is left in the bottle brought into the clinic today.     Lab Results   Component Value Date    CHOL 289 03/06/2023    CHOL 156.6 03/05/2020     Lab Results   Component Value Date    HDL 55 03/06/2023    HDL 38.6 03/05/2020     Lab Results   Component Value Date     03/06/2023    LDL 78 03/05/2020     Lab Results   Component Value Date    TRIG 386 03/06/2023    TRIG 202.5 03/05/2020     Lab Results   Component Value Date    CHOLHDLRATIO 4.1 03/05/2020       Cellulitis of L Toe: Patient was prescribed a 7 day course of cephalexin at last visit with Dr. Siddiqui (3/6/23). Patient reports that the toe is a bit better but still sore.     General Pain: Patient has acetaminophen 500mg which he takes as needed for pain.      Dry eyes: Current medication is Lubricating Plus Eye Drops. Patient reports he needs a " refill because he has blurry vision when no using.     Today's Vitals: /76   Pulse 77   Temp 98.4  F (36.9  C) (Oral)   Resp 20   SpO2 94%   ----------------      I spent 40 minutes with this patient today. All changes were made via collaborative practice agreement with Dr Siddiqui. A copy of the visit note was provided to the patient's provider(s).    A summary of these recommendations was given to the patient.    Sherine Glover, PD3 Student  Savi Lema, PharmD      Medication Therapy Recommendations  Essential hypertension with goal blood pressure less than 140/90    Current Medication: amLODIPine (NORVASC) 5 MG tablet   Rationale: Synergistic therapy - Needs additional medication therapy - Indication   Recommendation: Start Medication - amLODIPine 5 MG tablet   Status: Accepted per CPA          Current Medication: lisinopril (ZESTRIL) 40 MG tablet   Rationale: Dose too high - Dosage too high - Safety   Recommendation: Decrease Dose   Status: Patient Agreed - Adherence/Education         Psoriasis    Rationale: Synergistic therapy - Needs additional medication therapy - Indication   Recommendation: Start Medication - BETAMETHASONE DIPROPIONATE AUG   Status: Accepted per CPA

## 2023-03-17 NOTE — Clinical Note
I really wish he would have gone for an additional diabetes agent.  He says he is taking his medications, and the refill hx lines up.  I did kind of wonder if he stopped taking the best care of himself with his father's death and he said his mood was a little sad but he kept taking his medications.   I wanted to discuss the febuxostat (Uloric) with you and perhaps it would be better to chat in person.  Essentially, he is only on febuxostat 40mg; it can go up to 80mg.  The issue is that it carries a black block warning regarding increase in cardiovascular death and death from all causes compared to amlodipine.  His uric acid is 9, and has been this high for ~ a year.  He does not have established CAD, but he seems quite high risk for it (diabetes, hyperlipidemia).  What are your thoughts on increasing the febuxostat?  I am a little leery. I suppose we could do colchicine. Thoughts?  I have a phone visit with him in 2 weeks to discuss BP so I could bring it up then.  Savi Lema, PharmD

## 2023-03-29 NOTE — PROGRESS NOTES
"[facility] Dermatology Note  Encounter Date: Mar 31, 2023  Office Visit     Dermatology Problem List:  1. Psoriasis  - Humira, betamethasone 0.05% ointment, triamcinolone 0.1% cream BID prn, Lidex 0.05% solution BID prn  2. History of latent TB treated with ID  ____________________________________________    Assessment & Plan:    # Psoriasis vulgaris. Chronic, flare.   30-40% BSA. Latent TB has been treated. Discussed additional treatment with Humira. Risks and benefits discussed.  - Start Humira pending insurance approval. Needs annual chest xray.  - Start hydroxyzine 10 mg at bedtime prn  - Continue topicals: betamethasone 0.05% ointment, triamcinolone 0.1% cream BID prn, Lidex 0.05% solution BID prn  - Labs: Hep B core antibody, Hep B surface antibody  - Consider annual CXR    Procedures Performed:   None    Follow-up: 3 month(s) in-person, or earlier for new or changing lesions    Staff and Scribe:     Scribe Disclosure:  I, Adolfo Nino, am serving as a scribe to document services personally performed by Ed Prince MD based on data collection and the provider's statements to me.     Provider Disclosure:   The documentation recorded by the scribe accurately reflects the services I personally performed and the decisions made by me.    Ed Prince MD    Department of Dermatology  [facility]: Phone: [phone], Fax:[phone]  [facility]: Phone: [phone] Fax: [phone]  ____________________________________________    CC: Derm Problem (Psoriasis follow-up: patient feels condition is \"worse\" since last seen in 9/2022/Using 4 topicals: triamcinolone 0.1%, Vaseline, and betamethasone diproprionate 0.05%, and Lidex)    HPI:  Mr. Sa KENIA Mackey is a(n) 62 year old male who presents today as a return patient for psoriasis follow-up. Last seen in " dermatology by Kelli Lynch PA-C, on 10/20/2022, at which time patient was increased on methotrexate for treatment of psoriasis.    Today, patient reports that his psoriasis has been worse since his last visit. He reports that his rash started 8/2022 after receiving COVID vaccine. He has finished TB treatment.    Patient is otherwise feeling well, without additional skin concerns.    Labs Reviewed:  N/A    Physical Exam:  Vitals: There were no vitals taken for this visit.  SKIN: Focused examination of trunk and extremities was performed.  - Numerous pink well demarcated psoriasiform papules coalescing into plaques on trunk and extremities. 30-40% BSA.  - No other lesions of concern on areas examined.     Medications:  Current Outpatient Medications   Medication     acetaminophen (TYLENOL) 500 MG tablet     acetic acid-hydrocortisone (VOSOL-HC) 1-2 % otic solution     Alcohol Swabs (EASY TOUCH ALCOHOL PREP MEDIUM) 70 % PADS     amLODIPine (NORVASC) 5 MG tablet     aspirin (ASPIRIN LOW DOSE) 81 MG EC tablet     atorvastatin (LIPITOR) 80 MG tablet     augmented betamethasone dipropionate (DIPROLENE-AF) 0.05 % external ointment     blood glucose (NO BRAND SPECIFIED) test strip     carboxymethylcellulose PF (LUBRICATING PLUS EYE DROPS) 0.5 % ophthalmic solution     Continuous Blood Gluc Sensor (EdgeioYLE JOSE 14 DAY SENSOR) MISC     diphenhydrAMINE (BENADRYL) 25 MG tablet     empagliflozin (JARDIANCE) 25 MG TABS tablet     febuxostat (ULORIC) 40 MG TABS tablet     fluocinonide (LIDEX) 0.05 % external solution     Lancet Devices (EASY TOUCH LANCING DEVICE) MISC     lisinopril (ZESTRIL) 40 MG tablet     metFORMIN (GLUCOPHAGE XR) 500 MG 24 hr tablet     order for DME     ORDER FOR DME     Skin Protectants, Misc. (VASELINE CONSTANT CARE) OINT     triamcinolone (KENALOG) 0.1 % external cream     vitamin D3 (CHOLECALCIFEROL) 50 mcg (2000 units) tablet     No current facility-administered medications for this visit.       Past Medical History:   Patient Active Problem List   Diagnosis     Health Care Home     Male erectile disorder     Obstructive sleep apnea     Posttraumatic stress disorder     Testicular hypofunction     Hyperlipidemia LDL goal <100     Lesion of ulnar nerve     Hypovitaminosis D     Allergic rhinitis     H/O exploratory laparotomy     Chronic gout without tophus, unspecified cause, unspecified site     Dyshidrotic eczema     Essential hypertension with goal blood pressure less than 140/90     Type 2 diabetes mellitus with hyperglycemia, without long-term current use of insulin (H)     Morbid obesity (H)     Rotator cuff syndrome, right     Psoriasis     Past Medical History:   Diagnosis Date     Depressive disorder      Diabetes (H)      Hypertension

## 2023-03-30 ENCOUNTER — VIRTUAL VISIT (OUTPATIENT)
Dept: PHARMACY | Facility: CLINIC | Age: 63
End: 2023-03-30
Payer: COMMERCIAL

## 2023-03-30 DIAGNOSIS — I10 ESSENTIAL HYPERTENSION WITH GOAL BLOOD PRESSURE LESS THAN 140/90: Primary | ICD-10-CM

## 2023-03-30 PROCEDURE — 99606 MTMS BY PHARM EST 15 MIN: CPT | Mod: 93

## 2023-03-30 NOTE — PROGRESS NOTES
I have verified the content of the note, which accurately reflects my assessment of the patient and the plan of care.   Savi Lema, [facility], PharmD

## 2023-03-30 NOTE — Clinical Note
BP at home is slightly elevated since changing BP meds to as prescribed. I will be seeing him in clinic in two weeks to go over BP again and make sure he is checking appropriately. I am planning on increasing amlodipine to 10mg at that time if it is still elevated. Let me know if you have any other thoughts!  Thanks Erica

## 2023-03-30 NOTE — PATIENT INSTRUCTIONS
PLAN:                            -Keep a daily log of at home blood pressure monitoring values for the next 2 weeks and bring this log to our next visit   - Please bring your blood pressure cuff to our next visit

## 2023-03-31 ENCOUNTER — LAB (OUTPATIENT)
Dept: LAB | Facility: CLINIC | Age: 63
End: 2023-03-31
Payer: COMMERCIAL

## 2023-03-31 ENCOUNTER — OFFICE VISIT (OUTPATIENT)
Dept: DERMATOLOGY | Facility: CLINIC | Age: 63
End: 2023-03-31
Payer: COMMERCIAL

## 2023-03-31 ENCOUNTER — TELEPHONE (OUTPATIENT)
Dept: DERMATOLOGY | Facility: CLINIC | Age: 63
End: 2023-03-31

## 2023-03-31 DIAGNOSIS — R76.8 HEPATITIS B CORE ANTIBODY POSITIVE: ICD-10-CM

## 2023-03-31 DIAGNOSIS — L40.9 PSORIASIS: ICD-10-CM

## 2023-03-31 DIAGNOSIS — Z51.81 THERAPEUTIC DRUG MONITORING: ICD-10-CM

## 2023-03-31 DIAGNOSIS — Z51.81 THERAPEUTIC DRUG MONITORING: Primary | ICD-10-CM

## 2023-03-31 DIAGNOSIS — R76.8 HEPATITIS B CORE ANTIBODY POSITIVE: Primary | ICD-10-CM

## 2023-03-31 LAB
ALBUMIN SERPL BCG-MCNC: 4.6 G/DL (ref 3.5–5.2)
ALP SERPL-CCNC: 107 U/L (ref 40–129)
ALT SERPL W P-5'-P-CCNC: 26 U/L (ref 10–50)
ANION GAP SERPL CALCULATED.3IONS-SCNC: 10 MMOL/L (ref 7–15)
AST SERPL W P-5'-P-CCNC: 26 U/L (ref 10–50)
BILIRUB SERPL-MCNC: 0.7 MG/DL
BUN SERPL-MCNC: 20.9 MG/DL (ref 8–23)
CALCIUM SERPL-MCNC: 10.3 MG/DL (ref 8.8–10.2)
CHLORIDE SERPL-SCNC: 100 MMOL/L (ref 98–107)
CREAT SERPL-MCNC: 0.83 MG/DL (ref 0.67–1.17)
DEPRECATED HCO3 PLAS-SCNC: 28 MMOL/L (ref 22–29)
ERYTHROCYTE [DISTWIDTH] IN BLOOD BY AUTOMATED COUNT: 18.2 % (ref 10–15)
GFR SERPL CREATININE-BSD FRML MDRD: >90 ML/MIN/1.73M2
GLUCOSE SERPL-MCNC: 164 MG/DL (ref 70–99)
HBV SURFACE AB SERPL IA-ACNC: 37.91 M[IU]/ML
HBV SURFACE AB SERPL IA-ACNC: REACTIVE M[IU]/ML
HCT VFR BLD AUTO: 46.3 % (ref 40–53)
HGB BLD-MCNC: 14.1 G/DL (ref 13.3–17.7)
MCH RBC QN AUTO: 20.4 PG (ref 26.5–33)
MCHC RBC AUTO-ENTMCNC: 30.5 G/DL (ref 31.5–36.5)
MCV RBC AUTO: 67 FL (ref 78–100)
PLATELET # BLD AUTO: 286 10E3/UL (ref 150–450)
POTASSIUM SERPL-SCNC: 4.6 MMOL/L (ref 3.4–5.3)
PROT SERPL-MCNC: 8.7 G/DL (ref 6.4–8.3)
RBC # BLD AUTO: 6.91 10E6/UL (ref 4.4–5.9)
SODIUM SERPL-SCNC: 138 MMOL/L (ref 136–145)
WBC # BLD AUTO: 8.5 10E3/UL (ref 4–11)

## 2023-03-31 PROCEDURE — 36415 COLL VENOUS BLD VENIPUNCTURE: CPT | Performed by: PATHOLOGY

## 2023-03-31 PROCEDURE — 85027 COMPLETE CBC AUTOMATED: CPT | Performed by: PATHOLOGY

## 2023-03-31 PROCEDURE — 86704 HEP B CORE ANTIBODY TOTAL: CPT | Mod: 90 | Performed by: PATHOLOGY

## 2023-03-31 PROCEDURE — 86706 HEP B SURFACE ANTIBODY: CPT | Mod: 90 | Performed by: PATHOLOGY

## 2023-03-31 PROCEDURE — 80053 COMPREHEN METABOLIC PANEL: CPT | Performed by: PATHOLOGY

## 2023-03-31 PROCEDURE — 99000 SPECIMEN HANDLING OFFICE-LAB: CPT | Performed by: PATHOLOGY

## 2023-03-31 PROCEDURE — 99214 OFFICE O/P EST MOD 30 MIN: CPT | Performed by: DERMATOLOGY

## 2023-03-31 RX ORDER — HYDROXYZINE HYDROCHLORIDE 10 MG/1
10 TABLET, FILM COATED ORAL
Qty: 60 TABLET | Refills: 3 | Status: SHIPPED | OUTPATIENT
Start: 2023-03-31 | End: 2023-03-31

## 2023-03-31 RX ORDER — HYDROXYZINE HYDROCHLORIDE 10 MG/1
10 TABLET, FILM COATED ORAL
Qty: 60 TABLET | Refills: 3 | Status: SHIPPED | OUTPATIENT
Start: 2023-03-31 | End: 2023-04-13

## 2023-03-31 ASSESSMENT — PAIN SCALES - GENERAL: PAINLEVEL: NO PAIN (0)

## 2023-03-31 NOTE — LETTER
3/31/2023       RE: Sa KENIA Mackey  [address]  [place] [address]     Dear Colleague,    Thank you for referring your patient, Sa KENIA Mackey, to the [facility]. Please see a copy of my visit note below.    [facility] Dermatology Note  Encounter Date: Mar 31, 2023  Office Visit     Dermatology Problem List:  1. Psoriasis  - Humira, betamethasone 0.05% ointment, triamcinolone 0.1% cream BID prn, Lidex 0.05% solution BID prn  2. History of latent TB treated with ID  ____________________________________________    Assessment & Plan:    # Psoriasis vulgaris. Chronic, flare.   30-40% BSA. Latent TB has been treated. Discussed additional treatment with Humira. Risks and benefits discussed.  - Start Humira pending insurance approval. Needs annual chest xray.  - Start hydroxyzine 10 mg at bedtime prn  - Continue topicals: betamethasone 0.05% ointment, triamcinolone 0.1% cream BID prn, Lidex 0.05% solution BID prn  - Labs: Hep B core antibody, Hep B surface antibody  - Consider annual CXR    Procedures Performed:   None    Follow-up: 3 month(s) in-person, or earlier for new or changing lesions    Staff and Scribe:     Scribe Disclosure:  I, Adolfo Nino, am serving as a scribe to document services personally performed by Ed Prince MD based on data collection and the provider's statements to me.     Provider Disclosure:   The documentation recorded by the scribe accurately reflects the services I personally performed and the decisions made by me.    Ed Prince MD    Department of Dermatology  [facility]: Phone: [phone], Fax:[phone]  [facility]: Phone: [phone] Fax: [phone]  ____________________________________________    CC: Derm Problem (Psoriasis  "follow-up: patient feels condition is \"worse\" since last seen in 9/2022/Using 4 topicals: triamcinolone 0.1%, Vaseline, and betamethasone diproprionate 0.05%, and Lidex)    HPI:  Mr. Sa KENIA Mackey is a(n) 62 year old male who presents today as a return patient for psoriasis follow-up. Last seen in dermatology by Kelli Lynch PA-C, on 10/20/2022, at which time patient was increased on methotrexate for treatment of psoriasis.    Today, patient reports that his psoriasis has been worse since his last visit. He reports that his rash started 8/2022 after receiving COVID vaccine. He has finished TB treatment.    Patient is otherwise feeling well, without additional skin concerns.    Labs Reviewed:  N/A    Physical Exam:  Vitals: There were no vitals taken for this visit.  SKIN: Focused examination of trunk and extremities was performed.  - Numerous pink well demarcated psoriasiform papules coalescing into plaques on trunk and extremities. 30-40% BSA.  - No other lesions of concern on areas examined.     Medications:  Current Outpatient Medications   Medication    acetaminophen (TYLENOL) 500 MG tablet    acetic acid-hydrocortisone (VOSOL-HC) 1-2 % otic solution    Alcohol Swabs (EASY TOUCH ALCOHOL PREP MEDIUM) 70 % PADS    amLODIPine (NORVASC) 5 MG tablet    aspirin (ASPIRIN LOW DOSE) 81 MG EC tablet    atorvastatin (LIPITOR) 80 MG tablet    augmented betamethasone dipropionate (DIPROLENE-AF) 0.05 % external ointment    blood glucose (NO BRAND SPECIFIED) test strip    carboxymethylcellulose PF (LUBRICATING PLUS EYE DROPS) 0.5 % ophthalmic solution    Continuous Blood Gluc Sensor (FREESTYLE JOSE 14 DAY SENSOR) MISC    diphenhydrAMINE (BENADRYL) 25 MG tablet    empagliflozin (JARDIANCE) 25 MG TABS tablet    febuxostat (ULORIC) 40 MG TABS tablet    fluocinonide (LIDEX) 0.05 % external solution    Lancet Devices (EASY TOUCH LANCING DEVICE) MISC    lisinopril (ZESTRIL) 40 MG tablet    metFORMIN (GLUCOPHAGE XR) 500 MG 24 hr " tablet    order for DME    ORDER FOR DME    Skin Protectants, Misc. (VASELINE CONSTANT CARE) OINT    triamcinolone (KENALOG) 0.1 % external cream    vitamin D3 (CHOLECALCIFEROL) 50 mcg (2000 units) tablet     No current facility-administered medications for this visit.      Past Medical History:   Patient Active Problem List   Diagnosis    Health Care Home    Male erectile disorder    Obstructive sleep apnea    Posttraumatic stress disorder    Testicular hypofunction    Hyperlipidemia LDL goal <100    Lesion of ulnar nerve    Hypovitaminosis D    Allergic rhinitis    H/O exploratory laparotomy    Chronic gout without tophus, unspecified cause, unspecified site    Dyshidrotic eczema    Essential hypertension with goal blood pressure less than 140/90    Type 2 diabetes mellitus with hyperglycemia, without long-term current use of insulin (H)    Morbid obesity (H)    Rotator cuff syndrome, right    Psoriasis     Past Medical History:   Diagnosis Date    Depressive disorder     Diabetes (H)     Hypertension

## 2023-03-31 NOTE — NURSING NOTE
"Dermatology Rooming Note    Sa KENIA Mackey's goals for this visit include:   Chief Complaint   Patient presents with     Derm Problem     Psoriasis follow-up: patient feels condition is \"worse\" since last seen in 9/2022  Using 3 topicals: triamcinolone 0.1%, Vaseline, and betamethasone diproprionate 0.05%     Payal Hoffman LPN    "

## 2023-03-31 NOTE — TELEPHONE ENCOUNTER
PA Initiation    Medication: Humira  Insurance Company: [facility] - Phone [phone] Fax [phone]  Pharmacy Filling the Rx: [facility] - [place], [place] - [facility] [address]  Filling Pharmacy Phone:    Filling Pharmacy Fax:    Start Date: 3/31/2023    Key: [id number]

## 2023-04-03 DIAGNOSIS — R76.8 HEPATITIS B CORE ANTIBODY POSITIVE: Primary | ICD-10-CM

## 2023-04-03 LAB — HBV CORE AB SERPL QL IA: REACTIVE

## 2023-04-04 NOTE — TELEPHONE ENCOUNTER
Prior Authorization Approval    Authorization Effective Date: 3/1/2023  Authorization Expiration Date: 6/29/2023  Medication: Humira  Approved Dose/Quantity: 3 for 28 days  Reference #: Key: [id number]   Insurance Company: [employer] - [facility] [phone] Fax [phone]  Expected CoPay: $0.00     CoPay Card Available:      Foundation Assistance Needed:    Which Pharmacy is filling the prescription (Not needed for infusion/clinic administered): [facility] - [place] [address] [address]  Pharmacy Notified: Yes  Patient Notified: Tried to call pt no answer, no voicemail setup

## 2023-04-11 DIAGNOSIS — E11.65 TYPE 2 DIABETES MELLITUS WITH HYPERGLYCEMIA, WITHOUT LONG-TERM CURRENT USE OF INSULIN (H): ICD-10-CM

## 2023-04-11 RX ORDER — EMPAGLIFLOZIN 25 MG/1
TABLET, FILM COATED ORAL
Qty: 90 TABLET | Refills: 1 | Status: SHIPPED | OUTPATIENT
Start: 2023-04-11 | End: 2023-08-17

## 2023-04-12 DIAGNOSIS — R76.8 HEPATITIS B CORE ANTIBODY POSITIVE: ICD-10-CM

## 2023-04-12 DIAGNOSIS — G44.219 EPISODIC TENSION-TYPE HEADACHE, NOT INTRACTABLE: ICD-10-CM

## 2023-04-12 NOTE — PROGRESS NOTES
Medication Therapy Management (MTM) Encounter    ASSESSMENT:                            Medication Adherence/Access: No issues identified    Hypertension: Not meeting blood pressure goal of less than 130/80.  Patient could benefit from increased dose of amlodipine.      Psoriasis: Needs improvement.  Patient provided refill today of hydroxyzine and nystatin per her request.  Hepatitis B quant pending.    Type 2 diabetes: Not meeting HbA1c goal of less than 7%.  Patient could benefit from starting a third agent.  As patient is resistant to injectable medications will start glipizide today.  Patient was congratulated on diet and exercise changes.    Gout: Stable.  Uric acid is not at goal of less than 6, however patient has not had a gout attack in over 2 years.  Given the increased risk of cardiovascular events on higher doses of febuxostat okay to continue on current dose.    PLAN:                              Increase amlodipine to 10mg daily     Start Glipizide 5mg with dinner     Refilled nystatin cream     Refilled hydroxyzine     Follow-up: 4 weeks with myself (by phone)     SUBJECTIVE/OBJECTIVE:                          Sa Mackey is a 62 year old male seen for a follow-up visit. Patient seen with Liam Adams) . Today's visit is a follow-up MTM visit from 3/30/23     Reason for visit: Follow Up Hypertension.    Allergies/ADRs: Reviewed in chart  Past Medical History: Reviewed in chart  Tobacco: He reports that he has never smoked. He has never used smokeless tobacco.  Social History     Tobacco Use     Smoking status: Never     Smokeless tobacco: Never   Substance Use Topics     Alcohol use: No     Drug use: No       Medication Adherence/Access: no issues reported    Hypertension: Current medications are xamlodipine 5 mg and lisinopril 40 mg daily.  Patient has patient brings his home blood pressure readings with him today and they are for the most part in the 150s to 170s over 80s to 90s.  He has  only a few blood pressures that are less than 140/90.    BP Readings from Last 3 Encounters:   04/13/23 (!) 156/90   03/17/23 119/76   03/06/23 136/80       Last Comprehensive Metabolic Panel:  Lab Results   Component Value Date     03/06/2023    POTASSIUM 4.7 03/06/2023    CHLORIDE 101 03/06/2023    CO2 24 03/06/2023    ANIONGAP 15 03/06/2023     (H) 03/06/2023    BUN 18.8 03/06/2023    CR 0.80 03/06/2023    GFRESTIMATED >90 03/06/2023    PRETTY 10.4 (H) 03/06/2023     Psoriasis: Prescribed Humira at last dermatology appointment, which appears to have been covered by insurance.  Unfortunately his hepatitis B core antibody screen positive, so he has not yet started the Humira as we rule out active hepatitis B.  The hepatitis B quant was ordered by the dermatologist and drawn earlier today.  Additionally medications for psoriasis are betamethasone 0.5% ointment, triamcinolone 0.1% cream, and fluocinonide 0.05% solution.  Also prescribed hydroxyzine at last dermatology visit, but he says he has not received yet.  Patient requests a new prescription for nystatin cream which he says has helped with his itching in the past.    Type 2 Diabetes:  Currently taking Jarrett 2000 mg daily, and Jardiance 25 mg daily.  Patient continues to request only oral dosage formulations for diabetes.  Patient denies side effects of medications.  Blood sugar monitoring: Continuous Glucose Monitor.      Symptoms of low blood sugar? none  Symptoms of high blood sugar? none  Diet/Exercise: Patient states that since he and I last talked he has tried to walk more as well as eat less rice.  Aspirin: Taking 81mg daily and denies side effects   Statin: Yes: Atorvastatin 80 mg daily  ACEi/ARB: Yes: Lisinopril 40 mg daily.   Urine Albumin:   Lab Results   Component Value Date    UMALCR  03/06/2023      Comment:      Unable to calculate, urine albumin and/or urine creatinine is outside detectable limits.  Microalbuminuria is defined as an  albumin:creatinine ratio of 17 to 299 for males and 25 to 299 for females. A ratio of albumin:creatinine of 300 or higher is indicative of overt proteinuria.  Due to biologic variability, positive results should be confirmed by a second, first-morning random or 24-hour timed urine specimen. If there is discrepancy, a third specimen is recommended. When 2 out of 3 results are in the microalbuminuria range, this is evidence for incipient nephropathy and warrants increased efforts at glucose control, blood pressure control, and institution of therapy with an angiotensin-converting-enzyme (ACE) inhibitor (if the patient can tolerate it).        Lab Results   Component Value Date    A1C 9.9 03/06/2023    A1C 8.0 08/15/2022    A1C 8.4 04/29/2022    A1C 8.2 11/15/2021    A1C 6.8 07/23/2021    A1C 7.8 01/29/2021    A1C 9.0 12/04/2020    A1C 10.2 03/05/2020    A1C 7.0 09/27/2019    A1C 11.2 06/28/2019       Gout: Current medication is of Oxistat 40 mg daily.  Patient reports he has not had a gout attacks in at least 2 years.  Uric Acid   Date Value Ref Range Status   03/06/2023 9.0 (H) 3.4 - 7.0 mg/dL Final   03/05/2020 7.1 3.0 - 8.0 mg/dL Final       Today's Vitals: BP (!) 156/90 (BP Location: Left arm, Patient Position: Sitting, Cuff Size: Adult Regular)   Pulse 75   Temp 97.7  F (36.5  C) (Tympanic)   Wt 212 lb (96.2 kg)   SpO2 96%   BMI 36.39 kg/m    ----------------      I spent 30 minutes with this patient today. All changes were made via collaborative practice agreement with Dr Siddiqui. A copy of the visit note was provided to the patient's provider(s).    A summary of these recommendations was given to the patient.    Savi Lema, PharmD      Medication Therapy Recommendations  Essential hypertension with goal blood pressure less than 140/90    Current Medication: amLODIPine (NORVASC) 5 MG tablet (Discontinued)   Rationale: Dose too low - Dosage too low - Effectiveness   Recommendation: Increase Dose   Status:  Accepted per CPA         Type 2 diabetes mellitus with hyperglycemia, without long-term current use of insulin (H)    Rationale: Synergistic therapy - Needs additional medication therapy - Indication   Recommendation: Start Medication - glipiZIDE 5 MG 24 hr tablet   Status: Accepted per CPA

## 2023-04-13 ENCOUNTER — APPOINTMENT (OUTPATIENT)
Dept: LAB | Facility: CLINIC | Age: 63
End: 2023-04-13
Payer: COMMERCIAL

## 2023-04-13 ENCOUNTER — OFFICE VISIT (OUTPATIENT)
Dept: PHARMACY | Facility: CLINIC | Age: 63
End: 2023-04-13
Payer: COMMERCIAL

## 2023-04-13 VITALS
HEART RATE: 75 BPM | BODY MASS INDEX: 36.39 KG/M2 | TEMPERATURE: 97.7 F | OXYGEN SATURATION: 96 % | SYSTOLIC BLOOD PRESSURE: 156 MMHG | DIASTOLIC BLOOD PRESSURE: 90 MMHG | WEIGHT: 212 LBS

## 2023-04-13 DIAGNOSIS — I10 ESSENTIAL HYPERTENSION WITH GOAL BLOOD PRESSURE LESS THAN 140/90: Primary | ICD-10-CM

## 2023-04-13 DIAGNOSIS — M1A.9XX0 CHRONIC GOUT WITHOUT TOPHUS, UNSPECIFIED CAUSE, UNSPECIFIED SITE: ICD-10-CM

## 2023-04-13 DIAGNOSIS — E11.65 TYPE 2 DIABETES MELLITUS WITH HYPERGLYCEMIA, WITHOUT LONG-TERM CURRENT USE OF INSULIN (H): ICD-10-CM

## 2023-04-13 DIAGNOSIS — L40.9 PSORIASIS: ICD-10-CM

## 2023-04-13 PROCEDURE — 99606 MTMS BY PHARM EST 15 MIN: CPT | Performed by: PHARMACIST

## 2023-04-13 PROCEDURE — 99000 SPECIMEN HANDLING OFFICE-LAB: CPT | Performed by: PATHOLOGY

## 2023-04-13 PROCEDURE — 36415 COLL VENOUS BLD VENIPUNCTURE: CPT | Performed by: PATHOLOGY

## 2023-04-13 PROCEDURE — 99607 MTMS BY PHARM ADDL 15 MIN: CPT | Performed by: PHARMACIST

## 2023-04-13 PROCEDURE — 87517 HEPATITIS B DNA QUANT: CPT | Mod: 90 | Performed by: PATHOLOGY

## 2023-04-13 RX ORDER — HYDROXYZINE HYDROCHLORIDE 10 MG/1
10 TABLET, FILM COATED ORAL
Qty: 60 TABLET | Refills: 3 | Status: SHIPPED | OUTPATIENT
Start: 2023-04-13 | End: 2023-10-16

## 2023-04-13 RX ORDER — GLIPIZIDE 5 MG/1
5 TABLET, FILM COATED, EXTENDED RELEASE ORAL DAILY
Qty: 90 TABLET | Refills: 3 | Status: SHIPPED | OUTPATIENT
Start: 2023-04-13 | End: 2023-10-16

## 2023-04-13 RX ORDER — NYSTATIN 100000 U/G
CREAM TOPICAL 2 TIMES DAILY
Qty: 30 G | Refills: 11 | Status: SHIPPED | OUTPATIENT
Start: 2023-04-13 | End: 2024-06-26

## 2023-04-13 RX ORDER — AMLODIPINE BESYLATE 10 MG/1
10 TABLET ORAL DAILY
Qty: 90 TABLET | Refills: 3 | Status: SHIPPED | OUTPATIENT
Start: 2023-04-13 | End: 2023-04-27

## 2023-04-13 NOTE — NURSING NOTE
Due to patient being non-English speaking/uses sign language, an  was used for this visit. Only for face-to-face interpretation by an external agency, date and length of interpretation can be found on the scanned worksheet.     name: Vasile Lozano  Agency: [employer]  Language: Liam   Telephone number: [phone]  Type of interpretation: Telephone, spoken

## 2023-04-13 NOTE — PATIENT INSTRUCTIONS
It was great to meet with you today.  You may receive a survey via email or text message in the next few days about your MTM pharmacist or physician.  We value your experience and would be very thankful for your time with providing feedback on our clinic survey.      Increase amlodipine to 10mg daily   Start Glipizide 5mg with dinner   Refilled nystatin cream   Refilled hydroxyzine

## 2023-04-14 LAB — HBV DNA SERPL NAA+PROBE-ACNC: NOT DETECTED IU/ML

## 2023-04-18 NOTE — RESULT ENCOUNTER NOTE
Can we let patient know that his Hep B PCR was negative. OK to start Humira if he hasn't already started it.      Ed Prince MD  Pronouns: he/him/his    Department of Dermatology  [facility]: Phone: [phone], Fax:[phone]  [facility]: Phone: [phone] Fax: [phone]

## 2023-04-18 NOTE — RESULT ENCOUNTER NOTE
Can we let patient know that his Hep B PCR was negative. OK to start Humira if he hasn't already started it.     Ed Prince MD  Pronouns: he/him/his    Department of Dermatology  [facility]: Phone: [phone], Fax:[phone]  [facility]: Phone: [phone] Fax: [phone]

## 2023-04-19 RX ORDER — ACETAMINOPHEN 500 MG
1000 TABLET ORAL EVERY 8 HOURS PRN
Qty: 100 TABLET | Refills: 3 | Status: SHIPPED | OUTPATIENT
Start: 2023-04-19 | End: 2023-10-16

## 2023-04-20 ENCOUNTER — TELEPHONE (OUTPATIENT)
Dept: PHARMACY | Facility: CLINIC | Age: 63
End: 2023-04-20
Payer: COMMERCIAL

## 2023-04-20 DIAGNOSIS — L40.9 PSORIASIS: ICD-10-CM

## 2023-04-20 RX ORDER — FLUOCINONIDE TOPICAL SOLUTION USP, 0.05% 0.5 MG/ML
SOLUTION TOPICAL 2 TIMES DAILY
Qty: 120 ML | Refills: 3 | Status: SHIPPED | OUTPATIENT
Start: 2023-04-20 | End: 2023-07-18

## 2023-04-20 NOTE — TELEPHONE ENCOUNTER
I have been working to provide some continuity of care for this patient.  In short, patient was seen by dermatology 3/31/23 for psoriasis.  Patient was prescribed Humira once weekly (80mg x 1 then 40mg once weekly thereafter).  Initiation of Humira was delayed pending Hept B Quant to rule out active Hep B infection.  This came back undetectable 4/14.  Telephone note from 3/31 indicates that the PA for Humira was approved 4/3.  I called [facility] pharmacy and they indicate that they have attempted to call patient to arrange filling this medication and delivery.  They have left a few messages.  Unfortunately they did not know the patient does not speak English so they did not call with an  and these messages were left in English.  They know this now and have made a note that patient needs to be called with a Liam  from now on. Phone number for [facility] is [phone] but person I spoke with said on caller ID it usually comes across as last 4 digits [phone].     Attempted to call patient through [employer] line to coordinate pickup of the Humira.  Was not able to reach patient at the phone number listed in epic.  Attempted to call the patient's daughter,Milton, and was not able to reach her but I did leave a message.  Taye Ferrell, the  that usually works with this patient tells me that the daughter speaks English and can communicate with him.  There is a consent to communicate with the daughter in the chart from December 28, 2022.  I will try daughter and patient tomorrow.     Additionally per the card coordinator from [employer] patient is missing one of his creams.  Per the dermatology note he should be on betamethasone 0.05% ointment, triamcinolone 0.1% cream twice daily prn, Lidex 0.05% solution twice daily prn.  Per the dispense report this looks like it might be that the fluocinonide solution so I will go ahead and refill that today and send it to  his pharmacy.    Also there is a message from the  care coordinator at [facility] that patient is currently living with his daughter in [place].  We will want to make sure the specialty pharmacy has  this updated address so that it is delivered to the right place.    Finally, I would be happy to teach this patient how to use Humira when he receives it.  We currently have a phone visit appointment on 5/11/2023, but this can easily be switched to an in person visit.  Or I can see him earlier.    Savi Lema, LukasD

## 2023-04-21 NOTE — TELEPHONE ENCOUNTER
Daughter called back.  She will call the pharmacy.  Also told her I would be happy to teach patient how to use product.      Savi Lema, LukasD

## 2023-04-27 DIAGNOSIS — I10 ESSENTIAL HYPERTENSION WITH GOAL BLOOD PRESSURE LESS THAN 140/90: ICD-10-CM

## 2023-04-27 RX ORDER — AMLODIPINE BESYLATE 10 MG/1
10 TABLET ORAL DAILY
Qty: 90 TABLET | Refills: 1 | Status: SHIPPED | OUTPATIENT
Start: 2023-04-27 | End: 2023-10-16

## 2023-05-06 DIAGNOSIS — E11.65 TYPE 2 DIABETES MELLITUS WITH HYPERGLYCEMIA, WITHOUT LONG-TERM CURRENT USE OF INSULIN (H): ICD-10-CM

## 2023-05-08 RX ORDER — ATORVASTATIN CALCIUM 80 MG/1
TABLET, FILM COATED ORAL
Qty: 90 TABLET | Refills: 1 | Status: SHIPPED | OUTPATIENT
Start: 2023-05-08 | End: 2023-07-10

## 2023-05-10 ENCOUNTER — OFFICE VISIT (OUTPATIENT)
Dept: AUDIOLOGY | Facility: CLINIC | Age: 63
End: 2023-05-10
Payer: COMMERCIAL

## 2023-05-10 DIAGNOSIS — H90.6 MIXED HEARING LOSS, BILATERAL: Primary | ICD-10-CM

## 2023-05-10 PROCEDURE — 92591 PR HEARING AID EXAM BINAURAL: CPT | Performed by: AUDIOLOGIST

## 2023-05-10 NOTE — PROGRESS NOTES
AUDIOLOGY REPORT    SUBJECTIVE: Sa KENIA Mackey is a 62 year old male was seen in the Audiology Clinic at  [facility] on 5/10/23 to discuss concerns with hearing and functional communication difficulties. The patient was accompanied by their .  has been seen previously on 12/28/2022, and results revealed a mild sloping to a moderately-severe predominately sensorineural hearing loss in the right ear (25 dB air-bone gap at 250 Hz, masking dilemma at 1000 Hz for bone conduction) and mild to moderately-severe mixed hearing loss in the left ear.  The patient was medically evaluated and determined to be cleared for binaural hearing aids by Dr. Angela Santiago.   notes difficulty with communication in a variety of listening situations. He has an android type phone with bluetooth and is interested in connectivity. He currently uses wired headphones for phone calls and listening to music.    OBJECTIVE:  Abuse Screening:  Do you feel unsafe at home or work/school? No  Do you feel threatened by someone? No  Does anyone try to keep you from having contact with others, or doing things outside of your home? No  Physical signs of abuse present? No    Patient is a hearing aid candidate. Patient would like to move forward with a hearing aid evaluation today. Therefore, the patient was presented with different options for amplification to help aid in communication. Discussed styles, levels of technology and monaural vs. binaural fitting.     The hearing aid(s) mutually chosen were:  Binaural: Phonak Audeo P70-R  COLOR: Graphite Gray  BATTERY SIZE: rechargeable  EARMOLD/TIPS: to be chosen at fitting  CANAL/ LENGTH: 2M R/L    Otoscopy revealed ears are clear of cerumen bilaterally.     ASSESSMENT:     Reviewed purchase information and warranty information with patient. The 45 day trial period was explained to patient. The patient was given a copy of the [place] Department of [employer]  brochure on purchasing hearing instruments. Patient risk factors have been provided to the patient in writing prior to the sale of the hearing aid per FDA regulation. The risk factors are also available in the User Instructional Booklet to be presented on the day of the hearing aid fitting. Hearing aids ordered. Hearing aid evaluation completed.    PLAN: Sa is scheduled to return on 6/6/2023 for a hearing aid fitting and programming. Purchase agreement will be completed on that date. Please contact this clinic with any questions or concerns.    Nasrin Alberts, CCC-A  [place] Licensed Audiologist #[id number]

## 2023-05-11 ENCOUNTER — VIRTUAL VISIT (OUTPATIENT)
Dept: PHARMACY | Facility: CLINIC | Age: 63
End: 2023-05-11
Payer: COMMERCIAL

## 2023-05-11 DIAGNOSIS — Z11.59 NEED FOR HEPATITIS B SCREENING TEST: ICD-10-CM

## 2023-05-11 DIAGNOSIS — L40.9 PSORIASIS: Primary | ICD-10-CM

## 2023-05-11 DIAGNOSIS — E11.65 TYPE 2 DIABETES MELLITUS WITH HYPERGLYCEMIA, WITHOUT LONG-TERM CURRENT USE OF INSULIN (H): ICD-10-CM

## 2023-05-11 DIAGNOSIS — I10 ESSENTIAL HYPERTENSION WITH GOAL BLOOD PRESSURE LESS THAN 140/90: ICD-10-CM

## 2023-05-11 PROCEDURE — 99606 MTMS BY PHARM EST 15 MIN: CPT | Performed by: PHARMACIST

## 2023-05-11 NOTE — Clinical Note
Margarita Siddiqui and Suresh - ARELY only.  Humira seems to be working great! Angélica - I think the medication he was looking for was for Hepatitis B, not TB.  He does not currently have Hep B therefore does not need a medication for this. I explained to patient.  Pharmacy has also been updated.    Savi Lema, LukasD

## 2023-05-11 NOTE — PROGRESS NOTES
Medication Therapy Management (MTM) Encounter    ASSESSMENT:                            Medication Adherence/Access: No issues identified    Hypertension: Appears improved based on patient reported blood pressure values.  appropriate to continue current medications today and have patient follow-up in about a month to have blood pressure taken here in clinic.    Psoriasis: Improved per patient report.  Patient explained the importance of continuing the Humira at least through his appointment in July with his dermatologist.  Patient voiced understanding.    History of hepatitis B infection: Explained to patient that the labs that we obtained in March indicated that he may have hepatitis B, however lab that we obtain in April confirmed that he does not currently have it.  Therefore we did not need to treat it with any medication.  Patient voiced understanding.    Type 2 diabetes mellitus: Not meeting an HbA1c goal of less than 8% based on labs in March, however appears improved based on patient reported blood sugars.  Patient is due for an HbA1c in early June.    PLAN:                            Continue current medications    Follow-up: 1 month with myself and Dr. Siddiqui    SUBJECTIVE/OBJECTIVE:                          Sa Mackey is a 62 year old male called for a follow-up visit.  (ID# [id number]) was used during today's visit.  Today's visit is a follow-up MTM visit from 4/13/23     [place] [id number]     Reason for visit: Hypertension follow up, check in on psoriasis medication.    Allergies/ADRs: Reviewed in chart  Past Medical History: Reviewed in chart  Tobacco: He reports that he has never smoked. He has never used smokeless tobacco.  Social History     Tobacco Use     Smoking status: Never     Smokeless tobacco: Never   Substance Use Topics     Alcohol use: No     Drug use: No       Medication Adherence/Access: no issues reported    Hypertension: Currently taking amlodipine 10mg daily and lisinopril 40mg daily.   Amlodipine was increased at last visit.  Patient is tolerating this change well with no lower leg swelling. Blood pressure machine broke so patient does not have current readings and has misplaced BP log.  Patient thinks they have been in the 130s/80s.     BP Readings from Last 3 Encounters:   04/13/23 (!) 156/90   03/17/23 119/76   03/06/23 136/80       Psoriasis: Currently taking Humira every 2 weeks, betamethasone 0.05% external ointment as needed, triamcinolone 0.1% cream as needed, and fluocinonide external solution as needed..  Since we last talked, patient was started on Humira.  Patient reports that his rash has gotten much smaller and the itchiness has lessened.  He is still using the topical ointment - mostly after showering.  He also takes diphenhydramine or hydroxyzine at night for itching and sleep. Patient [facility] medication [facility] I spoke with them earlier this morning and it sounds like they have his daughter Santo phone number for the refill reminder phone calls.  Per the pharmacy they do some out reach calls but not to prospectively refill this medication.  They will call if he does not refill the medication on time.  Explained to the patient that the Humira is to be used through at least the  next time he sees his dermatologist.  Patient doses the medication on every other Wednesday and his next dose is due 5/17.     History of hepatitis: There is seem to be some confusion from the patient about whether or not he had hepatitis B.  On March 31 he had a hepatitis B surface antibody negative but a core antibody reactive.  He then followed up on April 13 and obtained a DNA quant which was not detected.  Patient as to whether or not he needed to start medication for hepatitis B.  Additionally patient's care manager from [facility] reach out to has and I believe this is the therapy she was talking about.    Type 2 Diabetes:  Currently taking metforrmin ER 2000mg daily, Jardiance 25mg  daily and recently started glipizide ER 5mg daily.  Patient denies adverse effects.   Blood sugar monitorin-140s fasting.   Patient is working on what he eats.     Symptoms of low blood sugar? none  Aspirin: Taking 81mg daily and denies side effects   Statin: Yes: Atorvastatin 80 mg daily  ACEi/ARB: Yes: Lisinopril 40 mg daily.   Urine Albumin:   Lab Results   Component Value Date    UMALCR  2023      Comment:      Unable to calculate, urine albumin and/or urine creatinine is outside detectable limits.  Microalbuminuria is defined as an albumin:creatinine ratio of 17 to 299 for males and 25 to 299 for females. A ratio of albumin:creatinine of 300 or higher is indicative of overt proteinuria.  Due to biologic variability, positive results should be confirmed by a second, first-morning random or 24-hour timed urine specimen. If there is discrepancy, a third specimen is recommended. When 2 out of 3 results are in the microalbuminuria range, this is evidence for incipient nephropathy and warrants increased efforts at glucose control, blood pressure control, and institution of therapy with an angiotensin-converting-enzyme (ACE) inhibitor (if the patient can tolerate it).        Lab Results   Component Value Date    A1C 9.9 2023    A1C 8.0 08/15/2022    A1C 8.4 2022    A1C 8.2 11/15/2021    A1C 6.8 2021    A1C 7.8 2021    A1C 9.0 2020    A1C 10.2 2020    A1C 7.0 2019    A1C 11.2 2019       Today's Vitals: There were no vitals taken for this visit.  ----------------      I spent 20 minutes with this patient today. All changes were made via collaborative practice agreement with Dr Siddiqui. A copy of the visit note was provided to the patient's provider(s).    A summary of these recommendations was declined by the patient.    ap    Telemedicine Visit Details  Type of service:  Telephone visit  Start Time: 10:15  End Time: 10:35 AM     Medication Therapy  Recommendations  No medication therapy recommendations to display

## 2023-05-12 ENCOUNTER — TELEPHONE (OUTPATIENT)
Dept: CARE COORDINATION | Facility: CLINIC | Age: 63
End: 2023-05-12
Payer: COMMERCIAL

## 2023-05-12 NOTE — TELEPHONE ENCOUNTER
Telephone call to [employer] to schedule round trip transportation for medical clinic appointment at [facility] on June 1, 2023@9:30    Transportation Details:   time between 8:00 -9:00   Return trip is will call and patient should contact transportation company after appointment has been completed.  Transportation provided via [employer].  [employer]: [phone]    Telephone call placed to client via [employer]  services [email or web address].  Transportation details provided to the patient.      STEVE Small -  / Care Coordinator  [facility]: [address] [place] [address], [facility]

## 2023-05-18 DIAGNOSIS — E78.5 HYPERLIPIDEMIA LDL GOAL <100: ICD-10-CM

## 2023-05-18 RX ORDER — ASPIRIN 81 MG/1
TABLET ORAL
Qty: 100 TABLET | Refills: 1 | Status: SHIPPED | OUTPATIENT
Start: 2023-05-18 | End: 2023-10-16

## 2023-05-27 DIAGNOSIS — E11.65 TYPE 2 DIABETES MELLITUS WITH HYPERGLYCEMIA, WITHOUT LONG-TERM CURRENT USE OF INSULIN (H): ICD-10-CM

## 2023-05-30 RX ORDER — FLASH GLUCOSE SENSOR
KIT MISCELLANEOUS
Qty: 2 EACH | Refills: 6 | Status: SHIPPED | OUTPATIENT
Start: 2023-05-30 | End: 2023-10-16

## 2023-05-30 NOTE — RESULT ENCOUNTER NOTE
Clyde Peña!  Good to see you in person.  As we discussed your diabetes control is excellent - congratulations on the good work you have done to limit your rice portions and take your medications!    Two tests that are more abnormal than expected are that your bad cholesterol LDL level is higher than expected as is your gout lab, uric acid.  Make sure to take both ATORVASTATIN and ULORIC every day.      No protein loss in your urine and kidneys working normally is very good news - keep up the good work!  Jason Siddiqui [place] (In Mg/Kg): 220

## 2023-06-01 ENCOUNTER — OFFICE VISIT (OUTPATIENT)
Dept: PHARMACY | Facility: CLINIC | Age: 63
End: 2023-06-01
Payer: COMMERCIAL

## 2023-06-01 ENCOUNTER — OFFICE VISIT (OUTPATIENT)
Dept: FAMILY MEDICINE | Facility: CLINIC | Age: 63
End: 2023-06-01
Payer: COMMERCIAL

## 2023-06-01 VITALS
HEIGHT: 64 IN | RESPIRATION RATE: 16 BRPM | OXYGEN SATURATION: 95 % | DIASTOLIC BLOOD PRESSURE: 76 MMHG | BODY MASS INDEX: 36.6 KG/M2 | HEART RATE: 69 BPM | TEMPERATURE: 98.2 F | SYSTOLIC BLOOD PRESSURE: 126 MMHG | WEIGHT: 214.4 LBS

## 2023-06-01 DIAGNOSIS — L40.9 PSORIASIS: Primary | ICD-10-CM

## 2023-06-01 DIAGNOSIS — M1A.9XX0 CHRONIC GOUT WITHOUT TOPHUS, UNSPECIFIED CAUSE, UNSPECIFIED SITE: ICD-10-CM

## 2023-06-01 DIAGNOSIS — I10 ESSENTIAL HYPERTENSION WITH GOAL BLOOD PRESSURE LESS THAN 140/90: ICD-10-CM

## 2023-06-01 DIAGNOSIS — E55.9 HYPOVITAMINOSIS D: ICD-10-CM

## 2023-06-01 DIAGNOSIS — E11.65 TYPE 2 DIABETES MELLITUS WITH HYPERGLYCEMIA, WITHOUT LONG-TERM CURRENT USE OF INSULIN (H): Primary | ICD-10-CM

## 2023-06-01 DIAGNOSIS — M1A.09X0 IDIOPATHIC CHRONIC GOUT OF MULTIPLE SITES WITHOUT TOPHUS: ICD-10-CM

## 2023-06-01 DIAGNOSIS — E11.65 TYPE 2 DIABETES MELLITUS WITH HYPERGLYCEMIA, WITHOUT LONG-TERM CURRENT USE OF INSULIN (H): ICD-10-CM

## 2023-06-01 DIAGNOSIS — Z12.11 SCREEN FOR COLON CANCER: ICD-10-CM

## 2023-06-01 LAB
ALBUMIN SERPL BCG-MCNC: 4.3 G/DL (ref 3.5–5.2)
ALP SERPL-CCNC: 74 U/L (ref 40–129)
ALT SERPL W P-5'-P-CCNC: 25 U/L (ref 10–50)
ANION GAP SERPL CALCULATED.3IONS-SCNC: 13 MMOL/L (ref 7–15)
AST SERPL W P-5'-P-CCNC: 21 U/L (ref 10–50)
BILIRUB SERPL-MCNC: 0.6 MG/DL
BUN SERPL-MCNC: 19.2 MG/DL (ref 8–23)
CALCIUM SERPL-MCNC: 9.8 MG/DL (ref 8.8–10.2)
CHLORIDE SERPL-SCNC: 102 MMOL/L (ref 98–107)
CREAT SERPL-MCNC: 0.93 MG/DL (ref 0.67–1.17)
DEPRECATED HCO3 PLAS-SCNC: 22 MMOL/L (ref 22–29)
GFR SERPL CREATININE-BSD FRML MDRD: >90 ML/MIN/1.73M2
GLUCOSE SERPL-MCNC: 126 MG/DL (ref 70–99)
HBA1C MFR BLD: 8.5 % (ref 0–5.6)
POTASSIUM SERPL-SCNC: 5 MMOL/L (ref 3.4–5.3)
PROT SERPL-MCNC: 7.7 G/DL (ref 6.4–8.3)
SODIUM SERPL-SCNC: 137 MMOL/L (ref 136–145)
URATE SERPL-MCNC: 10.5 MG/DL (ref 3.4–7)

## 2023-06-01 PROCEDURE — 36415 COLL VENOUS BLD VENIPUNCTURE: CPT | Performed by: FAMILY MEDICINE

## 2023-06-01 PROCEDURE — 80053 COMPREHEN METABOLIC PANEL: CPT | Performed by: FAMILY MEDICINE

## 2023-06-01 PROCEDURE — 83036 HEMOGLOBIN GLYCOSYLATED A1C: CPT | Performed by: FAMILY MEDICINE

## 2023-06-01 PROCEDURE — 82306 VITAMIN D 25 HYDROXY: CPT | Performed by: FAMILY MEDICINE

## 2023-06-01 PROCEDURE — 99214 OFFICE O/P EST MOD 30 MIN: CPT | Performed by: FAMILY MEDICINE

## 2023-06-01 PROCEDURE — 84550 ASSAY OF BLOOD/URIC ACID: CPT | Performed by: FAMILY MEDICINE

## 2023-06-01 PROCEDURE — 99606 MTMS BY PHARM EST 15 MIN: CPT | Performed by: PHARMACIST

## 2023-06-01 RX ORDER — CHOLECALCIFEROL (VITAMIN D3) 50 MCG
50 TABLET ORAL DAILY
Qty: 90 TABLET | Refills: 1 | Status: SHIPPED | OUTPATIENT
Start: 2023-06-01 | End: 2023-10-16

## 2023-06-01 RX ORDER — FEBUXOSTAT 40 MG/1
40 TABLET, FILM COATED ORAL DAILY
Qty: 90 TABLET | Refills: 1 | Status: SHIPPED | OUTPATIENT
Start: 2023-06-01 | End: 2023-10-16

## 2023-06-01 NOTE — LETTER
June 14, 2023       KENIA Mackey  [address] [place] [address]        Dear ,    We are writing to inform you of your test results.    Mr Mackey   Your test for blood in the stool was normal, thankfully.   We generally recommend repeating this test every year to screen for cancer of the colon.   No additional testing is needed at this time.   C Fallert     Resulted Orders   Uric acid   Result Value Ref Range    Uric Acid 10.5 (H) 3.4 - 7.0 mg/dL   Comprehensive metabolic panel   Result Value Ref Range    Sodium 137 136 - 145 mmol/L    Potassium 5.0 3.4 - 5.3 mmol/L    Chloride 102 98 - 107 mmol/L    Carbon Dioxide (CO2) 22 22 - 29 mmol/L    Anion Gap 13 7 - 15 mmol/L    Urea Nitrogen 19.2 8.0 - 23.0 mg/dL    Creatinine 0.93 0.67 - 1.17 mg/dL    Calcium 9.8 8.8 - 10.2 mg/dL    Glucose 126 (H) 70 - 99 mg/dL    Alkaline Phosphatase 74 40 - 129 U/L    AST 21 10 - 50 U/L    ALT 25 10 - 50 U/L    Protein Total 7.7 6.4 - 8.3 g/dL    Albumin 4.3 3.5 - 5.2 g/dL    Bilirubin Total 0.6 <=1.2 mg/dL    GFR Estimate >90 >60 mL/min/1.73m2      Comment:      eGFR calculated using 2021 CKD-EPI equation.   Hemoglobin A1c   Result Value Ref Range    Hemoglobin A1C 8.5 (H) 0.0 - 5.6 %      Comment:      Normal <5.7%   Prediabetes 5.7-6.4%    Diabetes 6.5% or higher     Note: Adopted from ADA consensus guidelines.   Fecal colorectal cancer screen FIT - Future (S+30)   Result Value Ref Range    Occult Blood Screen FIT Negative Negative   Vitamin D Deficiency   Result Value Ref Range    Vitamin D, Total (25-Hydroxy) 24 20 - 75 ug/L    Narrative    Season, race, dietary intake, and treatment affect the concentration of 25-hydroxy-Vitamin D. Values may decrease during winter months and increase during summer months. Values 20-29 ug/L may indicate Vitamin D insufficiency and values <20 ug/L may indicate Vitamin D deficiency.    Vitamin D determination is routinely performed by an immunoassay specific for 25 hydroxyvitamin D3.   If an individual is on vitamin D2(ergocalciferol) supplementation, please specify 25 OH vitamin D2 and D3 level determination by LCMSMS test VITD23.         If you have any questions or concerns, please call the clinic at the number listed above.       Sincerely,      Jason Siddiqui MD

## 2023-06-01 NOTE — PROGRESS NOTES
Medication Therapy Management (MTM) Encounter    ASSESSMENT:                            Medication Adherence/Access: No issues identified    Type 2 Diabetes: Not meeting an HbA1c goal of <8% but is meeting TIR goal of >70%. I suspected that the A1c is 'lagging' and TIR is a more accurate reflection.     Hypertension: Meeting a blood pressure goal of <130/80 based on today's reading and self-reported home blood sugars.    Psoriasis: Much improved.  Emphasized importance of continued use of Humira.    Gout: Stable.    PLAN:                            Continue current medications    Follow-up: 3 months or as needed    SUBJECTIVE/OBJECTIVE:                          Sa Mackey is a 62 year old male seen for a follow-up visit. Patient is seeing provider after our visit today. Patient seen with Estonian (Taye Ferrell) . Today's visit is a follow-up MTM visit from 5/11/23     Reason for visit: Diabetes, Hypertension follow up.    Allergies/ADRs: Reviewed in chart  Past Medical History: Reviewed in chart  Tobacco: He reports that he has never smoked. He has never used smokeless tobacco.  Social History     Tobacco Use     Smoking status: Never     Smokeless tobacco: Never   Substance Use Topics     Alcohol use: No     Drug use: No         Medication Adherence/Access: no issues reported    Type 2 Diabetes:  Currently taking metformin 1000mg twice daily, Jardiance 25mg daily and glipizide XL 5mg daily. Patient is not experiencing side effects.  Blood sugar monitoring: Continuous Glucose Monitor.       Symptoms of low blood sugar? none, blurred vision. Had a low one day - that day he did not eat much rice but did eat a lot of meat and veggies.  He ate rice to correct it.     Symptoms of high blood sugar? none  Aspirin: Taking 81mg daily for primary prevention   Statin: Yes: atorvastatin 80mg daily    ACEi/ARB: Yes: lisinopril 40mg daily.   Urine Albumin:   Lab Results   Component Value Date    ALCR  03/06/2023       Comment:      Unable to calculate, urine albumin and/or urine creatinine is outside detectable limits.  Microalbuminuria is defined as an albumin:creatinine ratio of 17 to 299 for males and 25 to 299 for females. A ratio of albumin:creatinine of 300 or higher is indicative of overt proteinuria.  Due to biologic variability, positive results should be confirmed by a second, first-morning random or 24-hour timed urine specimen. If there is discrepancy, a third specimen is recommended. When 2 out of 3 results are in the microalbuminuria range, this is evidence for incipient nephropathy and warrants increased efforts at glucose control, blood pressure control, and institution of therapy with an angiotensin-converting-enzyme (ACE) inhibitor (if the patient can tolerate it).        Lab Results   Component Value Date    A1C 8.5 06/01/2023    A1C 9.9 03/06/2023    A1C 8.0 08/15/2022    A1C 8.4 04/29/2022    A1C 8.2 11/15/2021    A1C 7.8 01/29/2021    A1C 9.0 12/04/2020    A1C 10.2 03/05/2020    A1C 7.0 09/27/2019    A1C 11.2 06/28/2019       Hypertension: Current medications are amlodipine 10mg and lisinopril 40 mg daily.  Patient does not have his blood pressure log with him but states they highest blood pressure he has had recently was 130 systolic.  BP Readings from Last 3 Encounters:   06/01/23 126/76   04/13/23 (!) 156/90   03/17/23 119/76       Psoriasis: Currently taking Humira 40mg every 2 weeks, betamethasone 0.5% ointment, triamcinolone 0.1% cream, fluocinonide 0.05% solution and hydroxyzine 25mg three times daily as needed for itching.  Patient does not use hydroxyzine if he can help it because it makes him very tired.        Gout: Currently taking febuxostat 40mg daily and denies side effects.  Previously we have not wanted to go higher on this medication despite having an elevated uric acid due to cardiovascular risk.  Patient is intolerant to allopurinol.  Uric acid pending today.     Today's Vitals:   BP  "Readings from Last 1 Encounters:   06/01/23 126/76     Pulse Readings from Last 1 Encounters:   06/01/23 69     Wt Readings from Last 1 Encounters:   06/01/23 214 lb 6.4 oz (97.3 kg)     Ht Readings from Last 1 Encounters:   06/01/23 5' 4\" (1.626 m)     Estimated body mass index is 36.8 kg/m  as calculated from the following:    Height as of an earlier encounter on 6/1/23: 5' 4\" (1.626 m).    Weight as of an earlier encounter on 6/1/23: 214 lb 6.4 oz (97.3 kg).    Temp Readings from Last 1 Encounters:   06/01/23 98.2  F (36.8  C) (Oral)     ----------------      I spent 20 minutes with this patient today. All changes were made via collaborative practice agreement with Dr Siddiqui. A copy of the visit note was provided to the patient's provider(s).    A summary of these recommendations was given to the patient (see AVS from today's appointment with Dr Siddiqui).    Savi Lema, PharmD      Medication Therapy Recommendations  No medication therapy recommendations to display       "

## 2023-06-01 NOTE — LETTER
June 5, 2023       KENIA Key  [address] [place] [address]        Dear ,    We are writing to inform you of your test results.    All your blood tests show that your gout levels are a bit high - make sure you are taking Uloric every day.  We discussed your diabetes, A1c result which is doing better.  Your kidneys are working normally and your vitamin D levels are OK so you can continue taking the supplement. See you in about 3 months!  Take care!    Resulted Orders   Uric acid   Result Value Ref Range    Uric Acid 10.5 (H) 3.4 - 7.0 mg/dL   Comprehensive metabolic panel   Result Value Ref Range    Sodium 137 136 - 145 mmol/L    Potassium 5.0 3.4 - 5.3 mmol/L    Chloride 102 98 - 107 mmol/L    Carbon Dioxide (CO2) 22 22 - 29 mmol/L    Anion Gap 13 7 - 15 mmol/L    Urea Nitrogen 19.2 8.0 - 23.0 mg/dL    Creatinine 0.93 0.67 - 1.17 mg/dL    Calcium 9.8 8.8 - 10.2 mg/dL    Glucose 126 (H) 70 - 99 mg/dL    Alkaline Phosphatase 74 40 - 129 U/L    AST 21 10 - 50 U/L    ALT 25 10 - 50 U/L    Protein Total 7.7 6.4 - 8.3 g/dL    Albumin 4.3 3.5 - 5.2 g/dL    Bilirubin Total 0.6 <=1.2 mg/dL    GFR Estimate >90 >60 mL/min/1.73m2      Comment:      eGFR calculated using 2021 CKD-EPI equation.   Hemoglobin A1c   Result Value Ref Range    Hemoglobin A1C 8.5 (H) 0.0 - 5.6 %      Comment:      Normal <5.7%   Prediabetes 5.7-6.4%    Diabetes 6.5% or higher     Note: Adopted from ADA consensus guidelines.   Vitamin D Deficiency   Result Value Ref Range    Vitamin D, Total (25-Hydroxy) 24 20 - 75 ug/L    Narrative    Season, race, dietary intake, and treatment affect the concentration of 25-hydroxy-Vitamin D. Values may decrease during winter months and increase during summer months. Values 20-29 ug/L may indicate Vitamin D insufficiency and values <20 ug/L may indicate Vitamin D deficiency.    Vitamin D determination is routinely performed by an immunoassay specific for 25 hydroxyvitamin D3.  If an individual is on  vitamin D2(ergocalciferol) supplementation, please specify 25 OH vitamin D2 and D3 level determination by LCMSMS test VITD23.         If you have any questions or concerns, please call the clinic at the number listed above.       Sincerely,      Jason Siddiqui MD

## 2023-06-01 NOTE — PROGRESS NOTES
Sa was seen today for diabetes, derm problem, medication reconciliation and refill request.    Diagnoses and all orders for this visit:    Type 2 diabetes mellitus with hyperglycemia, without long-term current use of insulin (H)  -     Hemoglobin A1c    Idiopathic chronic gout of multiple sites without tophus  -     Uric acid  -     febuxostat (ULORIC) 40 MG TABS tablet; Take 1 tablet (40 mg) by mouth daily    Essential hypertension with goal blood pressure less than 140/90  -     Comprehensive metabolic panel  -     Hemoglobin A1c    Screen for colon cancer  -     Fecal colorectal cancer screen FIT - Future (S+30); Future  -     Fecal colorectal cancer screen FIT - Future (S+30)    Hypovitaminosis D  -     vitamin D3 (CHOLECALCIFEROL) 50 mcg (2000 units) tablet; Take 1 tablet (50 mcg) by mouth daily  -     Vitamin D deficiency screening; Future  -     Cancel: Vitamin D deficiency screening  -     Vitamin D Deficiency; Future  -     Vitamin D Deficiency      Despite his A1c being slightly above goal, his CGM printout indicates that he is generally well controlled currently and I anticipate that his A1c will continue to drop and we therefore agreed not to change his diabetic regimen for now.    We discussed a colonoscopy and is not ready for this but agrees to a fit test.  I advised that a multivitamin is not necessary for someone who has a reasonable diet but that he could take vitamin D.  We also agreed to check his current level of this.    He will plan to follow-up in about 3 months time or sooner if needed.    Total visit time with patient was 25 mins, all of which was face to face MD time, and over 50% of this time was spent in counseling and coordination of care.  Including post-encounter documentation and orders, total encounter time was 32 mins.            This is a 62-year-old who is well-known to me.  He attends today mainly to follow-up on his chronic diseases.  He reports that he is much happier about  "his psoriasis which has really responded well to Humira injection.  He reports that he is much less itchy and that the rash is resolving.  He does have a follow-up with dermatology in about 6 weeks time.    Otherwise he also reports satisfaction with use of his CGM.  He is using it frequently and can see that most of the time his blood pressure sugars are satisfactory.  On 1 day in the past 2 weeks he did have a low blood sugar but generally is not experiencing this and over 80% of his readings are in target range.    He is not having any problems with gout and is taking his uricosuric medication.    He does ask me whether he should be taking a vitamin?    Concerning his depression, he continues to talk with a therapist once weekly and says that he is doing okay.    Objective:  /76 (BP Location: Left arm, Patient Position: Sitting, Cuff Size: Adult Large)   Pulse 69   Temp 98.2  F (36.8  C) (Oral)   Resp 16   Ht 1.626 m (5' 4\")   Wt 97.3 kg (214 lb 6.4 oz)   SpO2 95%   BMI 36.80 kg/m    His blood pressure is good, he remains obese  PHQ-2 Score: 0    He shows me that his psoriasis is much improved and is visibly happy about this.  We reviewed his CGM printout for the past 2 weeks.  Pharmacy reviewed his medications and we discussed these together and plan not to make any changes.              "

## 2023-06-01 NOTE — PATIENT INSTRUCTIONS
Excellent blood sugars for the past 14 days!  We anticipate this will continue and did not make any changes to the medications.

## 2023-06-02 LAB — DEPRECATED CALCIDIOL+CALCIFEROL SERPL-MC: 24 UG/L (ref 20–75)

## 2023-06-03 NOTE — RESULT ENCOUNTER NOTE
Clyde Peña, all your blood tests show that your gout levels are a bit high - make sure you are taking Uloric every day.  We discussed your diabetes, A1c result which is doing better.  Your kidneys are working normally and your vitamin D levels are OK so you can continue taking the supplement.  See you in about 3 months!  Take care, Dr Jason Siddiqui

## 2023-06-06 ENCOUNTER — OFFICE VISIT (OUTPATIENT)
Dept: AUDIOLOGY | Facility: CLINIC | Age: 63
End: 2023-06-06
Payer: COMMERCIAL

## 2023-06-06 DIAGNOSIS — H90.6 MIXED HEARING LOSS, BILATERAL: Primary | ICD-10-CM

## 2023-06-06 PROCEDURE — V5011 HEARING AID FITTING/CHECKING: HCPCS | Mod: LT | Performed by: AUDIOLOGIST

## 2023-06-06 PROCEDURE — V5020 CONFORMITY EVALUATION: HCPCS | Mod: LT | Performed by: AUDIOLOGIST

## 2023-06-06 PROCEDURE — V5020 CONFORMITY EVALUATION: HCPCS | Mod: RT | Performed by: AUDIOLOGIST

## 2023-06-06 PROCEDURE — V5261 HEARING AID, DIGIT, BIN, BTE: HCPCS | Mod: NU | Performed by: AUDIOLOGIST

## 2023-06-06 PROCEDURE — V5160 DISPENSING FEE BINAURAL: HCPCS | Performed by: AUDIOLOGIST

## 2023-06-06 PROCEDURE — 92593 PR HEARING AID CHECK, BINAURAL: CPT | Performed by: AUDIOLOGIST

## 2023-06-06 PROCEDURE — V5011 HEARING AID FITTING/CHECKING: HCPCS | Mod: RT | Performed by: AUDIOLOGIST

## 2023-06-07 NOTE — PATIENT INSTRUCTIONS

## 2023-06-07 NOTE — PROGRESS NOTES
AUDIOLOGY REPORT    SUBJECTIVE: Sa Mackey, a 62 year old male, was seen in the Audiology Clinic at [facility] today for a Binaural hearing aid fitting. Previous results have revealed a mild sloping to a moderately-severe predominately sensorineural hearing loss in the right ear (25 dB air-bone gap at 250 Hz, masking dilemma at 1000 Hz for bone conduction) and mild to moderately-severe mixed hearing loss in the left ear.  The patient was medically evaluated and determined to be cleared for binaural hearing aids by Dr. Angela Santiago.    OBJECTIVE:  Prior to fitting, a hearing aid check was performed to ensure device functionality. The hearing aid conformity evaluation was completed.The hearing aids were placed and they provided a good fit. Real-ear-probe-microphone measurements were completed on the [employer] system and were a good match to NAL-NL1 target with soft sounds audible, moderate sounds comfortable, and loud sounds below discomfort. UCLs are verified through maximum power output measures and demonstrate appropriate limiting of loud inputs. Mr. Mackey was oriented to proper hearing aid use, care, cleaning (no water, dry brush), rechargeable batteries, aid insertion/removal, user booklet, warranty information, storage cases, and other hearing aid details. The patient confirmed understanding of hearing aid use and care, and showed proper insertion of hearing aid and batteries while in the office today. Mr. Mackey reported good volume and sound quality today.    He is set at 80% of targets today. He was given a volume control and we practiced with this. He is happy with these settings. We will hook up his phone and practice changing domes and wax traps at next visit.    EAR(S) FIT: Binaural  MA HEARING AID MAKE: Right: Phonak Audeo P70-R, graphite grey; Left: Phonak Audeo P70-R, graphite grey    MA HEARING AID MODEL #: Right: [id number]; Left: [id number]  HEARING AID STYLE: Right: ITALIA;  Left: ITALIA  DOME SIZE: Right:  medium vented; Left::  Small power   LENGTH: Right:  2M; Left:  2M  SERIAL NUMBERS: Right: [id number]; Left: [id number]  WARRANTY END DATE: Right: 8/8/2026; Left:: 8/8/2026      The patient Does Not Require a signed a waiver that is on file agreeing to the upgrade charge, per their insurance contract.        ASSESSMENT: Binaural Phonak Audeo P70-R ITALIA hearing aid fitting completed today. Verification measures were performed. The 45 day trial period was explained to patient, and they expressed understanding. Mr. Mackey signed the Hearing Aid Purchase Agreement and was given a copy, as well as details on his hearing aids. Patient was counseled that exact out of pocket amounts cannot be determined for hearing aid claims being sent to insurance. Any insurance coverage information presented to the patient is an estimate only, and is not a guarantee of payment. Patient has been advised to check with their own insurance.    PLAN: Mr. Mackey will return for follow-up in 2-3 weeks for a hearing aid review appointment. Please call this clinic with questions regarding today s appointment.    Monico Alberts., CCC-A  [place] Licensed Audiologist #[id number]

## 2023-06-08 PROCEDURE — 82274 ASSAY TEST FOR BLOOD FECAL: CPT | Performed by: FAMILY MEDICINE

## 2023-06-10 LAB — HEMOCCULT STL QL IA: NEGATIVE

## 2023-06-14 NOTE — RESULT ENCOUNTER NOTE
Please mail labs to patient with this message.    Mr Mackey  Your test for blood in the stool was normal, thankfully.  We generally recommend repeating this test every year to screen for cancer of the colon.  No additional testing is needed at this time.  MICHAEL Madrid

## 2023-06-21 ENCOUNTER — TELEPHONE (OUTPATIENT)
Dept: PHARMACY | Facility: OTHER | Age: 63
End: 2023-06-21
Payer: COMMERCIAL

## 2023-06-21 NOTE — TELEPHONE ENCOUNTER
PA Initiation    Medication: HUMIRA *CF* PEN 40 MG/0.4ML SC PNKT  Insurance Company: [facility] - Phone [phone] Fax [phone]  Pharmacy Filling the Rx: [facility] - [place], [place] - [address]  Filling Pharmacy Phone:    Filling Pharmacy Fax:    Start Date: 6/21/2023

## 2023-06-21 NOTE — TELEPHONE ENCOUNTER
Prior Authorization Approval    Medication: HUMIRA *CF* PEN 40 MG/0.4ML SC PNKT  Authorization Effective Date: 5/22/2023  Authorization Expiration Date: 9/19/2023  Approved Dose/Quantity: 2 per 28 days  Reference #: Key: [id number]   Insurance Company: [employer] - Phone [phone] Fax [phone]  Expected CoPay:       CoPay Card Available:      Financial Assistance Needed: no  Which Pharmacy is filling the prescription: [facility] - [facility] [address]  Pharmacy Notified: Yes  Patient Notified: No

## 2023-06-27 ENCOUNTER — OFFICE VISIT (OUTPATIENT)
Dept: AUDIOLOGY | Facility: CLINIC | Age: 63
End: 2023-06-27
Payer: COMMERCIAL

## 2023-06-27 DIAGNOSIS — H90.6 MIXED HEARING LOSS, BILATERAL: Primary | ICD-10-CM

## 2023-06-27 PROCEDURE — V5010 ASSESSMENT FOR HEARING AID: HCPCS | Performed by: AUDIOLOGIST

## 2023-06-27 NOTE — PROGRESS NOTES
AUDIOLOGY REPORT    SUBJECTIVE:Sa KENIA Mackey is a 62 year old male who was seen in the Audiology Clinic at the [facility] on 6/27/2023  for a follow-up check regarding the fitting of new hearing aids. Previous results have revealed a mild sloping to a moderately-severe predominately sensorineural hearing loss in the right ear (25 dB air-bone gap at 250 Hz, masking dilemma at 1000 Hz for bone conduction) and mild to moderately-severe mixed hearing loss in the left ear.  The patient was medically evaluated and determined to be cleared for binaural hearing aids by Dr. Angela Santiago. Patient accompanied by in-person .     OBJECTIVE:      Otoscopy revealed clear ear canals, bilaterally. Patient reports he isn't wearing the hearing aids much because he can hear a constant buzzing type of sound with them. He is hearing people talking to him much better, but would like the buzzing noise to be gone.  He states the hearing aids are comfortable to wear and staying in place.    Based on patient report, the following changes were made:    1. Hearing aids were cleaned and listening check is good. I did not hear any internal feedback or distortion from the devices.  2. Hearing aids hooked up and some changes were made to programming. I took occlusion manager to moderate and took soft noise reduction up to weak. Patient reports a big improvement in the buzzing sound he was hearing. He states he can hear it a little bit, but it is much much better.  3. I tried turning the hearing aids up from 80% to 90%, but patient reports they were too loud. Tried 85% but he preferred 80% of targets. Left patient at 80% today. He is happy with this.  4. Showed him how to change domes and wax traps and he demonstrated this independently today. Gave him extra supplies.  5. Paired his cell phone and practiced with audio and phone calls today. Can revisit the mague at his next appointment if he is  interested.    Reviewed 45 day trial period, care, cleaning (no water, dry brush), rechargeable batteries, aid insertion/removal, volume adjustment (if applicable), user booklet, warranty information, storage cases, and other hearing aid details.     No charge visit today (in warranty hearing aid check).       ASSESSMENT: A follow-up appointment for hearing aid fitting was completed today. Changes to hearing aid was completed as outlined above.     PLAN:Sa will return for follow-up on 7/25/2023 for another check to make sure he is happy with the settings. Please call this clinic with any questions regarding today s appointment.    Monico Alberts., CCC-A  [place] Licensed Audiologist #[id number]

## 2023-07-10 DIAGNOSIS — E11.65 TYPE 2 DIABETES MELLITUS WITH HYPERGLYCEMIA, WITHOUT LONG-TERM CURRENT USE OF INSULIN (H): ICD-10-CM

## 2023-07-10 RX ORDER — ATORVASTATIN CALCIUM 80 MG/1
TABLET, FILM COATED ORAL
Qty: 90 TABLET | Refills: 1 | Status: SHIPPED | OUTPATIENT
Start: 2023-07-10 | End: 2023-07-25

## 2023-07-11 DIAGNOSIS — I10 ESSENTIAL HYPERTENSION WITH GOAL BLOOD PRESSURE LESS THAN 140/90: ICD-10-CM

## 2023-07-11 RX ORDER — AMLODIPINE BESYLATE 5 MG/1
10 TABLET ORAL DAILY
Qty: 180 TABLET | Refills: 1 | Status: SHIPPED | OUTPATIENT
Start: 2023-07-11

## 2023-07-12 ENCOUNTER — TELEPHONE (OUTPATIENT)
Dept: FAMILY MEDICINE | Facility: CLINIC | Age: 63
End: 2023-07-12
Payer: COMMERCIAL

## 2023-07-12 NOTE — TELEPHONE ENCOUNTER
[facility] phone call message- general phone call:    Reason for call: They need a call back re the Rifampin they would like to know if the patient is still taking this med    Action desired: call back.    Return call needed: Yes    OK to leave a message on voice mail? Yes    Advised patient to response may take up to 2 business days: Yes    Primary language: Liam      needed? Yes    Call taken on July 12, 2023 at 12:50 PM by Kiya Barros

## 2023-07-14 NOTE — TELEPHONE ENCOUNTER
Returned call to patient's pharmacy and confirmed that patient is not on rifampin. It seems that they had a note on his chart from when he was taking it and wanted to confirm he finished as there would be an interaction with his amlodipine.     Savi Lema, LukasD

## 2023-07-18 ENCOUNTER — OFFICE VISIT (OUTPATIENT)
Dept: DERMATOLOGY | Facility: CLINIC | Age: 63
End: 2023-07-18
Payer: COMMERCIAL

## 2023-07-18 DIAGNOSIS — L40.0 PSORIASIS VULGARIS: Primary | ICD-10-CM

## 2023-07-18 PROCEDURE — 99214 OFFICE O/P EST MOD 30 MIN: CPT | Performed by: DERMATOLOGY

## 2023-07-18 RX ORDER — FLUOCINONIDE TOPICAL SOLUTION USP, 0.05% 0.5 MG/ML
SOLUTION TOPICAL
Qty: 120 ML | Refills: 11 | Status: SHIPPED | OUTPATIENT
Start: 2023-07-18 | End: 2023-10-16

## 2023-07-18 RX ORDER — TRIAMCINOLONE ACETONIDE 1 MG/G
CREAM TOPICAL
Qty: 454 G | Refills: 5 | Status: SHIPPED | OUTPATIENT
Start: 2023-07-18 | End: 2023-11-13

## 2023-07-18 RX ORDER — BETAMETHASONE DIPROPIONATE 0.5 MG/G
OINTMENT, AUGMENTED TOPICAL
Qty: 45 G | Refills: 11 | Status: SHIPPED | OUTPATIENT
Start: 2023-07-18 | End: 2023-10-16

## 2023-07-18 ASSESSMENT — PAIN SCALES - GENERAL: PAINLEVEL: NO PAIN (0)

## 2023-07-18 NOTE — PROGRESS NOTES
[facility] Dermatology Note  Encounter Date: Jul 18, 2023  Office Visit     Dermatology Problem List:  1. Psoriasis  - Humira, betamethasone 0.05% ointment, triamcinolone 0.1% cream BID prn, Lidex 0.05% solution BID prn  2. History of latent TB treated with ID  ____________________________________________    Assessment & Plan:     # Psoriasis vulgaris. Chronic, flare/not at goal.    30-40% BSA. Latent TB has been treated. Hep B core positive, Hep B PCR negative. Has started humira with some interval improvement including decreased pruritus, but still without rather significant involvement on trunk and extremities. Will plan for longer therapeutic trial and consider switch to IL-17 or 23 inhibitor at follow-up if not improving.   - Continue Humira  - Continue topicals: betamethasone 0.05% ointment, triamcinolone 0.1% cream BID prn, Lidex 0.05% solution BID prn  - Future considerations: IL-17 or IL-23 inhibitor    Procedures Performed:   None    Follow-up: 4 month(s) in-person, or earlier for new or changing lesions    Staff:     Ed Prince MD  Pronouns: he/him/his    Department of Dermatology  [facility]: Phone: [phone], Fax:[phone]  [facility]: Phone: [phone] Fax: [phone]  ____________________________________________    CC: Psoriasis ( is here today for psoriasis f/up. [facility]. )    HPI:  Mr. Sa KENIA Mackey is a(n) 63 year old male who presents today as a return patient for follow-up psoriasis. Last seen March 2023 with plan to start humira.     Today, he reports psoriasis is improved from prior. He states lesions have become slightly less raised and less itchy. He has been taking adalimumab for about 6-8 weeks. Tolerating well without side effects.     Patient is otherwise feeling well, without additional skin concerns.     Labs  Reviewed:  N/A    Physical Exam:  Vitals: There were no vitals taken for this visit.  SKIN: Focused examination of trunk and extremities was performed.  - Scattered pink, well-demarcated papules coalescing into thin plaques on trunk and extremities.    - No other lesions of concern on areas examined.     Medications:  Current Outpatient Medications   Medication     acetaminophen (TYLENOL) 500 MG tablet     acetic acid-hydrocortisone (VOSOL-HC) 1-2 % otic solution     adalimumab (HUMIRA *CF*) 40 MG/0.4ML pen kit     adalimumab (HUMIRA *CF*) 80 MG/0.8ML pen kit     Alcohol Swabs (EASY TOUCH ALCOHOL PREP MEDIUM) 70 % PADS     amLODIPine (NORVASC) 10 MG tablet     amLODIPine (NORVASC) 5 MG tablet     aspirin (ASPIRIN LOW DOSE) 81 MG EC tablet     atorvastatin (LIPITOR) 80 MG tablet     augmented betamethasone dipropionate (DIPROLENE-AF) 0.05 % external ointment     blood glucose (NO BRAND SPECIFIED) test strip     carboxymethylcellulose PF (LUBRICATING PLUS EYE DROPS) 0.5 % ophthalmic solution     Continuous Blood Gluc Sensor (FREESTYLE JOSE 14 DAY SENSOR) MISC     diphenhydrAMINE (BENADRYL) 25 MG tablet     febuxostat (ULORIC) 40 MG TABS tablet     fluocinonide (LIDEX) 0.05 % external solution     glipiZIDE (GLUCOTROL XL) 5 MG 24 hr tablet     hydrOXYzine (ATARAX) 10 MG tablet     JARDIANCE 25 MG TABS tablet     Lancet Devices (EASY TOUCH LANCING DEVICE) MISC     lisinopril (ZESTRIL) 40 MG tablet     metFORMIN (GLUCOPHAGE XR) 500 MG 24 hr tablet     nystatin (MYCOSTATIN) 068167 UNIT/GM external cream     order for DME     ORDER FOR DME     Skin Protectants, Misc. (VASELINE CONSTANT CARE) OINT     triamcinolone (KENALOG) 0.1 % external cream     vitamin D3 (CHOLECALCIFEROL) 50 mcg (2000 units) tablet     No current facility-administered medications for this visit.      Past Medical History:   Patient Active Problem List   Diagnosis     Health Care Home     Male erectile disorder     Obstructive sleep apnea      Posttraumatic stress disorder     Testicular hypofunction     Hyperlipidemia LDL goal <100     Lesion of ulnar nerve     Hypovitaminosis D     Allergic rhinitis     H/O exploratory laparotomy     Chronic gout without tophus, unspecified cause, unspecified site     Dyshidrotic eczema     Essential hypertension with goal blood pressure less than 140/90     Type 2 diabetes mellitus with hyperglycemia, without long-term current use of insulin (H)     Morbid obesity (H)     Rotator cuff syndrome, right     Psoriasis     Past Medical History:   Diagnosis Date     Depressive disorder      Diabetes (H)      Hypertension

## 2023-07-18 NOTE — LETTER
7/18/2023       RE: Sa KENIA Mackey  [address] [place] [address]     Dear Colleague,    Thank you for referring your patient, Sa KENIA Mackey, to the [facility] [place] at [facility]. Please see a copy of my visit note below.    Former[facility] Dermatology Note  Encounter Date: Jul 18, 2023  Office Visit     Dermatology Problem List:  1. Psoriasis  - Humira, betamethasone 0.05% ointment, triamcinolone 0.1% cream BID prn, Lidex 0.05% solution BID prn  2. History of latent TB treated with ID  ____________________________________________    Assessment & Plan:     # Psoriasis vulgaris. Chronic, flare/not at goal.    30-40% BSA. Latent TB has been treated. Hep B core positive, Hep B PCR negative. Has started humira with some interval improvement including decreased pruritus, but still without rather significant involvement on trunk and extremities. Will plan for longer therapeutic trial and consider switch to IL-17 or 23 inhibitor at follow-up if not improving.   - Continue Humira  - Continue topicals: betamethasone 0.05% ointment, triamcinolone 0.1% cream BID prn, Lidex 0.05% solution BID prn  - Future considerations: IL-17 or IL-23 inhibitor    Procedures Performed:   None    Follow-up: 4 month(s) in-person, or earlier for new or changing lesions    Staff:     Ed Prince MD  Pronouns: he/him/his    Department of Dermatology  [facility]: Phone: [phone], Fax:[phone]  [facility]: Phone: [phone] Fax: [phone]  ____________________________________________    CC: Psoriasis ( is here today for psoriasis f/up. [facility]. )    HPI:  Mr. Sa KENIA Mackey is a(n) 63 year old male who presents today as a return patient for follow-up psoriasis. Last seen March 2023 with plan to start humira.     Today,  he reports psoriasis is improved from prior. He states lesions have become slightly less raised and less itchy. He has been taking adalimumab for about 6-8 weeks. Tolerating well without side effects.     Patient is otherwise feeling well, without additional skin concerns.     Labs Reviewed:  N/A    Physical Exam:  Vitals: There were no vitals taken for this visit.  SKIN: Focused examination of trunk and extremities was performed.  - Scattered pink, well-demarcated papules coalescing into thin plaques on trunk and extremities.    - No other lesions of concern on areas examined.     Medications:  Current Outpatient Medications   Medication    acetaminophen (TYLENOL) 500 MG tablet    acetic acid-hydrocortisone (VOSOL-HC) 1-2 % otic solution    adalimumab (HUMIRA *CF*) 40 MG/0.4ML pen kit    adalimumab (HUMIRA *CF*) 80 MG/0.8ML pen kit    Alcohol Swabs (EASY TOUCH ALCOHOL PREP MEDIUM) 70 % PADS    amLODIPine (NORVASC) 10 MG tablet    amLODIPine (NORVASC) 5 MG tablet    aspirin (ASPIRIN LOW DOSE) 81 MG EC tablet    atorvastatin (LIPITOR) 80 MG tablet    augmented betamethasone dipropionate (DIPROLENE-AF) 0.05 % external ointment    blood glucose (NO BRAND SPECIFIED) test strip    carboxymethylcellulose PF (LUBRICATING PLUS EYE DROPS) 0.5 % ophthalmic solution    Continuous Blood Gluc Sensor (FREESTYLE JOSE 14 DAY SENSOR) MISC    diphenhydrAMINE (BENADRYL) 25 MG tablet    febuxostat (ULORIC) 40 MG TABS tablet    fluocinonide (LIDEX) 0.05 % external solution    glipiZIDE (GLUCOTROL XL) 5 MG 24 hr tablet    hydrOXYzine (ATARAX) 10 MG tablet    JARDIANCE 25 MG TABS tablet    Lancet Devices (EASY TOUCH LANCING DEVICE) MISC    lisinopril (ZESTRIL) 40 MG tablet    metFORMIN (GLUCOPHAGE XR) 500 MG 24 hr tablet    nystatin (MYCOSTATIN) 830221 UNIT/GM external cream    order for DME    ORDER FOR DME    Skin Protectants, Misc. (VASELINE CONSTANT CARE) OINT    triamcinolone (KENALOG) 0.1 % external cream    vitamin D3  (CHOLECALCIFEROL) 50 mcg (2000 units) tablet     No current facility-administered medications for this visit.      Past Medical History:   Patient Active Problem List   Diagnosis    Health Care Home    Male erectile disorder    Obstructive sleep apnea    Posttraumatic stress disorder    Testicular hypofunction    Hyperlipidemia LDL goal <100    Lesion of ulnar nerve    Hypovitaminosis D    Allergic rhinitis    H/O exploratory laparotomy    Chronic gout without tophus, unspecified cause, unspecified site    Dyshidrotic eczema    Essential hypertension with goal blood pressure less than 140/90    Type 2 diabetes mellitus with hyperglycemia, without long-term current use of insulin (H)    Morbid obesity (H)    Rotator cuff syndrome, right    Psoriasis     Past Medical History:   Diagnosis Date    Depressive disorder     Diabetes (H)     Hypertension

## 2023-07-18 NOTE — NURSING NOTE
Dermatology Rooming Note    Sa KENIA Mackey's goals for this visit include:   Chief Complaint   Patient presents with     Psoriasis     Sa is here today for psoriasis f/up. FBS.      Maris Lawson, EMT

## 2023-07-24 DIAGNOSIS — E11.65 TYPE 2 DIABETES MELLITUS WITH HYPERGLYCEMIA, WITHOUT LONG-TERM CURRENT USE OF INSULIN (H): ICD-10-CM

## 2023-07-25 RX ORDER — ATORVASTATIN CALCIUM 80 MG/1
TABLET, FILM COATED ORAL
Qty: 90 TABLET | Refills: 1 | Status: SHIPPED | OUTPATIENT
Start: 2023-07-25 | End: 2023-10-16

## 2023-07-31 ENCOUNTER — OFFICE VISIT (OUTPATIENT)
Dept: AUDIOLOGY | Facility: CLINIC | Age: 63
End: 2023-07-31
Payer: COMMERCIAL

## 2023-07-31 DIAGNOSIS — H90.6 MIXED HEARING LOSS, BILATERAL: Primary | ICD-10-CM

## 2023-07-31 PROCEDURE — V5010 ASSESSMENT FOR HEARING AID: HCPCS | Performed by: AUDIOLOGIST

## 2023-07-31 NOTE — PROGRESS NOTES
AUDIOLOGY REPORT    HEARING AID CHECK    SUBJECTIVE:Sa KENIA Mackey is a 63 year old male who was seen in the Audiology Clinic at the [facility] on 7/31/2023  for a hearing aid check. Previous results have revealed a mild sloping to a moderately-severe predominately sensorineural hearing loss in the right ear (25 dB air-bone gap at 250 Hz, masking dilemma at 1000 Hz for bone conduction) and mild to moderately-severe mixed hearing loss in the left ear.  The patient was medically evaluated and determined to be cleared for binaural hearing aids by Dr. Angela Santiago. Patient accompanied by in-person .      OBJECTIVE:     Based on patient report, the following changes were made:    Patient is still complaining of the buzzing sound in the left ear. After questioning patient more about the noise, it was found that when the hearing aid is pushed in his ear too far he hears the noise. I pulled the hearing aid out slightly and the noise went away. He is happy with this.  No programming changes were made today as patient is happy with the settings.   He does not utilize his phone with the hearing aids. He has his bluetooth turned off.  He is overall doing well with the hearing aids.    No charge visit today (in warranty hearing aid check).    ASSESSMENT: Hearing aid check was completed today. Changes to hearing aid was completed as outlined above.     PLAN: will return for follow-up as needed, or at least every 6-9 months for cleaning and assessment of hearing aid.   Appointment is scheduled for 2/5/2023 for hearing aid check. Please call this clinic with any questions regarding today s appointment.    Monico Alberts., CCC-A  [place] Licensed Audiologist #[id number]

## 2023-08-31 DIAGNOSIS — E11.65 TYPE 2 DIABETES MELLITUS WITH HYPERGLYCEMIA, WITHOUT LONG-TERM CURRENT USE OF INSULIN (H): ICD-10-CM

## 2023-08-31 RX ORDER — EMPAGLIFLOZIN 25 MG/1
TABLET, FILM COATED ORAL
Qty: 90 TABLET | Refills: 1 | Status: SHIPPED | OUTPATIENT
Start: 2023-08-31 | End: 2023-10-16

## 2023-09-13 ENCOUNTER — TELEPHONE (OUTPATIENT)
Dept: DERMATOLOGY | Facility: CLINIC | Age: 63
End: 2023-09-13
Payer: COMMERCIAL

## 2023-09-13 NOTE — TELEPHONE ENCOUNTER
PA Initiation    Medication: HUMIRA *CF* PEN 40 MG/0.4ML SC PNKT  Insurance Company: [facility] - Phone [phone] Fax [phone]  Pharmacy Filling the Rx: [facility] - [place], [place] - [facility] [address]  Filling Pharmacy Phone: [phone]  Filling Pharmacy Fax:    Start Date: 9/13/2023

## 2023-09-13 NOTE — TELEPHONE ENCOUNTER
Prior Authorization Approval    Medication: HUMIRA *CF* PEN 40 MG/0.4ML SC PNKT  Authorization Effective Date: 8/14/2023  Authorization Expiration Date: 9/12/2024  Approved Dose/Quantity: 2 per 28 dasy  Reference #: Key: [id number]   Insurance Company: [employer] - Phone [phone] Fax [phone]  Expected CoPay:       CoPay Card Available:      Financial Assistance Needed: no  [facility] - [facility] [address]  Pharmacy Notified:    Patient Notified:

## 2023-10-03 ENCOUNTER — TELEPHONE (OUTPATIENT)
Dept: FAMILY MEDICINE | Facility: CLINIC | Age: 63
End: 2023-10-03
Payer: COMMERCIAL

## 2023-10-16 ENCOUNTER — OFFICE VISIT (OUTPATIENT)
Dept: FAMILY MEDICINE | Facility: CLINIC | Age: 63
End: 2023-10-16
Payer: COMMERCIAL

## 2023-10-16 VITALS
BODY MASS INDEX: 39.07 KG/M2 | HEART RATE: 54 BPM | SYSTOLIC BLOOD PRESSURE: 160 MMHG | DIASTOLIC BLOOD PRESSURE: 81 MMHG | OXYGEN SATURATION: 95 % | WEIGHT: 227.6 LBS | RESPIRATION RATE: 20 BRPM

## 2023-10-16 DIAGNOSIS — E11.65 TYPE 2 DIABETES MELLITUS WITH HYPERGLYCEMIA, WITHOUT LONG-TERM CURRENT USE OF INSULIN (H): Primary | ICD-10-CM

## 2023-10-16 DIAGNOSIS — E78.5 HYPERLIPIDEMIA LDL GOAL <100: ICD-10-CM

## 2023-10-16 DIAGNOSIS — L40.9 PSORIASIS: ICD-10-CM

## 2023-10-16 DIAGNOSIS — Z23 NEED FOR PROPHYLACTIC VACCINATION AND INOCULATION AGAINST INFLUENZA: ICD-10-CM

## 2023-10-16 DIAGNOSIS — M1A.09X0 IDIOPATHIC CHRONIC GOUT OF MULTIPLE SITES WITHOUT TOPHUS: ICD-10-CM

## 2023-10-16 DIAGNOSIS — L30.9 DERMATITIS: ICD-10-CM

## 2023-10-16 DIAGNOSIS — E55.9 HYPOVITAMINOSIS D: ICD-10-CM

## 2023-10-16 DIAGNOSIS — I10 ESSENTIAL HYPERTENSION WITH GOAL BLOOD PRESSURE LESS THAN 140/90: ICD-10-CM

## 2023-10-16 DIAGNOSIS — H04.123 DRY EYES: ICD-10-CM

## 2023-10-16 DIAGNOSIS — L29.9 EAR ITCHING: ICD-10-CM

## 2023-10-16 LAB — HBA1C MFR BLD: 8.6 % (ref 0–5.6)

## 2023-10-16 PROCEDURE — 90471 IMMUNIZATION ADMIN: CPT | Performed by: FAMILY MEDICINE

## 2023-10-16 PROCEDURE — 83036 HEMOGLOBIN GLYCOSYLATED A1C: CPT | Performed by: FAMILY MEDICINE

## 2023-10-16 PROCEDURE — 36415 COLL VENOUS BLD VENIPUNCTURE: CPT | Performed by: FAMILY MEDICINE

## 2023-10-16 PROCEDURE — 90686 IIV4 VACC NO PRSV 0.5 ML IM: CPT | Performed by: FAMILY MEDICINE

## 2023-10-16 PROCEDURE — 99214 OFFICE O/P EST MOD 30 MIN: CPT | Mod: 25 | Performed by: FAMILY MEDICINE

## 2023-10-16 RX ORDER — AMLODIPINE BESYLATE 10 MG/1
10 TABLET ORAL DAILY
Qty: 90 TABLET | Refills: 1 | Status: SHIPPED | OUTPATIENT
Start: 2023-10-16 | End: 2024-02-23

## 2023-10-16 RX ORDER — ISOPROPYL ALCOHOL 70 ML/100ML
1 SWAB TOPICAL 3 TIMES DAILY PRN
Qty: 300 EACH | Refills: 1 | Status: SHIPPED | OUTPATIENT
Start: 2023-10-16

## 2023-10-16 RX ORDER — HYDROCORTISONE AND ACETIC ACID 20.75; 10.375 MG/ML; MG/ML
3 SOLUTION AURICULAR (OTIC) 2 TIMES DAILY PRN
Qty: 10 ML | Refills: 3 | Status: SHIPPED | OUTPATIENT
Start: 2023-10-16

## 2023-10-16 RX ORDER — METFORMIN HCL 500 MG
2000 TABLET, EXTENDED RELEASE 24 HR ORAL DAILY
Qty: 360 TABLET | Refills: 1 | Status: SHIPPED | OUTPATIENT
Start: 2023-10-16 | End: 2024-02-23

## 2023-10-16 RX ORDER — ACETAMINOPHEN 500 MG
1000 TABLET ORAL EVERY 8 HOURS PRN
Qty: 100 TABLET | Refills: 3 | Status: SHIPPED | OUTPATIENT
Start: 2023-10-16 | End: 2024-02-23

## 2023-10-16 RX ORDER — DIPHENHYDRAMINE HCL 25 MG
25 TABLET ORAL EVERY 6 HOURS PRN
Qty: 100 TABLET | Refills: 3 | Status: SHIPPED | OUTPATIENT
Start: 2023-10-16 | End: 2024-02-23

## 2023-10-16 RX ORDER — MINERAL OIL/HYDROPHIL PETROLAT
OINTMENT (GRAM) TOPICAL 3 TIMES DAILY
Qty: 452 G | Refills: 1 | Status: SHIPPED | OUTPATIENT
Start: 2023-10-16 | End: 2024-02-23

## 2023-10-16 RX ORDER — FEBUXOSTAT 40 MG/1
40 TABLET, FILM COATED ORAL DAILY
Qty: 90 TABLET | Refills: 1 | Status: SHIPPED | OUTPATIENT
Start: 2023-10-16 | End: 2024-02-23

## 2023-10-16 RX ORDER — FLASH GLUCOSE SENSOR
KIT MISCELLANEOUS
Qty: 2 EACH | Refills: 6 | Status: SHIPPED | OUTPATIENT
Start: 2023-10-16 | End: 2024-02-23

## 2023-10-16 RX ORDER — ASPIRIN 81 MG/1
TABLET ORAL
Qty: 100 TABLET | Refills: 1 | Status: SHIPPED | OUTPATIENT
Start: 2023-10-16 | End: 2024-01-29

## 2023-10-16 RX ORDER — HYDROXYZINE HYDROCHLORIDE 10 MG/1
10 TABLET, FILM COATED ORAL
Qty: 60 TABLET | Refills: 3 | Status: SHIPPED | OUTPATIENT
Start: 2023-10-16 | End: 2023-11-13

## 2023-10-16 RX ORDER — CARBOXYMETHYLCELLULOSE SODIUM 5 MG/ML
SOLUTION/ DROPS OPHTHALMIC
Qty: 30 EACH | Refills: 3 | Status: SHIPPED | OUTPATIENT
Start: 2023-10-16 | End: 2024-02-23

## 2023-10-16 RX ORDER — CHOLECALCIFEROL (VITAMIN D3) 50 MCG
50 TABLET ORAL DAILY
Qty: 90 TABLET | Refills: 1 | Status: SHIPPED | OUTPATIENT
Start: 2023-10-16 | End: 2024-01-29

## 2023-10-16 RX ORDER — ATORVASTATIN CALCIUM 80 MG/1
80 TABLET, FILM COATED ORAL DAILY
Qty: 90 TABLET | Refills: 1 | Status: SHIPPED | OUTPATIENT
Start: 2023-10-16 | End: 2024-02-23

## 2023-10-16 RX ORDER — LISINOPRIL 40 MG/1
40 TABLET ORAL DAILY
Qty: 90 TABLET | Refills: 1 | Status: SHIPPED | OUTPATIENT
Start: 2023-10-16 | End: 2024-01-29

## 2023-10-16 RX ORDER — FLUOCINONIDE TOPICAL SOLUTION USP, 0.05% 0.5 MG/ML
SOLUTION TOPICAL
Qty: 120 ML | Refills: 11 | Status: SHIPPED | OUTPATIENT
Start: 2023-10-16 | End: 2023-11-13

## 2023-10-16 RX ORDER — BETAMETHASONE DIPROPIONATE 0.5 MG/G
OINTMENT, AUGMENTED TOPICAL
Qty: 45 G | Refills: 11 | Status: SHIPPED | OUTPATIENT
Start: 2023-10-16 | End: 2023-11-13

## 2023-10-16 RX ORDER — GLIPIZIDE 5 MG/1
5 TABLET, FILM COATED, EXTENDED RELEASE ORAL DAILY
Qty: 90 TABLET | Refills: 1 | Status: SHIPPED | OUTPATIENT
Start: 2023-10-16 | End: 2024-02-23

## 2023-10-16 ASSESSMENT — PATIENT HEALTH QUESTIONNAIRE - PHQ9: SUM OF ALL RESPONSES TO PHQ QUESTIONS 1-9: 2

## 2023-10-16 NOTE — PROGRESS NOTES
Sa was seen today for diabetes and imm/inj.    Diagnoses and all orders for this visit:    Type 2 diabetes mellitus with hyperglycemia, without long-term current use of insulin (H)  -     Hemoglobin A1c  -     Alcohol Swabs (EASY TOUCH ALCOHOL PREP MEDIUM) 70 % PADS; 1 pad. by In Vitro route 3 times daily as needed  -     atorvastatin (LIPITOR) 80 MG tablet; Take 1 tablet (80 mg) by mouth daily  -     blood glucose (NO BRAND SPECIFIED) test strip; Use up to 3 times a day  -     Continuous Blood Gluc Sensor (FREESTYLE JOSE 14 DAY SENSOR) [facility]; Use to test blood sugar daily per 's instructions.  Remove and replace every 14 days.  -     empagliflozin (JARDIANCE) 25 MG TABS tablet; Take 1 tablet (25 mg) by mouth daily  -     glipiZIDE (GLUCOTROL XL) 5 MG 24 hr tablet; Take 1 tablet (5 mg) by mouth daily With supper  -     metFORMIN (GLUCOPHAGE XR) 500 MG 24 hr tablet; Take 4 tablets (2,000 mg) by mouth daily    Psoriasis  -     adalimumab (HUMIRA *CF*) 80 MG/0.8ML pen kit; Inject 0.8 mL (80 mg) subcutaneous for 1 dose, followed by 40 mg every other week beginning 1 week after initial dose (see separate order)  -     augmented betamethasone dipropionate (DIPROLENE-AF) 0.05 % external ointment; Apply twice daily as needed for problem areas of psoriasis up to 2 weeks at a time  -     fluocinonide (LIDEX) 0.05 % external solution; Apply 10-15 drops to scalp at nighttime before bed as needed for itch.  -     hydrOXYzine (ATARAX) 10 MG tablet; Take 1 tablet (10 mg) by mouth at bedtime as needed, may repeat once for itching    Essential hypertension with goal blood pressure less than 140/90  -     amLODIPine (NORVASC) 10 MG tablet; Take 1 tablet (10 mg) by mouth daily  -     lisinopril (ZESTRIL) 40 MG tablet; Take 1 tablet (40 mg) by mouth daily    Hyperlipidemia LDL goal <100  -     aspirin (ASPIRIN LOW DOSE) 81 MG EC tablet; TAKE 1 TABLET (81 MG) BY MOUTH DAILY    Dry eyes  -     carboxymethylcellulose PF  (LUBRICATING PLUS EYE DROPS) 0.5 % ophthalmic solution; PLACE 1 DROP INTO BOTH EYES DAILY AS NEEDED FOR DRY EYES    Idiopathic chronic gout of multiple sites without tophus  -     febuxostat (ULORIC) 40 MG TABS tablet; Take 1 tablet (40 mg) by mouth daily    Hypovitaminosis D  -     vitamin D3 (CHOLECALCIFEROL) 50 mcg (2000 units) tablet; Take 1 tablet (50 mcg) by mouth daily    Dermatitis  -     Skin Protectants, Misc. (AMERIPHOR) OINT; Externally apply topically 3 times daily    Ear itching  -     acetic acid-hydrocortisone (VOSOL-HC) 1-2 % otic solution; Place 3 drops into both ears 2 times daily as needed (itching)    Need for prophylactic vaccination and inoculation against influenza    Other orders  -     acetaminophen (TYLENOL) 500 MG tablet; Take 2 tablets (1,000 mg) by mouth every 8 hours as needed for mild pain  -     diphenhydrAMINE (BENADRYL) 25 MG tablet; Take 1 tablet (25 mg) by mouth every 6 hours as needed for itching  -     INFLUENZA VACCINE IM > 6 MONTHS VALENT IIV4 (AFLURIA/FLUZONE)      He will work on being more attentive to his diet.  He is aware that he eats too much rice and says he really likes it!  We agreed not to adjust his medications today and instead he will work on tighter dietary control.    He will start taking his blood pressure medications daily.    He thinks he has a follow-up with the dermatologist in 1 month although I cannot verify this.  I therefore will send note to his dermatologist to see if follow-up can be arranged.    He agreed to have an influenza vaccine today but declined COVID booster.    Total visit time with patient was 25 mins, all of which was face to face MD time, and over 50% of this time was spent in counseling and coordination of care.  Including post-encounter documentation and orders, total encounter time was 32 mins.      Subjective:  This is a 63-year-old who follows up for his chronic health conditions.  He uses a CGM and download of this reveals that  his control is reasonable but could be better.  He still has several readings in the high or very high range and is below goal for the numbers in the target range.  He does not however have any low readings.    Otherwise he reports that he continues to have pretty active psoriasis on his trunk.  The thing that bothers him most is how itchy it is.  He acknowledges that he is not using moisturizer on a regular basis and asks me to refill one.    His gout is well controlled on current treatment and he has had no flareups.    In addition he reports that his depression is generally improved and he is feeling reasonably happy.  We discussed the fact that his father, who is also my patient,  about 1 year ago and that his anniversary will be coming up soon and patient plans to celebrate this in someway.    Objective:  BP (!) 160/81   Pulse 54   Resp 20   Wt 103.2 kg (227 lb 9.6 oz)   SpO2 95%   BMI 39.07 kg/m    His blood pressure is elevated but he tells me that he has not taken his blood pressure medicines for 2 days.  He is also gained some weight such that his BMI has crept up again.  He is aware of this and will try to work on it.    He continues to have an active psoriatic eruption on his trunk.  His forearms reveal that the eruption is less active but that he has dry skin.    His pulse is regular, he has no lower extremity edema.    I reviewed all his medications and verified what he is taking and refilled all of these.    I reviewed his CGM printout, see below    Results for orders placed or performed in visit on 10/16/23   Hemoglobin A1c     Status: Abnormal   Result Value Ref Range    Hemoglobin A1C 8.6 (H) 0.0 - 5.6 %

## 2023-11-13 ENCOUNTER — OFFICE VISIT (OUTPATIENT)
Dept: FAMILY MEDICINE | Facility: CLINIC | Age: 63
End: 2023-11-13
Payer: COMMERCIAL

## 2023-11-13 DIAGNOSIS — Z51.81 ENCOUNTER FOR THERAPEUTIC DRUG MONITORING: Primary | ICD-10-CM

## 2023-11-13 DIAGNOSIS — Z22.7 LATENT TUBERCULOSIS BY BLOOD TEST: ICD-10-CM

## 2023-11-13 DIAGNOSIS — L40.9 PSORIASIS: ICD-10-CM

## 2023-11-13 LAB
BASOPHILS # BLD AUTO: 0.1 10E3/UL (ref 0–0.2)
BASOPHILS NFR BLD AUTO: 1 %
EOSINOPHIL # BLD AUTO: 0.2 10E3/UL (ref 0–0.7)
EOSINOPHIL NFR BLD AUTO: 3 %
ERYTHROCYTE [DISTWIDTH] IN BLOOD BY AUTOMATED COUNT: 19 % (ref 10–15)
HCT VFR BLD AUTO: 43.3 % (ref 40–53)
HGB BLD-MCNC: 13.5 G/DL (ref 13.3–17.7)
IMM GRANULOCYTES # BLD: 0 10E3/UL
IMM GRANULOCYTES NFR BLD: 0 %
LYMPHOCYTES # BLD AUTO: 2 10E3/UL (ref 0.8–5.3)
LYMPHOCYTES NFR BLD AUTO: 30 %
MCH RBC QN AUTO: 20.9 PG (ref 26.5–33)
MCHC RBC AUTO-ENTMCNC: 31.2 G/DL (ref 31.5–36.5)
MCV RBC AUTO: 67 FL (ref 78–100)
MONOCYTES # BLD AUTO: 0.5 10E3/UL (ref 0–1.3)
MONOCYTES NFR BLD AUTO: 7 %
NEUTROPHILS # BLD AUTO: 3.9 10E3/UL (ref 1.6–8.3)
NEUTROPHILS NFR BLD AUTO: 59 %
PLATELET # BLD AUTO: 259 10E3/UL (ref 150–450)
RBC # BLD AUTO: 6.47 10E6/UL (ref 4.4–5.9)
WBC # BLD AUTO: 6.5 10E3/UL (ref 4–11)

## 2023-11-13 PROCEDURE — 85025 COMPLETE CBC W/AUTO DIFF WBC: CPT | Performed by: PHYSICIAN ASSISTANT

## 2023-11-13 PROCEDURE — 36415 COLL VENOUS BLD VENIPUNCTURE: CPT | Performed by: PHYSICIAN ASSISTANT

## 2023-11-13 PROCEDURE — 80053 COMPREHEN METABOLIC PANEL: CPT | Performed by: PHYSICIAN ASSISTANT

## 2023-11-13 PROCEDURE — 99214 OFFICE O/P EST MOD 30 MIN: CPT | Performed by: PHYSICIAN ASSISTANT

## 2023-11-13 PROCEDURE — 86481 TB AG RESPONSE T-CELL SUSP: CPT | Performed by: PHYSICIAN ASSISTANT

## 2023-11-13 RX ORDER — FLUOCINONIDE TOPICAL SOLUTION USP, 0.05% 0.5 MG/ML
SOLUTION TOPICAL
Qty: 120 ML | Refills: 11 | Status: SHIPPED | OUTPATIENT
Start: 2023-11-13

## 2023-11-13 RX ORDER — TRIAMCINOLONE ACETONIDE 1 MG/G
CREAM TOPICAL
Qty: 454 G | Refills: 5 | Status: SHIPPED | OUTPATIENT
Start: 2023-11-13 | End: 2024-06-26

## 2023-11-13 RX ORDER — BETAMETHASONE DIPROPIONATE 0.5 MG/G
OINTMENT, AUGMENTED TOPICAL
Qty: 45 G | Refills: 11 | Status: SHIPPED | OUTPATIENT
Start: 2023-11-13 | End: 2024-02-23

## 2023-11-13 ASSESSMENT — PAIN SCALES - GENERAL: PAINLEVEL: NO PAIN (0)

## 2023-11-13 NOTE — PROGRESS NOTES
[facility] Dermatology Note  Encounter Date: Nov 13, 2023  Office Visit      Dermatology Problem List:  1. Psoriasis  - Current: Cosentyx (continuing Humira until he gets Cosentyx approved), betamethasone 0.05% ointment, triamcinolone 0.1% cream BID prn, Lidex 0.05% solution BID prn  - Previous: Humira - did not clear him up  - Safety labs: CBC, CMP, TB Gold, CXR 11/13/23   - Patient with hx of latent TB which was treated with ID   ____________________________________________    Assessment & Plan:  # Psoriasis vulgaris. Chronic, flare. BSA 30-40% Not at treatment goal. Pruritic.   - He will continue use of Humira until he relives new medication since it was not giving him much relief  - He will start on Cosentyx Injections 300mg once a week x 5 weeks then once a month thereafter.   - Labs ordered: CBC, CMP, and TB Gold  - Continue/ Refilled topicals: betamethasone 0.05% ointment, triamcinolone 0.1% cream BID prn, Lidex 0.05% solution BID prn   - Imaging ordered: Chest X-ray which he will need annually due to hx of latent TB (treated with ID)    Procedures Performed:   None     Follow-up: 5 month(s) in-person, or earlier for new or changing lesions    Staff and scribe     Scribe disclosure  I, Siobhan Krishnamurthy, am serving as a scribe to document services personally performed by Deya Kelly PA-C based on data collection and the provider's statements to me.     All risks, benefits and alternatives were discussed with patient.  Patient is in agreement and understands the assessment and plan.  All questions were answered.    Deya Kelly PA-C, MPAS  [facility]: Phone: [phone], Fax: [phone]  [facility]: Phone: [phone],  Fax: [phone]  [facility]: Phone: [phone], Fax: [phone]  ____________________________________________    CC: Psoriasis      HPI:  Mr. Sa AQUINO  Key is a 63 year old male who presents today as a return patient for a psoriasis follow up.     He last seen Dr. Prince. Patient has been on Humira now for about 9mo. He reports that since July he has been having psoriasis all over his body. Initially the Humira helped the itching. The rash never went away, but even now the itching has returned. The rashe still has not improved at all.      He has been consistent with Humira injections every 2 weeks.     He does report that he has joint pain in his left hand. He is left handed. Otherwise he is tolerating the medication well without side effects, it just is not working well for him. He is using his topicals almost daily. Hydroxyzine was not helping his itching.     Patient is otherwise feeling well, without additional concerns.    Labs:  CBC, CMP, TB Gold and CXR ordered today    Physical Exam:  Vitals: There were no vitals taken for this visit.  SKIN: Total skin excluding the undergarment areas was performed. The exam included the head/face, neck, both arms, chest, back, abdomen, both legs, digits and/or nails.   - There is an erythematous well demarcated plaques with silvery micaceous scale on the body- essentially the entire body as either small coin shaped macules or plaques on it. Face is spared. 30 - 40% BSA .    - No other lesions of concern on areas examined.     Medications:  Current Outpatient Medications   Medication    acetaminophen (TYLENOL) 500 MG tablet    acetic acid-hydrocortisone (VOSOL-HC) 1-2 % otic solution    adalimumab (HUMIRA *CF*) 40 MG/0.4ML pen kit    amLODIPine (NORVASC) 10 MG tablet    amLODIPine (NORVASC) 5 MG tablet    aspirin (ASPIRIN LOW DOSE) 81 MG EC tablet    atorvastatin (LIPITOR) 80 MG tablet    augmented betamethasone dipropionate (DIPROLENE-AF) 0.05 % external ointment    carboxymethylcellulose PF (LUBRICATING PLUS EYE DROPS) 0.5 % ophthalmic solution    diphenhydrAMINE (BENADRYL) 25 MG tablet    empagliflozin (JARDIANCE) 25  MG TABS tablet    febuxostat (ULORIC) 40 MG TABS tablet    fluocinonide (LIDEX) 0.05 % external solution    glipiZIDE (GLUCOTROL XL) 5 MG 24 hr tablet    hydrOXYzine (ATARAX) 10 MG tablet    lisinopril (ZESTRIL) 40 MG tablet    metFORMIN (GLUCOPHAGE XR) 500 MG 24 hr tablet    nystatin (MYCOSTATIN) 800118 UNIT/GM external cream    Skin Protectants, Misc. (AMERIPHOR) OINT    triamcinolone (KENALOG) 0.1 % external cream    vitamin D3 (CHOLECALCIFEROL) 50 mcg (2000 units) tablet    adalimumab (HUMIRA *CF*) 80 MG/0.8ML pen kit    Alcohol Swabs (EASY TOUCH ALCOHOL PREP MEDIUM) 70 % PADS    blood glucose (NO BRAND SPECIFIED) test strip    Continuous Blood Gluc Sensor (FREESTYLE JOSE 14 DAY SENSOR) MISC    Lancet Devices (EASY TOUCH LANCING DEVICE) MISC    order for DME    ORDER FOR DME     No current facility-administered medications for this visit.      Past Medical/Surgical History:   Patient Active Problem List   Diagnosis    Health Care Home    Male erectile disorder    Obstructive sleep apnea    Posttraumatic stress disorder    Testicular hypofunction    Hyperlipidemia LDL goal <100    Lesion of ulnar nerve    Hypovitaminosis D    Allergic rhinitis    H/O exploratory laparotomy    Chronic gout without tophus, unspecified cause, unspecified site    Dyshidrotic eczema    Essential hypertension with goal blood pressure less than 140/90    Type 2 diabetes mellitus with hyperglycemia, without long-term current use of insulin (H)    Morbid obesity (H)    Rotator cuff syndrome, right    Psoriasis     Past Medical History:   Diagnosis Date    Depressive disorder     Diabetes (H)     Hypertension

## 2023-11-13 NOTE — LETTER
11/13/2023         RE: Sa KENIA Mackey  [address] [place] [address]        Dear Colleague,    Thank you for referring your patient, Sa KENIA Mackey, to the [facility]. Please see a copy of my visit note below.    [facility] Dermatology Note  Encounter Date: Nov 13, 2023  Office Visit      Dermatology Problem List:  1. Psoriasis  - Current: Cosentyx (continuing Humira until he gets Cosentyx approved), betamethasone 0.05% ointment, triamcinolone 0.1% cream BID prn, Lidex 0.05% solution BID prn  - Previous: Humira - did not clear him up  - Safety labs: CBC, CMP, TB Gold, CXR 11/13/23   - Patient with hx of latent TB which was treated with ID   ____________________________________________    Assessment & Plan:  # Psoriasis vulgaris. Chronic, flare. BSA 30-40% Not at treatment goal. Pruritic.   - He will continue use of Humira until he relives new medication since it was not giving him much relief  - He will start on Cosentyx Injections 300mg once a week x 5 weeks then once a month thereafter.   - Labs ordered: CBC, CMP, and TB Gold  - Continue/ Refilled topicals: betamethasone 0.05% ointment, triamcinolone 0.1% cream BID prn, Lidex 0.05% solution BID prn   - Imaging ordered: Chest X-ray which he will need annually due to hx of latent TB (treated with ID)    Procedures Performed:   None     Follow-up: 5 month(s) in-person, or earlier for new or changing lesions    Staff and scribe     Scribe disclosure  I, Siobhan Krishnamurthy, am serving as a scribe to document services personally performed by Deya Kelly PA-C based on data collection and the provider's statements to me.     All risks, benefits and alternatives were discussed with patient.  Patient is in agreement and understands the assessment and plan.  All questions were answered.    Deya Kelly PA-C, [facility]  [facility]: Phone: [phone], Fax:  [phone]  [facility]: Phone: [phone],  Fax: [phone]  [facility]: Phone: [phone], Fax: [phone]  ____________________________________________    CC: Psoriasis      HPI:  Mr. Sa KENIA Mackey is a 63 year old male who presents today as a return patient for a psoriasis follow up.     He last seen Dr. Prince. Patient has been on Humira now for about 9mo. He reports that since July he has been having psoriasis all over his body. Initially the Humira helped the itching. The rash never went away, but even now the itching has returned. The rashe still has not improved at all.      He has been consistent with Humira injections every 2 weeks.     He does report that he has joint pain in his left hand. He is left handed. Otherwise he is tolerating the medication well without side effects, it just is not working well for him. He is using his topicals almost daily. Hydroxyzine was not helping his itching.     Patient is otherwise feeling well, without additional concerns.    Labs:  CBC, CMP, TB Gold and CXR ordered today    Physical Exam:  Vitals: There were no vitals taken for this visit.  SKIN: Total skin excluding the undergarment areas was performed. The exam included the head/face, neck, both arms, chest, back, abdomen, both legs, digits and/or nails.   - There is an erythematous well demarcated plaques with silvery micaceous scale on the body- essentially the entire body as either small coin shaped macules or plaques on it. Face is spared. 30 - 40% BSA .    - No other lesions of concern on areas examined.     Medications:  Current Outpatient Medications   Medication     acetaminophen (TYLENOL) 500 MG tablet     acetic acid-hydrocortisone (VOSOL-HC) 1-2 % otic solution     adalimumab (HUMIRA *CF*) 40 MG/0.4ML pen kit     amLODIPine (NORVASC) 10 MG tablet     amLODIPine (NORVASC) 5 MG tablet     aspirin (ASPIRIN LOW DOSE) 81 MG EC tablet     atorvastatin (LIPITOR) 80  MG tablet     augmented betamethasone dipropionate (DIPROLENE-AF) 0.05 % external ointment     carboxymethylcellulose PF (LUBRICATING PLUS EYE DROPS) 0.5 % ophthalmic solution     diphenhydrAMINE (BENADRYL) 25 MG tablet     empagliflozin (JARDIANCE) 25 MG TABS tablet     febuxostat (ULORIC) 40 MG TABS tablet     fluocinonide (LIDEX) 0.05 % external solution     glipiZIDE (GLUCOTROL XL) 5 MG 24 hr tablet     hydrOXYzine (ATARAX) 10 MG tablet     lisinopril (ZESTRIL) 40 MG tablet     metFORMIN (GLUCOPHAGE XR) 500 MG 24 hr tablet     nystatin (MYCOSTATIN) 233479 UNIT/GM external cream     Skin Protectants, Misc. (AMERIPHOR) OINT     triamcinolone (KENALOG) 0.1 % external cream     vitamin D3 (CHOLECALCIFEROL) 50 mcg (2000 units) tablet     adalimumab (HUMIRA *CF*) 80 MG/0.8ML pen kit     Alcohol Swabs (EASY TOUCH ALCOHOL PREP MEDIUM) 70 % PADS     blood glucose (NO BRAND SPECIFIED) test strip     Continuous Blood Gluc Sensor (FREESTYLE JOSE 14 DAY SENSOR) [facility] [place]     Lancet Devices (EASY TOUCH LANCING DEVICE) MISC     order for DME     ORDER FOR DME     No current facility-administered medications for this visit.      Past Medical/Surgical History:   Patient Active Problem List   Diagnosis     Health Care Home     Male erectile disorder     Obstructive sleep apnea     Posttraumatic stress disorder     Testicular hypofunction     Hyperlipidemia LDL goal <100     Lesion of ulnar nerve     Hypovitaminosis D     Allergic rhinitis     H/O exploratory laparotomy     Chronic gout without tophus, unspecified cause, unspecified site     Dyshidrotic eczema     Essential hypertension with goal blood pressure less than 140/90     Type 2 diabetes mellitus with hyperglycemia, without long-term current use of insulin (H)     Morbid obesity (H)     Rotator cuff syndrome, right     Psoriasis     Past Medical History:   Diagnosis Date     Depressive disorder      Diabetes (H)      Hypertension                         Again, thank you for  allowing me to participate in the care of your patient.        Sincerely,        Deya Kelly PA-C

## 2023-11-14 ENCOUNTER — TELEPHONE (OUTPATIENT)
Dept: DERMATOLOGY | Facility: CLINIC | Age: 63
End: 2023-11-14
Payer: COMMERCIAL

## 2023-11-14 LAB
ALBUMIN SERPL BCG-MCNC: 4.2 G/DL (ref 3.5–5.2)
ALP SERPL-CCNC: 103 U/L (ref 40–129)
ALT SERPL W P-5'-P-CCNC: 38 U/L (ref 0–70)
ANION GAP SERPL CALCULATED.3IONS-SCNC: 10 MMOL/L (ref 7–15)
AST SERPL W P-5'-P-CCNC: 25 U/L (ref 0–45)
BILIRUB SERPL-MCNC: 0.4 MG/DL
BUN SERPL-MCNC: 19.9 MG/DL (ref 8–23)
CALCIUM SERPL-MCNC: 9.5 MG/DL (ref 8.8–10.2)
CHLORIDE SERPL-SCNC: 104 MMOL/L (ref 98–107)
CREAT SERPL-MCNC: 1.16 MG/DL (ref 0.67–1.17)
DEPRECATED HCO3 PLAS-SCNC: 25 MMOL/L (ref 22–29)
EGFRCR SERPLBLD CKD-EPI 2021: 71 ML/MIN/1.73M2
GLUCOSE SERPL-MCNC: 290 MG/DL (ref 70–99)
POTASSIUM SERPL-SCNC: 4.8 MMOL/L (ref 3.4–5.3)
PROT SERPL-MCNC: 7.7 G/DL (ref 6.4–8.3)
SODIUM SERPL-SCNC: 139 MMOL/L (ref 135–145)

## 2023-11-14 NOTE — TELEPHONE ENCOUNTER
PA Initiation    Medication: COSENTYX SENSOREADY (300 MG) 150 MG/ML SC SOAJ  Insurance Company: [employer] Non-Specialty PA's - Phone [phone] Fax [phone]  Pharmacy Filling the Rx:    Filling Pharmacy Phone:    Filling Pharmacy Fax:    Start Date: 11/14/2023    [id number]        Carolyn Santos [facility] [email or web address]  Phone: [phone]  Fax: [phone]

## 2023-11-15 LAB
GAMMA INTERFERON BACKGROUND BLD IA-ACNC: 0.04 IU/ML
M TB IFN-G BLD-IMP: NEGATIVE
M TB IFN-G CD4+ BCKGRND COR BLD-ACNC: 9.96 IU/ML
MITOGEN IGNF BCKGRD COR BLD-ACNC: 0.07 IU/ML
MITOGEN IGNF BCKGRD COR BLD-ACNC: 0.09 IU/ML
QUANTIFERON MITOGEN: 10 IU/ML
QUANTIFERON NIL TUBE: 0.04 IU/ML
QUANTIFERON TB1 TUBE: 0.13 IU/ML
QUANTIFERON TB2 TUBE: 0.11

## 2023-11-15 NOTE — TELEPHONE ENCOUNTER
Prior Authorization Approval    Medication: COSENTYX SENSOREADY (300 MG) 150 MG/ML SC SOAJ  Authorization Effective Date: 10/15/2023  Authorization Expiration Date: 11/13/2024  Approved Dose/Quantity: 10 ml per first 35 days, then 2ml per d28 days  Reference #: [id number]   Insurance Company: [employer] Non-Specialty PA's - Phone [phone] Fax [phone]  Expected CoPay: $  0  CoPay Card Available:      Financial Assistance Needed: no  Which Pharmacy is filling the prescription:    Pharmacy Notified: yes  Patient Notified: yes; called patient using  services; press 0 for additional languages (Liam). [phone]        Carolyn Santos CphT  [facility] Liaison  [email or web address]  Phone: [phone]  Fax: [phone]

## 2023-11-16 ENCOUNTER — TELEPHONE (OUTPATIENT)
Dept: FAMILY MEDICINE | Facility: CLINIC | Age: 63
End: 2023-11-16
Payer: COMMERCIAL

## 2023-11-16 NOTE — LETTER
November 16, 2023       KENIA Mackey  [address] [place] [address]        Dear ,    We are writing to inform you of your test results.  All your labs are normal.  If you have any questions or concerns, please call the clinic at the number listed above.     Resulted Orders   Comprehensive metabolic panel   Result Value Ref Range    Sodium 139 135 - 145 mmol/L      Comment:      Reference intervals for this test were updated on 09/26/2023 to more accurately reflect our healthy population. There may be differences in the flagging of prior results with similar values performed with this method. Interpretation of those prior results can be made in the context of the updated reference intervals.     Potassium 4.8 3.4 - 5.3 mmol/L    Carbon Dioxide (CO2) 25 22 - 29 mmol/L    Anion Gap 10 7 - 15 mmol/L    Urea Nitrogen 19.9 8.0 - 23.0 mg/dL    Creatinine 1.16 0.67 - 1.17 mg/dL    GFR Estimate 71 >60 mL/min/1.73m2    Calcium 9.5 8.8 - 10.2 mg/dL    Chloride 104 98 - 107 mmol/L    Glucose 290 (H) 70 - 99 mg/dL    Alkaline Phosphatase 103 40 - 129 U/L    AST 25 0 - 45 U/L      Comment:      Reference intervals for this test were updated on 6/12/2023 to more accurately reflect our healthy population. There may be differences in the flagging of prior results with similar values performed with this method. Interpretation of those prior results can be made in the context of the updated reference intervals.    ALT 38 0 - 70 U/L      Comment:      Reference intervals for this test were updated on 6/12/2023 to more accurately reflect our healthy population. There may be differences in the flagging of prior results with similar values performed with this method. Interpretation of those prior results can be made in the context of the updated reference intervals.      Protein Total 7.7 6.4 - 8.3 g/dL    Albumin 4.2 3.5 - 5.2 g/dL    Bilirubin Total 0.4 <=1.2 mg/dL   Quantiferon TB Gold Plus Grey Tube   Result Value Ref  Range    Quantiferon Nil Tube 0.04 IU/mL   Quantiferon TB Gold Plus Green Tube   Result Value Ref Range    Quantiferon TB1 Tube 0.13 IU/mL   Quantiferon TB Gold Plus Yellow Tube   Result Value Ref Range    Quantiferon TB2 Tube 0.11    Quantiferon TB Gold Plus Purple Tube   Result Value Ref Range    Quantiferon Mitogen 10.00 IU/mL   CBC with platelets and differential   Result Value Ref Range    WBC Count 6.5 4.0 - 11.0 10e3/uL    RBC Count 6.47 (H) 4.40 - 5.90 10e6/uL    Hemoglobin 13.5 13.3 - 17.7 g/dL    Hematocrit 43.3 40.0 - 53.0 %    MCV 67 (L) 78 - 100 fL    MCH 20.9 (L) 26.5 - 33.0 pg    MCHC 31.2 (L) 31.5 - 36.5 g/dL    RDW 19.0 (H) 10.0 - 15.0 %    Platelet Count 259 150 - 450 10e3/uL    % Neutrophils 59 %    % Lymphocytes 30 %    % Monocytes 7 %    % Eosinophils 3 %    % Basophils 1 %    % Immature Granulocytes 0 %    Absolute Neutrophils 3.9 1.6 - 8.3 10e3/uL    Absolute Lymphocytes 2.0 0.8 - 5.3 10e3/uL    Absolute Monocytes 0.5 0.0 - 1.3 10e3/uL    Absolute Eosinophils 0.2 0.0 - 0.7 10e3/uL    Absolute Basophils 0.1 0.0 - 0.2 10e3/uL    Absolute Immature Granulocytes 0.0 <=0.4 10e3/uL   Quantiferon TB Gold Plus   Result Value Ref Range    Quantiferon-TB Gold Plus Negative Negative      Comment:      No interferon gamma response to M.tuberculosis antigens was detected. Infection with M.tuberculosis is unlikely, however a single negative result does not exclude infection. In patients at high risk for infection, a second test should be considered in accordance with the 2017 ATS/IDSA/CDC Clinical Pract  ice Guidelines for Diagnosis of Tuberculosis in Adults and Children     TB1 Ag minus Nil Value 0.09 IU/mL    TB2 Ag minus Nil Value 0.07 IU/mL    Mitogen minus Nil Result 9.96 IU/mL    Nil Result 0.04 IU/mL     Sincerely,    Deya Kelly PA-C Dermatology

## 2023-11-16 NOTE — TELEPHONE ENCOUNTER
Normal lab letter mailed home.    Margie TILLMAN RN  [facility] [place]  [phone]

## 2023-12-11 ENCOUNTER — TELEPHONE (OUTPATIENT)
Dept: DERMATOLOGY | Facility: CLINIC | Age: 63
End: 2023-12-11
Payer: COMMERCIAL

## 2023-12-19 NOTE — TELEPHONE ENCOUNTER
S/w pt who states he has been in contact with the [facility] and will be receiving the medication soon.    Margie TILLMAN RN  [facility] [place]  [phone]

## 2023-12-22 ENCOUNTER — TELEPHONE (OUTPATIENT)
Dept: DERMATOLOGY | Facility: CLINIC | Age: 63
End: 2023-12-22
Payer: COMMERCIAL

## 2023-12-22 NOTE — TELEPHONE ENCOUNTER
PA Needed    Medication: Cosentyx 150mg/ml starter dose  QTY/DS: 8 per 28 days  NEW INS: no  Insurance Company: [facility]   Pharmacy Filling the Rx:  [facility]  PA :  2023  Date of last fill: never filled    PA for starter dose , call to insurance says we can not extend that PA need to start all new PA.  PT now says he wants this filled right boone

## 2023-12-27 NOTE — TELEPHONE ENCOUNTER
Prior Authorization Approval    Called [employer] to get a VPA for QL at [phone]    Medication: COSENTYX SENSOREADY (300 MG) 150 MG/ML SC SOAJ  Authorization Effective Date: 12/27/2023  Authorization Expiration Date: 1/26/2024  Approved Dose/Quantity: 10ml per 35 days   Reference #:     Insurance Company: [employer] Non-Specialty PA's - Phone [phone] Fax [phone]  Expected CoPay: $    CoPay Card Available:      Financial Assistance Needed: na  Which Pharmacy is filling the prescription:    Pharmacy Notified: yes, notified PFA via chat  Patient Notified: no    Carolyn Santos CphT  [facility] Liaison  [email or web address]  Phone: [phone]  Fax: [phone]

## 2024-01-23 DIAGNOSIS — L40.9 PSORIASIS: ICD-10-CM

## 2024-01-24 RX ORDER — SECUKINUMAB 150 MG/ML
INJECTION SUBCUTANEOUS
Qty: 8 ML | Refills: 0 | Status: SHIPPED | OUTPATIENT
Start: 2024-01-24

## 2024-01-24 NOTE — TELEPHONE ENCOUNTER
Routing refill request to provider for review/approval because:  Drug not on the [facility] refill protocol     Margie TILLMAN RN  [facility] [place]  [phone]

## 2024-01-29 DIAGNOSIS — E11.65 TYPE 2 DIABETES MELLITUS WITH HYPERGLYCEMIA, WITHOUT LONG-TERM CURRENT USE OF INSULIN (H): ICD-10-CM

## 2024-01-29 DIAGNOSIS — E55.9 HYPOVITAMINOSIS D: ICD-10-CM

## 2024-01-29 DIAGNOSIS — I10 ESSENTIAL HYPERTENSION WITH GOAL BLOOD PRESSURE LESS THAN 140/90: ICD-10-CM

## 2024-01-29 DIAGNOSIS — E78.5 HYPERLIPIDEMIA LDL GOAL <100: ICD-10-CM

## 2024-01-29 RX ORDER — ASPIRIN 81 MG/1
TABLET ORAL
Qty: 90 TABLET | Refills: 0 | Status: SHIPPED | OUTPATIENT
Start: 2024-01-29 | End: 2024-02-23

## 2024-01-29 RX ORDER — CHOLECALCIFEROL (VITAMIN D3) 50 MCG
50 TABLET ORAL DAILY
Qty: 90 TABLET | Refills: 0 | Status: SHIPPED | OUTPATIENT
Start: 2024-01-29 | End: 2024-02-23

## 2024-01-29 RX ORDER — LISINOPRIL 40 MG/1
40 TABLET ORAL DAILY
Qty: 90 TABLET | Refills: 0 | Status: SHIPPED | OUTPATIENT
Start: 2024-01-29 | End: 2024-02-23

## 2024-01-29 RX ORDER — EMPAGLIFLOZIN 25 MG/1
25 TABLET, FILM COATED ORAL DAILY
Qty: 90 TABLET | Refills: 0 | Status: SHIPPED | OUTPATIENT
Start: 2024-01-29 | End: 2024-02-23

## 2024-02-08 ENCOUNTER — HOSPITAL ENCOUNTER (EMERGENCY)
Facility: HOSPITAL | Age: 64
Discharge: STILL ADMITTED OR ADMINISTRATIVE | End: 2024-02-09
Payer: COMMERCIAL

## 2024-02-08 ENCOUNTER — OFFICE VISIT (OUTPATIENT)
Dept: FAMILY MEDICINE | Facility: CLINIC | Age: 64
End: 2024-02-08
Payer: COMMERCIAL

## 2024-02-08 VITALS
OXYGEN SATURATION: 95 % | RESPIRATION RATE: 24 BRPM | DIASTOLIC BLOOD PRESSURE: 85 MMHG | WEIGHT: 225.6 LBS | TEMPERATURE: 98.1 F | SYSTOLIC BLOOD PRESSURE: 150 MMHG | HEIGHT: 66 IN | BODY MASS INDEX: 36.26 KG/M2 | HEART RATE: 88 BPM

## 2024-02-08 DIAGNOSIS — R07.9 CHEST PAIN, UNSPECIFIED TYPE: Primary | ICD-10-CM

## 2024-02-08 DIAGNOSIS — E78.2 MIXED HYPERLIPIDEMIA: ICD-10-CM

## 2024-02-08 DIAGNOSIS — I10 ESSENTIAL HYPERTENSION WITH GOAL BLOOD PRESSURE LESS THAN 140/90: ICD-10-CM

## 2024-02-08 DIAGNOSIS — E66.01 MORBID OBESITY (H): ICD-10-CM

## 2024-02-08 DIAGNOSIS — E11.65 TYPE 2 DIABETES MELLITUS WITH HYPERGLYCEMIA, WITHOUT LONG-TERM CURRENT USE OF INSULIN (H): ICD-10-CM

## 2024-02-08 PROCEDURE — 99215 OFFICE O/P EST HI 40 MIN: CPT | Mod: GC

## 2024-02-08 PROCEDURE — 93000 ELECTROCARDIOGRAM COMPLETE: CPT | Mod: GC

## 2024-02-08 RX ORDER — ATENOLOL 50 MG/1
50 TABLET ORAL DAILY
COMMUNITY

## 2024-02-08 RX ORDER — ASPIRIN 325 MG
325 TABLET ORAL ONCE
Status: COMPLETED | OUTPATIENT
Start: 2024-02-08 | End: 2024-02-08

## 2024-02-08 RX ORDER — GABAPENTIN 400 MG/1
400 CAPSULE ORAL 3 TIMES DAILY
COMMUNITY
End: 2024-02-23

## 2024-02-08 RX ORDER — PROPYLENE GLYCOL 0.06 MG/ML
1 EMULSION OPHTHALMIC 4 TIMES DAILY
COMMUNITY
Start: 2023-09-01 | End: 2024-06-06

## 2024-02-08 RX ADMIN — Medication 325 MG: at 11:07

## 2024-02-08 ASSESSMENT — PATIENT HEALTH QUESTIONNAIRE - PHQ9
10. IF YOU CHECKED OFF ANY PROBLEMS, HOW DIFFICULT HAVE THESE PROBLEMS MADE IT FOR YOU TO DO YOUR WORK, TAKE CARE OF THINGS AT HOME, OR GET ALONG WITH OTHER PEOPLE: SOMEWHAT DIFFICULT
SUM OF ALL RESPONSES TO PHQ QUESTIONS 1-9: 9
SUM OF ALL RESPONSES TO PHQ QUESTIONS 1-9: 9

## 2024-02-08 NOTE — PROGRESS NOTES
Clinic Administered Medication Documentation    Patient was given Aspirin 325 MG. Prior to medication administration, verified patient's identity using patient's name and date of birth.    Aniya Milan MA

## 2024-02-08 NOTE — PATIENT INSTRUCTIONS
Report to the emergency department for further workup.  While I do not think you are acutely having a heart attack, your symptoms are worrying enough that I would like to be positive.    Before you leave, we will give you some aspirin to help reduce the risk of any injury to your heart.    Please go to the emergency department right away.  They will do testing to help make sure your heart is okay.

## 2024-02-08 NOTE — PROGRESS NOTES
"  Assessment & Plan     Chest pain, unspecified type  Patient has been experiencing 13 days of chest pain following injection of Cosentyx. (Of note, there is a side effect of chest burning and pressure that is possible with the medication).  Pain has been achy, constant, has not changes in intensity but never completely goes away, patient is unable to detect any particular pattern with it.  Had initial nausea and diaphoresis, both have resolved, continues to have shortness of breath and fatigue.  Does not have any radiation of chest pain into arm or neck.  Has enough risk factors concerning for ACS, EKG in clinic shows no change from previous EKG 8/3/2022.  Could potentially be Cosentyx side effect, but given comorbidities, would like to have full workup in the ED.  Patient will be going to [facility] ED presently.  - EKG 12-lead, tracing only  - aspirin (ASA) tablet 325 mg    Morbid obesity (H)  Type 2 diabetes mellitus with hyperglycemia, without long-term current use of insulin (H)  Essential hypertension with goal blood pressure less than 140/90  Mixed hyperlipidemia  Multiple risk factors that could potentially put patient at high risk for acute coronary syndrome.  While the prolonged 13 days of symptoms make this slightly less likely, would like for him to get full ACS workup at the emergency department.    Diagnosis or treatment significantly limited by social determinants of health - non English speaking  Ordering of each unique test  Prescription drug management  40 minutes spent by me on the date of the encounter doing chart review, history and exam, documentation and further activities per the note      BMI  Estimated body mass index is 36.41 kg/m  as calculated from the following:    Height as of this encounter: 1.676 m (5' 6\").    Weight as of this encounter: 102.3 kg (225 lb 9.6 oz).   Weight management plan: Patient was referred to their PCP to discuss a diet and exercise plan.      MEDICATIONS: " "  Orders Placed This Encounter   Medications    SYSTANE BALANCE 0.6 % SOLN ophthalmic solution     Sig: Place 1 drop into both eyes 4 times daily    gabapentin (NEURONTIN) 400 MG capsule     Sig: Take 400 mg by mouth 3 times daily    atenolol (TENORMIN) 50 MG tablet     Sig: Take 50 mg by mouth daily    aspirin (ASA) tablet 325 mg          - Continue other medications without change    Return for Report to Emergency Department for further workup.    Subjective   Sa is a 63 year old, presenting for the following health issues:  Chest Pain (Chest pain for 10 days - difficulty breathing as well. //Chest pain feels like an \"achy\" pain. Pt has not felt this feeling before.  )        10/16/2023     8:13 AM   Additional Questions   Roomed by KATHARINA. her MA   Accompanied by Self     HPI   Hx of DM2, HLD and HTN, PTSD    Patient comes in today for chest pain, achy described. Started 13 days ago after Cosentyx injection for skin condition. Constant baseline but does come and go in intensity, not sure of triggers. States unlike any chest pain he's had before, has not had as a side effect of other meds before. Associated shortness of breath, fatigue, pleuritic pain. No associated radiation to neck or arms, diaphoresis (did have it at onset), nausea.     Objective    BP (!) 150/85   Pulse 88   Temp 98.1  F (36.7  C) (Tympanic)   Resp 24   Ht 1.676 m (5' 6\")   Wt 102.3 kg (225 lb 9.6 oz)   SpO2 95%   BMI 36.41 kg/m    Body mass index is 36.41 kg/m .  Physical Exam   GENERAL: alert and no distress  NECK: no adenopathy, no asymmetry, masses, or scars  RESP: lungs clear to auscultation - no rales, rhonchi or wheezes  CV: regular rate and rhythm, normal S1 S2, no S3 or S4, no murmur, click or rub, no peripheral edema, L carotid bruit noted, unable to elicit pain with palpation of chest  ABDOMEN: soft, nontender, no hepatosplenomegaly, no masses and bowel sounds normal, no increase in chest pain with abdominal pressure  MS: unable " to elicit pain with palpation of chest wall, no change with movement of arms  SKIN: no suspicious lesions or rashes, resolving psoriatic plaques  PSYCH: mentation appears normal, affect normal/bright        EKG: Read by myself compared to previous EKG 8/3/22.  Appears relatively unchanged with only slight ST drop that does not equal 1 box in lead I compared to previous.        Answers submitted by the patient for this visit:  Patient Health Questionnaire (Submitted on 2/8/2024)  If you checked off any problems, how difficult have these problems made it for you to do your work, take care of things at home, or get along with other people?: Somewhat difficult  PHQ9 TOTAL SCORE: 9  Signed Electronically by: Caorl Francis MD

## 2024-02-08 NOTE — ED NOTES
Expected Patient Referral to ED  11:10 AM    Referring Clinic/Provider:  [facility]    Reason for referral/Clinical facts:  63 M hx HTN, HLD, DM, constant CP since 12 dys ago when he got a Cosentyx injection. Not reproducible on exam.    EKG without changes.    Recommendations provided:  Send to ED for further evaluation    Caller was informed that this institution does possess the capabilities and/or resources to provide for patient and should be transferred to our facility.    Discussed that if direct admit is sought and any hurdles are encountered, this ED would be happy to see the patient and evaluate.    Informed caller that recommendations provided are recommendations based only on the facts provided and that they responsible to accept or reject the advice, or to seek a formal in person consultation as needed and that this ED will see/treat patient should they arrive.      Theresa Quinones MD  [facility] EMERGENCY DEPARTMENT  [address] [place] [phone]  [phone]       Theresa Quinones MD  02/08/24 1111

## 2024-02-08 NOTE — PROGRESS NOTES
"Preceptor Attestation:  Vitals:    02/08/24 1024 02/08/24 1032   BP: (!) 153/90 (!) 150/85   Pulse: 88    Resp: 24    Temp: 98.1  F (36.7  C)    TempSrc: Tympanic    SpO2: 95%    Weight: 102.3 kg (225 lb 9.6 oz)    Height: 1.676 m (5' 6\")           I discussed the patient with the resident and evaluated the patient in person. I personally viewed the EKG and agree with the interpretation documented by the resident. I have verified the content of the note, which accurately reflects my assessment of the patient and the plan of care.   Supervising Physician:  Carol Lewis MD    "

## 2024-02-23 ENCOUNTER — OFFICE VISIT (OUTPATIENT)
Dept: FAMILY MEDICINE | Facility: CLINIC | Age: 64
End: 2024-02-23
Payer: COMMERCIAL

## 2024-02-23 ENCOUNTER — TELEPHONE (OUTPATIENT)
Dept: FAMILY MEDICINE | Facility: CLINIC | Age: 64
End: 2024-02-23

## 2024-02-23 VITALS
TEMPERATURE: 97.6 F | OXYGEN SATURATION: 98 % | HEART RATE: 63 BPM | SYSTOLIC BLOOD PRESSURE: 162 MMHG | HEIGHT: 64 IN | RESPIRATION RATE: 12 BRPM | WEIGHT: 221.8 LBS | BODY MASS INDEX: 37.87 KG/M2 | DIASTOLIC BLOOD PRESSURE: 83 MMHG

## 2024-02-23 DIAGNOSIS — I10 ESSENTIAL HYPERTENSION WITH GOAL BLOOD PRESSURE LESS THAN 140/90: ICD-10-CM

## 2024-02-23 DIAGNOSIS — M1A.09X0 IDIOPATHIC CHRONIC GOUT OF MULTIPLE SITES WITHOUT TOPHUS: ICD-10-CM

## 2024-02-23 DIAGNOSIS — E55.9 HYPOVITAMINOSIS D: ICD-10-CM

## 2024-02-23 DIAGNOSIS — E78.5 HYPERLIPIDEMIA LDL GOAL <100: ICD-10-CM

## 2024-02-23 DIAGNOSIS — E11.65 TYPE 2 DIABETES MELLITUS WITH HYPERGLYCEMIA, WITHOUT LONG-TERM CURRENT USE OF INSULIN (H): Primary | ICD-10-CM

## 2024-02-23 DIAGNOSIS — H04.123 DRY EYES: ICD-10-CM

## 2024-02-23 DIAGNOSIS — L30.9 DERMATITIS: ICD-10-CM

## 2024-02-23 DIAGNOSIS — L85.3 DRY SKIN: Primary | ICD-10-CM

## 2024-02-23 DIAGNOSIS — L29.9 EAR ITCHING: ICD-10-CM

## 2024-02-23 DIAGNOSIS — L40.9 PSORIASIS: ICD-10-CM

## 2024-02-23 DIAGNOSIS — G63 POLYNEUROPATHY ASSOCIATED WITH UNDERLYING DISEASE (H): ICD-10-CM

## 2024-02-23 LAB
ANION GAP SERPL CALCULATED.3IONS-SCNC: 11 MMOL/L (ref 7–15)
BUN SERPL-MCNC: 20.8 MG/DL (ref 8–23)
CALCIUM SERPL-MCNC: 9.6 MG/DL (ref 8.8–10.2)
CHLORIDE SERPL-SCNC: 106 MMOL/L (ref 98–107)
CHOLEST SERPL-MCNC: 223 MG/DL
CREAT SERPL-MCNC: 0.93 MG/DL (ref 0.67–1.17)
CREAT UR-MCNC: 47.1 MG/DL
DEPRECATED HCO3 PLAS-SCNC: 26 MMOL/L (ref 22–29)
EGFRCR SERPLBLD CKD-EPI 2021: >90 ML/MIN/1.73M2
FASTING STATUS PATIENT QL REPORTED: ABNORMAL
GLUCOSE SERPL-MCNC: 144 MG/DL (ref 70–99)
HBA1C MFR BLD: 7.1 % (ref 0–5.6)
HDLC SERPL-MCNC: 40 MG/DL
LDLC SERPL CALC-MCNC: 149 MG/DL
MICROALBUMIN UR-MCNC: <12 MG/L
MICROALBUMIN/CREAT UR: NORMAL MG/G{CREAT}
NONHDLC SERPL-MCNC: 183 MG/DL
POTASSIUM SERPL-SCNC: 4.3 MMOL/L (ref 3.4–5.3)
SODIUM SERPL-SCNC: 143 MMOL/L (ref 135–145)
TRIGL SERPL-MCNC: 172 MG/DL
TSH SERPL DL<=0.005 MIU/L-ACNC: 1.96 UIU/ML (ref 0.3–4.2)
URATE SERPL-MCNC: 4.9 MG/DL (ref 3.4–7)
VIT B12 SERPL-MCNC: 490 PG/ML (ref 232–1245)

## 2024-02-23 PROCEDURE — 82570 ASSAY OF URINE CREATININE: CPT | Performed by: FAMILY MEDICINE

## 2024-02-23 PROCEDURE — 80048 BASIC METABOLIC PNL TOTAL CA: CPT | Performed by: FAMILY MEDICINE

## 2024-02-23 PROCEDURE — 99214 OFFICE O/P EST MOD 30 MIN: CPT | Performed by: FAMILY MEDICINE

## 2024-02-23 PROCEDURE — 83036 HEMOGLOBIN GLYCOSYLATED A1C: CPT | Performed by: FAMILY MEDICINE

## 2024-02-23 PROCEDURE — 82607 VITAMIN B-12: CPT | Performed by: FAMILY MEDICINE

## 2024-02-23 PROCEDURE — 82043 UR ALBUMIN QUANTITATIVE: CPT | Performed by: FAMILY MEDICINE

## 2024-02-23 PROCEDURE — 80061 LIPID PANEL: CPT | Performed by: FAMILY MEDICINE

## 2024-02-23 PROCEDURE — 36415 COLL VENOUS BLD VENIPUNCTURE: CPT | Performed by: FAMILY MEDICINE

## 2024-02-23 PROCEDURE — 84443 ASSAY THYROID STIM HORMONE: CPT | Performed by: FAMILY MEDICINE

## 2024-02-23 PROCEDURE — 84550 ASSAY OF BLOOD/URIC ACID: CPT | Performed by: FAMILY MEDICINE

## 2024-02-23 RX ORDER — BETAMETHASONE DIPROPIONATE 0.5 MG/G
OINTMENT, AUGMENTED TOPICAL
Qty: 45 G | Refills: 11 | Status: SHIPPED | OUTPATIENT
Start: 2024-02-23 | End: 2024-06-06

## 2024-02-23 RX ORDER — GABAPENTIN 400 MG/1
400 CAPSULE ORAL 2 TIMES DAILY
Qty: 180 CAPSULE | Refills: 1 | Status: SHIPPED | OUTPATIENT
Start: 2024-02-23 | End: 2024-05-23

## 2024-02-23 RX ORDER — AMMONIUM LACTATE 12 G/100G
LOTION TOPICAL DAILY PRN
Qty: 225 G | Refills: 5 | Status: SHIPPED | OUTPATIENT
Start: 2024-02-23

## 2024-02-23 RX ORDER — FEBUXOSTAT 40 MG/1
40 TABLET, FILM COATED ORAL DAILY
Qty: 90 TABLET | Refills: 1 | Status: SHIPPED | OUTPATIENT
Start: 2024-02-23 | End: 2024-05-23

## 2024-02-23 RX ORDER — FLASH GLUCOSE SENSOR
KIT MISCELLANEOUS
Qty: 2 EACH | Refills: 6 | Status: SHIPPED | OUTPATIENT
Start: 2024-02-23

## 2024-02-23 RX ORDER — LISINOPRIL 40 MG/1
40 TABLET ORAL DAILY
Qty: 90 TABLET | Refills: 1 | Status: SHIPPED | OUTPATIENT
Start: 2024-02-23 | End: 2024-05-23

## 2024-02-23 RX ORDER — ASPIRIN 81 MG/1
TABLET ORAL
Qty: 90 TABLET | Refills: 1 | Status: SHIPPED | OUTPATIENT
Start: 2024-02-23 | End: 2024-05-23

## 2024-02-23 RX ORDER — CARBOXYMETHYLCELLULOSE SODIUM 5 MG/ML
SOLUTION/ DROPS OPHTHALMIC
Qty: 30 EACH | Refills: 3 | Status: SHIPPED | OUTPATIENT
Start: 2024-02-23 | End: 2024-06-06

## 2024-02-23 RX ORDER — MINERAL OIL/HYDROPHIL PETROLAT
OINTMENT (GRAM) TOPICAL 3 TIMES DAILY
Qty: 452 G | Refills: 1 | Status: SHIPPED | OUTPATIENT
Start: 2024-02-23 | End: 2024-02-23

## 2024-02-23 RX ORDER — METFORMIN HCL 500 MG
2000 TABLET, EXTENDED RELEASE 24 HR ORAL DAILY
Qty: 360 TABLET | Refills: 1 | Status: SHIPPED | OUTPATIENT
Start: 2024-02-23 | End: 2024-05-23

## 2024-02-23 RX ORDER — GLIPIZIDE 5 MG/1
5 TABLET, FILM COATED, EXTENDED RELEASE ORAL DAILY
Qty: 90 TABLET | Refills: 1 | Status: SHIPPED | OUTPATIENT
Start: 2024-02-23 | End: 2024-05-23

## 2024-02-23 RX ORDER — ATORVASTATIN CALCIUM 80 MG/1
80 TABLET, FILM COATED ORAL DAILY
Qty: 90 TABLET | Refills: 1 | Status: SHIPPED | OUTPATIENT
Start: 2024-02-23 | End: 2024-05-23

## 2024-02-23 RX ORDER — CHOLECALCIFEROL (VITAMIN D3) 50 MCG
50 TABLET ORAL DAILY
Qty: 90 TABLET | Refills: 1 | Status: SHIPPED | OUTPATIENT
Start: 2024-02-23 | End: 2024-05-23

## 2024-02-23 RX ORDER — AMLODIPINE BESYLATE 10 MG/1
10 TABLET ORAL DAILY
Qty: 90 TABLET | Refills: 1 | Status: SHIPPED | OUTPATIENT
Start: 2024-02-23 | End: 2024-05-23

## 2024-02-23 RX ORDER — ACETAMINOPHEN 500 MG
1000 TABLET ORAL EVERY 8 HOURS PRN
Qty: 100 TABLET | Refills: 3 | Status: SHIPPED | OUTPATIENT
Start: 2024-02-23

## 2024-02-23 RX ORDER — DIPHENHYDRAMINE HCL 25 MG
25 TABLET ORAL EVERY 6 HOURS PRN
Qty: 100 TABLET | Refills: 3 | Status: SHIPPED | OUTPATIENT
Start: 2024-02-23

## 2024-02-23 NOTE — TELEPHONE ENCOUNTER
Prior Authorization needed on:  Skin Protectants, Misc. (AMERIPHOR) OINT     Medication:  Skin Protectants, Misc. (AMERIPHOR) OINT  Dose:      Pharmacy confirmed as [facility] [place], [place] - [address]  [address]  [place] [address]  Phone: [phone] Fax: [phone]  :     Insurance Name:  [facility]  Insurance Patient ID: [id number]    Do you want to process with PA or send in alternative?     Neris Saravia CMA February 23, 2024 at 2:28 PM

## 2024-02-23 NOTE — PATIENT INSTRUCTIONS
Hemoglobin A1C   Date Value Ref Range Status   02/23/2024 7.1 (H) 0.0 - 5.6 % Final     Comment:     Normal <5.7%   Prediabetes 5.7-6.4%    Diabetes 6.5% or higher     Note: Adopted from ADA consensus guidelines.   10/16/2023 8.6 (H) 0.0 - 5.6 % Final     Comment:     Normal <5.7%   Prediabetes 5.7-6.4%    Diabetes 6.5% or higher     Note: Adopted from ADA consensus guidelines.   06/01/2023 8.5 (H) 0.0 - 5.6 % Final     Comment:     Normal <5.7%   Prediabetes 5.7-6.4%    Diabetes 6.5% or higher     Note: Adopted from ADA consensus guidelines.   01/29/2021 7.8 (H) 4.1 - 5.7 % Final   12/04/2020 9.0 (H) 4.1 - 5.7 % Final   03/05/2020 10.2 (H) 4.1 - 5.7 % Final

## 2024-02-23 NOTE — PROGRESS NOTES
ASSESSMENT/PLAN:  Sa was seen today for follow up, diabetes and medication reconciliation.    Diagnoses and all orders for this visit:    Type 2 diabetes mellitus with hyperglycemia, without long-term current use of insulin (H)  -     Albumin Random Urine Quantitative with Creat Ratio  -     Lipid Profile  -     Basic metabolic panel  -     Hemoglobin A1c  -     atorvastatin (LIPITOR) 80 MG tablet; Take 1 tablet (80 mg) by mouth daily  -     Continuous Blood Gluc Sensor (FREESTYLE JOSE 14 DAY SENSOR) [facility]; Use to test blood sugar daily per 's instructions.  Remove and replace every 14 days.  -     empagliflozin (JARDIANCE) 25 MG TABS tablet; Take 1 tablet (25 mg) by mouth daily  -     glipiZIDE (GLUCOTROL XL) 5 MG 24 hr tablet; Take 1 tablet (5 mg) by mouth daily With supper  -     metFORMIN (GLUCOPHAGE XR) 500 MG 24 hr tablet; Take 4 tablets (2,000 mg) by mouth daily    Idiopathic chronic gout of multiple sites without tophus  -     Uric acid  -     acetaminophen (TYLENOL) 500 MG tablet; Take 2 tablets (1,000 mg) by mouth every 8 hours as needed for mild pain  -     febuxostat (ULORIC) 40 MG TABS tablet; Take 1 tablet (40 mg) by mouth daily    Polyneuropathy associated with underlying disease (H24)  -     Vitamin B12; Future  -     TSH with free T4 reflex; Future  -     gabapentin (NEURONTIN) 400 MG capsule; Take 1 capsule (400 mg) by mouth 2 times daily    Ear itching    Psoriasis  -     augmented betamethasone dipropionate (DIPROLENE-AF) 0.05 % external ointment; Apply twice daily as needed for problem areas of psoriasis up to 2 weeks at a time  -     diphenhydrAMINE (BENADRYL) 25 MG tablet; Take 1 tablet (25 mg) by mouth every 6 hours as needed for itching    Essential hypertension with goal blood pressure less than 140/90  -     amLODIPine (NORVASC) 10 MG tablet; Take 1 tablet (10 mg) by mouth daily  -     lisinopril (ZESTRIL) 40 MG tablet; Take 1 tablet (40 mg) by mouth daily    Hyperlipidemia LDL  goal <100  -     aspirin (ASPIRIN LOW DOSE) 81 MG EC tablet; TAKE 1 TABLET (81 MG) BY MOUTH DAILY    Dry eyes  -     carboxymethylcellulose PF (LUBRICATING PLUS EYE DROPS) 0.5 % ophthalmic solution; PLACE 1 DROP INTO BOTH EYES DAILY AS NEEDED FOR DRY EYES    Dermatitis  -     Skin Protectants, Misc. (AMERIPHOR) OINT; Externally apply topically 3 times daily    Hypovitaminosis D  -     vitamin D3 (CHOLECALCIFEROL) 50 mcg (2000 units) tablet; Take 1 tablet (50 mcg) by mouth daily      Based on his negative stress test 18 months ago, his likelihood of coronary artery disease is relatively low.  He describes no exertional component to chest pain.  He had some minor Q waves noted on prior EKG which automation read as possible inferior infarct.  Cardiology review was requested but apparently not obtained.  All in all, based on current symptoms, his risk for active coronary artery disease seems low.    He is hesitant to restart the Cosentyx because he believes this did cause chest pain.  I offered to message his dermatologist's to get their opinion.    Otherwise I refilled all his meds and we agreed that he will follow-up every 3 to 6 months or sooner if needed.    Total visit time with patient was 25 mins, all of which was face to face MD time, and over 50% of this time was spent in counseling and coordination of care.  Including post-encounter documentation and orders on the date of service, total encounter time was 32 mins.    Subjective    is a 63 year old, presenting for the following health issues:  Follow Up (Heart issue), Diabetes, and Medication Reconciliation (Did not review, patient recently seem and state not taking any new med)     This is a 63-year-old who is well-known to me.  He attends today to follow-up on his diabetes is well as recent episode of chest pain for which she was seen in clinic.  He reports that he noticed chest pain after he took an new dermatology prescribed injectable, cosentyx.  He had  "been originally prescribed Humira but said it was not working for his psoriasis and he therefore was switched to Cosentyx.  He says he took it 3 times and developed some chest pain each time.  When he was seen in clinic he had an EKG performed which was not considered normal and he was advised to go to the ER however there was some miscommunication and he ended up not going.    Today he says he feels well and has not had any chest pains since but also has not taken Cosentyx.  He denies any exertional component to the chest pain and really feels it was the medication that caused it.  I reviewed the side effect profile for Cosentyx and it does not include cardiac side effects of the prominent concern.  On the other hand, Humira does.    He reports that he is having some recurrence of itching and he really would like to restart treatment.  He has an appointment with dermatology in 2 months time.    Otherwise concerning his diabetes, he says that he has altered his diet limiting his consumption of rice and instead eating more fruits, vegetables and meat.  He likes the freestyle CGM and the information provide some.  He would like to stay on all his current medications.  Review of Systems   Psych: He has had a long history of depressed mood and says he is stable at present.  Neuro: He is noticing more numbness in both his feet and hands.      10/16/2023     8:25 AM 2/8/2024    10:30 AM 2/8/2024    10:31 AM   PHQ   PHQ-9 Total Score 2 9 9   Q9: Thoughts of better off dead/self-harm past 2 weeks Not at all Not at all Not at all       Objective    Physical Exam   BP (!) 162/83 (BP Location: Left arm, Patient Position: Sitting, Cuff Size: Adult Large)   Pulse 63   Temp 97.6  F (36.4  C) (Oral)   Resp 12   Ht 1.626 m (5' 4\")   Wt 100.6 kg (221 lb 12.8 oz)   SpO2 98%   BMI 38.07 kg/m       Vitals his BP is elevated today and he says he has not taken today's medications yet.  We reviewed his weight and he has gained a " little bit over his previous low.  Well nourished and in no distress  Neck supple without lymphadenopathy, no goiter  Heart sounds normal without murmur    Lungs clear to auscultation, no wheezes/rales/rhonchi, good air entry   No lower extremity edema   Derm: His psoriasis is much less active than last time seen by me.  He has what appears to be older bruising on his skin which she says is itchy.  Feet: His microfilament testing was negative although he does describe a glove and stocking distribution for numbness.  His feet are otherwise in good condition without arterial insufficiency.    We reviewed his CGM report which is very good and consistent with an A1c of 7.1 today.    I reviewed the nuclear medicine stress test obtained in August 2022 which showed no evidence of ischemia or other abnormality.    Results for orders placed or performed in visit on 02/23/24   Hemoglobin A1c     Status: Abnormal   Result Value Ref Range    Hemoglobin A1C 7.1 (H) 0.0 - 5.6 %

## 2024-02-23 NOTE — LETTER
March 1, 2024       KENIA Key  [address] [place] [address]        Dear ,    We are writing to inform you of your test results.    As we discussed, your A1c number is very good, showing that your diabetes is well controlled - well done!  Also your gout lab, uric acid is low - very good too, shows you are taking Uloric medication every day.     The one number that is not good is your LDL cholesterol - this is helped when you take your Atorvastatin medication every day - OK?     Your kidneys are also working normally - good!      We checked for other causes for neuropathy (numbness in your feet) and common causes are negative.     Keep up the good work - see you in 3 months or sooner if needed.     Resulted Orders   Uric acid   Result Value Ref Range    Uric Acid 4.9 3.4 - 7.0 mg/dL   Albumin Random Urine Quantitative with Creat Ratio   Result Value Ref Range    Creatinine Urine mg/dL 47.1 mg/dL      Comment:      The reference ranges have not been established in urine creatinine. The results should be integrated into the clinical context for interpretation.    Albumin Urine mg/L <12.0 mg/L      Comment:      The reference ranges have not been established in urine albumin. The results should be integrated into the clinical context for interpretation.    Albumin Urine mg/g Cr        Comment:      Unable to calculate, urine albumin and/or urine creatinine is outside detectable limits.  Microalbuminuria is defined as an albumin:creatinine ratio of 17 to 299 for males and 25 to 299 for females. A ratio of albumin:creatinine of 300 or higher is indicative of overt proteinuria.  Due to biologic variability, positive results should be confirmed by a second, first-morning random or 24-hour timed urine specimen. If there is discrepancy, a third specimen is recommended. When 2 out of 3 results are in the microalbuminuria range, this is evidence for incipient nephropathy and warrants increased efforts at  glucose control, blood pressure control, and institution of therapy with an angiotensin-converting-enzyme (ACE) inhibitor (if the patient can tolerate it).     Lipid Profile   Result Value Ref Range    Cholesterol 223 (H) <200 mg/dL    Triglycerides 172 (H) <150 mg/dL    Direct Measure HDL 40 >=40 mg/dL    LDL Cholesterol Calculated 149 (H) <=100 mg/dL    Non HDL Cholesterol 183 (H) <130 mg/dL    Patient Fasting > 8hrs? Unknown     Narrative    Cholesterol  Desirable:  <200 mg/dL    Triglycerides  Normal:  Less than 150 mg/dL  Borderline High:  150-199 mg/dL  High:  200-499 mg/dL  Very High:  Greater than or equal to 500 mg/dL    Direct Measure HDL  Female:  Greater than or equal to 50 mg/dL   Male:  Greater than or equal to 40 mg/dL    LDL Cholesterol  Desirable:  <100mg/dL  Above Desirable:  100-129 mg/dL   Borderline High:  130-159 mg/dL   High:  160-189 mg/dL   Very High:  >= 190 mg/dL    Non HDL Cholesterol  Desirable:  130 mg/dL  Above Desirable:  130-159 mg/dL  Borderline High:  160-189 mg/dL  High:  190-219 mg/dL  Very High:  Greater than or equal to 220 mg/dL   Basic metabolic panel   Result Value Ref Range    Sodium 143 135 - 145 mmol/L      Comment:      Reference intervals for this test were updated on 09/26/2023 to more accurately reflect our healthy population. There may be differences in the flagging of prior results with similar values performed with this method. Interpretation of those prior results can be made in the context of the updated reference intervals.     Potassium 4.3 3.4 - 5.3 mmol/L    Chloride 106 98 - 107 mmol/L    Carbon Dioxide (CO2) 26 22 - 29 mmol/L    Anion Gap 11 7 - 15 mmol/L    Urea Nitrogen 20.8 8.0 - 23.0 mg/dL    Creatinine 0.93 0.67 - 1.17 mg/dL    GFR Estimate >90 >60 mL/min/1.73m2    Calcium 9.6 8.8 - 10.2 mg/dL    Glucose 144 (H) 70 - 99 mg/dL   Hemoglobin A1c   Result Value Ref Range    Hemoglobin A1C 7.1 (H) 0.0 - 5.6 %      Comment:      Normal <5.7%   Prediabetes  5.7-6.4%    Diabetes 6.5% or higher     Note: Adopted from ADA consensus guidelines.   Vitamin B12   Result Value Ref Range    Vitamin B12 490 232 - 1,245 pg/mL   TSH with free T4 reflex   Result Value Ref Range    TSH 1.96 0.30 - 4.20 uIU/mL       If you have any questions or concerns, please call the clinic at the number listed above.       Sincerely,      Jason Siddiqui MD

## 2024-02-26 NOTE — RESULT ENCOUNTER NOTE
Sa Than - as we discussed, your A1c number is very good, showing that your diabetes is well controlled - well done!  Also your gout lab, uric acid is low - very good too, shows you are taking Uloric medication every day.    The one number that is not good is your LDL cholesterol - this is helped when you take your Atorvastatin medication every day - OK?    Your kidneys are also working normally - good!      We checked for other causes for neuropathy (numbness in your feet) and common causes are negative.    Keep up the good work - see you in 3 months or sooner if needed.

## 2024-02-28 DIAGNOSIS — E11.65 TYPE 2 DIABETES MELLITUS WITH HYPERGLYCEMIA, WITHOUT LONG-TERM CURRENT USE OF INSULIN (H): ICD-10-CM

## 2024-03-11 ENCOUNTER — OFFICE VISIT (OUTPATIENT)
Dept: AUDIOLOGY | Facility: CLINIC | Age: 64
End: 2024-03-11
Payer: COMMERCIAL

## 2024-03-11 DIAGNOSIS — H90.6 MIXED HEARING LOSS, BILATERAL: Primary | ICD-10-CM

## 2024-03-11 PROCEDURE — 92593 PR HEARING AID CHECK, BINAURAL: CPT | Performed by: AUDIOLOGIST

## 2024-03-11 NOTE — PROGRESS NOTES
AUDIOLOGY REPORT    HEARING AID CHECK    SUBJECTIVE:Sa KENIA Mackey is a 63 year old male who was seen in the Audiology Clinic at the [facility] on 3/11/2024  for a hearing aid check. Previous results have revealed a mild sloping to a moderately-severe predominately sensorineural hearing loss in the right ear (25 dB air-bone gap at 250 Hz, masking dilemma at 1000 Hz for bone conduction) and mild to moderately-severe mixed hearing loss in the left ear.  The patient was medically evaluated and determined to be cleared for binaural hearing aids by Dr. Angela Santiago.   The patient has been seen previously in this clinic and was fit with binaural Phonak Audeo P70-R ITALIA hearing aids on 6/6/2023.   reports he is having issues with itching and pain in both ears.  ID: [id number].    OBJECTIVE:     Otoscopy revealed red and dry ear canals, bilaterally.  He is advised to follow up with his primary care physician or ENT for this.    Based on patient report, the following changes were made:    Cleaned both hearing aids and listening check is good.   Reviewed how to change domes and wax traps and he did well with this today. Gave extra supplies today.   Updated firmware in both hearing aids. He did not want any other programming changes today.      ASSESSMENT: Hearing aid check was completed today. Changes to hearing aid was completed as outlined above.     PLAN: will return for follow-up as needed, or at least every 6-9 months for cleaning and assessment of hearing aid.   Please call this clinic with any questions regarding today s appointment.    Monico Alberts., CCC-A  [place] Licensed Audiologist #[id number]

## 2024-03-26 ENCOUNTER — OFFICE VISIT (OUTPATIENT)
Dept: FAMILY MEDICINE | Facility: CLINIC | Age: 64
End: 2024-03-26
Payer: COMMERCIAL

## 2024-03-26 VITALS
WEIGHT: 221.4 LBS | TEMPERATURE: 98.7 F | SYSTOLIC BLOOD PRESSURE: 146 MMHG | HEART RATE: 82 BPM | DIASTOLIC BLOOD PRESSURE: 85 MMHG | RESPIRATION RATE: 16 BRPM | BODY MASS INDEX: 38 KG/M2 | OXYGEN SATURATION: 95 %

## 2024-03-26 DIAGNOSIS — R05.1 ACUTE COUGH: ICD-10-CM

## 2024-03-26 DIAGNOSIS — H65.93 BILATERAL NON-SUPPURATIVE OTITIS MEDIA: Primary | ICD-10-CM

## 2024-03-26 PROCEDURE — 99214 OFFICE O/P EST MOD 30 MIN: CPT | Performed by: STUDENT IN AN ORGANIZED HEALTH CARE EDUCATION/TRAINING PROGRAM

## 2024-03-26 RX ORDER — BENZONATATE 100 MG/1
100 CAPSULE ORAL 3 TIMES DAILY PRN
Qty: 30 CAPSULE | Refills: 0 | Status: SHIPPED | OUTPATIENT
Start: 2024-03-26 | End: 2024-04-05

## 2024-03-26 NOTE — PROGRESS NOTES
Nursing Notes:   Daniel DalalssKILO valdivia  3/26/2024  3:22 PM  Signed  Due to patient being non-English speaking/uses sign language, an  was used for this visit. Only for face-to-face interpretation by an external agency, date and length of interpretation can be found on the scanned worksheet.     name: Taye Ferrell  Agency: [employer]  Language: Liam   Telephone number: [phone]  Type of interpretation: Face-to-face, spoken        ASSESSMENT AND PLAN:      Sa was seen today for evaluation of cough, found to have bilateral otitis media    Diagnoses and all orders for this visit:    Bilateral non-suppurative otitis media  Acute cough  Atelectasis  Presenting for evaluation of cough.  Mildly low O2 sats with cough that initially improved and then worsened.  He is coughing up thick yellow sputum, and his exam shows bilateral otitis media.  Will treat with Augmentin to give good coverage for both ears and pneumonia.  Will use Tessalon Perles symptomatically for cough.  Recommended tea with honey even though he has diabetes.  Continue to push fluids.  I did not do a chest x-ray today because we were already going to treat, and I think his exam is more consistent with atelectasis, but if symptoms are worsening he will come back in and I would recommend a chest x-ray and labs at that time.  Did not check COVID or flu as we would not treat at this point anyway.  -     amoxicillin-clavulanate (AUGMENTIN) 875-125 MG tablet; Take 1 tablet by mouth 2 times daily  -     benzonatate (TESSALON) 100 MG capsule; Take 1 capsule (100 mg) by mouth 3 times daily as needed for cough      Follow-up if symptoms are not improving by the end of the week.      Patient Instructions   Augmentin:  Antibiotic, 1 pill 2x per day for 10 day  Tessalon perls:  Take up to 3x per day as needed    Lots of water!  Lots of lemon  A little bit of honey.      Keep taking deep breaths.   Use tylenol to help with pain.      FOllow up if  not getting better.      Diamante Redman MD    SUBJECTIVE  Sa KENIA Mackey is a 63 year old male with past medical history significant for    Patient Active Problem List   Diagnosis    Health Care Home    Muscle pain    Depression    Male erectile disorder    Obstructive sleep apnea    PTSD (post-traumatic stress disorder)    Testicular hypofunction    Mixed hyperlipidemia    Lesion of ulnar nerve    Vitamin D deficiency    Allergic rhinitis    H/O exploratory laparotomy    Gout    Dyshidrotic eczema    Essential hypertension with goal blood pressure less than 140/90    Type II diabetes mellitus (H)    Morbid obesity (H)    Rotator cuff syndrome, right    Psoriasis    Bilateral knee pain    Dermatochalasis    Difficulty walking    Elevated hemoglobin A1c    Elevated LFTs    Fatty liver    Glaucoma suspect    Gouty arthropathy    Lateral epicondylitis    Malaise and fatigue    Fibromyalgia    Muscular deconditioning    Central serous retinopathy    Retinal pigment epithelial detachment     Others present at the visit:   Taye Ferrell, present in person.     Presents for   Chief Complaint   Patient presents with    Other     Cough with some mucus but is now dry for the past 2 weeks      Patient presents today for evaluation of cough.  He has a past medical history of type 2 diabetes, hypertension, eczema but no allergies, and presents with 2 weeks of cough.  Initially the cough got better over the first week.  He took a cough syrup and this was helpful.  Over the last week however the cough has gotten worse.  It is productive of yellow, green, brown sputum.  No blood.  He does have quite a bit of nasal congestion.  The cough is most bothersome at nighttime and he is having paroxysmal coughing spells.  He denies ear pain.  Maybe has some mild warmth but does not think he has a fever, no sweats or chills.  He does not feel short of breath but he does have some chest and abdominal pain from all the coughing.  He notes  some wheezes when he is breathing.  He is a non-smoker, does not chew, and does not have a history of asthma or any other breathing troubles.  He has been using over-the-counter cough syrup which has been mildly helpful at the beginning but not now and has been using lemon with fluids.Has avoided honey because he has diabetes.        OBJECTIVE:  Vitals: BP (!) 146/85   Pulse 82   Temp 98.7  F (37.1  C) (Oral)   Resp 16   Wt 100.4 kg (221 lb 6.4 oz)   SpO2 95%   BMI 38.00 kg/m    BMI= Body mass index is 38 kg/m .  Objective:    Vitals:  Vitals are reviewed.  He is afebrile, O2 sats are mildly low, mildly elevated blood pressure.  No tachypnea.  Gen:  Alert, pleasant, no acute distress.  Was observed to have a paroxysmal coughing spell.  Head:  Normal cephalic, atraumatic  Ears:  Tympanic membranes viewed bilaterally, left side with erythmea, right side with pus and bulging.    Throat:  Clear.  Non-erythematous and without exudate  Neck:  Bilateral shotty lymphadenopathy  Cardiac:  Regular rate and rhythm, no murmurs, rubs or gallops  Respiratory:  Lungs overall are clear.  Good airflow throughout.  He does have slight crackles bilaterally in the bases.  Abdomen:  Soft, non-tender, non-distended, bowel sounds positive  Extremities:  Warm, well-perfused, pulses 2+/4, no lower extremity edema  Skin:  Mucous membranes moist.  No rash.   Capillary refill <2 secs.          Patient Instructions   Augmentin:  Antibiotic, 1 pill 2x per day for 10 day  Tessalon perls:  Take up to 3x per day as needed    Lots of water!  Lots of lemon  A little bit of honey.      Keep taking deep breaths.   Use tylenol to help with pain.      FOllow up if not getting better.      Diamante Redman MD

## 2024-03-26 NOTE — NURSING NOTE
Due to patient being non-English speaking/uses sign language, an  was used for this visit. Only for face-to-face interpretation by an external agency, date and length of interpretation can be found on the scanned worksheet.     name: Taye Ferrell  Agency: [employer]  Language: Liam   Telephone number: [phone]  Type of interpretation: Face-to-face, spoken

## 2024-03-26 NOTE — PATIENT INSTRUCTIONS
Augmentin:  Antibiotic, 1 pill 2x per day for 10 day  Tessalon perls:  Take up to 3x per day as needed    Lots of water!  Lots of lemon  A little bit of honey.      Keep taking deep breaths.   Use tylenol to help with pain.      FOllow up if not getting better.

## 2024-04-05 ENCOUNTER — OFFICE VISIT (OUTPATIENT)
Dept: FAMILY MEDICINE | Facility: CLINIC | Age: 64
End: 2024-04-05
Payer: COMMERCIAL

## 2024-04-05 VITALS
DIASTOLIC BLOOD PRESSURE: 82 MMHG | OXYGEN SATURATION: 97 % | SYSTOLIC BLOOD PRESSURE: 144 MMHG | BODY MASS INDEX: 37.25 KG/M2 | WEIGHT: 217 LBS | HEART RATE: 86 BPM | TEMPERATURE: 98.4 F | RESPIRATION RATE: 16 BRPM

## 2024-04-05 DIAGNOSIS — Z79.899 TAKING A STATIN MEDICATION: ICD-10-CM

## 2024-04-05 DIAGNOSIS — E78.5 HYPERLIPIDEMIA LDL GOAL <100: ICD-10-CM

## 2024-04-05 DIAGNOSIS — H66.13 CHRONIC TUBOTYMPANIC SUPPURATIVE OTITIS MEDIA OF BOTH EARS: Primary | ICD-10-CM

## 2024-04-05 DIAGNOSIS — R06.02 SHORTNESS OF BREATH: ICD-10-CM

## 2024-04-05 LAB
CHOLEST SERPL-MCNC: 209 MG/DL
FASTING STATUS PATIENT QL REPORTED: ABNORMAL
HDLC SERPL-MCNC: 32 MG/DL
LDLC SERPL CALC-MCNC: 147 MG/DL
NONHDLC SERPL-MCNC: 177 MG/DL
TRIGL SERPL-MCNC: 152 MG/DL

## 2024-04-05 PROCEDURE — 99214 OFFICE O/P EST MOD 30 MIN: CPT | Performed by: FAMILY MEDICINE

## 2024-04-05 PROCEDURE — 36415 COLL VENOUS BLD VENIPUNCTURE: CPT | Performed by: FAMILY MEDICINE

## 2024-04-05 PROCEDURE — 80061 LIPID PANEL: CPT | Performed by: FAMILY MEDICINE

## 2024-04-05 RX ORDER — CEFDINIR 300 MG/1
300 CAPSULE ORAL 2 TIMES DAILY
Qty: 20 CAPSULE | Refills: 0 | Status: SHIPPED | OUTPATIENT
Start: 2024-04-05 | End: 2024-04-15

## 2024-04-05 RX ORDER — OFLOXACIN 3 MG/ML
5 SOLUTION AURICULAR (OTIC) 2 TIMES DAILY
Qty: 10 ML | Refills: 1 | Status: SHIPPED | OUTPATIENT
Start: 2024-04-05 | End: 2024-04-15

## 2024-04-05 NOTE — LETTER
April 9, 2024       KENIA Mackey  [address] [place] [address]        Dear  and family,    We are writing to inform you of your test results.    Your bad cholesterol LDL remains quite high even though you told me you do not miss taking ATORVASTATIN 80mgs every day.  Correct?  If that is the case, then we need to move on to a stronger cholesterol medication.  This is a new injection you give yourself every 2 weeks. Some insurances do not want to pay for it - I will send a prescription and see what happens.  Please call me or our pharmacists with any questions - OK?  Take care!     Resulted Orders   Lipid Profile   Result Value Ref Range    Cholesterol 209 (H) <200 mg/dL    Triglycerides 152 (H) <150 mg/dL    Direct Measure HDL 32 (L) >=40 mg/dL    LDL Cholesterol Calculated 147 (H) <=100 mg/dL    Non HDL Cholesterol 177 (H) <130 mg/dL    Patient Fasting > 8hrs? Unknown     Narrative    Cholesterol  Desirable:  <200 mg/dL    Triglycerides  Normal:  Less than 150 mg/dL  Borderline High:  150-199 mg/dL  High:  200-499 mg/dL  Very High:  Greater than or equal to 500 mg/dL    Direct Measure HDL  Female:  Greater than or equal to 50 mg/dL   Male:  Greater than or equal to 40 mg/dL    LDL Cholesterol  Desirable:  <100mg/dL  Above Desirable:  100-129 mg/dL   Borderline High:  130-159 mg/dL   High:  160-189 mg/dL   Very High:  >= 190 mg/dL    Non HDL Cholesterol  Desirable:  130 mg/dL  Above Desirable:  130-159 mg/dL  Borderline High:  160-189 mg/dL  High:  190-219 mg/dL  Very High:  Greater than or equal to 220 mg/dL       If you have any questions or concerns, please call the clinic at the number listed above.       Sincerely,      Jason Siddiqui MD

## 2024-04-05 NOTE — PROGRESS NOTES
ASSESSMENT/PLAN:  Sa was seen today for other.    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  -     cefdinir (OMNICEF) 300 MG capsule; Take 1 capsule (300 mg) by mouth 2 times daily for 10 days  -     ofloxacin (FLOXIN) 0.3 % otic solution; Place 5 drops into both ears 2 times daily for 10 days  -     Adult ENT  Referral; Future    Shortness of breath    Hyperlipidemia LDL goal <100  -     Lipid Profile; Future  -     Lipid Profile  -     evolocumab (REPATHA) 140 MG/ML prefilled autoinjector; Inject 1 mL (140 mg) Subcutaneous every 14 days    Taking a statin medication  -     evolocumab (REPATHA) 140 MG/ML prefilled autoinjector; Inject 1 mL (140 mg) Subcutaneous every 14 days      Concerning his otitis media, he has not responded to a 10-day course of Augmentin.  Following guidelines I therefore prescribed cefdinir which is recommended in this case.  Given the high likelihood of perforation at least on the right side and his report of ear drainage, I also added in topical antibiotic drops.  I placed a referral to ENT given that he has had recurring otitis media despite his age.    We agreed to recheck a lipid panel today and anticipate that this should be lower than when last checked.  He is encouraged to continue with his diabetic treatments.    I reviewed his chart and note that he had a negative stress test in 2022.  I indicated that this is reassuring against the possibility of an acute coronary event for the next 4 years.  Therefore his shortness of breath is less likely to be cardiac in origin.  We agreed that he will follow-up if this continues to be a symptom he is noticing.    Total visit time with patient was 25 mins, all of which was face to face MD time, and over 50% of this time was spent in counseling and coordination of care.  Including post-encounter documentation and orders on the date of service, total encounter time was 32  mins.    Subjective   Sa is a 63 year old, presenting for the following health issues:  Other (Itchy on both ears )  This is a 63-year-old who is well-known to me.  He attends today having been seen about 10 days ago with bilateral otitis media.  He was given a course of Augmentin and reports that today is the last day.  He continues to have some discomfort in his ears associated with itching and some noticeable drainage.  He has had prior otitis media and a tympanic membrane perforation noted.    Otherwise he reports that he is doing well.  He receives therapy for chronic major depressive disorder and we briefly discussed his father's death for whom he is still grieving.    His A1c at last check was very satisfactory and he is encouraged to continue his current treatment for diabetes.    Review of Systems   Pulmonary: He reports that he sometimes notices shortness of breath.  He does not notice chest pain.  He does not have a cough or symptoms of infection at this time.      10/16/2023     8:25 AM 2/8/2024    10:30 AM 2/8/2024    10:31 AM   PHQ   PHQ-9 Total Score 2 9 9   Q9: Thoughts of better off dead/self-harm past 2 weeks Not at all Not at all Not at all       Objective    Physical Exam   BP (!) 144/82   Pulse 86   Temp 98.4  F (36.9  C) (Oral)   Resp 16   Wt 98.4 kg (217 lb)   SpO2 97%   BMI 37.25 kg/m       Vitals stable -BP is elevated and he says he has not yet taken today's antihypertensives.  Well nourished and in no distress  HEENT: PERRLA; both of his TMs reveal effusion with erythema and probable perforation on the right side.   Heart sounds normal without murmur    Lungs clear to auscultation, no wheezes/rales/rhonchi, good air entry   No lower extremity edema     I reviewed his last LDL with him.  He is taking high-dose atorvastatin and his LDL should be lower.  He tells me he does not miss any doses but cannot recall if he possibly had missed a few days around the time of the last blood  draw    Lab Results   Component Value Date    CHOL 223 02/23/2024    CHOL 156.6 03/05/2020     Lab Results   Component Value Date    HDL 40 02/23/2024    HDL 38.6 03/05/2020     Lab Results   Component Value Date     02/23/2024    LDL 78 03/05/2020     Lab Results   Component Value Date    TRIG 172 02/23/2024    TRIG 202.5 03/05/2020     Lab Results   Component Value Date    CHOLHDLRATIO 4.1 03/05/2020

## 2024-04-05 NOTE — NURSING NOTE
Due to patient being non-English speaking/uses sign language, an  was used for this visit. Only for face-to-face interpretation by an external agency, date and length of interpretation can be found on the scanned worksheet.     name: Vasile  Agency: [employer]  Language: Ashley   Telephone number: [phone]  Type of interpretation: Telephone, spoken

## 2024-04-08 NOTE — RESULT ENCOUNTER NOTE
Sa Than and family - your bad cholesterol LDL remains quite high even though you told me you do not miss taking ATORVASTATIN 80mgs every day.  Correct?  If that is the case, then we need to move on to a stronger cholesterol medication.  This is a new injection you give yourself every 2 weeks.  Some insurances do not want to pay for it - I will send a prescription and see what happens.  Please call me or our pharmacists with any questions - OK?  Take care, Dr Jason Siddiqui

## 2024-04-15 ENCOUNTER — OFFICE VISIT (OUTPATIENT)
Dept: FAMILY MEDICINE | Facility: CLINIC | Age: 64
End: 2024-04-15
Attending: PHYSICIAN ASSISTANT
Payer: COMMERCIAL

## 2024-04-15 DIAGNOSIS — L40.0 PSORIASIS VULGARIS: Primary | ICD-10-CM

## 2024-04-15 DIAGNOSIS — M25.50 ARTHRALGIA, UNSPECIFIED JOINT: ICD-10-CM

## 2024-04-15 DIAGNOSIS — L40.50 PSORIATIC ARTHRITIS (H): ICD-10-CM

## 2024-04-15 DIAGNOSIS — Z51.81 ENCOUNTER FOR THERAPEUTIC DRUG MONITORING: ICD-10-CM

## 2024-04-15 DIAGNOSIS — R07.89 CHEST TIGHTNESS: ICD-10-CM

## 2024-04-15 DIAGNOSIS — L40.9 PSORIASIS: ICD-10-CM

## 2024-04-15 LAB
BASOPHILS # BLD AUTO: 0.1 10E3/UL (ref 0–0.2)
BASOPHILS NFR BLD AUTO: 1 %
EOSINOPHIL # BLD AUTO: 0.3 10E3/UL (ref 0–0.7)
EOSINOPHIL NFR BLD AUTO: 4 %
ERYTHROCYTE [DISTWIDTH] IN BLOOD BY AUTOMATED COUNT: 18.8 % (ref 10–15)
HCT VFR BLD AUTO: 43.8 % (ref 40–53)
HGB BLD-MCNC: 13.9 G/DL (ref 13.3–17.7)
IMM GRANULOCYTES # BLD: 0 10E3/UL
IMM GRANULOCYTES NFR BLD: 0 %
LYMPHOCYTES # BLD AUTO: 2.6 10E3/UL (ref 0.8–5.3)
LYMPHOCYTES NFR BLD AUTO: 32 %
MCH RBC QN AUTO: 20.7 PG (ref 26.5–33)
MCHC RBC AUTO-ENTMCNC: 31.7 G/DL (ref 31.5–36.5)
MCV RBC AUTO: 65 FL (ref 78–100)
MONOCYTES # BLD AUTO: 0.6 10E3/UL (ref 0–1.3)
MONOCYTES NFR BLD AUTO: 8 %
NEUTROPHILS # BLD AUTO: 4.5 10E3/UL (ref 1.6–8.3)
NEUTROPHILS NFR BLD AUTO: 55 %
PLATELET # BLD AUTO: 290 10E3/UL (ref 150–450)
RBC # BLD AUTO: 6.72 10E6/UL (ref 4.4–5.9)
WBC # BLD AUTO: 8.1 10E3/UL (ref 4–11)

## 2024-04-15 PROCEDURE — 80053 COMPREHEN METABOLIC PANEL: CPT | Performed by: PHYSICIAN ASSISTANT

## 2024-04-15 PROCEDURE — 36415 COLL VENOUS BLD VENIPUNCTURE: CPT | Performed by: PHYSICIAN ASSISTANT

## 2024-04-15 PROCEDURE — 99214 OFFICE O/P EST MOD 30 MIN: CPT | Performed by: PHYSICIAN ASSISTANT

## 2024-04-15 PROCEDURE — 85025 COMPLETE CBC W/AUTO DIFF WBC: CPT | Performed by: PHYSICIAN ASSISTANT

## 2024-04-15 RX ORDER — CLOBETASOL PROPIONATE 0.5 MG/G
OINTMENT TOPICAL 2 TIMES DAILY
Qty: 60 G | Refills: 2 | Status: SHIPPED | OUTPATIENT
Start: 2024-04-15 | End: 2024-06-06

## 2024-04-15 NOTE — PATIENT INSTRUCTIONS
Patient Education       Proper skin care from [facility]:    -Eliminate harsh soaps as they strip the natural oils from the skin, often resulting in dry itchy skin ( i.e. Dial, Zest, [place] Spring)  -Use mild soaps such as Cetaphil or Dove Sensitive Skin in the shower. You do not need to use soap on arms, legs, and trunk every time you shower unless visibly soiled.   -Avoid hot or cold showers.  -After showering, lightly dry off and apply moisturizing within 2-3 minutes. This will help trap moisture in the skin.   -Aggressive use of a moisturizer at least 1-2 times a day to the entire body (including -Vanicream, Cetaphil, Aquaphor or Cerave) and moisturize hands after every washing.  -We recommend using moisturizers that come in a tub that needs to be scooped out, not a pump. This has more of an oil base. It will hold moisture in your skin much better than a water base moisturizer. The above recommended are non-pore clogging.      Wear a sunscreen with at least SPF 30 on your face, ears, neck and V of the chest daily. Wear sunscreen on other areas of the body if those areas are exposed to the sun throughout the day. Sunscreens can contain physical and/or chemical blockers. Physical blockers are less likely to clog pores, these include zinc oxide and titanium dioxide. Reapply every two hour and after swimming.     Sunscreen examples: https://www.ewg.org/sunscreen/    UV radiation  UVA radiation remains constant throughout the day and throughout the year. It is a longer wavelength than UVB and therefore penetrates deeper into the skin leading to immediate and delayed tanning, photoaging, and skin cancer. 70-80% of UVA and UVB radiation occurs between the hours of 10am-2pm.  UVB radiation  UVB radiation causes the most harmful effects and is more significant during the summer months. However, snow and ice can reflect UVB radiation leading to skin damage during the winter months as well. UVB radiation is  responsible for tanning, burning, inflammation, delayed erythema (pinkness), pigmentation (brown spots), and skin cancer.     I recommend self monthly full body exams and yearly full body exams with a dermatology provider. If you develop a new or changing lesion please follow up for examination. Most skin cancers are pink and scaly or pink and pearly. However, we do see blue/brown/black skin cancers.  Consider the ABCDEs of melanoma when giving yourself your monthly full body exam ( don't forget the groin, buttocks, feet, toes, etc). A-asymmetry, B-borders, C-color, D-diameter, E-elevation or evolving. If you see any of these changes please follow up in clinic. If you cannot see your back I recommend purchasing a hand held mirror to use with a larger wall mirror.       Checking for Skin Cancer  You can find cancer early by checking your skin each month. There are 3 kinds of skin cancer. They are melanoma, basal cell carcinoma, and squamous cell carcinoma. Doing monthly skin checks is the best way to find new marks or skin changes. Follow the instructions below for checking your skin.   The ABCDEs of checking moles for melanoma   Check your moles or growths for signs of melanoma using ABCDE:   Asymmetry: the sides of the mole or growth don t match  Border: the edges are ragged, notched, or blurred  Color: the color within the mole or growth varies  Diameter: the mole or growth is larger than 6 mm (size of a pencil eraser)  Evolving: the size, shape, or color of the mole or growth is changing (evolving is not shown in the images below)    Checking for other types of skin cancer  Basal cell carcinoma or squamous cell carcinoma have symptoms such as:     A spot or mole that looks different from all other marks on your skin  Changes in how an area feels, such as itching, tenderness, or pain  Changes in the skin's surface, such as oozing, bleeding, or scaliness  A sore that does not heal  New swelling or redness beyond  the border of a mole    Who s at risk?  Anyone can get skin cancer. But you are at greater risk if you have:   Fair skin, light-colored hair, or light-colored eyes  Many moles or abnormal moles on your skin  A history of sunburns from sunlight or tanning beds  A family history of skin cancer  A history of exposure to radiation or chemicals  A weakened immune system  If you have had skin cancer in the past, you are at risk for recurring skin cancer.   How to check your skin  Do your monthly skin checkups in front of a full-length mirror. Check all parts of your body, including your:   Head (ears, face, neck, and scalp)  Torso (front, back, and sides)  Arms (tops, undersides, upper, and lower armpits)  Hands (palms, backs, and fingers, including under the nails)  Buttocks and genitals  Legs (front, back, and sides)  Feet (tops, soles, toes, including under the nails, and between toes)  If you have a lot of moles, take digital photos of them each month. Make sure to take photos both up close and from a distance. These can help you see if any moles change over time.   Most skin changes are not cancer. But if you see any changes in your skin, call your doctor right away. Only he or she can diagnose a problem. If you have skin cancer, seeing your doctor can be the first step toward getting the treatment that could save your life.   [employer] last reviewed this educational content on 4/1/2019 2000-2020 [employer]. [address] [place] [address], [place] [address]. All rights reserved. This information is not intended as a substitute for professional medical care. Always follow your healthcare professional's instructions.       When should I call my doctor?  If you are worsening or not improving, please, contact us or seek urgent care as noted below.     Who should I call with questions (adults)?  [facility] (adult and pediatric): [phone]  [facility]  [place] (adult): [phone]  [facility] ([place], [place], [place] and [place]) [phone]  For urgent needs outside of business hours call the [facility] at [phone] and ask for the dermatology resident on call to be paged  If this is a medical emergency and you are unable to reach an ER, Call 911      If you need a prescription refill, please contact your pharmacy. Refills are approved or denied by our Physicians during normal business hours, Monday through Fridays  Per office policy, refills will not be granted if you have not been seen within the past year (or sooner depending on your child's condition)

## 2024-04-15 NOTE — PROGRESS NOTES
"[facility] Dermatology Note  Encounter Date: Apr 15, 2024  Office Visit      Dermatology Problem List:  1. Psoriasis + joint pain, likely psoriatic arthritis - 30-40% BSA  - Current: Cosentyx (Holding), clobetasol, and ammonium lactate.   - Previous: Humira - did not clear him up, betamethasone 0.05% oint. Triam 0.1% cream, lidex 0.05% solution  - Safety labs: CBC, CMP 4/15/24,  TB Gold, CXR 11/13/23              - Patient with hx of latent TB which was treated with ID, Hep B core positive, Hep B PCR negative  ____________________________________________    Assessment & Plan:  # Psoriasis vulgaris + joint pain, likely psoriatic arthritis. Chronic, flare. BSA 30-40%. Vast improvement with Cosentyx in both skin rash and joint pain, however patient held medication after first 3 injections due to feeling of chest tightness 2-3 days following each injection. Experiencing SOB and \"chest tightness\". Had a negative stress done by his PCP in 2022. Last dose of cosentyx was in February, 2024. Rash has not returned but joint pain has.  - Referral ordered to cardiology. Will continue to hold Cosentyx until he sees cardiology. If everything unremarkable with cardiology then he will restart use as this has been dramatically effective for his skin rash as well as significant joint pain.   - Humira can be associated with new onset CHF, however this patient was not on Humira for long - <1 year in total  - I do not see evidence in literature of cardiac side effects related to cosentyx at this time, so hopefully he will be able to resume treatment with cosentyx, but given complaint of chest pain I think it is best for him to follow up and be evaluated more thoroughly.  - Start on clobetasol 0.05% ointment to apply to his hips BID for 2-3 weeks.   - Will continue use of ammonium lactate lotion to extremities, may alternate with clobetasol  - Next visit will order labs and Chest x-ray as he needs this annually given " hx of latent TB.     Procedures Performed:   None     Follow-up: 6mo for new or changing lesions    Staff and scribe:    Scribe Disclosure:   I, EDIE RANGEL, am serving as a scribe; to document services personally performed by Deya Kelly PA-C -based on data collection and the provider's statements to me.     Provider Disclosure:  I agree with above History, Review of Systems, Physical exam and Plan.  I have reviewed the content of the documentation and have edited it as needed. I have personally performed the services documented here and the documentation accurately represents those services and the decisions I have made.      Electronically signed by:    All risks, benefits and alternatives were discussed with patient.  Patient is in agreement and understands the assessment and plan.  All questions were answered.    Deya Kelly PA-C, [facility]  [facility] [place]: Phone: [phone], Fax: [phone]  [facility]: Phone: [phone],  Fax: [phone]  [facility]: Phone: [phone], Fax: [phone]  ____________________________________________    CC: Psoriasis (Patient wants to discuss treatment plan- states cosentyx helps psoriasis but is causing chest pain and shortness of breath. State he was seen by PCP and they did not think this was cardiac issues -he has had a full work up 02/2024.  States he has had chest pain previously to cosentycx but the sob came after starting the medication)      Reviewed patients past medical history and pertinent chart review prior to patient's visit today.     HPI:  Mr. Sa KENIA Mackey is a 63 year old male who presents today as a return patient for psoriasis follow up. Was switched to Cosentyx from Humira. Humira was not effective to treat his PSO. On humira about 8-9mo. He only just began treatment with Cosentyx, he had given himself 3 injections. He stopped it in Feb  "of this year because he noticed what he calls \"chest congestion\" 2-3 days after he gave himself an injection. He did follow up with his PCP 10 days ago, and his PCP felt that his relative risk for cardiovascular disease was low. He had a stress test with his PCP in 2022, and recently had an EKG 2/8/24. The EKG was ordered due to sx of chest pain.     Patient states he does not feel these sx are similar to shortness of breath like you may have with an upper respiratory infection.     His joint pain and rash significantly improved with just 3 does of cosentyx, however since holding cosentyx the joint pain has started to return. Skin rash has not returned. Has flaring patch of rash on hips, but this had not yet cleared when he stopped the cosentyx    Patient is otherwise feeling well, without additional concerns.    Labs:  CBC and CMP ordered today    Physical Exam:  Vitals: There were no vitals taken for this visit.  SKIN: Total skin excluding the undergarment areas was performed. The exam included the head/face, neck, both arms, chest, back, abdomen, both legs, digits and/or nails.    - inflammatory plaques on his R and L hip  - Arms and back are much improved.   - No other lesions of concern on areas examined.     Medications:  Current Outpatient Medications   Medication Sig Dispense Refill    COSENTYX SENSOREADY, 300 MG, 150 MG/ML SOAJ INJECT THE CONTENTS OF TWO PENS SUBCUTANEOUSLY WEEKLY FOR 5 WEEKS THEN TWO PENS EVERY 4 WEEKS. **STARTER DOSE** 8 mL 0    acetaminophen (TYLENOL) 500 MG tablet Take 2 tablets (1,000 mg) by mouth every 8 hours as needed for mild pain 100 tablet 3    acetic acid-hydrocortisone (VOSOL-HC) 1-2 % otic solution Place 3 drops into both ears 2 times daily as needed (itching) 10 mL 3    adalimumab (HUMIRA *CF*) 40 MG/0.4ML pen kit Inject 0.4 mLs (40 mg) Subcutaneous every 14 days (Patient not taking: Reported on 4/15/2024) 0.8 mL 11    adalimumab (HUMIRA *CF*) 80 MG/0.8ML pen kit Inject 0.8 " mL (80 mg) subcutaneous for 1 dose, followed by 40 mg every other week beginning 1 week after initial dose (see separate order) (Patient not taking: Reported on 4/15/2024) 0.8 mL 0    Alcohol Swabs (EASY TOUCH ALCOHOL PREP MEDIUM) 70 % PADS 1 pad. by In Vitro route 3 times daily as needed 300 each 1    amLODIPine (NORVASC) 10 MG tablet Take 1 tablet (10 mg) by mouth daily 90 tablet 1    amLODIPine (NORVASC) 5 MG tablet TAKE 2 TABLETS (10 MG) BY MOUTH DAILY 180 tablet 1    ammonium lactate (LAC-HYDRIN) 12 % external lotion Apply topically daily as needed for dry skin 225 g 5    amoxicillin-clavulanate (AUGMENTIN) 875-125 MG tablet Take 1 tablet by mouth 2 times daily 20 tablet 0    aspirin (ASPIRIN LOW DOSE) 81 MG EC tablet TAKE 1 TABLET (81 MG) BY MOUTH DAILY 90 tablet 1    atenolol (TENORMIN) 50 MG tablet Take 50 mg by mouth daily      atorvastatin (LIPITOR) 80 MG tablet Take 1 tablet (80 mg) by mouth daily 90 tablet 1    augmented betamethasone dipropionate (DIPROLENE-AF) 0.05 % external ointment Apply twice daily as needed for problem areas of psoriasis up to 2 weeks at a time 45 g 11    blood glucose (NO BRAND SPECIFIED) test strip Use up to 3 times a day 100 strip 3    carboxymethylcellulose PF (LUBRICATING PLUS EYE DROPS) 0.5 % ophthalmic solution PLACE 1 DROP INTO BOTH EYES DAILY AS NEEDED FOR DRY EYES 30 each 3    cefdinir (OMNICEF) 300 MG capsule Take 1 capsule (300 mg) by mouth 2 times daily for 10 days 20 capsule 0    Continuous Blood Gluc Sensor (FREESTYLE JOSE 14 DAY SENSOR) [facility] [place] to test blood sugar daily per 's instructions.  Remove and replace every 14 days. 2 each 6    diphenhydrAMINE (BENADRYL) 25 MG tablet Take 1 tablet (25 mg) by mouth every 6 hours as needed for itching 100 tablet 3    empagliflozin (JARDIANCE) 25 MG TABS tablet Take 1 tablet (25 mg) by mouth daily 90 tablet 1    evolocumab (REPATHA) 140 MG/ML prefilled autoinjector Inject 1 mL (140 mg) Subcutaneous every 14 days  3 mL 1    febuxostat (ULORIC) 40 MG TABS tablet Take 1 tablet (40 mg) by mouth daily 90 tablet 1    fluocinonide (LIDEX) 0.05 % external solution Apply 10-15 drops to scalp at nighttime before bed as needed for itch. 120 mL 11    gabapentin (NEURONTIN) 400 MG capsule Take 1 capsule (400 mg) by mouth 2 times daily 180 capsule 1    glipiZIDE (GLUCOTROL XL) 5 MG 24 hr tablet Take 1 tablet (5 mg) by mouth daily With supper 90 tablet 1    Lancet Devices (EASY TOUCH LANCING DEVICE) MISC USE TO TEST BLOOD SUGARS 1 each 0    lisinopril (ZESTRIL) 40 MG tablet Take 1 tablet (40 mg) by mouth daily 90 tablet 1    metFORMIN (GLUCOPHAGE XR) 500 MG 24 hr tablet Take 4 tablets (2,000 mg) by mouth daily 360 tablet 1    nystatin (MYCOSTATIN) 919029 UNIT/GM external cream Apply topically 2 times daily 30 g 11    ofloxacin (FLOXIN) 0.3 % otic solution Place 5 drops into both ears 2 times daily for 10 days 10 mL 1    order for DME Equipment being ordered: 1 seated 4-wheel walker with brakes 1 Device 0    ORDER FOR DME  (Continuous Positive Airway Pressure) nightly      SYSTANE BALANCE 0.6 % SOLN ophthalmic solution Place 1 drop into both eyes 4 times daily      triamcinolone (KENALOG) 0.1 % external cream Apply twice daily as needed for rash on the body 454 g 5    vitamin D3 (CHOLECALCIFEROL) 50 mcg (2000 units) tablet Take 1 tablet (50 mcg) by mouth daily 90 tablet 1     No current facility-administered medications for this visit.      Past Medical/Surgical History:   Patient Active Problem List   Diagnosis    Health Care Home    Muscle pain    Depression    Male erectile disorder    Obstructive sleep apnea    PTSD (post-traumatic stress disorder)    Testicular hypofunction    Mixed hyperlipidemia    Lesion of ulnar nerve    Vitamin D deficiency    Allergic rhinitis    H/O exploratory laparotomy    Gout    Dyshidrotic eczema    Essential hypertension with goal blood pressure less than 140/90    Type II diabetes mellitus (H)    Morbid  obesity (H)    Rotator cuff syndrome, right    Psoriasis    Bilateral knee pain    Dermatochalasis    Difficulty walking    Elevated hemoglobin A1c    Elevated LFTs    Fatty liver    Glaucoma suspect    Gouty arthropathy    Lateral epicondylitis    Malaise and fatigue    Fibromyalgia    Muscular deconditioning    Central serous retinopathy    Retinal pigment epithelial detachment     Past Medical History:   Diagnosis Date    Depressive disorder     Diabetes (H)     Hypertension

## 2024-04-15 NOTE — LETTER
"    4/15/2024         RE: Sa KENIA Mackey  [address] [place] [address]        Dear Colleague,    Thank you for referring your patient, Sa KENIA Mackey, to the [facility] [place]. Please see a copy of my visit note below.    [facility] Dermatology Note  Encounter Date: Apr 15, 2024  Office Visit      Dermatology Problem List:  1. Psoriasis + joint pain, likely psoriatic arthritis - 30-40% BSA  - Current: Cosentyx (Holding), clobetasol, and ammonium lactate.   - Previous: Humira - did not clear him up, betamethasone 0.05% oint. Triam 0.1% cream, lidex 0.05% solution  - Safety labs: CBC, CMP 4/15/24,  TB Gold, CXR 11/13/23              - Patient with hx of latent TB which was treated with ID, Hep B core positive, Hep B PCR negative  ____________________________________________    Assessment & Plan:  # Psoriasis vulgaris + joint pain, likely psoriatic arthritis. Chronic, flare. BSA 30-40%. Vast improvement with Cosentyx in both skin rash and joint pain, however patient held medication after first 3 injections due to feeling of chest tightness 2-3 days following each injection. Experiencing SOB and \"chest tightness\". Had a negative stress done by his PCP in 2022. Last dose of cosentyx was in February, 2024. Rash has not returned but joint pain has.  - Referral ordered to cardiology. Will continue to hold Cosentyx until he sees cardiology. If everything unremarkable with cardiology then he will restart use as this has been dramatically effective for his skin rash as well as significant joint pain.   - Humira can be associated with new onset CHF, however this patient was not on Humira for long - <1 year in total  - I do not see evidence in literature of cardiac side effects related to cosentyx at this time, so hopefully he will be able to resume treatment with cosentyx, but given complaint of chest pain I think it is best for him to follow up and be evaluated more thoroughly.  - " Start on clobetasol 0.05% ointment to apply to his hips BID for 2-3 weeks.   - Will continue use of ammonium lactate lotion to extremities, may alternate with clobetasol  - Next visit will order labs and Chest x-ray as he needs this annually given hx of latent TB.     Procedures Performed:   None     Follow-up: 6mo for new or changing lesions    Staff and scribe:    Scribe Disclosure:   I, EDIE RANGEL, am serving as a scribe; to document services personally performed by Deya Kelly PA-C -based on data collection and the provider's statements to me.     Provider Disclosure:  I agree with above History, Review of Systems, Physical exam and Plan.  I have reviewed the content of the documentation and have edited it as needed. I have personally performed the services documented here and the documentation accurately represents those services and the decisions I have made.      Electronically signed by:    All risks, benefits and alternatives were discussed with patient.  Patient is in agreement and understands the assessment and plan.  All questions were answered.    Deya Kelly PA-C, MPAS  [facility]: Phone: [phone], Fax: [phone]  [facility]: Phone: [phone],  Fax: [phone]  [facility]: Phone: [phone], Fax: [phone]  ____________________________________________    CC: Psoriasis (Patient wants to discuss treatment plan- states cosentyx helps psoriasis but is causing chest pain and shortness of breath. State he was seen by PCP and they did not think this was cardiac issues -he has had a full work up 02/2024.  States he has had chest pain previously to cosentycx but the sob came after starting the medication)      Reviewed patients past medical history and pertinent chart review prior to patient's visit today.     HPI:  Mr. Sa KENIA Mackey is a 63 year old male who presents today  "as a return patient for psoriasis follow up. Was switched to Cosentyx from Humira. Humira was not effective to treat his PSO. On humira about 8-9mo. He only just began treatment with Cosentyx, he had given himself 3 injections. He stopped it in Feb of this year because he noticed what he calls \"chest congestion\" 2-3 days after he gave himself an injection. He did follow up with his PCP 10 days ago, and his PCP felt that his relative risk for cardiovascular disease was low. He had a stress test with his PCP in 2022, and recently had an EKG 2/8/24. The EKG was ordered due to sx of chest pain.     Patient states he does not feel these sx are similar to shortness of breath like you may have with an upper respiratory infection.     His joint pain and rash significantly improved with just 3 does of cosentyx, however since holding cosentyx the joint pain has started to return. Skin rash has not returned. Has flaring patch of rash on hips, but this had not yet cleared when he stopped the cosentyx    Patient is otherwise feeling well, without additional concerns.    Labs:  CBC and CMP ordered today    Physical Exam:  Vitals: There were no vitals taken for this visit.  SKIN: Total skin excluding the undergarment areas was performed. The exam included the head/face, neck, both arms, chest, back, abdomen, both legs, digits and/or nails.    - inflammatory plaques on his R and L hip  - Arms and back are much improved.   - No other lesions of concern on areas examined.     Medications:  Current Outpatient Medications   Medication Sig Dispense Refill     COSENTYX SENSOREADY, 300 MG, 150 MG/ML SOAJ INJECT THE CONTENTS OF TWO PENS SUBCUTANEOUSLY WEEKLY FOR 5 WEEKS THEN TWO PENS EVERY 4 WEEKS. **STARTER DOSE** 8 mL 0     acetaminophen (TYLENOL) 500 MG tablet Take 2 tablets (1,000 mg) by mouth every 8 hours as needed for mild pain 100 tablet 3     acetic acid-hydrocortisone (VOSOL-HC) 1-2 % otic solution Place 3 drops into both ears 2 " times daily as needed (itching) 10 mL 3     adalimumab (HUMIRA *CF*) 40 MG/0.4ML pen kit Inject 0.4 mLs (40 mg) Subcutaneous every 14 days (Patient not taking: Reported on 4/15/2024) 0.8 mL 11     adalimumab (HUMIRA *CF*) 80 MG/0.8ML pen kit Inject 0.8 mL (80 mg) subcutaneous for 1 dose, followed by 40 mg every other week beginning 1 week after initial dose (see separate order) (Patient not taking: Reported on 4/15/2024) 0.8 mL 0     Alcohol Swabs (EASY TOUCH ALCOHOL PREP MEDIUM) 70 % PADS 1 pad. by In Vitro route 3 times daily as needed 300 each 1     amLODIPine (NORVASC) 10 MG tablet Take 1 tablet (10 mg) by mouth daily 90 tablet 1     amLODIPine (NORVASC) 5 MG tablet TAKE 2 TABLETS (10 MG) BY MOUTH DAILY 180 tablet 1     ammonium lactate (LAC-HYDRIN) 12 % external lotion Apply topically daily as needed for dry skin 225 g 5     amoxicillin-clavulanate (AUGMENTIN) 875-125 MG tablet Take 1 tablet by mouth 2 times daily 20 tablet 0     aspirin (ASPIRIN LOW DOSE) 81 MG EC tablet TAKE 1 TABLET (81 MG) BY MOUTH DAILY 90 tablet 1     atenolol (TENORMIN) 50 MG tablet Take 50 mg by mouth daily       atorvastatin (LIPITOR) 80 MG tablet Take 1 tablet (80 mg) by mouth daily 90 tablet 1     augmented betamethasone dipropionate (DIPROLENE-AF) 0.05 % external ointment Apply twice daily as needed for problem areas of psoriasis up to 2 weeks at a time 45 g 11     blood glucose (NO BRAND SPECIFIED) test strip Use up to 3 times a day 100 strip 3     carboxymethylcellulose PF (LUBRICATING PLUS EYE DROPS) 0.5 % ophthalmic solution PLACE 1 DROP INTO BOTH EYES DAILY AS NEEDED FOR DRY EYES 30 each 3     cefdinir (OMNICEF) 300 MG capsule Take 1 capsule (300 mg) by mouth 2 times daily for 10 days 20 capsule 0     Continuous Blood Gluc Sensor (FREESTYLE JOSE 14 DAY SENSOR) [place] Use to test blood sugar daily per 's instructions.  Remove and replace every 14 days. 2 each 6     diphenhydrAMINE (BENADRYL) 25 MG tablet Take 1 tablet  (25 mg) by mouth every 6 hours as needed for itching 100 tablet 3     empagliflozin (JARDIANCE) 25 MG TABS tablet Take 1 tablet (25 mg) by mouth daily 90 tablet 1     evolocumab (REPATHA) 140 MG/ML prefilled autoinjector Inject 1 mL (140 mg) Subcutaneous every 14 days 3 mL 1     febuxostat (ULORIC) 40 MG TABS tablet Take 1 tablet (40 mg) by mouth daily 90 tablet 1     fluocinonide (LIDEX) 0.05 % external solution Apply 10-15 drops to scalp at nighttime before bed as needed for itch. 120 mL 11     gabapentin (NEURONTIN) 400 MG capsule Take 1 capsule (400 mg) by mouth 2 times daily 180 capsule 1     glipiZIDE (GLUCOTROL XL) 5 MG 24 hr tablet Take 1 tablet (5 mg) by mouth daily With supper 90 tablet 1     Lancet Devices (EASY TOUCH LANCING DEVICE) MISC USE TO TEST BLOOD SUGARS 1 each 0     lisinopril (ZESTRIL) 40 MG tablet Take 1 tablet (40 mg) by mouth daily 90 tablet 1     metFORMIN (GLUCOPHAGE XR) 500 MG 24 hr tablet Take 4 tablets (2,000 mg) by mouth daily 360 tablet 1     nystatin (MYCOSTATIN) 830041 UNIT/GM external cream Apply topically 2 times daily 30 g 11     ofloxacin (FLOXIN) 0.3 % otic solution Place 5 drops into both ears 2 times daily for 10 days 10 mL 1     order for DME Equipment being ordered: 1 seated 4-wheel walker with brakes 1 Device 0     ORDER FOR DME  (Continuous Positive Airway Pressure) nightly       SYSTANE BALANCE 0.6 % SOLN ophthalmic solution Place 1 drop into both eyes 4 times daily       triamcinolone (KENALOG) 0.1 % external cream Apply twice daily as needed for rash on the body 454 g 5     vitamin D3 (CHOLECALCIFEROL) 50 mcg (2000 units) tablet Take 1 tablet (50 mcg) by mouth daily 90 tablet 1     No current facility-administered medications for this visit.      Past Medical/Surgical History:   Patient Active Problem List   Diagnosis     Health Care Home     Muscle pain     Depression     Male erectile disorder     Obstructive sleep apnea     PTSD (post-traumatic stress disorder)      Testicular hypofunction     Mixed hyperlipidemia     Lesion of ulnar nerve     Vitamin D deficiency     Allergic rhinitis     H/O exploratory laparotomy     Gout     Dyshidrotic eczema     Essential hypertension with goal blood pressure less than 140/90     Type II diabetes mellitus (H)     Morbid obesity (H)     Rotator cuff syndrome, right     Psoriasis     Bilateral knee pain     Dermatochalasis     Difficulty walking     Elevated hemoglobin A1c     Elevated LFTs     Fatty liver     Glaucoma suspect     Gouty arthropathy     Lateral epicondylitis     Malaise and fatigue     Fibromyalgia     Muscular deconditioning     Central serous retinopathy     Retinal pigment epithelial detachment     Past Medical History:   Diagnosis Date     Depressive disorder      Diabetes (H)      Hypertension                         Again, thank you for allowing me to participate in the care of your patient.        Sincerely,        Deya Kelly PA-C

## 2024-04-16 LAB
ALBUMIN SERPL BCG-MCNC: 4.5 G/DL (ref 3.5–5.2)
ALP SERPL-CCNC: 95 U/L (ref 40–150)
ALT SERPL W P-5'-P-CCNC: 32 U/L (ref 0–70)
ANION GAP SERPL CALCULATED.3IONS-SCNC: 10 MMOL/L (ref 7–15)
AST SERPL W P-5'-P-CCNC: 25 U/L (ref 0–45)
BILIRUB SERPL-MCNC: 0.4 MG/DL
BUN SERPL-MCNC: 19.7 MG/DL (ref 8–23)
CALCIUM SERPL-MCNC: 10.2 MG/DL (ref 8.8–10.2)
CHLORIDE SERPL-SCNC: 103 MMOL/L (ref 98–107)
CREAT SERPL-MCNC: 0.77 MG/DL (ref 0.67–1.17)
DEPRECATED HCO3 PLAS-SCNC: 26 MMOL/L (ref 22–29)
EGFRCR SERPLBLD CKD-EPI 2021: >90 ML/MIN/1.73M2
GLUCOSE SERPL-MCNC: 88 MG/DL (ref 70–99)
POTASSIUM SERPL-SCNC: 4.5 MMOL/L (ref 3.4–5.3)
PROT SERPL-MCNC: 9 G/DL (ref 6.4–8.3)
SODIUM SERPL-SCNC: 139 MMOL/L (ref 135–145)

## 2024-04-18 ENCOUNTER — TELEPHONE (OUTPATIENT)
Dept: FAMILY MEDICINE | Facility: CLINIC | Age: 64
End: 2024-04-18
Payer: COMMERCIAL

## 2024-04-18 NOTE — TELEPHONE ENCOUNTER
S/w pt and gave Deya's message below about lab results.  Pt states understanding.    Margie TILLMAN RN  [facility] [place]  [phone]

## 2024-05-22 DIAGNOSIS — E11.65 TYPE 2 DIABETES MELLITUS WITH HYPERGLYCEMIA, WITHOUT LONG-TERM CURRENT USE OF INSULIN (H): ICD-10-CM

## 2024-05-22 DIAGNOSIS — E55.9 HYPOVITAMINOSIS D: ICD-10-CM

## 2024-05-22 DIAGNOSIS — G63 POLYNEUROPATHY ASSOCIATED WITH UNDERLYING DISEASE (H): ICD-10-CM

## 2024-05-22 DIAGNOSIS — M1A.09X0 IDIOPATHIC CHRONIC GOUT OF MULTIPLE SITES WITHOUT TOPHUS: ICD-10-CM

## 2024-05-22 DIAGNOSIS — E78.5 HYPERLIPIDEMIA LDL GOAL <100: ICD-10-CM

## 2024-05-22 DIAGNOSIS — I10 ESSENTIAL HYPERTENSION WITH GOAL BLOOD PRESSURE LESS THAN 140/90: ICD-10-CM

## 2024-05-23 RX ORDER — GABAPENTIN 400 MG/1
400 CAPSULE ORAL 2 TIMES DAILY
Qty: 180 CAPSULE | Refills: 1 | Status: SHIPPED | OUTPATIENT
Start: 2024-05-23 | End: 2024-09-14

## 2024-05-23 RX ORDER — AMLODIPINE BESYLATE 10 MG/1
10 TABLET ORAL DAILY
Qty: 90 TABLET | Refills: 1 | Status: SHIPPED | OUTPATIENT
Start: 2024-05-23

## 2024-05-23 RX ORDER — CHOLECALCIFEROL (VITAMIN D3) 50 MCG
50 TABLET ORAL DAILY
Qty: 90 TABLET | Refills: 1 | Status: SHIPPED | OUTPATIENT
Start: 2024-05-23

## 2024-05-23 RX ORDER — METFORMIN HCL 500 MG
2000 TABLET, EXTENDED RELEASE 24 HR ORAL DAILY
Qty: 360 TABLET | Refills: 1 | Status: SHIPPED | OUTPATIENT
Start: 2024-05-23

## 2024-05-23 RX ORDER — GLIPIZIDE 5 MG/1
5 TABLET, FILM COATED, EXTENDED RELEASE ORAL DAILY
Qty: 90 TABLET | Refills: 1 | Status: SHIPPED | OUTPATIENT
Start: 2024-05-23 | End: 2024-08-20

## 2024-05-23 RX ORDER — ATORVASTATIN CALCIUM 80 MG/1
80 TABLET, FILM COATED ORAL DAILY
Qty: 90 TABLET | Refills: 1 | Status: SHIPPED | OUTPATIENT
Start: 2024-05-23

## 2024-05-23 RX ORDER — LISINOPRIL 40 MG/1
40 TABLET ORAL DAILY
Qty: 90 TABLET | Refills: 1 | Status: SHIPPED | OUTPATIENT
Start: 2024-05-23

## 2024-05-23 RX ORDER — ASPIRIN 81 MG/1
TABLET ORAL
Qty: 90 TABLET | Refills: 1 | Status: SHIPPED | OUTPATIENT
Start: 2024-05-23

## 2024-05-23 RX ORDER — FEBUXOSTAT 40 MG/1
40 TABLET, FILM COATED ORAL DAILY
Qty: 90 TABLET | Refills: 1 | Status: SHIPPED | OUTPATIENT
Start: 2024-05-23

## 2024-05-23 NOTE — TELEPHONE ENCOUNTER
Name from pharmacy: VIT D3 2,000U (50MCG) TAB 2000 UNIT Tablet          Will file in chart as: vitamin D3 (CHOLECALCIFEROL) 50 mcg (2000 units) tablet    Sig: Take 1 tablet (50 mcg) by mouth daily    Disp: 90 tablet    Refills: 1    Start: 5/22/2024    Class: E-Prescribe    Non-formulary For: Hypovitaminosis D    Last ordered: 3 months ago (2/23/2024) by Jason Siddiqui MD    Last refill: 5/22/2024    Rx #: [id number]    Vitamin Supplements Protocol Spnpda0805/22/2024 03:44 PM   Protocol Details The patient does not have an order for high-dose vitamin D    Medication is active on med list    Medication indicated for associated diagnosis    The patient is 2 years of age or older    Recent (12 mo) or future (90 days) visit within the authorizing provider's specialty       Name from pharmacy: ASPIRIN LOW DOSE 81 MG TBEC 81 Tablet         Will file in chart as: ASPIRIN LOW DOSE 81 MG EC tablet    Sig: TAKE 1 TABLET (81 MG) BY MOUTH DAILY    Disp: 90 tablet    Refills: 1    Start: 5/22/2024    Class: E-Prescribe    Non-formulary For: Hyperlipidemia LDL goal <100    Last ordered: 3 months ago (2/23/2024) by Jason Siddiqui MD    Last refill: 5/22/2024    Rx #: [id number]    Analgesics (Non-Narcotic Tylenol and ASA Only) Qdizpb6705/22/2024 03:44 PM   Protocol Details Patient is age 20 years or older    Medication is active on med list    Medication matches indication    Recent (12 mo) or future (90 days) visit within the authorizing provider's specialty       Name from pharmacy: GABAPENTIN 400 MG CAPSULE 400 Capsule         Will file in chart as: gabapentin (NEURONTIN) 400 MG capsule    Sig: Take 1 capsule (400 mg) by mouth 2 times daily    Disp: 180 capsule    Refills: 1    Start: 5/22/2024    Class: E-Prescribe    For: Polyneuropathy associated with underlying disease (H24)    Last ordered: 3 months ago (2/23/2024) by Jason Siddiqui MD    Last refill: 5/22/2024    Rx #: [id number]        Name from pharmacy:  LISINOPRIL 40 MG TABLET 40 Tablet         Will file in chart as: lisinopril (ZESTRIL) 40 MG tablet    Sig: Take 1 tablet (40 mg) by mouth daily    Disp: 90 tablet    Refills: 1    Start: 5/22/2024    Class: E-Prescribe    For: Essential hypertension with goal blood pressure less than 140/90    Last ordered: 3 months ago (2/23/2024) by Jason Siddiqui MD    Last refill: 5/22/2024    Rx #: [id number]    ACE Inhibitors (Including Combos) Protocol Zyzstr0605/22/2024 03:44 PM   Protocol Details Blood pressure under 140/90 in past 12 months- Clinicial or Patient Reported    Medication is active on med list    Medication indicated for associated diagnosis    Has GFR on file in past 12 months and most recent value is normal    Recent (12 mo) or future (90 days) visit within the authorizing provider's specialty    Patient is age 18 or older    Normal serum potassium on file in past 12 months       Name from pharmacy: METFORMIN HCL ER 500MG 24HR 500 Tablet         Will file in chart as: metFORMIN (GLUCOPHAGE XR) 500 MG 24 hr tablet    Sig: Take 4 tablets (2,000 mg) by mouth daily    Disp: 360 tablet    Refills: 1    Start: 5/22/2024    Class: E-Prescribe    For: Type 2 diabetes mellitus with hyperglycemia, without long-term current use of insulin (H)    Last ordered: 3 months ago (2/23/2024) by Jason Siddiqui MD    Last refill: 5/22/2024    Rx #: [id number]    Biguanide Agents Iuqdac7905/22/2024 03:44 PM   Protocol Details Patient is age 10 or older    Patient has documented A1c within the specified period of time.    Patient does NOT have a diagnosis of CHF.    Medication is active on med list    Medication indicated for associated diagnosis    Has GFR on file in past 12 months and most recent value is normal    Recent (6 mo) or future (90 days) visit within the authorizing provider's specialty       Name from pharmacy: GLIPIZIDE ER 5 MG TAB 5 Tablet         Will file in chart as: glipiZIDE (GLUCOTROL XL) 5 MG 24 hr  tablet    Sig: Take 1 tablet (5 mg) by mouth daily With supper    Disp: 90 tablet    Refills: 1    Start: 5/22/2024    Class: E-Prescribe    For: Type 2 diabetes mellitus with hyperglycemia, without long-term current use of insulin (H)    Last ordered: 3 months ago (2/23/2024) by Jason Siddiqui MD    Last refill: 5/22/2024    Rx #: [id number]    Sulfonylurea Agents Teubyg0205/22/2024 03:44 PM   Protocol Details Patient has documented A1c within the specified period of time.    Medication is active on med list    Has GFR on file in past 12 months and most recent value is normal    Recent (6 mo) or future (90 days) visit within the authorizing provider's specialty    Medication indicated for associated diagnosis    Patient is age 18 or older    Patient has a recent creatinine (normal) within the past 12 mos.       Name from pharmacy: FEBUXOSTAT 40 MG TABS 40 Tablet         Will file in chart as: febuxostat (ULORIC) 40 MG TABS tablet    Sig: Take 1 tablet (40 mg) by mouth daily    Disp: 90 tablet    Refills: 1    Start: 5/22/2024    Class: E-Prescribe    For: Idiopathic chronic gout of multiple sites without tophus    Last ordered: 3 months ago (2/23/2024) by Jason Siddiqui MD    Last refill: 5/22/2024    Rx #: [id number]    Gout Agents Protocol Mwygim9805/22/2024 03:44 PM   Protocol Details CBC on file in past 12 months    ALT on file in past 12 months    Has Uric Acid on file in past 12 months and value is less than 6    Medication is active on med list    Medication indicated for associated diagnosis    Has GFR on file in past 12 months and most recent value is normal    Recent (12 mo) or future (90 days) visit within the authorizing provider's specialty    Patient is age 18 or older       Name from pharmacy: ATORVASTATIN 80MG TABLET 80 Tablet         Will file in chart as: atorvastatin (LIPITOR) 80 MG tablet    Sig: Take 1 tablet (80 mg) by mouth daily    Disp: 90 tablet    Refills: 1    Start: 5/22/2024     Class: E-Prescribe    For: Type 2 diabetes mellitus with hyperglycemia, without long-term current use of insulin (H)    Last ordered: 3 months ago (2/23/2024) by Jason Siddiqui MD    Last refill: 5/22/2024    Rx #: [id number]    Antihyperlipidemic agents Evteea8805/22/2024 03:44 PM   Protocol Details LDL on file in the past 12 months    Medication is active on med list    Recent (12 mo) or future (90 days) visit within the authorizing provider's specialty    Patient is age 18 years or older       Name from pharmacy: AMLODIPINE BES 10MG TABLET 10 Tablet         Will file in chart as: amLODIPine (NORVASC) 10 MG tablet    Sig: Take 1 tablet (10 mg) by mouth daily    Disp: 90 tablet    Refills: 1    Start: 5/22/2024    Class: E-Prescribe    For: Essential hypertension with goal blood pressure less than 140/90    Last ordered: 3 months ago (2/23/2024) by Jason Siddiqui MD    Last refill: 5/22/2024    Rx #: [id number]    Calcium Channel Blockers Protocol  Mppzaw0405/22/2024 03:44 PM   Protocol Details Blood pressure under 140/90 in past 12 months        BP Readings from Last 3 Encounters:   04/05/24 (!) 144/82   03/26/24 (!) 146/85   02/23/24 (!) 162/83     Component      Latest Ref Rng 2/23/2024  8:28 AM   Hemoglobin A1C      0.0 - 5.6 % 7.1 (H)      Component      Latest Ref Rng 4/15/2024  3:57 PM   ALT      0 - 70 U/L 32         Component      Latest Ref Rng 4/15/2024  3:57 PM   GFR Estimate      >60 mL/min/1.73m2 >90        Component      Latest Ref Rng 2/23/2024  8:28 AM   Uric Acid      3.4 - 7.0 mg/dL 4.9      Component      Latest Ref Rng 4/5/2024  10:33 AM   Cholesterol      <200 mg/dL 209 (H)    HDL Cholesterol      >=40 mg/dL 32 (L)    LDL Cholesterol Calculated      <=100 mg/dL 147 (H)    Triglycerides      <150 mg/dL 152 (H)    Patient Fasting? Unknown    Non HDL Cholesterol      <130 mg/dL 177 (H)       Legend:    Prescription approved per [facility] Refill Protocol.  Vanita RN, BSN

## 2024-06-06 ENCOUNTER — OFFICE VISIT (OUTPATIENT)
Dept: CARDIOLOGY | Facility: CLINIC | Age: 64
End: 2024-06-06
Attending: PHYSICIAN ASSISTANT
Payer: COMMERCIAL

## 2024-06-06 VITALS
WEIGHT: 222.1 LBS | DIASTOLIC BLOOD PRESSURE: 83 MMHG | SYSTOLIC BLOOD PRESSURE: 162 MMHG | HEART RATE: 73 BPM | TEMPERATURE: 98 F | BODY MASS INDEX: 37.92 KG/M2 | RESPIRATION RATE: 16 BRPM | HEIGHT: 64 IN | OXYGEN SATURATION: 97 %

## 2024-06-06 DIAGNOSIS — R07.89 CHEST TIGHTNESS: ICD-10-CM

## 2024-06-06 PROCEDURE — 99204 OFFICE O/P NEW MOD 45 MIN: CPT | Performed by: INTERNAL MEDICINE

## 2024-06-06 NOTE — PROGRESS NOTES
[facility]   [address], [place] [address]   [email or web address]   OFFICE: [phone]          Thank you Dr. Kelly for asking the [facility] team to participate in the care of your patient, Sa KENIA Mackey.     Impression and Plan     1.  Chest discomfort.  Certain features are a bit atypical for cardiac etiology, ischemic or otherwise.  Cannot fully discount possible ischemic contribution to his symptoms though level of suspicion is fairly low.  Plan:  Stress echocardiogram.    Further recommendations pending test results.    35 minutes spent reviewing prior records (including documentation, laboratory studies, cardiac testing/imaging), interview with patient along with physical exam, planning, and subsequent documentation/crafting of note).           History of Present Illness    Once again I would like to thank you again for asking me to participate in the care of your patient, Sa KENIA Mackey.  As you know, but to reiterate for my own records, Sa KENIA Mackey is a 63 year old male with symptoms of chest discomfort.  Patient feels as though his chest discomfort seems to occur after he receives injections for his skin condition.  Symptoms are not necessarily provoked with exertion in a predictable fashion.  He has perhaps some mild associated shortness of breath though not pronounced.    Further review of systems is otherwise negative/noncontributory (medical record and 13 point review of systems reviewed as well and pertinent positives noted).         Cardiac Diagnostics/Imaging      Regadenoson nuclear perfusion study 19 August 2022:  No evidence of infarct or ischemia.  Normal left ventricular systolic performance with ejection fraction of 70%.    Ambulatory monitor x 14 days 4 December 2020 through 17 December 2020:  Normal multiday monitor with rare singular PVCs, no complex arrhythmias seen.    Chest radiograph 15 August 2022:  Lungs are clear.   No  "pleural effusion.   Heart size and pulmonary vascularity within normal limits.           Physical Examination       BP (!) 162/83 (BP Location: Left arm, Patient Position: Sitting, Cuff Size: Adult Large)   Pulse 73   Temp 98  F (36.7  C) (Oral)   Resp 16   Ht 1.626 m (5' 4\")   Wt 100.7 kg (222 lb 1.6 oz)   SpO2 97%   BMI 38.12 kg/m          Wt Readings from Last 3 Encounters:   06/06/24 100.7 kg (222 lb 1.6 oz)   04/05/24 98.4 kg (217 lb)   03/26/24 100.4 kg (221 lb 6.4 oz)       The patient is alert and oriented times three. Sclerae are anicteric. Mucosal membranes are moist. Jugular venous pressure is normal. No significant adenopathy/thyromegally appreciated. Lungs are clear with good expansion. On cardiovascular exam, the patient has a regular S1 and S2. Abdomen is soft and non-tender. Extremities reveal no clubbing, cyanosis, or edema.         Family History/Social History/Risk Factors   Patient does not smoke.  Family history reviewed, and family history includes Cancer in his mother; Coronary Artery Disease in his father; Heart Disease in his father; No Known Problems in his brother, cousin, daughter, maternal grandfather, maternal grandmother, maternal half-brother, maternal half-sister, nephew, niece, paternal grandfather, paternal grandmother, paternal half-brother, paternal half-sister, sister, son, and another family member; Other - See Comments in his mother.          Medical History  Surgical History Family History Social History   Past Medical History:   Diagnosis Date    Depressive disorder     Diabetes (H)     Hypertension      Past Surgical History:   Procedure Laterality Date    ORIF trimalleolar ankle fracture Left 02/02/2021    [facility]     Family History   Problem Relation Age of Onset    Other - See Comments Mother     Cancer Mother     Coronary Artery Disease Father     Heart Disease Father     No Known Problems Maternal Grandmother     No Known Problems Maternal Grandfather "     No Known Problems Paternal Grandmother     No Known Problems Paternal Grandfather     No Known Problems Brother     No Known Problems Sister     No Known Problems Son     No Known Problems Daughter     No Known Problems Maternal Half-Brother     No Known Problems Maternal Half-Sister     No Known Problems Paternal Half-Brother     No Known Problems Paternal Half-Sister     No Known Problems Niece     No Known Problems Nephew     No Known Problems Cousin     No Known Problems Other     Diabetes No family hx of     Hypertension No family hx of     Hyperlipidemia No family hx of     Kidney Disease No family hx of     Cerebrovascular Disease No family hx of     Obesity No family hx of     Thrombosis No family hx of     Asthma No family hx of     Arthritis No family hx of     Thyroid Disease No family hx of     Depression No family hx of     Mental Illness No family hx of     Substance Abuse No family hx of     Cystic Fibrosis No family hx of     Early Death No family hx of     Coronary Artery Disease Early Onset No family hx of     Heart Failure No family hx of     Bleeding Diathesis No family hx of     Dementia No family hx of     Breast Cancer No family hx of     Ovarian Cancer No family hx of     Uterine Cancer No family hx of     Prostate Cancer No family hx of     Colorectal Cancer No family hx of     Pancreatic Cancer No family hx of     Lung Cancer No family hx of     Melanoma No family hx of     Autoimmune Disease No family hx of     Unknown/Adopted No family hx of     Genetic Disorder No family hx of         Social History     Socioeconomic History    Marital status:      Spouse name: Not on file    Number of children: Not on file    Years of education: Not on file    Highest education level: Not on file   Occupational History    Not on file   Tobacco Use    Smoking status: Never    Smokeless tobacco: Never   Vaping Use    Vaping status: Never Used   Substance and Sexual Activity    Alcohol use: No     Drug use: No    Sexual activity: Not Currently   Other Topics Concern    Not on file   Social History Narrative    Not on file     Social Determinants of Health     Financial Resource Strain: Low Risk  (4/5/2024)    Financial Resource Strain     Within the past 12 months, have you or your family members you live with been unable to get utilities (heat, electricity) when it was really needed?: No   Food Insecurity: Low Risk  (4/5/2024)    Food Insecurity     Within the past 12 months, did you worry that your food would run out before you got money to buy more?: No     Within the past 12 months, did the food you bought just not last and you didn t have money to get more?: No   Transportation Needs: Low Risk  (4/5/2024)    Transportation Needs     Within the past 12 months, has lack of transportation kept you from medical appointments, getting your medicines, non-medical meetings or appointments, work, or from getting things that you need?: No   Physical Activity: Not on file   Stress: Not on file   Social Connections: Not on file   Interpersonal Safety: Low Risk  (4/5/2024)    Interpersonal Safety     Do you feel physically and emotionally safe where you currently live?: Yes     Within the past 12 months, have you been hit, slapped, kicked or otherwise physically hurt by someone?: No     Within the past 12 months, have you been humiliated or emotionally abused in other ways by your partner or ex-partner?: No   Housing Stability: Low Risk  (4/5/2024)    Housing Stability     Do you have housing? : Yes     Are you worried about losing your housing?: No           Medications  Allergies   Current Outpatient Medications   Medication Sig Dispense Refill    acetaminophen (TYLENOL) 500 MG tablet Take 2 tablets (1,000 mg) by mouth every 8 hours as needed for mild pain 100 tablet 3    acetic acid-hydrocortisone (VOSOL-HC) 1-2 % otic solution Place 3 drops into both ears 2 times daily as needed (itching) 10 mL 3    Alcohol  Swabs (EASY TOUCH ALCOHOL PREP MEDIUM) 70 % PADS 1 pad. by In Vitro route 3 times daily as needed 300 each 1    amLODIPine (NORVASC) 10 MG tablet TAKE 1 TABLET (10 MG) BY MOUTH DAILY 90 tablet 1    amLODIPine (NORVASC) 5 MG tablet TAKE 2 TABLETS (10 MG) BY MOUTH DAILY 180 tablet 1    ammonium lactate (LAC-HYDRIN) 12 % external lotion Apply topically daily as needed for dry skin 225 g 5    amoxicillin-clavulanate (AUGMENTIN) 875-125 MG tablet Take 1 tablet by mouth 2 times daily 20 tablet 0    aspirin (ASPIRIN LOW DOSE) 81 MG EC tablet TAKE 1 TABLET (81 MG) BY MOUTH DAILY 90 tablet 1    atenolol (TENORMIN) 50 MG tablet Take 50 mg by mouth daily      atorvastatin (LIPITOR) 80 MG tablet TAKE 1 TABLET (80 MG) BY MOUTH DAILY 90 tablet 1    blood glucose (NO BRAND SPECIFIED) test strip Use up to 3 times a day 100 strip 3    Continuous Blood Gluc Sensor (FREESTYLE JOSE 14 DAY SENSOR) [facility] [place] to test blood sugar daily per 's instructions.  Remove and replace every 14 days. 2 each 6    COSENTYX SENSOREADY, 300 MG, 150 MG/ML SOAJ INJECT THE CONTENTS OF TWO PENS SUBCUTANEOUSLY WEEKLY FOR 5 WEEKS THEN TWO PENS EVERY 4 WEEKS. **STARTER DOSE** 8 mL 0    diphenhydrAMINE (BENADRYL) 25 MG tablet Take 1 tablet (25 mg) by mouth every 6 hours as needed for itching 100 tablet 3    empagliflozin (JARDIANCE) 25 MG TABS tablet Take 1 tablet (25 mg) by mouth daily 90 tablet 1    febuxostat (ULORIC) 40 MG TABS tablet TAKE 1 TABLET (40 MG) BY MOUTH DAILY 90 tablet 1    fluocinonide (LIDEX) 0.05 % external solution Apply 10-15 drops to scalp at nighttime before bed as needed for itch. 120 mL 11    gabapentin (NEURONTIN) 400 MG capsule TAKE 1 CAPSULE (400 MG) BY MOUTH 2 TIMES DAILY 180 capsule 1    glipiZIDE (GLUCOTROL XL) 5 MG 24 hr tablet TAKE 1 TABLET (5 MG) BY MOUTH DAILY WITH SUPPER 90 tablet 1    Lancet Devices (EASY TOUCH LANCING DEVICE) MISC USE TO TEST BLOOD SUGARS 1 each 0    lisinopril (ZESTRIL) 40 MG tablet TAKE 1  "TABLET (40 MG) BY MOUTH DAILY 90 tablet 1    metFORMIN (GLUCOPHAGE XR) 500 MG 24 hr tablet TAKE 4 TABLETS (2,000 MG) BY MOUTH DAILY 360 tablet 1    nystatin (MYCOSTATIN) 677637 UNIT/GM external cream Apply topically 2 times daily 30 g 11    order for DME Equipment being ordered: 1 seated 4-wheel walker with brakes 1 Device 0    ORDER FOR DME  (Continuous Positive Airway Pressure) nightly      triamcinolone (KENALOG) 0.1 % external cream Apply twice daily as needed for rash on the body 454 g 5    vitamin D3 (CHOLECALCIFEROL) 50 mcg (2000 units) tablet TAKE 1 TABLET (50 MCG) BY MOUTH DAILY 90 tablet 1       Allergies   Allergen Reactions    Allopurinol Rash    Cortisporin [Neomycin-Polymyxin-Hc] Swelling    Indomethacin     Salsalate     Tetracycline     Tramadol     Nsaids Rash          Lab Results    Chemistry/lipid CBC Cardiac Enzymes/BNP/TSH/INR   Recent Labs   Lab Test 04/05/24  1033 07/23/21  1300 03/05/20  1059   CHOL 209*   < > 156.6   HDL 32*   < > 38.6*   *   < > 78   TRIG 152*   < > 202.5*   CHOLHDLRATIO  --   --  4.1    < > = values in this interval not displayed.     Recent Labs   Lab Test 04/05/24  1033 02/23/24  0828 03/06/23  1106   * 149* 157*     Recent Labs   Lab Test 04/15/24  1557      POTASSIUM 4.5   CHLORIDE 103   CO2 26   GLC 88   BUN 19.7   CR 0.77   GFRESTIMATED >90   PRETTY 10.2     Recent Labs   Lab Test 04/15/24  1557 02/23/24  0828 11/13/23  1535   CR 0.77 0.93 1.16     Recent Labs   Lab Test 02/23/24  0828 10/16/23  0911 06/01/23  0927   A1C 7.1* 8.6* 8.5*          Recent Labs   Lab Test 04/15/24  1557   WBC 8.1   HGB 13.9   HCT 43.8   MCV 65*        Recent Labs   Lab Test 04/15/24  1557 11/13/23  1535 03/31/23  1205   HGB 13.9 13.5 14.1    No results for input(s): \"TROPONINI\" in the last 83503 hours.  No results for input(s): \"BNP\", \"NTBNPI\", \"NTBNP\" in the last 38235 hours.  Recent Labs   Lab Test 02/23/24  0828   TSH 1.96     No results for input(s): \"INR\" in " the last 05530 hours.       Medications  Allergies   Current Outpatient Medications   Medication Sig Dispense Refill    acetaminophen (TYLENOL) 500 MG tablet Take 2 tablets (1,000 mg) by mouth every 8 hours as needed for mild pain 100 tablet 3    acetic acid-hydrocortisone (VOSOL-HC) 1-2 % otic solution Place 3 drops into both ears 2 times daily as needed (itching) 10 mL 3    Alcohol Swabs (EASY TOUCH ALCOHOL PREP MEDIUM) 70 % PADS 1 pad. by In Vitro route 3 times daily as needed 300 each 1    amLODIPine (NORVASC) 10 MG tablet TAKE 1 TABLET (10 MG) BY MOUTH DAILY 90 tablet 1    amLODIPine (NORVASC) 5 MG tablet TAKE 2 TABLETS (10 MG) BY MOUTH DAILY 180 tablet 1    ammonium lactate (LAC-HYDRIN) 12 % external lotion Apply topically daily as needed for dry skin 225 g 5    amoxicillin-clavulanate (AUGMENTIN) 875-125 MG tablet Take 1 tablet by mouth 2 times daily 20 tablet 0    aspirin (ASPIRIN LOW DOSE) 81 MG EC tablet TAKE 1 TABLET (81 MG) BY MOUTH DAILY 90 tablet 1    atenolol (TENORMIN) 50 MG tablet Take 50 mg by mouth daily      atorvastatin (LIPITOR) 80 MG tablet TAKE 1 TABLET (80 MG) BY MOUTH DAILY 90 tablet 1    blood glucose (NO BRAND SPECIFIED) test strip Use up to 3 times a day 100 strip 3    Continuous Blood Gluc Sensor (FREESTYLE JOSE 14 DAY SENSOR) [facility] Use to test blood sugar daily per 's instructions.  Remove and replace every 14 days. 2 each 6    COSENTYX SENSOREADY, 300 MG, 150 MG/ML SOAJ INJECT THE CONTENTS OF TWO PENS SUBCUTANEOUSLY WEEKLY FOR 5 WEEKS THEN TWO PENS EVERY 4 WEEKS. **STARTER DOSE** 8 mL 0    diphenhydrAMINE (BENADRYL) 25 MG tablet Take 1 tablet (25 mg) by mouth every 6 hours as needed for itching 100 tablet 3    empagliflozin (JARDIANCE) 25 MG TABS tablet Take 1 tablet (25 mg) by mouth daily 90 tablet 1    febuxostat (ULORIC) 40 MG TABS tablet TAKE 1 TABLET (40 MG) BY MOUTH DAILY 90 tablet 1    fluocinonide (LIDEX) 0.05 % external solution Apply 10-15 drops to scalp at  "nighttime before bed as needed for itch. 120 mL 11    gabapentin (NEURONTIN) 400 MG capsule TAKE 1 CAPSULE (400 MG) BY MOUTH 2 TIMES DAILY 180 capsule 1    glipiZIDE (GLUCOTROL XL) 5 MG 24 hr tablet TAKE 1 TABLET (5 MG) BY MOUTH DAILY WITH SUPPER 90 tablet 1    Lancet Devices (EASY TOUCH LANCING DEVICE) MISC USE TO TEST BLOOD SUGARS 1 each 0    lisinopril (ZESTRIL) 40 MG tablet TAKE 1 TABLET (40 MG) BY MOUTH DAILY 90 tablet 1    metFORMIN (GLUCOPHAGE XR) 500 MG 24 hr tablet TAKE 4 TABLETS (2,000 MG) BY MOUTH DAILY 360 tablet 1    nystatin (MYCOSTATIN) 534659 UNIT/GM external cream Apply topically 2 times daily 30 g 11    order for DME Equipment being ordered: 1 seated 4-wheel walker with brakes 1 Device 0    ORDER FOR DME  (Continuous Positive Airway Pressure) nightly      triamcinolone (KENALOG) 0.1 % external cream Apply twice daily as needed for rash on the body 454 g 5    vitamin D3 (CHOLECALCIFEROL) 50 mcg (2000 units) tablet TAKE 1 TABLET (50 MCG) BY MOUTH DAILY 90 tablet 1      Allergies   Allergen Reactions    Allopurinol Rash    Cortisporin [Neomycin-Polymyxin-Hc] Swelling    Indomethacin     Salsalate     Tetracycline     Tramadol     Nsaids Rash          Lab Results   Lab Results   Component Value Date     04/15/2024    .9 01/29/2021    CO2 26 04/15/2024    CO2 28 08/08/2022    CO2 25.7 01/29/2021    BUN 19.7 04/15/2024    BUN 21 08/08/2022    BUN 16.3 01/29/2021     Lab Results   Component Value Date    WBC 8.1 04/15/2024    HGB 13.9 04/15/2024    HGB 12.6 01/29/2021    HCT 43.8 04/15/2024    HCT 42.2 01/29/2021    MCV 65 04/15/2024    MCV 70.5 01/29/2021     04/15/2024     Lab Results   Component Value Date    CHOL 209 04/05/2024    CHOL 156.6 03/05/2020    TRIG 152 04/05/2024    TRIG 202.5 03/05/2020    HDL 32 04/05/2024    HDL 38.6 03/05/2020     No results found for: \"INR\"  No results found for: \"BNP\"  No results found for: \"CKTOTAL\", \"CKMB\", \"TROPONINI\"  Lab Results   Component " Value Date    TSH 1.96 02/23/2024

## 2024-06-06 NOTE — LETTER
6/6/2024    Jason Siddiqui MD  [address] [place] [id number]    RE: Sa KENIA Mackey       Dear Colleague,     I had the pleasure of seeing Sa KENIA Mackey in the [facility].         [facility]   [address], [place] [address]   [email or web address]   OFFICE: [phone]          Thank you Dr. Kelly for asking the [facility] team to participate in the care of your patient, Sa KENIA Mackey.     Impression and Plan     1.  Chest discomfort.  Certain features are a bit atypical for cardiac etiology, ischemic or otherwise.  Cannot fully discount possible ischemic contribution to his symptoms though level of suspicion is fairly low.  Plan:  Stress echocardiogram.    Further recommendations pending test results.    35 minutes spent reviewing prior records (including documentation, laboratory studies, cardiac testing/imaging), interview with patient along with physical exam, planning, and subsequent documentation/crafting of note).           History of Present Illness    Once again I would like to thank you again for asking me to participate in the care of your patient, Sa KENIA Mackey.  As you know, but to reiterate for my own records, Sa KENIA Mackey is a 63 year old male with symptoms of chest discomfort.  Patient feels as though his chest discomfort seems to occur after he receives injections for his skin condition.  Symptoms are not necessarily provoked with exertion in a predictable fashion.  He has perhaps some mild associated shortness of breath though not pronounced.    Further review of systems is otherwise negative/noncontributory (medical record and 13 point review of systems reviewed as well and pertinent positives noted).         Cardiac Diagnostics/Imaging      Regadenoson nuclear perfusion study 19 August 2022:  No evidence of infarct or ischemia.  Normal left ventricular systolic performance with ejection fraction of 70%.    Ambulatory monitor x 14  "days 4 December 2020 through 17 December 2020:  Normal multiday monitor with rare singular PVCs, no complex arrhythmias seen.    Chest radiograph 15 August 2022:  Lungs are clear.   No pleural effusion.   Heart size and pulmonary vascularity within normal limits.           Physical Examination       BP (!) 162/83 (BP Location: Left arm, Patient Position: Sitting, Cuff Size: Adult Large)   Pulse 73   Temp 98  F (36.7  C) (Oral)   Resp 16   Ht 1.626 m (5' 4\")   Wt 100.7 kg (222 lb 1.6 oz)   SpO2 97%   BMI 38.12 kg/m          Wt Readings from Last 3 Encounters:   06/06/24 100.7 kg (222 lb 1.6 oz)   04/05/24 98.4 kg (217 lb)   03/26/24 100.4 kg (221 lb 6.4 oz)       The patient is alert and oriented times three. Sclerae are anicteric. Mucosal membranes are moist. Jugular venous pressure is normal. No significant adenopathy/thyromegally appreciated. Lungs are clear with good expansion. On cardiovascular exam, the patient has a regular S1 and S2. Abdomen is soft and non-tender. Extremities reveal no clubbing, cyanosis, or edema.         Family History/Social History/Risk Factors   Patient does not smoke.  Family history reviewed, and family history includes Cancer in his mother; Coronary Artery Disease in his father; Heart Disease in his father; No Known Problems in his brother, cousin, daughter, maternal grandfather, maternal grandmother, maternal half-brother, maternal half-sister, nephew, niece, paternal grandfather, paternal grandmother, paternal half-brother, paternal half-sister, sister, son, and another family member; Other - See Comments in his mother.          Medical History  Surgical History Family History Social History   Past Medical History:   Diagnosis Date    Depressive disorder     Diabetes (H)     Hypertension      Past Surgical History:   Procedure Laterality Date    ORIF trimalleolar ankle fracture Left 02/02/2021    [facility]     Family History   Problem Relation Age of Onset    Other " - See Comments Mother     Cancer Mother     Coronary Artery Disease Father     Heart Disease Father     No Known Problems Maternal Grandmother     No Known Problems Maternal Grandfather     No Known Problems Paternal Grandmother     No Known Problems Paternal Grandfather     No Known Problems Brother     No Known Problems Sister     No Known Problems Son     No Known Problems Daughter     No Known Problems Maternal Half-Brother     No Known Problems Maternal Half-Sister     No Known Problems Paternal Half-Brother     No Known Problems Paternal Half-Sister     No Known Problems Niece     No Known Problems Nephew     No Known Problems Cousin     No Known Problems Other     Diabetes No family hx of     Hypertension No family hx of     Hyperlipidemia No family hx of     Kidney Disease No family hx of     Cerebrovascular Disease No family hx of     Obesity No family hx of     Thrombosis No family hx of     Asthma No family hx of     Arthritis No family hx of     Thyroid Disease No family hx of     Depression No family hx of     Mental Illness No family hx of     Substance Abuse No family hx of     Cystic Fibrosis No family hx of     Early Death No family hx of     Coronary Artery Disease Early Onset No family hx of     Heart Failure No family hx of     Bleeding Diathesis No family hx of     Dementia No family hx of     Breast Cancer No family hx of     Ovarian Cancer No family hx of     Uterine Cancer No family hx of     Prostate Cancer No family hx of     Colorectal Cancer No family hx of     Pancreatic Cancer No family hx of     Lung Cancer No family hx of     Melanoma No family hx of     Autoimmune Disease No family hx of     Unknown/Adopted No family hx of     Genetic Disorder No family hx of         Social History     Socioeconomic History    Marital status:      Spouse name: Not on file    Number of children: Not on file    Years of education: Not on file    Highest education level: Not on file    Occupational History    Not on file   Tobacco Use    Smoking status: Never    Smokeless tobacco: Never   Vaping Use    Vaping status: Never Used   Substance and Sexual Activity    Alcohol use: No    Drug use: No    Sexual activity: Not Currently   Other Topics Concern    Not on file   Social History Narrative    Not on file     Social Determinants of Health     Financial Resource Strain: Low Risk  (4/5/2024)    Financial Resource Strain     Within the past 12 months, have you or your family members you live with been unable to get utilities (heat, electricity) when it was really needed?: No   Food Insecurity: Low Risk  (4/5/2024)    Food Insecurity     Within the past 12 months, did you worry that your food would run out before you got money to buy more?: No     Within the past 12 months, did the food you bought just not last and you didn t have money to get more?: No   Transportation Needs: Low Risk  (4/5/2024)    Transportation Needs     Within the past 12 months, has lack of transportation kept you from medical appointments, getting your medicines, non-medical meetings or appointments, work, or from getting things that you need?: No   Physical Activity: Not on file   Stress: Not on file   Social Connections: Not on file   Interpersonal Safety: Low Risk  (4/5/2024)    Interpersonal Safety     Do you feel physically and emotionally safe where you currently live?: Yes     Within the past 12 months, have you been hit, slapped, kicked or otherwise physically hurt by someone?: No     Within the past 12 months, have you been humiliated or emotionally abused in other ways by your partner or ex-partner?: No   Housing Stability: Low Risk  (4/5/2024)    Housing Stability     Do you have housing? : Yes     Are you worried about losing your housing?: No           Medications  Allergies   Current Outpatient Medications   Medication Sig Dispense Refill    acetaminophen (TYLENOL) 500 MG tablet Take 2 tablets (1,000 mg) by  mouth every 8 hours as needed for mild pain 100 tablet 3    acetic acid-hydrocortisone (VOSOL-HC) 1-2 % otic solution Place 3 drops into both ears 2 times daily as needed (itching) 10 mL 3    Alcohol Swabs (EASY TOUCH ALCOHOL PREP MEDIUM) 70 % PADS 1 pad. by In Vitro route 3 times daily as needed 300 each 1    amLODIPine (NORVASC) 10 MG tablet TAKE 1 TABLET (10 MG) BY MOUTH DAILY 90 tablet 1    amLODIPine (NORVASC) 5 MG tablet TAKE 2 TABLETS (10 MG) BY MOUTH DAILY 180 tablet 1    ammonium lactate (LAC-HYDRIN) 12 % external lotion Apply topically daily as needed for dry skin 225 g 5    amoxicillin-clavulanate (AUGMENTIN) 875-125 MG tablet Take 1 tablet by mouth 2 times daily 20 tablet 0    aspirin (ASPIRIN LOW DOSE) 81 MG EC tablet TAKE 1 TABLET (81 MG) BY MOUTH DAILY 90 tablet 1    atenolol (TENORMIN) 50 MG tablet Take 50 mg by mouth daily      atorvastatin (LIPITOR) 80 MG tablet TAKE 1 TABLET (80 MG) BY MOUTH DAILY 90 tablet 1    blood glucose (NO BRAND SPECIFIED) test strip Use up to 3 times a day 100 strip 3    Continuous Blood Gluc Sensor (FREESTYLE JOSE 14 DAY SENSOR) [facility] Use to test blood sugar daily per 's instructions.  Remove and replace every 14 days. 2 each 6    COSENTYX SENSOREADY, 300 MG, 150 MG/ML SOAJ INJECT THE CONTENTS OF TWO PENS SUBCUTANEOUSLY WEEKLY FOR 5 WEEKS THEN TWO PENS EVERY 4 WEEKS. **STARTER DOSE** 8 mL 0    diphenhydrAMINE (BENADRYL) 25 MG tablet Take 1 tablet (25 mg) by mouth every 6 hours as needed for itching 100 tablet 3    empagliflozin (JARDIANCE) 25 MG TABS tablet Take 1 tablet (25 mg) by mouth daily 90 tablet 1    febuxostat (ULORIC) 40 MG TABS tablet TAKE 1 TABLET (40 MG) BY MOUTH DAILY 90 tablet 1    fluocinonide (LIDEX) 0.05 % external solution Apply 10-15 drops to scalp at nighttime before bed as needed for itch. 120 mL 11    gabapentin (NEURONTIN) 400 MG capsule TAKE 1 CAPSULE (400 MG) BY MOUTH 2 TIMES DAILY 180 capsule 1    glipiZIDE (GLUCOTROL XL) 5 MG 24 hr  tablet TAKE 1 TABLET (5 MG) BY MOUTH DAILY WITH SUPPER 90 tablet 1    Lancet Devices (EASY TOUCH LANCING DEVICE) MISC USE TO TEST BLOOD SUGARS 1 each 0    lisinopril (ZESTRIL) 40 MG tablet TAKE 1 TABLET (40 MG) BY MOUTH DAILY 90 tablet 1    metFORMIN (GLUCOPHAGE XR) 500 MG 24 hr tablet TAKE 4 TABLETS (2,000 MG) BY MOUTH DAILY 360 tablet 1    nystatin (MYCOSTATIN) 084136 UNIT/GM external cream Apply topically 2 times daily 30 g 11    order for DME Equipment being ordered: 1 seated 4-wheel walker with brakes 1 Device 0    ORDER FOR DME  (Continuous Positive Airway Pressure) nightly      triamcinolone (KENALOG) 0.1 % external cream Apply twice daily as needed for rash on the body 454 g 5    vitamin D3 (CHOLECALCIFEROL) 50 mcg (2000 units) tablet TAKE 1 TABLET (50 MCG) BY MOUTH DAILY 90 tablet 1       Allergies   Allergen Reactions    Allopurinol Rash    Cortisporin [Neomycin-Polymyxin-Hc] Swelling    Indomethacin     Salsalate     Tetracycline     Tramadol     Nsaids Rash          Lab Results    Chemistry/lipid CBC Cardiac Enzymes/BNP/TSH/INR   Recent Labs   Lab Test 04/05/24  1033 07/23/21  1300 03/05/20  1059   CHOL 209*   < > 156.6   HDL 32*   < > 38.6*   *   < > 78   TRIG 152*   < > 202.5*   CHOLHDLRATIO  --   --  4.1    < > = values in this interval not displayed.     Recent Labs   Lab Test 04/05/24  1033 02/23/24  0828 03/06/23  1106   * 149* 157*     Recent Labs   Lab Test 04/15/24  1557      POTASSIUM 4.5   CHLORIDE 103   CO2 26   GLC 88   BUN 19.7   CR 0.77   GFRESTIMATED >90   PRETTY 10.2     Recent Labs   Lab Test 04/15/24  1557 02/23/24  0828 11/13/23  1535   CR 0.77 0.93 1.16     Recent Labs   Lab Test 02/23/24  0828 10/16/23  0911 06/01/23  0927   A1C 7.1* 8.6* 8.5*          Recent Labs   Lab Test 04/15/24  1557   WBC 8.1   HGB 13.9   HCT 43.8   MCV 65*        Recent Labs   Lab Test 04/15/24  1557 11/13/23  1535 03/31/23  1205   HGB 13.9 13.5 14.1    No results for input(s):  "\"TROPONINI\" in the last 85870 hours.  No results for input(s): \"BNP\", \"NTBNPI\", \"NTBNP\" in the last 03287 hours.  Recent Labs   Lab Test 02/23/24  0828   TSH 1.96     No results for input(s): \"INR\" in the last 33269 hours.       Medications  Allergies   Current Outpatient Medications   Medication Sig Dispense Refill    acetaminophen (TYLENOL) 500 MG tablet Take 2 tablets (1,000 mg) by mouth every 8 hours as needed for mild pain 100 tablet 3    acetic acid-hydrocortisone (VOSOL-HC) 1-2 % otic solution Place 3 drops into both ears 2 times daily as needed (itching) 10 mL 3    Alcohol Swabs (EASY TOUCH ALCOHOL PREP MEDIUM) 70 % PADS 1 pad. by In Vitro route 3 times daily as needed 300 each 1    amLODIPine (NORVASC) 10 MG tablet TAKE 1 TABLET (10 MG) BY MOUTH DAILY 90 tablet 1    amLODIPine (NORVASC) 5 MG tablet TAKE 2 TABLETS (10 MG) BY MOUTH DAILY 180 tablet 1    ammonium lactate (LAC-HYDRIN) 12 % external lotion Apply topically daily as needed for dry skin 225 g 5    amoxicillin-clavulanate (AUGMENTIN) 875-125 MG tablet Take 1 tablet by mouth 2 times daily 20 tablet 0    aspirin (ASPIRIN LOW DOSE) 81 MG EC tablet TAKE 1 TABLET (81 MG) BY MOUTH DAILY 90 tablet 1    atenolol (TENORMIN) 50 MG tablet Take 50 mg by mouth daily      atorvastatin (LIPITOR) 80 MG tablet TAKE 1 TABLET (80 MG) BY MOUTH DAILY 90 tablet 1    blood glucose (NO BRAND SPECIFIED) test strip Use up to 3 times a day 100 strip 3    Continuous Blood Gluc Sensor (FREESTYLE JOSE 14 DAY SENSOR) [facility] Use to test blood sugar daily per 's instructions.  Remove and replace every 14 days. 2 each 6    COSENTYX SENSOREADY, 300 MG, 150 MG/ML SOAJ INJECT THE CONTENTS OF TWO PENS SUBCUTANEOUSLY WEEKLY FOR 5 WEEKS THEN TWO PENS EVERY 4 WEEKS. **STARTER DOSE** 8 mL 0    diphenhydrAMINE (BENADRYL) 25 MG tablet Take 1 tablet (25 mg) by mouth every 6 hours as needed for itching 100 tablet 3    empagliflozin (JARDIANCE) 25 MG TABS tablet Take 1 tablet (25 mg) " by mouth daily 90 tablet 1    febuxostat (ULORIC) 40 MG TABS tablet TAKE 1 TABLET (40 MG) BY MOUTH DAILY 90 tablet 1    fluocinonide (LIDEX) 0.05 % external solution Apply 10-15 drops to scalp at nighttime before bed as needed for itch. 120 mL 11    gabapentin (NEURONTIN) 400 MG capsule TAKE 1 CAPSULE (400 MG) BY MOUTH 2 TIMES DAILY 180 capsule 1    glipiZIDE (GLUCOTROL XL) 5 MG 24 hr tablet TAKE 1 TABLET (5 MG) BY MOUTH DAILY WITH SUPPER 90 tablet 1    Lancet Devices (EASY TOUCH LANCING DEVICE) MISC USE TO TEST BLOOD SUGARS 1 each 0    lisinopril (ZESTRIL) 40 MG tablet TAKE 1 TABLET (40 MG) BY MOUTH DAILY 90 tablet 1    metFORMIN (GLUCOPHAGE XR) 500 MG 24 hr tablet TAKE 4 TABLETS (2,000 MG) BY MOUTH DAILY 360 tablet 1    nystatin (MYCOSTATIN) 173818 UNIT/GM external cream Apply topically 2 times daily 30 g 11    order for DME Equipment being ordered: 1 seated 4-wheel walker with brakes 1 Device 0    ORDER FOR DME  (Continuous Positive Airway Pressure) nightly      triamcinolone (KENALOG) 0.1 % external cream Apply twice daily as needed for rash on the body 454 g 5    vitamin D3 (CHOLECALCIFEROL) 50 mcg (2000 units) tablet TAKE 1 TABLET (50 MCG) BY MOUTH DAILY 90 tablet 1      Allergies   Allergen Reactions    Allopurinol Rash    Cortisporin [Neomycin-Polymyxin-Hc] Swelling    Indomethacin     Salsalate     Tetracycline     Tramadol     Nsaids Rash          Lab Results   Lab Results   Component Value Date     04/15/2024    .9 01/29/2021    CO2 26 04/15/2024    CO2 28 08/08/2022    CO2 25.7 01/29/2021    BUN 19.7 04/15/2024    BUN 21 08/08/2022    BUN 16.3 01/29/2021     Lab Results   Component Value Date    WBC 8.1 04/15/2024    HGB 13.9 04/15/2024    HGB 12.6 01/29/2021    HCT 43.8 04/15/2024    HCT 42.2 01/29/2021    MCV 65 04/15/2024    MCV 70.5 01/29/2021     04/15/2024     Lab Results   Component Value Date    CHOL 209 04/05/2024    CHOL 156.6 03/05/2020    TRIG 152 04/05/2024    TRIG 202.5  "03/05/2020    HDL 32 04/05/2024    HDL 38.6 03/05/2020     No results found for: \"INR\"  No results found for: \"BNP\"  No results found for: \"CKTOTAL\", \"CKMB\", \"TROPONINI\"  Lab Results   Component Value Date    TSH 1.96 02/23/2024                      Thank you for allowing me to participate in the care of your patient.      Sincerely,     Pallavi Bear MD     [facility]  cc:   Deya Kelly PA-C  [address] [place] [address]      "

## 2024-06-25 ENCOUNTER — TELEPHONE (OUTPATIENT)
Dept: FAMILY MEDICINE | Facility: CLINIC | Age: 64
End: 2024-06-25
Payer: COMMERCIAL

## 2024-06-25 NOTE — CONFIDENTIAL NOTE
[facility]    Phone Message    May a detailed message be left on voicemail: yes     Reason for Call: Other: Pt would like a call to confirm if he can continue his injections before his upcoming appointment on 8/1. Please advise.     Action Taken: Other: [facility]    Travel Screening: Not Applicable

## 2024-06-25 NOTE — TELEPHONE ENCOUNTER
Patient Contact    Attempt # 1    Was call answered?  No.  No vm received.  Will try calling at a later time.    Margie TILLMAN RN  [facility] [place]  [phone]

## 2024-06-25 NOTE — TELEPHONE ENCOUNTER
Kay Harrison routed conversation to [facility] Nurse Pool59 minutes ago (12:27 PM)     Kay Harrison59 minutes ago (12:26 PM)     SB  [facility]     Phone Message     May a detailed message be left on voicemail: yes      Reason for Call: Other: Pt would like a call to confirm if he can continue his injections before his upcoming appointment on 8/1. Please advise.      Action Taken: Other: [facility]     Travel Screening: Not Applicable                                                                                                                                                                                                                                                                                                                                                                                                                                                                                                                                                                                                                                                                                          Unsigned   Note

## 2024-06-26 DIAGNOSIS — B49 FUNGAL INFECTION: Primary | ICD-10-CM

## 2024-06-26 DIAGNOSIS — L40.9 PSORIASIS: ICD-10-CM

## 2024-06-26 RX ORDER — TRIAMCINOLONE ACETONIDE 1 MG/G
CREAM TOPICAL
Qty: 454 G | Refills: 5 | Status: SHIPPED | OUTPATIENT
Start: 2024-06-26 | End: 2024-07-12

## 2024-06-26 RX ORDER — NYSTATIN 100000 U/G
CREAM TOPICAL
Qty: 30 G | Refills: 1 | Status: SHIPPED | OUTPATIENT
Start: 2024-06-26 | End: 2024-07-12

## 2024-06-26 NOTE — TELEPHONE ENCOUNTER
Name from pharmacy: TRIAMCINOLONE 0.1% CREAM 454GM          Will file in chart as: triamcinolone (KENALOG) 0.1 % external cream    Sig: APPLY TOPICALLY TO THE AFFECTED AREA TWICE DAILY FOR 1- 2 WEEKS THEN AS NEEDED    Disp: 454 g    Refills: 5 (Pharmacy requested: Not specified)    Start: 6/26/2024    Class: E-Prescribe    For: Psoriasis    Last ordered: 7 months ago (11/13/2023) by Deya Kelly PA-C    Last refill: 2/10/2023    Rx #: [id number]    Topical Steroids and Nonsteroidals Protocol [id number]/26/2024 09:44 AM   Protocol Details Patient is age 6 or older    Authorizing prescriber's most recent note related to this medication read.    High potency steroid not ordered    Recent (12 mo) or future (30 days) visit within the authorizing provider's specialty    Medication is active on med list        MANJU Waldron, BSN

## 2024-06-26 NOTE — TELEPHONE ENCOUNTER
Name from pharmacy: NYSTATIN CREAM 30GM          Will file in chart as: nystatin (MYCOSTATIN) 569647 UNIT/GM external cream    Sig: APPLY TOPICALLY TO THE AFFECTED AREA TWICE DAILY    Disp: 30 g    Refills: 11 (Pharmacy requested: Not specified)    Start: 6/26/2024    Class: E-Prescribe    Last ordered: 1 year ago (4/13/2023) by Jason Siddiqui MD    Last refill: 4/13/2023    Rx #: [id number]    Antifungal Agents [id number]/26/2024 09:44 AM   Protocol Details Recent (12 mo) or future (90 days) visit within the authorizing provider's specialty    Always Fail Criteria        Vanita, RN, BSN

## 2024-06-26 NOTE — TELEPHONE ENCOUNTER
Patient Contact    Attempt # 2    Was call answered?  No.  No vm received will call at a later time.    Margie TILLMAN RN  [facility] [place]  [phone]

## 2024-07-01 NOTE — TELEPHONE ENCOUNTER
Patient Contact    Attempt # 3    Was call answered?  No.  Not able to leave message due to no vm.    Margie TILLMAN RN  [facility] [place]  [phone]

## 2024-07-02 ENCOUNTER — HOSPITAL ENCOUNTER (OUTPATIENT)
Dept: CARDIOLOGY | Facility: HOSPITAL | Age: 64
Discharge: HOME | End: 2024-07-02
Attending: INTERNAL MEDICINE | Admitting: INTERNAL MEDICINE
Payer: COMMERCIAL

## 2024-07-02 DIAGNOSIS — R07.89 CHEST TIGHTNESS: ICD-10-CM

## 2024-07-02 LAB
CV STRESS CURRENT BP HE: NORMAL
CV STRESS CURRENT HR HE: 100
CV STRESS CURRENT HR HE: 101
CV STRESS CURRENT HR HE: 102
CV STRESS CURRENT HR HE: 104
CV STRESS CURRENT HR HE: 106
CV STRESS CURRENT HR HE: 107
CV STRESS CURRENT HR HE: 109
CV STRESS CURRENT HR HE: 112
CV STRESS CURRENT HR HE: 113
CV STRESS CURRENT HR HE: 114
CV STRESS CURRENT HR HE: 124
CV STRESS CURRENT HR HE: 126
CV STRESS CURRENT HR HE: 129
CV STRESS CURRENT HR HE: 129
CV STRESS CURRENT HR HE: 133
CV STRESS CURRENT HR HE: 133
CV STRESS CURRENT HR HE: 135
CV STRESS CURRENT HR HE: 139
CV STRESS CURRENT HR HE: 141
CV STRESS CURRENT HR HE: 150
CV STRESS CURRENT HR HE: 79
CV STRESS CURRENT HR HE: 87
CV STRESS DEVIATION TIME HE: NORMAL
CV STRESS ECHO PERCENT HR HE: NORMAL
CV STRESS EXERCISE STAGE HE: NORMAL
CV STRESS EXERCISE STAGE REACHED HE: NORMAL
CV STRESS FINAL RESTING BP HE: NORMAL
CV STRESS FINAL RESTING HR HE: 100
CV STRESS MAX HR HE: 161
CV STRESS MAX TREADMILL GRADE HE: 14
CV STRESS MAX TREADMILL SPEED HE: 3.4
CV STRESS PEAK DIA BP HE: NORMAL
CV STRESS PEAK SYS BP HE: NORMAL
CV STRESS PHASE HE: NORMAL
CV STRESS PROTOCOL HE: NORMAL
CV STRESS REASON STOPPED HE: NORMAL
CV STRESS RESTING PT POSITION HE: NORMAL
CV STRESS RESTING PT POSITION HE: NORMAL
CV STRESS ST DEVIATION AMOUNT HE: NORMAL
CV STRESS ST DEVIATION ELEVATION HE: NORMAL
CV STRESS ST EVELATION AMOUNT HE: NORMAL
CV STRESS SYMPTOMS HE: NORMAL
CV STRESS TEST TYPE HE: NORMAL
CV STRESS TOTAL STAGE TIME MIN 1 HE: NORMAL
STRESS ECHO BASELINE DIASTOLIC HE: 75
STRESS ECHO BASELINE HR: 80
STRESS ECHO BASELINE SYSTOLIC BP: 155
STRESS ECHO LAST STRESS DIASTOLIC BP: 90
STRESS ECHO LAST STRESS HR: 150
STRESS ECHO LAST STRESS SYSTOLIC BP: 168
STRESS ECHO POST ESTIMATED WORKLOAD: 8.9
STRESS ECHO POST EXERCISE DUR MIN: 7
STRESS ECHO POST EXERCISE DUR SEC: 0
STRESS ECHO TARGET HR: 133

## 2024-07-02 PROCEDURE — 93016 CV STRESS TEST SUPVJ ONLY: CPT | Performed by: INTERNAL MEDICINE

## 2024-07-02 PROCEDURE — 93321 DOPPLER ECHO F-UP/LMTD STD: CPT | Mod: 26 | Performed by: INTERNAL MEDICINE

## 2024-07-02 PROCEDURE — 93352 ADMIN ECG CONTRAST AGENT: CPT | Performed by: INTERNAL MEDICINE

## 2024-07-02 PROCEDURE — C8928 TTE W OR W/O FOL W/CON,STRES: HCPCS

## 2024-07-02 PROCEDURE — 255N000002 HC RX 255 OP 636: Performed by: INTERNAL MEDICINE

## 2024-07-02 PROCEDURE — 93325 DOPPLER ECHO COLOR FLOW MAPG: CPT | Mod: TC

## 2024-07-02 PROCEDURE — 93325 DOPPLER ECHO COLOR FLOW MAPG: CPT | Mod: 26 | Performed by: INTERNAL MEDICINE

## 2024-07-02 PROCEDURE — 93350 STRESS TTE ONLY: CPT | Mod: 26 | Performed by: INTERNAL MEDICINE

## 2024-07-02 PROCEDURE — 93018 CV STRESS TEST I&R ONLY: CPT | Performed by: INTERNAL MEDICINE

## 2024-07-02 RX ADMIN — PERFLUTREN 4 ML: 6.52 INJECTION, SUSPENSION INTRAVENOUS at 14:54

## 2024-07-09 DIAGNOSIS — R07.89 CHEST TIGHTNESS: ICD-10-CM

## 2024-07-09 DIAGNOSIS — R94.39 ABNORMAL STRESS ECHOCARDIOGRAM: Primary | ICD-10-CM

## 2024-07-12 ENCOUNTER — OFFICE VISIT (OUTPATIENT)
Dept: FAMILY MEDICINE | Facility: CLINIC | Age: 64
End: 2024-07-12
Payer: COMMERCIAL

## 2024-07-12 VITALS
DIASTOLIC BLOOD PRESSURE: 81 MMHG | SYSTOLIC BLOOD PRESSURE: 135 MMHG | TEMPERATURE: 97.8 F | RESPIRATION RATE: 20 BRPM | HEIGHT: 64 IN | OXYGEN SATURATION: 97 % | WEIGHT: 221 LBS | HEART RATE: 69 BPM | BODY MASS INDEX: 37.73 KG/M2

## 2024-07-12 DIAGNOSIS — G47.33 OBSTRUCTIVE SLEEP APNEA SYNDROME: ICD-10-CM

## 2024-07-12 DIAGNOSIS — Z12.11 SCREEN FOR COLON CANCER: Primary | ICD-10-CM

## 2024-07-12 DIAGNOSIS — L40.9 PSORIASIS: ICD-10-CM

## 2024-07-12 DIAGNOSIS — E11.65 TYPE 2 DIABETES MELLITUS WITH HYPERGLYCEMIA, WITHOUT LONG-TERM CURRENT USE OF INSULIN (H): ICD-10-CM

## 2024-07-12 DIAGNOSIS — E11.65 TYPE 2 DIABETES MELLITUS WITH HYPERGLYCEMIA, WITHOUT LONG-TERM CURRENT USE OF INSULIN (H): Primary | ICD-10-CM

## 2024-07-12 DIAGNOSIS — B49 FUNGAL INFECTION: ICD-10-CM

## 2024-07-12 LAB
ANION GAP SERPL CALCULATED.3IONS-SCNC: 11 MMOL/L (ref 7–15)
BUN SERPL-MCNC: 20.2 MG/DL (ref 8–23)
CALCIUM SERPL-MCNC: 9.2 MG/DL (ref 8.8–10.2)
CHLORIDE SERPL-SCNC: 101 MMOL/L (ref 98–107)
CHOLEST SERPL-MCNC: 177 MG/DL
CREAT SERPL-MCNC: 0.8 MG/DL (ref 0.67–1.17)
CREAT UR-MCNC: 41 MG/DL
DEPRECATED HCO3 PLAS-SCNC: 26 MMOL/L (ref 22–29)
EGFRCR SERPLBLD CKD-EPI 2021: >90 ML/MIN/1.73M2
FASTING STATUS PATIENT QL REPORTED: ABNORMAL
FASTING STATUS PATIENT QL REPORTED: ABNORMAL
GLUCOSE SERPL-MCNC: 132 MG/DL (ref 70–99)
HBA1C MFR BLD: 7.9 % (ref 0–5.6)
HDLC SERPL-MCNC: 36 MG/DL
LDLC SERPL CALC-MCNC: 86 MG/DL
MICROALBUMIN UR-MCNC: <12 MG/L
MICROALBUMIN/CREAT UR: NORMAL MG/G{CREAT}
NONHDLC SERPL-MCNC: 141 MG/DL
POTASSIUM SERPL-SCNC: 4.3 MMOL/L (ref 3.4–5.3)
SODIUM SERPL-SCNC: 138 MMOL/L (ref 135–145)
TRIGL SERPL-MCNC: 273 MG/DL

## 2024-07-12 PROCEDURE — G2211 COMPLEX E/M VISIT ADD ON: HCPCS | Performed by: FAMILY MEDICINE

## 2024-07-12 PROCEDURE — 82570 ASSAY OF URINE CREATININE: CPT | Performed by: FAMILY MEDICINE

## 2024-07-12 PROCEDURE — 99214 OFFICE O/P EST MOD 30 MIN: CPT | Performed by: FAMILY MEDICINE

## 2024-07-12 PROCEDURE — 80048 BASIC METABOLIC PNL TOTAL CA: CPT | Performed by: FAMILY MEDICINE

## 2024-07-12 PROCEDURE — 83036 HEMOGLOBIN GLYCOSYLATED A1C: CPT | Performed by: FAMILY MEDICINE

## 2024-07-12 PROCEDURE — 82043 UR ALBUMIN QUANTITATIVE: CPT | Performed by: FAMILY MEDICINE

## 2024-07-12 PROCEDURE — 80061 LIPID PANEL: CPT | Performed by: FAMILY MEDICINE

## 2024-07-12 PROCEDURE — 36415 COLL VENOUS BLD VENIPUNCTURE: CPT | Performed by: FAMILY MEDICINE

## 2024-07-12 RX ORDER — TRIAMCINOLONE ACETONIDE 1 MG/G
CREAM TOPICAL
Qty: 454 G | Refills: 5 | Status: SHIPPED | OUTPATIENT
Start: 2024-07-12 | End: 2024-07-16

## 2024-07-12 RX ORDER — NYSTATIN 100000 U/G
CREAM TOPICAL
Qty: 30 G | Refills: 1 | Status: SHIPPED | OUTPATIENT
Start: 2024-07-12

## 2024-07-12 ASSESSMENT — PATIENT HEALTH QUESTIONNAIRE - PHQ9: SUM OF ALL RESPONSES TO PHQ QUESTIONS 1-9: 2

## 2024-07-12 NOTE — LETTER
July 17, 2024       [address]  [address]  [place] [address]        Dear MrKana,    We are writing to inform you of your test results.    Overall these lab results are good - your A1c average sugar is under 8.0, your bad cholesterol LDL is lower than it has been and your kidneys are working well.  Good!     Did you get the voice message I left about the new cream recommended by the dermatologist? I hope you did and that you are using them now on alternating days.  Take care!     Resulted Orders   Hemoglobin A1c   Result Value Ref Range    Hemoglobin A1C 7.9 (H) 0.0 - 5.6 %      Comment:      Normal <5.7%   Prediabetes 5.7-6.4%    Diabetes 6.5% or higher     Note: Adopted from ADA consensus guidelines.   Basic metabolic panel   Result Value Ref Range    Sodium 138 135 - 145 mmol/L    Potassium 4.3 3.4 - 5.3 mmol/L    Chloride 101 98 - 107 mmol/L    Carbon Dioxide (CO2) 26 22 - 29 mmol/L    Anion Gap 11 7 - 15 mmol/L    Urea Nitrogen 20.2 8.0 - 23.0 mg/dL    Creatinine 0.80 0.67 - 1.17 mg/dL    GFR Estimate >90 >60 mL/min/1.73m2      Comment:      eGFR calculated using 2021 CKD-EPI equation.    Calcium 9.2 8.8 - 10.2 mg/dL    Glucose 132 (H) 70 - 99 mg/dL    Patient Fasting > 8hrs? Unknown    Lipid Profile   Result Value Ref Range    Cholesterol 177 <200 mg/dL    Triglycerides 273 (H) <150 mg/dL    Direct Measure HDL 36 (L) >=40 mg/dL    LDL Cholesterol Calculated 86 <=100 mg/dL    Non HDL Cholesterol 141 (H) <130 mg/dL    Patient Fasting > 8hrs? Unknown     Narrative    Cholesterol  Desirable:  <200 mg/dL    Triglycerides  Normal:  Less than 150 mg/dL  Borderline High:  150-199 mg/dL  High:  200-499 mg/dL  Very High:  Greater than or equal to 500 mg/dL    Direct Measure HDL  Female:  Greater than or equal to 50 mg/dL   Male:  Greater than or equal to 40 mg/dL    LDL Cholesterol  Desirable:  <100mg/dL  Above Desirable:  100-129 mg/dL   Borderline High:  130-159 mg/dL   High:  160-189 mg/dL   Very High:  >= 190  mg/dL    Non HDL Cholesterol  Desirable:  130 mg/dL  Above Desirable:  130-159 mg/dL  Borderline High:  160-189 mg/dL  High:  190-219 mg/dL  Very High:  Greater than or equal to 220 mg/dL   Albumin Random Urine Quantitative with Creat Ratio   Result Value Ref Range    Creatinine Urine mg/dL 41.0 mg/dL      Comment:      The reference ranges have not been established in urine creatinine. The results should be integrated into the clinical context for interpretation.    Albumin Urine mg/L <12.0 mg/L      Comment:      The reference ranges have not been established in urine albumin. The results should be integrated into the clinical context for interpretation.    Albumin Urine mg/g Cr        Comment:      Unable to calculate, urine albumin and/or urine creatinine is outside detectable limits.  Microalbuminuria is defined as an albumin:creatinine ratio of 17 to 299 for males and 25 to 299 for females. A ratio of albumin:creatinine of 300 or higher is indicative of overt proteinuria.  Due to biologic variability, positive results should be confirmed by a second, first-morning random or 24-hour timed urine specimen. If there is discrepancy, a third specimen is recommended. When 2 out of 3 results are in the microalbuminuria range, this is evidence for incipient nephropathy and warrants increased efforts at glucose control, blood pressure control, and institution of therapy with an angiotensin-converting-enzyme (ACE) inhibitor (if the patient can tolerate it).         If you have any questions or concerns, please call the clinic at the number listed above.       Sincerely,      Jason Siddiqui MD

## 2024-07-12 NOTE — PATIENT INSTRUCTIONS
Diabetes control is pretty good - we want A1c close to 7.0:  Lab Results   Component Value Date    A1C 7.9 07/12/2024    A1C 7.1 02/23/2024    A1C 8.6 10/16/2023    A1C 8.5 06/01/2023    A1C 9.9 03/06/2023    A1C 7.8 01/29/2021    A1C 9.0 12/04/2020    A1C 10.2 03/05/2020    A1C 7.0 09/27/2019    A1C 11.2 06/28/2019

## 2024-07-12 NOTE — PROGRESS NOTES
ASSESSMENT/PLAN:  Sa was seen today for diabetes, derm problem and refill request.    Diagnoses and all orders for this visit:    Screen for colon cancer  -     Fecal colorectal cancer screen FIT - Future (S+30); Future    Type 2 diabetes mellitus with hyperglycemia, without long-term current use of insulin (H)  -     Hemoglobin A1c  -     Basic metabolic panel  -     Lipid Profile  -     Albumin Random Urine Quantitative with Creat Ratio    Fungal infection  -     nystatin (MYCOSTATIN) 897877 UNIT/GM external cream; Apply twice daily to fungus infection only    Psoriasis  -     Discontinue: triamcinolone (KENALOG) 0.1 % external cream; APPLY TOPICALLY TO THE AFFECTED AREA TWICE DAILY FOR 1- 2 WEEKS THEN AS NEEDED  -     calcipotriene (DOVONOX) 0.005 % external ointment; Apply to psoriasis TWICE DAILY EVERY OTHER DAY alternating with triamcinolone  -     triamcinolone (KENALOG) 0.1 % external cream; APPLY TOPICALLY TO PSORIASIS TWICE DAILY EVERY OTHER DAY ALTERNATING WITH CALCIPOTRIENE    Obstructive sleep apnea syndrome  -     Adult Sleep Eval & Management Referral; Future      His A1c was available at the time of the visit and he is generally congratulated for keeping his sugars under goal of less than 8.0.  If he can get closer to 7.0 this would be great.    I note that cardiology was concerned about some possible apical ischemia on stress testing and is recommending a CT angiogram.  I attempted to call cardiology to have this scheduled while the patient is present with an  but they will need to follow-up with the patient or a family member now that the phone numbers are correct in the chart.    I will contact his dermatologist to see if there are any alternatives to treat his psoriasis while we await the CT angiogram result.  I refilled the triamcinolone cream which does give him some relief from itching as well as nystatin cream which he applies to the groin and feet.    Total visit time with patient  "was 25 mins, all of which was face to face MD time, and over 50% of this time was spent in counseling and coordination of care.  Including post-encounter documentation and orders on the date of service, total encounter time was 32 mins.    The longitudinal plan of care for the diagnosis(es)/condition(s) as documented were addressed during this visit. Due to the added complexity in care, I will continue to support  in the subsequent management and with ongoing continuity of care.    Follow-up 3 months or sooner as needed.      Subjective   Sa is a 64 year old, presenting for the following health issues:  Diabetes (DM CK ), Derm Problem (Follow-up RASH - still occurring - not getting better/), and Refill Request (Refill on itchy creams )     This is a 64-year-old well-known to me.  He attends today to follow-up on his diabetes and other chronic health conditions.  He also continues to have very disabling psoriasis.  He told the dermatologist that he had developed chest pain when he gave himself a dose of Cosentyx and so he was referred to cardiology.  He has seen a cardiologist who performed a stress test and he has not yet heard the results of the stress test.    Review of his records revealed that cardiology has been trying to contact him.  He divulges that his own phone is broken at present and that phone numbers of family members are incorrect and need to be updated and this would explain the difficulties of communication.    Review of Systems   Eyes: He has a small patch of flaky skin on his left upper lid that is irritating and wonders if I can remove this.      2/8/2024    10:30 AM 2/8/2024    10:31 AM 7/12/2024    11:56 AM   PHQ   PHQ-9 Total Score 9 9 2   Q9: Thoughts of better off dead/self-harm past 2 weeks Not at all Not at all Not at all       Objective    Physical Exam   /81   Pulse 69   Temp 97.8  F (36.6  C) (Oral)   Resp 20   Ht 1.626 m (5' 4\")   Wt 100.2 kg (221 lb)   SpO2 97%   BMI " 37.93 kg/m       Vitals stable -BP high normal, obese BMI  Well nourished  HEENT: PERRLA; TMs normal color and landmarks; nasopharynx pink and moist; oropharynx pink and moist  Eyes: He has patches of hardened skin adherent to his left upper lid which I easily removed with a scalpel blade  Neck supple without lymphadenopathy, no goiter  Heart sounds normal without murmur    Lungs clear to auscultation, no wheezes/rales/rhonchi, good air entry   Abdomen soft, nontender, no masses, no rebound, bowel sounds throughout  No lower extremity edema     Derm: He does indeed have very florid psoriasis at present, see photos            Results for orders placed or performed in visit on 07/12/24   Hemoglobin A1c     Status: Abnormal   Result Value Ref Range    Hemoglobin A1C 7.9 (H) 0.0 - 5.6 %   Basic metabolic panel     Status: Abnormal   Result Value Ref Range    Sodium 138 135 - 145 mmol/L    Potassium 4.3 3.4 - 5.3 mmol/L    Chloride 101 98 - 107 mmol/L    Carbon Dioxide (CO2) 26 22 - 29 mmol/L    Anion Gap 11 7 - 15 mmol/L    Urea Nitrogen 20.2 8.0 - 23.0 mg/dL    Creatinine 0.80 0.67 - 1.17 mg/dL    GFR Estimate >90 >60 mL/min/1.73m2    Calcium 9.2 8.8 - 10.2 mg/dL    Glucose 132 (H) 70 - 99 mg/dL    Patient Fasting > 8hrs? Unknown    Lipid Profile     Status: Abnormal   Result Value Ref Range    Cholesterol 177 <200 mg/dL    Triglycerides 273 (H) <150 mg/dL    Direct Measure HDL 36 (L) >=40 mg/dL    LDL Cholesterol Calculated 86 <=100 mg/dL    Non HDL Cholesterol 141 (H) <130 mg/dL    Patient Fasting > 8hrs? Unknown     Narrative    Cholesterol  Desirable:  <200 mg/dL    Triglycerides  Normal:  Less than 150 mg/dL  Borderline High:  150-199 mg/dL  High:  200-499 mg/dL  Very High:  Greater than or equal to 500 mg/dL    Direct Measure HDL  Female:  Greater than or equal to 50 mg/dL   Male:  Greater than or equal to 40 mg/dL    LDL Cholesterol  Desirable:  <100mg/dL  Above Desirable:  100-129 mg/dL   Borderline High:   130-159 mg/dL   High:  160-189 mg/dL   Very High:  >= 190 mg/dL    Non HDL Cholesterol  Desirable:  130 mg/dL  Above Desirable:  130-159 mg/dL  Borderline High:  160-189 mg/dL  High:  190-219 mg/dL  Very High:  Greater than or equal to 220 mg/dL   Albumin Random Urine Quantitative with Creat Ratio     Status: None   Result Value Ref Range    Creatinine Urine mg/dL 41.0 mg/dL    Albumin Urine mg/L <12.0 mg/L    Albumin Urine mg/g Cr           Lab Results   Component Value Date    A1C 7.9 07/12/2024    A1C 7.1 02/23/2024    A1C 8.6 10/16/2023    A1C 8.5 06/01/2023    A1C 9.9 03/06/2023    A1C 7.8 01/29/2021    A1C 9.0 12/04/2020    A1C 10.2 03/05/2020    A1C 7.0 09/27/2019    A1C 11.2 06/28/2019

## 2024-07-16 RX ORDER — TRIAMCINOLONE ACETONIDE 1 MG/G
CREAM TOPICAL
Qty: 454 G | Refills: 5 | Status: SHIPPED | OUTPATIENT
Start: 2024-07-16

## 2024-07-16 RX ORDER — CALCIPOTRIENE 50 UG/G
OINTMENT TOPICAL
Qty: 454 G | Refills: 1 | Status: SHIPPED | OUTPATIENT
Start: 2024-07-16

## 2024-07-17 NOTE — RESULT ENCOUNTER NOTE
Sa Than, overall these lab results are good - your A1c average sugar is under 8.0, your bad cholesterol LDL is lower than it has been and your kidneys are working well.  Good!    Did you get the voice message I left about the new cream recommended by the dermatologist?  I hope you did and that you are using them now on alternating days.  Take care, Dr Jason Siddiqui

## 2024-07-18 DIAGNOSIS — E11.65 TYPE 2 DIABETES MELLITUS WITH HYPERGLYCEMIA, WITHOUT LONG-TERM CURRENT USE OF INSULIN (H): ICD-10-CM

## 2024-07-18 RX ORDER — EMPAGLIFLOZIN 25 MG/1
25 TABLET, FILM COATED ORAL DAILY
Qty: 30 TABLET | Refills: 1 | Status: SHIPPED | OUTPATIENT
Start: 2024-07-18 | End: 2024-07-18

## 2024-07-18 NOTE — TELEPHONE ENCOUNTER
Name from pharmacy: JARDIANCE 25 MG TABLET 25 Tablet         Will file in chart as: JARDIANCE 25 MG TABS tablet    Sig: TAKE 1 TABLET (25 MG) BY MOUTH DAILY    Disp: 90 tablet    Refills: 1    Start: 7/18/2024    Class: E-Prescribe    Non-formulary For: Type 2 diabetes mellitus with hyperglycemia, without long-term current use of insulin (H)    Last ordered: 4 months ago (2/23/2024) by Jason Siddiqui MD    Last refill: 7/18/2024    Rx #: [id number]    Sodium Glucose Co-Transport Inhibitor Agents [id number]/18/2024 01:36 PM   Protocol Details Patient has documented A1c within the specified period of time.    Medication is active on med list    Has GFR on file in past 12 months and most recent value is normal    Recent (6 mo) or future (90 days) visit within the authorizing provider's specialty    Medication indicated for associated diagnosis    Patient is age 18 or older    Patient has documented normal Potassium within the last 12 mos.      To be filled at: [facility] [place] [facility] - [place], [place] - [address] [place] [address]. [address]     Component      Latest Ref Rng 7/12/2024  9:59 AM   Potassium      3.4 - 5.3 mmol/L 4.3        Component      Latest Ref Rng 7/12/2024  9:59 AM   GFR Estimate      >60 mL/min/1.73m2 >90        Component      Latest Ref Rng 7/12/2024  9:59 AM   Hemoglobin A1C      0.0 - 5.6 % 7.9 (H)       Legend:  (H) High

## 2024-08-01 ENCOUNTER — OFFICE VISIT (OUTPATIENT)
Dept: DERMATOLOGY | Facility: CLINIC | Age: 64
End: 2024-08-01
Payer: COMMERCIAL

## 2024-08-01 ENCOUNTER — TELEPHONE (OUTPATIENT)
Dept: DERMATOLOGY | Facility: CLINIC | Age: 64
End: 2024-08-01

## 2024-08-01 DIAGNOSIS — L40.50 PSORIATIC ARTHRITIS (H): ICD-10-CM

## 2024-08-01 DIAGNOSIS — Z22.7 TB LUNG, LATENT: ICD-10-CM

## 2024-08-01 DIAGNOSIS — Z51.81 MEDICATION MONITORING ENCOUNTER: ICD-10-CM

## 2024-08-01 DIAGNOSIS — L40.0 PSORIASIS VULGARIS: Primary | ICD-10-CM

## 2024-08-01 LAB
BASOPHILS # BLD AUTO: 0.1 10E3/UL (ref 0–0.2)
BASOPHILS NFR BLD AUTO: 1 %
EOSINOPHIL # BLD AUTO: 0.3 10E3/UL (ref 0–0.7)
EOSINOPHIL NFR BLD AUTO: 4 %
ERYTHROCYTE [DISTWIDTH] IN BLOOD BY AUTOMATED COUNT: 19.4 % (ref 10–15)
HCT VFR BLD AUTO: 42.9 % (ref 40–53)
HGB BLD-MCNC: 13.8 G/DL (ref 13.3–17.7)
IMM GRANULOCYTES # BLD: 0 10E3/UL
IMM GRANULOCYTES NFR BLD: 0 %
LYMPHOCYTES # BLD AUTO: 2 10E3/UL (ref 0.8–5.3)
LYMPHOCYTES NFR BLD AUTO: 24 %
MCH RBC QN AUTO: 21.2 PG (ref 26.5–33)
MCHC RBC AUTO-ENTMCNC: 32.2 G/DL (ref 31.5–36.5)
MCV RBC AUTO: 66 FL (ref 78–100)
MONOCYTES # BLD AUTO: 0.5 10E3/UL (ref 0–1.3)
MONOCYTES NFR BLD AUTO: 7 %
NEUTROPHILS # BLD AUTO: 5.3 10E3/UL (ref 1.6–8.3)
NEUTROPHILS NFR BLD AUTO: 64 %
PLATELET # BLD AUTO: 273 10E3/UL (ref 150–450)
RBC # BLD AUTO: 6.52 10E6/UL (ref 4.4–5.9)
WBC # BLD AUTO: 8.2 10E3/UL (ref 4–11)

## 2024-08-01 PROCEDURE — 80053 COMPREHEN METABOLIC PANEL: CPT | Performed by: PHYSICIAN ASSISTANT

## 2024-08-01 PROCEDURE — 99214 OFFICE O/P EST MOD 30 MIN: CPT | Performed by: PHYSICIAN ASSISTANT

## 2024-08-01 PROCEDURE — 36415 COLL VENOUS BLD VENIPUNCTURE: CPT | Performed by: PHYSICIAN ASSISTANT

## 2024-08-01 PROCEDURE — 85025 COMPLETE CBC W/AUTO DIFF WBC: CPT | Performed by: PHYSICIAN ASSISTANT

## 2024-08-01 PROCEDURE — 86481 TB AG RESPONSE T-CELL SUSP: CPT | Performed by: PHYSICIAN ASSISTANT

## 2024-08-01 RX ORDER — CLOBETASOL PROPIONATE 0.5 MG/G
CREAM TOPICAL
Qty: 120 G | Refills: 1 | Status: SHIPPED | OUTPATIENT
Start: 2024-08-01

## 2024-08-01 RX ORDER — IBUPROFEN 600 MG/1
600 TABLET, FILM COATED ORAL EVERY 6 HOURS PRN
Qty: 60 TABLET | Refills: 0 | Status: SHIPPED | OUTPATIENT
Start: 2024-08-01

## 2024-08-01 RX ORDER — CLOBETASOL PROPIONATE 0.5 MG/G
CREAM TOPICAL 2 TIMES DAILY
Qty: 120 G | Refills: 1 | Status: SHIPPED | OUTPATIENT
Start: 2024-08-01 | End: 2024-08-01

## 2024-08-01 RX ORDER — MOMETASONE FUROATE 1 MG/G
CREAM TOPICAL
Qty: 90 G | Refills: 1 | Status: SHIPPED | OUTPATIENT
Start: 2024-08-01

## 2024-08-01 NOTE — PROGRESS NOTES
"[facility] Dermatology Note  Encounter Date: Aug 1, 2024  Office Visit     Dermatology Problem List:  1. Psoriasis + joint pain, likely psoriatic arthritis - 30-40% BSA  - Current: Cosentyx (Holding), clobetasol, and ammonium lactate.   - Previous: Humira - did not clear him up, betamethasone 0.05% oint. Triam 0.1% cream, lidex 0.05% solution  - Safety labs: CBC, CMP, TB Gold 8/1/24, CXR 8/15/22 (ordered 8/1/24)              - Patient with hx of latent TB which was treated with ID, Hep B core positive, Hep B PCR negative    Social: Requires [place]   ____________________________________________     Assessment & Plan:  # Psoriasis vulgaris + joint pain, likely psoriatic arthritis. Chronic, flare. BSA 30-40%. Vast improvement with Cosentyx in both skin rash and joint pain, however patient held medication after first 3 injections due to feeling of chest tightness 2-3 days following each injection. Experiencing SOB and \"chest tightness\". Had a negative stress done by his PCP in 2022, but is now being reevaluated by cardiology and we are awaiting approval from them to restart cosentyx. Last dose of cosentyx was in February, 2024. Rash has returned but, but not particularly symptomatic, however joint pain has returned and this is more bothersome to him.  - Will continue to hold Cosentyx until he completes work up with cardiology, had a somewhat abnormal echo stress test on 7/2/24 showing apical ischemia and now we are awaiting a CT angiogram on 8/21/24. If cleared with cardiology then he will restart use as this has been dramatically effective for his skin rash as well as significant joint pain.   - Humira can be associated with new onset CHF, however this patient was not on Humira for long - <1 year in total  - I do not see evidence in literature of cardiac side effects related to cosentyx at this time, so hopefully he will be able to resume treatment with cosentyx, but given complaint of " chest pain I think it is best for him to follow up and be evaluated more thoroughly.  - Start ibuprofen 600mg Q 6 hours for joint pain, hopefully will have result from cardiology within next 1-2 weeks and patient feels he can manage joint pain during this time, though would prefer to restart his biologic as soon as he is able  - Continue on clobetasol 0.05% ointment to apply to his hips BID for 2-3 weeks. Also has TMC to use, but clobetasol will be better for more severe flares.  - Will continue use of ammonium lactate lotion to extremities, may alternate with clobetasol  - Labs and Chest x-ray ordered today as he needs this annually given hx of latent TB. He has had CXR ordered the past two years, but he has not completed this, I encouraged him to do this.      Procedures Performed:   None    Follow-up: 6 month(s) in-person, or earlier for new or changing lesions    Staff and Scribe:     Scribe Disclosure:   I, SANDIP NAYAK, am serving as a scribe; to document services personally performed by Deya Kelly PA-C-based on data collection and the provider's statements to me.  Provider Disclosure:   The documentation recorded by the scribe accurately reflects the services I personally performed and the decisions made by me.    All risks, benefits and alternatives were discussed with patient.  Patient is in agreement and understands the assessment and plan.  All questions were answered.    Deya Kelly PA-C, [facility]  [facility]: Phone: [phone], Fax: [phone]  [facility]: Phone: [phone],  Fax: [phone]  [facility]: Phone: [phone], Fax: [phone]    ____________________________________________    CC: No chief complaint on file.    HPI:  Mr. Sa KENIA Mackey is a(n) 64 year old male who presents today as a return patient for psoriasis and psoriatic arthritis follow up. Last dose of  cosentyx was Feb 2024. His skin rash has returned, but this is less bothersome to him than the joint pain at this time. He did follow up with cardiology due to having SOB. He had a stress test showing apical ischemia and now we are awaiting a CT angiogram to evaluate this further before we can restart the cosentyx.    Patient is otherwise feeling well, without additional skin concerns.    Labs Reviewed:  CBC, CMP and TG Gold ordered today  CXR reviewed from 2022, it was ordered in 2023, but patient did not complete, I have ordered this again for him due to having latent TB hx    Physical Exam:  Vitals: There were no vitals taken for this visit.  SKIN: Sun-exposed skin, which includes the head/face, neck, both arms, digits, and/or nails was examined.   - no visible active psoriatic plaques on extremities, but review of photos with PCP shows significant pink scaly plaques on the abdomen and chest, they are thin plaques at this time  - No other lesions of concern on areas examined.     Medications:  Current Outpatient Medications   Medication Sig Dispense Refill    acetaminophen (TYLENOL) 500 MG tablet Take 2 tablets (1,000 mg) by mouth every 8 hours as needed for mild pain 100 tablet 3    acetic acid-hydrocortisone (VOSOL-HC) 1-2 % otic solution Place 3 drops into both ears 2 times daily as needed (itching) 10 mL 3    Alcohol Swabs (EASY TOUCH ALCOHOL PREP MEDIUM) 70 % PADS 1 pad. by In Vitro route 3 times daily as needed 300 each 1    amLODIPine (NORVASC) 10 MG tablet TAKE 1 TABLET (10 MG) BY MOUTH DAILY 90 tablet 1    amLODIPine (NORVASC) 5 MG tablet TAKE 2 TABLETS (10 MG) BY MOUTH DAILY 180 tablet 1    ammonium lactate (LAC-HYDRIN) 12 % external lotion Apply topically daily as needed for dry skin 225 g 5    amoxicillin-clavulanate (AUGMENTIN) 875-125 MG tablet Take 1 tablet by mouth 2 times daily 20 tablet 0    aspirin (ASPIRIN LOW DOSE) 81 MG EC tablet TAKE 1 TABLET (81 MG) BY MOUTH DAILY 90 tablet 1    atenolol  (TENORMIN) 50 MG tablet Take 50 mg by mouth daily      atorvastatin (LIPITOR) 80 MG tablet TAKE 1 TABLET (80 MG) BY MOUTH DAILY 90 tablet 1    blood glucose (NO BRAND SPECIFIED) test strip Use up to 3 times a day 100 strip 3    calcipotriene (DOVONOX) 0.005 % external ointment Apply to psoriasis TWICE DAILY EVERY OTHER DAY alternating with triamcinolone 454 g 1    Continuous Blood Gluc Sensor (FREESTYLE JOSE 14 DAY SENSOR) [facility] Use to test blood sugar daily per 's instructions.  Remove and replace every 14 days. 2 each 6    COSENTYX SENSOREADY, 300 MG, 150 MG/ML SOAJ INJECT THE CONTENTS OF TWO PENS SUBCUTANEOUSLY WEEKLY FOR 5 WEEKS THEN TWO PENS EVERY 4 WEEKS. **STARTER DOSE** 8 mL 0    diphenhydrAMINE (BENADRYL) 25 MG tablet Take 1 tablet (25 mg) by mouth every 6 hours as needed for itching 100 tablet 3    empagliflozin (JARDIANCE) 25 MG TABS tablet Take 1 tablet (25 mg) by mouth daily 90 tablet 1    febuxostat (ULORIC) 40 MG TABS tablet TAKE 1 TABLET (40 MG) BY MOUTH DAILY 90 tablet 1    fluocinonide (LIDEX) 0.05 % external solution Apply 10-15 drops to scalp at nighttime before bed as needed for itch. 120 mL 11    gabapentin (NEURONTIN) 400 MG capsule TAKE 1 CAPSULE (400 MG) BY MOUTH 2 TIMES DAILY 180 capsule 1    glipiZIDE (GLUCOTROL XL) 5 MG 24 hr tablet TAKE 1 TABLET (5 MG) BY MOUTH DAILY WITH SUPPER 90 tablet 1    Lancet Devices (EASY TOUCH LANCING DEVICE) MISC USE TO TEST BLOOD SUGARS 1 each 0    lisinopril (ZESTRIL) 40 MG tablet TAKE 1 TABLET (40 MG) BY MOUTH DAILY 90 tablet 1    metFORMIN (GLUCOPHAGE XR) 500 MG 24 hr tablet TAKE 4 TABLETS (2,000 MG) BY MOUTH DAILY 360 tablet 1    nystatin (MYCOSTATIN) 579505 UNIT/GM external cream Apply twice daily to fungus infection only 30 g 1    order for DME Equipment being ordered: 1 seated 4-wheel walker with brakes 1 Device 0    ORDER FOR DME  (Continuous Positive Airway Pressure) nightly      triamcinolone (KENALOG) 0.1 % external cream APPLY  TOPICALLY TO PSORIASIS TWICE DAILY EVERY OTHER DAY ALTERNATING WITH CALCIPOTRIENE 454 g 5    vitamin D3 (CHOLECALCIFEROL) 50 mcg (2000 units) tablet TAKE 1 TABLET (50 MCG) BY MOUTH DAILY 90 tablet 1     No current facility-administered medications for this visit.      Past Medical History:   Patient Active Problem List   Diagnosis    Muscle pain    Depression    Male erectile disorder    Obstructive sleep apnea    PTSD (post-traumatic stress disorder)    Testicular hypofunction    Mixed hyperlipidemia    Lesion of ulnar nerve    Vitamin D deficiency    Allergic rhinitis    H/O exploratory laparotomy    Gout    Dyshidrotic eczema    Essential hypertension with goal blood pressure less than 140/90    Type II diabetes mellitus (H)    Morbid obesity (H)    Rotator cuff syndrome, right    Psoriasis    Bilateral knee pain    Dermatochalasis    Difficulty walking    Elevated hemoglobin A1c    Elevated LFTs    Fatty liver    Glaucoma suspect    Gouty arthropathy    Lateral epicondylitis    Malaise and fatigue    Fibromyalgia    Muscular deconditioning    Central serous retinopathy    Retinal pigment epithelial detachment    Joint pain    Psoriatic arthritis (H)    Encounter for therapeutic drug monitoring    Chest tightness     Past Medical History:   Diagnosis Date    Depressive disorder     Diabetes (H)     Hypertension

## 2024-08-01 NOTE — PATIENT INSTRUCTIONS
Proper skin care from [facility]:    -Eliminate harsh soaps as they strip the natural oils from the skin, often resulting in dry itchy skin ( i.e. Dial, Zest, Faroese Spring)  -Use mild soaps such as Cetaphil or Dove Sensitive Skin in the shower. You do not need to use soap on arms, legs, and trunk every time you shower unless visibly soiled.   -Avoid hot or cold showers.  -After showering, lightly dry off and apply moisturizing within 2-3 minutes. This will help trap moisture in the skin.   -Aggressive use of a moisturizer at least 1-2 times a day to the entire body (including -Vanicream, Cetaphil, Aquaphor or Cerave) and moisturize hands after every washing.  -We recommend using moisturizers that come in a tub that needs to be scooped out, not a pump. This has more of an oil base. It will hold moisture in your skin much better than a water base moisturizer. The above recommended are non-pore clogging.      Wear a sunscreen with at least SPF 30 on your face, ears, neck and V of the chest daily. Wear sunscreen on other areas of the body if those areas are exposed to the sun throughout the day. Sunscreens can contain physical and/or chemical blockers. Physical blockers are less likely to clog pores, these include zinc oxide and titanium dioxide. Reapply every two hour and after swimming.     Sunscreen examples: https://www.ewg.org/sunscreen/    UV radiation  UVA radiation remains constant throughout the day and throughout the year. It is a longer wavelength than UVB and therefore penetrates deeper into the skin leading to immediate and delayed tanning, photoaging, and skin cancer. 70-80% of UVA and UVB radiation occurs between the hours of 10am-2pm.  UVB radiation  UVB radiation causes the most harmful effects and is more significant during the summer months. However, snow and ice can reflect UVB radiation leading to skin damage during the winter months as well. UVB radiation is responsible for tanning,  burning, inflammation, delayed erythema (pinkness), pigmentation (brown spots), and skin cancer.     I recommend self monthly full body exams and yearly full body exams with a dermatology provider. If you develop a new or changing lesion please follow up for examination. Most skin cancers are pink and scaly or pink and pearly. However, we do see blue/brown/black skin cancers.  Consider the ABCDEs of melanoma when giving yourself your monthly full body exam ( don't forget the groin, buttocks, feet, toes, etc). A-asymmetry, B-borders, C-color, D-diameter, E-elevation or evolving. If you see any of these changes please follow up in clinic. If you cannot see your back I recommend purchasing a hand held mirror to use with a larger wall mirror.       Checking for Skin Cancer  You can find cancer early by checking your skin each month. There are 3 kinds of skin cancer. They are melanoma, basal cell carcinoma, and squamous cell carcinoma. Doing monthly skin checks is the best way to find new marks or skin changes. Follow the instructions below for checking your skin.   The ABCDEs of checking moles for melanoma   Check your moles or growths for signs of melanoma using ABCDE:   Asymmetry: the sides of the mole or growth don t match  Border: the edges are ragged, notched, or blurred  Color: the color within the mole or growth varies  Diameter: the mole or growth is larger than 6 mm (size of a pencil eraser)  Evolving: the size, shape, or color of the mole or growth is changing (evolving is not shown in the images below)    Checking for other types of skin cancer  Basal cell carcinoma or squamous cell carcinoma have symptoms such as:     A spot or mole that looks different from all other marks on your skin  Changes in how an area feels, such as itching, tenderness, or pain  Changes in the skin's surface, such as oozing, bleeding, or scaliness  A sore that does not heal  New swelling or redness beyond the border of a  mole    Who s at risk?  Anyone can get skin cancer. But you are at greater risk if you have:   Fair skin, light-colored hair, or light-colored eyes  Many moles or abnormal moles on your skin  A history of sunburns from sunlight or tanning beds  A family history of skin cancer  A history of exposure to radiation or chemicals  A weakened immune system  If you have had skin cancer in the past, you are at risk for recurring skin cancer.   How to check your skin  Do your monthly skin checkups in front of a full-length mirror. Check all parts of your body, including your:   Head (ears, face, neck, and scalp)  Torso (front, back, and sides)  Arms (tops, undersides, upper, and lower armpits)  Hands (palms, backs, and fingers, including under the nails)  Buttocks and genitals  Legs (front, back, and sides)  Feet (tops, soles, toes, including under the nails, and between toes)  If you have a lot of moles, take digital photos of them each month. Make sure to take photos both up close and from a distance. These can help you see if any moles change over time.   Most skin changes are not cancer. But if you see any changes in your skin, call your doctor right away. Only he or she can diagnose a problem. If you have skin cancer, seeing your doctor can be the first step toward getting the treatment that could save your life.   [employer] last reviewed this educational content on 4/1/2019 2000-2020 [employer]. [address] [place] [address], [place] [address]. All rights reserved. This information is not intended as a substitute for professional medical care. Always follow your healthcare professional's instructions.       When should I call my doctor?  If you are worsening or not improving, please, contact us or seek urgent care as noted below.     Who should I call with questions (adults)?    [facility] [phone]  For urgent needs outside of business hours call the [facility] at  [phone] and ask for the dermatology resident on call to be paged  If this is a medical emergency and you are unable to reach an ER, Call 911      If you need a prescription refill, please contact your pharmacy. Refills are approved or denied by our Physicians during normal business hours, Monday through Fridays  Per office policy, refills will not be granted if you have not been seen within the past year (or sooner depending on your child's condition)

## 2024-08-01 NOTE — TELEPHONE ENCOUNTER
[facility] Call Center    Phone Message    May a detailed message be left on voicemail: yes     Reason for Call: Medication Question or concern regarding medication   Prescription Clarification  Name of Medication: ibuprofen (ADVIL/MOTRIN) 600 MG tablet  Prescribing Provider: Robin DAHL   Pharmacy: [place], [place] - [address] [place] [address]    What on the order needs clarification? pt reporting allergy to non steriod - anti inflammatory. Please call pharmacy to discuss. Thanks        Action Taken: Message routed to:  [facility] Derm    Travel Screening: Not Applicable     Date of Service:

## 2024-08-01 NOTE — LETTER
"8/1/2024      Sa KENIA Mackey  [address]  [place] [address]      Dear Colleague,    Thank you for referring your patient, Sa KNEIA Mackey, to the [facility]. Please see a copy of my visit note below.    [facility] Dermatology Note  Encounter Date: Aug 1, 2024  Office Visit     Dermatology Problem List:  1. Psoriasis + joint pain, likely psoriatic arthritis - 30-40% BSA  - Current: Cosentyx (Holding), clobetasol, and ammonium lactate.   - Previous: Humira - did not clear him up, betamethasone 0.05% oint. Triam 0.1% cream, lidex 0.05% solution  - Safety labs: CBC, CMP, TB Gold 8/1/24, CXR 8/15/22 (ordered 8/1/24)              - Patient with hx of latent TB which was treated with ID, Hep B core positive, Hep B PCR negative    Social: Requires Martiniquais   ____________________________________________     Assessment & Plan:  # Psoriasis vulgaris + joint pain, likely psoriatic arthritis. Chronic, flare. BSA 30-40%. Vast improvement with Cosentyx in both skin rash and joint pain, however patient held medication after first 3 injections due to feeling of chest tightness 2-3 days following each injection. Experiencing SOB and \"chest tightness\". Had a negative stress done by his PCP in 2022, but is now being reevaluated by cardiology and we are awaiting approval from them to restart cosentyx. Last dose of cosentyx was in February, 2024. Rash has returned but, but not particularly symptomatic, however joint pain has returned and this is more bothersome to him.  - Will continue to hold Cosentyx until he completes work up with cardiology, had a somewhat abnormal echo stress test on 7/2/24 showing apical ischemia and now we are awaiting a CT angiogram on 8/21/24. If cleared with cardiology then he will restart use as this has been dramatically effective for his skin rash as well as significant joint pain.   - Humira can be associated with new onset CHF, however this patient was " not on Humira for long - <1 year in total  - I do not see evidence in literature of cardiac side effects related to cosentyx at this time, so hopefully he will be able to resume treatment with cosentyx, but given complaint of chest pain I think it is best for him to follow up and be evaluated more thoroughly.  - Start ibuprofen 600mg Q 6 hours for joint pain, hopefully will have result from cardiology within next 1-2 weeks and patient feels he can manage joint pain during this time, though would prefer to restart his biologic as soon as he is able  - Continue on clobetasol 0.05% ointment to apply to his hips BID for 2-3 weeks. Also has TMC to use, but clobetasol will be better for more severe flares.  - Will continue use of ammonium lactate lotion to extremities, may alternate with clobetasol  - Labs and Chest x-ray ordered today as he needs this annually given hx of latent TB. He has had CXR ordered the past two years, but he has not completed this, I encouraged him to do this.      Procedures Performed:   None    Follow-up: 6 month(s) in-person, or earlier for new or changing lesions    Staff and Scribe:     Scribe Disclosure:   I, SANDIP NAYAK, am serving as a scribe; to document services personally performed by Deya Kelly PA-C-based on data collection and the provider's statements to me.  Provider Disclosure:   The documentation recorded by the scribe accurately reflects the services I personally performed and the decisions made by me.    All risks, benefits and alternatives were discussed with patient.  Patient is in agreement and understands the assessment and plan.  All questions were answered.    Deya Kelly PA-C, [facility]  [facility] [place]: Phone: [phone], Fax: [phone]  [facility]: Phone: [phone],  Fax: [phone]  [facility]: Phone: [phone], Fax:  [phone]    ____________________________________________    CC: No chief complaint on file.    HPI:  Mr. Sa KENIA Mackey is a(n) 64 year old male who presents today as a return patient for psoriasis and psoriatic arthritis follow up. Last dose of cosentyx was Feb 2024. His skin rash has returned, but this is less bothersome to him than the joint pain at this time. He did follow up with cardiology due to having SOB. He had a stress test showing apical ischemia and now we are awaiting a CT angiogram to evaluate this further before we can restart the cosentyx.    Patient is otherwise feeling well, without additional skin concerns.    Labs Reviewed:  CBC, CMP and TG Gold ordered today  CXR reviewed from 2022, it was ordered in 2023, but patient did not complete, I have ordered this again for him due to having latent TB hx    Physical Exam:  Vitals: There were no vitals taken for this visit.  SKIN: Sun-exposed skin, which includes the head/face, neck, both arms, digits, and/or nails was examined.   - no visible active psoriatic plaques on extremities, but review of photos with PCP shows significant pink scaly plaques on the abdomen and chest, they are thin plaques at this time  - No other lesions of concern on areas examined.     Medications:  Current Outpatient Medications   Medication Sig Dispense Refill     acetaminophen (TYLENOL) 500 MG tablet Take 2 tablets (1,000 mg) by mouth every 8 hours as needed for mild pain 100 tablet 3     acetic acid-hydrocortisone (VOSOL-HC) 1-2 % otic solution Place 3 drops into both ears 2 times daily as needed (itching) 10 mL 3     Alcohol Swabs (EASY TOUCH ALCOHOL PREP MEDIUM) 70 % PADS 1 pad. by In Vitro route 3 times daily as needed 300 each 1     amLODIPine (NORVASC) 10 MG tablet TAKE 1 TABLET (10 MG) BY MOUTH DAILY 90 tablet 1     amLODIPine (NORVASC) 5 MG tablet TAKE 2 TABLETS (10 MG) BY MOUTH DAILY 180 tablet 1     ammonium lactate (LAC-HYDRIN) 12 % external lotion Apply topically  daily as needed for dry skin 225 g 5     amoxicillin-clavulanate (AUGMENTIN) 875-125 MG tablet Take 1 tablet by mouth 2 times daily 20 tablet 0     aspirin (ASPIRIN LOW DOSE) 81 MG EC tablet TAKE 1 TABLET (81 MG) BY MOUTH DAILY 90 tablet 1     atenolol (TENORMIN) 50 MG tablet Take 50 mg by mouth daily       atorvastatin (LIPITOR) 80 MG tablet TAKE 1 TABLET (80 MG) BY MOUTH DAILY 90 tablet 1     blood glucose (NO BRAND SPECIFIED) test strip Use up to 3 times a day 100 strip 3     calcipotriene (DOVONOX) 0.005 % external ointment Apply to psoriasis TWICE DAILY EVERY OTHER DAY alternating with triamcinolone 454 g 1     Continuous Blood Gluc Sensor (FREESTYLE JOSE 14 DAY SENSOR) [facility] [place] to test blood sugar daily per 's instructions.  Remove and replace every 14 days. 2 each 6     COSENTYX SENSOREADY, 300 MG, 150 MG/ML SOAJ INJECT THE CONTENTS OF TWO PENS SUBCUTANEOUSLY WEEKLY FOR 5 WEEKS THEN TWO PENS EVERY 4 WEEKS. **STARTER DOSE** 8 mL 0     diphenhydrAMINE (BENADRYL) 25 MG tablet Take 1 tablet (25 mg) by mouth every 6 hours as needed for itching 100 tablet 3     empagliflozin (JARDIANCE) 25 MG TABS tablet Take 1 tablet (25 mg) by mouth daily 90 tablet 1     febuxostat (ULORIC) 40 MG TABS tablet TAKE 1 TABLET (40 MG) BY MOUTH DAILY 90 tablet 1     fluocinonide (LIDEX) 0.05 % external solution Apply 10-15 drops to scalp at nighttime before bed as needed for itch. 120 mL 11     gabapentin (NEURONTIN) 400 MG capsule TAKE 1 CAPSULE (400 MG) BY MOUTH 2 TIMES DAILY 180 capsule 1     glipiZIDE (GLUCOTROL XL) 5 MG 24 hr tablet TAKE 1 TABLET (5 MG) BY MOUTH DAILY WITH SUPPER 90 tablet 1     Lancet Devices (EASY TOUCH LANCING DEVICE) MISC USE TO TEST BLOOD SUGARS 1 each 0     lisinopril (ZESTRIL) 40 MG tablet TAKE 1 TABLET (40 MG) BY MOUTH DAILY 90 tablet 1     metFORMIN (GLUCOPHAGE XR) 500 MG 24 hr tablet TAKE 4 TABLETS (2,000 MG) BY MOUTH DAILY 360 tablet 1     nystatin (MYCOSTATIN) 442120 UNIT/GM external cream  Apply twice daily to fungus infection only 30 g 1     order for DME Equipment being ordered: 1 seated 4-wheel walker with brakes 1 Device 0     ORDER FOR DME  (Continuous Positive Airway Pressure) nightly       triamcinolone (KENALOG) 0.1 % external cream APPLY TOPICALLY TO PSORIASIS TWICE DAILY EVERY OTHER DAY ALTERNATING WITH CALCIPOTRIENE 454 g 5     vitamin D3 (CHOLECALCIFEROL) 50 mcg (2000 units) tablet TAKE 1 TABLET (50 MCG) BY MOUTH DAILY 90 tablet 1     No current facility-administered medications for this visit.      Past Medical History:   Patient Active Problem List   Diagnosis     Muscle pain     Depression     Male erectile disorder     Obstructive sleep apnea     PTSD (post-traumatic stress disorder)     Testicular hypofunction     Mixed hyperlipidemia     Lesion of ulnar nerve     Vitamin D deficiency     Allergic rhinitis     H/O exploratory laparotomy     Gout     Dyshidrotic eczema     Essential hypertension with goal blood pressure less than 140/90     Type II diabetes mellitus (H)     Morbid obesity (H)     Rotator cuff syndrome, right     Psoriasis     Bilateral knee pain     Dermatochalasis     Difficulty walking     Elevated hemoglobin A1c     Elevated LFTs     Fatty liver     Glaucoma suspect     Gouty arthropathy     Lateral epicondylitis     Malaise and fatigue     Fibromyalgia     Muscular deconditioning     Central serous retinopathy     Retinal pigment epithelial detachment     Joint pain     Psoriatic arthritis (H)     Encounter for therapeutic drug monitoring     Chest tightness     Past Medical History:   Diagnosis Date     Depressive disorder      Diabetes (H)      Hypertension          Again, thank you for allowing me to participate in the care of your patient.        Sincerely,        Deya Kelly PA-C

## 2024-08-02 LAB
ALBUMIN SERPL BCG-MCNC: 4.4 G/DL (ref 3.5–5.2)
ALP SERPL-CCNC: 115 U/L (ref 40–150)
ALT SERPL W P-5'-P-CCNC: 50 U/L (ref 0–70)
ANION GAP SERPL CALCULATED.3IONS-SCNC: 10 MMOL/L (ref 7–15)
AST SERPL W P-5'-P-CCNC: 40 U/L (ref 0–45)
BILIRUB SERPL-MCNC: 0.5 MG/DL
BUN SERPL-MCNC: 19.3 MG/DL (ref 8–23)
CALCIUM SERPL-MCNC: 9.5 MG/DL (ref 8.8–10.4)
CHLORIDE SERPL-SCNC: 105 MMOL/L (ref 98–107)
CREAT SERPL-MCNC: 0.77 MG/DL (ref 0.67–1.17)
EGFRCR SERPLBLD CKD-EPI 2021: >90 ML/MIN/1.73M2
GLUCOSE SERPL-MCNC: 121 MG/DL (ref 70–99)
HCO3 SERPL-SCNC: 23 MMOL/L (ref 22–29)
POTASSIUM SERPL-SCNC: 4.3 MMOL/L (ref 3.4–5.3)
PROT SERPL-MCNC: 8.6 G/DL (ref 6.4–8.3)
SODIUM SERPL-SCNC: 138 MMOL/L (ref 135–145)

## 2024-08-02 NOTE — TELEPHONE ENCOUNTER
S/w [facility] pharmacist who states their records state pt has allergy to NSAIDS and then something happened with phones that parties could not hear each other.  Nurse hung up and s/w Deya Kelly and advised above information.  Deya states per pt he does not have an allergy to NSAIDS and continue to prescribe.    Nurse called pharmacy back and s/w Kay and advised of provider message above.  Kay is going to remove NSAIDS allergy from pt's profile.    Margie TILLMAN RN  [facility]  [phone]

## 2024-08-03 LAB
GAMMA INTERFERON BACKGROUND BLD IA-ACNC: 0.06 IU/ML
M TB IFN-G BLD-IMP: NEGATIVE
M TB IFN-G CD4+ BCKGRND COR BLD-ACNC: 9.94 IU/ML
MITOGEN IGNF BCKGRD COR BLD-ACNC: 0.13 IU/ML
MITOGEN IGNF BCKGRD COR BLD-ACNC: 0.17 IU/ML
QUANTIFERON MITOGEN: 10 IU/ML
QUANTIFERON NIL TUBE: 0.06 IU/ML
QUANTIFERON TB1 TUBE: 0.23 IU/ML
QUANTIFERON TB2 TUBE: 0.19

## 2024-08-06 ENCOUNTER — TELEPHONE (OUTPATIENT)
Dept: DERMATOLOGY | Facility: CLINIC | Age: 64
End: 2024-08-06
Payer: COMMERCIAL

## 2024-08-06 NOTE — LETTER
August 9, 2024       [address]  [address]  [place] [address]        Dear ,    We are writing to inform you of your test results.    Labs are stable. Nothing to stop him from continuing biologic cosentyx lab wise, however we are needing to wait until we hear back from cardiology before he can safely do this. So restarting is to be determined, but blood work is not holding him back at this point.    Resulted Orders   Comprehensive metabolic panel   Result Value Ref Range    Sodium 138 135 - 145 mmol/L    Potassium 4.3 3.4 - 5.3 mmol/L    Carbon Dioxide (CO2) 23 22 - 29 mmol/L    Anion Gap 10 7 - 15 mmol/L    Urea Nitrogen 19.3 8.0 - 23.0 mg/dL    Creatinine 0.77 0.67 - 1.17 mg/dL    GFR Estimate >90 >60 mL/min/1.73m2      Comment:      eGFR calculated using 2021 CKD-EPI equation.    Calcium 9.5 8.8 - 10.4 mg/dL      Comment:      Reference intervals for this test were updated on 7/16/2024 to reflect our healthy population more accurately. There may be differences in the flagging of prior results with similar values performed with this method. Those prior results can be interpreted in the context of the updated reference intervals.    Chloride 105 98 - 107 mmol/L    Glucose 121 (H) 70 - 99 mg/dL    Alkaline Phosphatase 115 40 - 150 U/L    AST 40 0 - 45 U/L    ALT 50 0 - 70 U/L    Protein Total 8.6 (H) 6.4 - 8.3 g/dL    Albumin 4.4 3.5 - 5.2 g/dL    Bilirubin Total 0.5 <=1.2 mg/dL   CBC with platelets and differential   Result Value Ref Range    WBC Count 8.2 4.0 - 11.0 10e3/uL    RBC Count 6.52 (H) 4.40 - 5.90 10e6/uL    Hemoglobin 13.8 13.3 - 17.7 g/dL    Hematocrit 42.9 40.0 - 53.0 %    MCV 66 (L) 78 - 100 fL    MCH 21.2 (L) 26.5 - 33.0 pg    MCHC 32.2 31.5 - 36.5 g/dL    RDW 19.4 (H) 10.0 - 15.0 %    Platelet Count 273 150 - 450 10e3/uL    % Neutrophils 64 %    % Lymphocytes 24 %    % Monocytes 7 %    % Eosinophils 4 %    % Basophils 1 %    % Immature Granulocytes 0 %    Absolute Neutrophils 5.3 1.6 -  8.3 10e3/uL    Absolute Lymphocytes 2.0 0.8 - 5.3 10e3/uL    Absolute Monocytes 0.5 0.0 - 1.3 10e3/uL    Absolute Eosinophils 0.3 0.0 - 0.7 10e3/uL    Absolute Basophils 0.1 0.0 - 0.2 10e3/uL    Absolute Immature Granulocytes 0.0 <=0.4 10e3/uL   Quantiferon TB Gold Plus Grey Tube   Result Value Ref Range    Quantiferon Nil Tube 0.06 IU/mL   Quantiferon TB Gold Plus Green Tube   Result Value Ref Range    Quantiferon TB1 Tube 0.23 IU/mL   Quantiferon TB Gold Plus Yellow Tube   Result Value Ref Range    Quantiferon TB2 Tube 0.19    Quantiferon TB Gold Plus Purple Tube   Result Value Ref Range    Quantiferon Mitogen 10.00 IU/mL   Quantiferon TB Gold Plus   Result Value Ref Range    Quantiferon-TB Gold Plus Negative Negative      Comment:      No interferon gamma response to M.tuberculosis antigens was detected. Infection with M.tuberculosis is unlikely, however a single negative result does not exclude infection. In patients at high risk for infection, a second test should be considered in accordance with the 2017 ATS/IDSA/CDC Clinical Pract  ice Guidelines for Diagnosis of Tuberculosis in Adults and Children     TB1 Ag minus Nil Value 0.17 IU/mL    TB2 Ag minus Nil Value 0.13 IU/mL    Mitogen minus Nil Result 9.94 IU/mL    Nil Result 0.06 IU/mL       If you have any questions or concerns, please call the clinic at the number listed above.     Sincerely,    Deya Kelly PA-C  Dermatology

## 2024-08-06 NOTE — TELEPHONE ENCOUNTER
----- Message from Deya Kelly sent at 8/5/2024  1:23 PM CDT -----  No my chart - needs an /Belgian.     Labs are stable. Nothing to stop him from continuing biologic lab wise, however we are needing to wait until we hear back from cardiology before he can safely do this. So restarting is TBD, but blood work is not holding him back at this point.    Ask how his joint pain is and if he has been able to tolerate the stronger dose of ibuprofen?     Thank you!    Concepción

## 2024-08-06 NOTE — TELEPHONE ENCOUNTER
Patient Contact    Attempt # 1    Was call answered?  No.  Left message on voicemail with information to call nurse back at [phone].    Home number is for daughter ctc in chart.    Margie TILLMAN RN  [facility] [place]  [phone]

## 2024-08-06 NOTE — LETTER
August 9, 2024       [facility] [address]  [address]  [place] [address]        Dear ,    We are writing to inform you of your test results.    Labs are stable. Nothing to stop him from continuing biologic cosentyx lab wise, however we are needing to wait until we hear back from cardiology before he can safely do this. So restarting is TBD, but blood work is not holding him back at this point.     Ask how his joint pain is and if he has been able to tolerate the stronger dose of ibuprofen?      Thank you!     Brit          Resulted Orders   Comprehensive metabolic panel   Result Value Ref Range    Sodium 138 135 - 145 mmol/L    Potassium 4.3 3.4 - 5.3 mmol/L    Carbon Dioxide (CO2) 23 22 - 29 mmol/L    Anion Gap 10 7 - 15 mmol/L    Urea Nitrogen 19.3 8.0 - 23.0 mg/dL    Creatinine 0.77 0.67 - 1.17 mg/dL    GFR Estimate >90 >60 mL/min/1.73m2      Comment:      eGFR calculated using 2021 CKD-EPI equation.    Calcium 9.5 8.8 - 10.4 mg/dL      Comment:      Reference intervals for this test were updated on 7/16/2024 to reflect our healthy population more accurately. There may be differences in the flagging of prior results with similar values performed with this method. Those prior results can be interpreted in the context of the updated reference intervals.    Chloride 105 98 - 107 mmol/L    Glucose 121 (H) 70 - 99 mg/dL    Alkaline Phosphatase 115 40 - 150 U/L    AST 40 0 - 45 U/L    ALT 50 0 - 70 U/L    Protein Total 8.6 (H) 6.4 - 8.3 g/dL    Albumin 4.4 3.5 - 5.2 g/dL    Bilirubin Total 0.5 <=1.2 mg/dL   CBC with platelets and differential   Result Value Ref Range    WBC Count 8.2 4.0 - 11.0 10e3/uL    RBC Count 6.52 (H) 4.40 - 5.90 10e6/uL    Hemoglobin 13.8 13.3 - 17.7 g/dL    Hematocrit 42.9 40.0 - 53.0 %    MCV 66 (L) 78 - 100 fL    MCH 21.2 (L) 26.5 - 33.0 pg    MCHC 32.2 31.5 - 36.5 g/dL    RDW 19.4 (H) 10.0 - 15.0 %    Platelet Count 273 150 - 450 10e3/uL    % Neutrophils 64 %    % Lymphocytes 24 %    %  Monocytes 7 %    % Eosinophils 4 %    % Basophils 1 %    % Immature Granulocytes 0 %    Absolute Neutrophils 5.3 1.6 - 8.3 10e3/uL    Absolute Lymphocytes 2.0 0.8 - 5.3 10e3/uL    Absolute Monocytes 0.5 0.0 - 1.3 10e3/uL    Absolute Eosinophils 0.3 0.0 - 0.7 10e3/uL    Absolute Basophils 0.1 0.0 - 0.2 10e3/uL    Absolute Immature Granulocytes 0.0 <=0.4 10e3/uL   Quantiferon TB Gold Plus Grey Tube   Result Value Ref Range    Quantiferon Nil Tube 0.06 IU/mL   Quantiferon TB Gold Plus Green Tube   Result Value Ref Range    Quantiferon TB1 Tube 0.23 IU/mL   Quantiferon TB Gold Plus Yellow Tube   Result Value Ref Range    Quantiferon TB2 Tube 0.19    Quantiferon TB Gold Plus Purple Tube   Result Value Ref Range    Quantiferon Mitogen 10.00 IU/mL   Quantiferon TB Gold Plus   Result Value Ref Range    Quantiferon-TB Gold Plus Negative Negative      Comment:      No interferon gamma response to M.tuberculosis antigens was detected. Infection with M.tuberculosis is unlikely, however a single negative result does not exclude infection. In patients at high risk for infection, a second test should be considered in accordance with the 2017 ATS/IDSA/CDC Clinical Pract  ice Guidelines for Diagnosis of Tuberculosis in Adults and Children     TB1 Ag minus Nil Value 0.17 IU/mL    TB2 Ag minus Nil Value 0.13 IU/mL    Mitogen minus Nil Result 9.94 IU/mL    Nil Result 0.06 IU/mL       If you have any questions or concerns, please call the clinic at the number listed above.       Sincerely,

## 2024-08-08 RX ORDER — METOPROLOL TARTRATE 1 MG/ML
5-15 INJECTION, SOLUTION INTRAVENOUS
Status: DISCONTINUED | OUTPATIENT
Start: 2024-08-08 | End: 2024-08-22 | Stop reason: HOSPADM

## 2024-08-08 RX ORDER — NITROGLYCERIN 0.4 MG/1
0.4 TABLET SUBLINGUAL
Status: DISCONTINUED | OUTPATIENT
Start: 2024-08-08 | End: 2024-08-22 | Stop reason: HOSPADM

## 2024-08-09 NOTE — TELEPHONE ENCOUNTER
Patient Contact    Attempt # 2    Was call answered?  No.  Detailed message left with info from provider and return number [phone] to call with any questions or concerns.  Mailed results letter with Deya's message to home address.    Margie TILLMAN RN  [facility] [place]  [phone]

## 2024-08-17 DIAGNOSIS — E11.65 TYPE 2 DIABETES MELLITUS WITH HYPERGLYCEMIA, WITHOUT LONG-TERM CURRENT USE OF INSULIN (H): ICD-10-CM

## 2024-08-20 RX ORDER — GLIPIZIDE 5 MG/1
5 TABLET, FILM COATED, EXTENDED RELEASE ORAL DAILY
Qty: 90 TABLET | Refills: 1 | Status: SHIPPED | OUTPATIENT
Start: 2024-08-20

## 2024-08-20 NOTE — TELEPHONE ENCOUNTER
Name from pharmacy: GLIPIZIDE ER 5 MG TB24 5 Tablet          Will file in chart as: glipiZIDE (GLUCOTROL XL) 5 MG 24 hr tablet    Sig: TAKE 1 TABLET (5 MG) BY MOUTH DAILY WITH SUPPER    Disp: 90 tablet    Refills: 1    Start: 8/17/2024    Class: E-Prescribe    Non-formulary For: Type 2 diabetes mellitus with hyperglycemia, without long-term current use of insulin (H)    Last ordered: 2 months ago (5/23/2024) by Jason Siddiqui MD    Last refill: 8/17/2024    Rx #: [id number]    Sulfonylurea Agents Hettms1508/17/2024 12:40 PM   Protocol Details Patient has documented A1c within the specified period of time.    Medication is active on med list    Has GFR on file in past 12 months and most recent value is normal    Recent (6 mo) or future (90 days) visit within the authorizing provider's specialty    Medication indicated for associated diagnosis    Patient is age 18 or older    Patient has a recent creatinine (normal) within the past 12 mos.        Hemoglobin A1C   Date Value Ref Range Status   07/12/2024 7.9 (H) 0.0 - 5.6 % Final     Comment:     Normal <5.7%   Prediabetes 5.7-6.4%    Diabetes 6.5% or higher     Note: Adopted from ADA consensus guidelines.   01/29/2021 7.8 (H) 4.1 - 5.7 % Final     GFR Estimate   Date Value Ref Range Status   08/01/2024 >90 >60 mL/min/1.73m2 Final     Comment:     eGFR calculated using 2021 CKD-EPI equation.   01/29/2021 >90 >60.0 mL/min/1.7 m2 Final     Creatinine   Date Value Ref Range Status   08/01/2024 0.77 0.67 - 1.17 mg/dL Final   01/29/2021 0.7 0.7 - 1.3 mg/dL Final     Prescription approved per [employer] Refill Protocol.    MANJU Waldron, BSN

## 2024-08-21 ENCOUNTER — HOSPITAL ENCOUNTER (OUTPATIENT)
Dept: CT IMAGING | Facility: CLINIC | Age: 64
Discharge: HOME | End: 2024-08-21
Attending: INTERNAL MEDICINE | Admitting: INTERNAL MEDICINE
Payer: COMMERCIAL

## 2024-08-21 VITALS — SYSTOLIC BLOOD PRESSURE: 135 MMHG | DIASTOLIC BLOOD PRESSURE: 66 MMHG | HEART RATE: 68 BPM

## 2024-08-21 DIAGNOSIS — R07.89 CHEST TIGHTNESS: ICD-10-CM

## 2024-08-21 DIAGNOSIS — R94.39 ABNORMAL STRESS ECHOCARDIOGRAM: ICD-10-CM

## 2024-08-21 LAB — BSA FOR ECHO PROCEDURE: 0 M2

## 2024-08-21 PROCEDURE — 75574 CT ANGIO HRT W/3D IMAGE: CPT

## 2024-08-21 PROCEDURE — 75574 CT ANGIO HRT W/3D IMAGE: CPT | Mod: 26 | Performed by: INTERNAL MEDICINE

## 2024-08-21 PROCEDURE — 250N000013 HC RX MED GY IP 250 OP 250 PS 637: Performed by: INTERNAL MEDICINE

## 2024-08-21 PROCEDURE — 250N000009 HC RX 250: Performed by: INTERNAL MEDICINE

## 2024-08-21 PROCEDURE — 250N000011 HC RX IP 250 OP 636: Performed by: INTERNAL MEDICINE

## 2024-08-21 RX ORDER — IOPAMIDOL 755 MG/ML
100 INJECTION, SOLUTION INTRAVASCULAR ONCE
Status: COMPLETED | OUTPATIENT
Start: 2024-08-21 | End: 2024-08-21

## 2024-08-21 RX ADMIN — METOPROLOL TARTRATE 5 MG: 5 INJECTION INTRAVENOUS at 07:38

## 2024-08-21 RX ADMIN — NITROGLYCERIN 0.4 MG: 0.4 TABLET SUBLINGUAL at 07:42

## 2024-08-21 RX ADMIN — IOPAMIDOL 100 ML: 755 INJECTION, SOLUTION INTRAVENOUS at 07:57

## 2024-08-28 ENCOUNTER — TELEPHONE (OUTPATIENT)
Dept: DERMATOLOGY | Facility: CLINIC | Age: 64
End: 2024-08-28
Payer: COMMERCIAL

## 2024-08-28 NOTE — TELEPHONE ENCOUNTER
"----- Message from Deya Kelly sent at 8/26/2024  2:00 PM CDT -----  Regarding: FW: Cardiology clearance  Can we call this patient - he will need an  unfortunately - and let him know that he has gotten clearance from cardiology to resume treatment with his biologic medication for his psoriasis and joint pain. He can go ahead and start that as soon as he is able.     Thank you!    Concepción  ----- Message -----  From: Jason Siddiqui MD  Sent: 8/26/2024   1:16 PM CDT  To: Pallavi Bear MD; #  Subject: Cardiology clearance                             Hello, I see his ct angiogram did not show severe obstructions - I would think he can be considered \"cleared\" to restart biologics for his severe psoriasis?  Agreed?  Thank you, Jason Siddiqui PCP  "

## 2024-08-28 NOTE — TELEPHONE ENCOUNTER
Patient Contact    Attempt # 1    Was call answered?  No.  Left message on voicemail with information to call nurse back at [phone].    Give Deya's message below.    Margie TILLMAN RN  [facility] [place]  [phone]

## 2024-08-28 NOTE — LETTER
September 4, 2024       [facility] [address]  [address]  [place] [address]          Dear ,     We have tried calling you but have been unsuccessful in reaching you.  We are letting you know you have been cleared by Cardiology to resume treatment with cosentyx for psoriasis.    Because you have been off of the medication for awhile I would recommend you start all over with the loading dose before transitioning to the maintenance dose.  I would like to see you again in person after you have been on the cosentyx for 3-4 months.  I sent 2 prescriptions to the specialty pharmacy for the loading dose and maintenance dose.       If you have any questions please call the clinic at [phone]    Sincerely,    Deya Kelly PA-C

## 2024-08-29 DIAGNOSIS — L40.0 PSORIASIS VULGARIS: Primary | ICD-10-CM

## 2024-08-29 DIAGNOSIS — M25.50 ARTHRALGIA, UNSPECIFIED JOINT: ICD-10-CM

## 2024-08-29 NOTE — TELEPHONE ENCOUNTER
Deya Kelly, ANTOINETTE  P  Skin Nurse Pool  Can we contact this patient (requires ) and let him know he has been cleared by cardiology to resume treatment with cosentyx for psoriasis.    Because he has been off for a while, I would recommend he start all over with the loading dose before transitioning to the maintenance dose. I do want to see him in person again after he has been on this for 3-4mo. I refilled both the loading and maintenance dose and sent to the specialty pharmacy.    Deya

## 2024-08-29 NOTE — TELEPHONE ENCOUNTER
Patient Contact    Attempt # 2    Was call answered?  No    Left message on answering machine for patient to call back.    Give Lydia message below.    Thank you,  Smiley SHEPHERD RN  Dermatology   [phone]

## 2024-08-31 ENCOUNTER — HEALTH MAINTENANCE LETTER (OUTPATIENT)
Age: 64
End: 2024-08-31

## 2024-09-04 NOTE — TELEPHONE ENCOUNTER
Letter mailed home with Deya's message below.    Margie TILLMAN RN  [facility] [place]  [phone]

## 2024-09-14 DIAGNOSIS — G63 POLYNEUROPATHY ASSOCIATED WITH UNDERLYING DISEASE (H): ICD-10-CM

## 2024-09-14 RX ORDER — GABAPENTIN 400 MG/1
400 CAPSULE ORAL 2 TIMES DAILY
Qty: 120 CAPSULE | Refills: 1 | Status: SHIPPED | OUTPATIENT
Start: 2024-09-14

## 2024-10-18 NOTE — PATIENT INSTRUCTIONS
I have refilled all your medications.    We agreed that you would schedule an appointment to get knee injections and consider repeating imaging, probably XRays first and then possibly MRI.      I placed a referral for a psychologist - someone will call you to follow up.    When you come in we will check labs and maybe need to increase your blood pressure medications.      10/09/20    MENTAL HEALTH REFERRAL    Mental Health referral routed to behavioral health team for recommendations. See Documentation Only encounter for more information.    Liliana Colindres       Yes

## 2024-10-25 ENCOUNTER — TELEPHONE (OUTPATIENT)
Dept: DERMATOLOGY | Facility: CLINIC | Age: 64
End: 2024-10-25
Payer: COMMERCIAL

## 2024-10-25 NOTE — TELEPHONE ENCOUNTER
PA Initiation    Medication: COSENTYX SENSOREADY (300 MG) 150 MG/ML SC SOAJ  Insurance Company: [facility] - Phone [phone] Fax [phone]  Pharmacy Filling the Rx: [facility] - [place], [place] - [address]  Filling Pharmacy Phone:    Filling Pharmacy Fax:    Start Date: 10/25/2024    Key: [id number]   100% of the time

## 2024-10-29 NOTE — TELEPHONE ENCOUNTER
Prior Authorization Approval    Medication: COSENTYX SENSOREADY (300 MG) 150 MG/ML SC SOAJ  Authorization Effective Date: 10/25/2024  Authorization Expiration Date: 10/25/2025  Approved Dose/Quantity: 4ml for 28 days  Reference #: Key: [id number]   Insurance Company: [employer] - Phone [phone] Fax [phone]  Expected CoPay: $    CoPay Card Available: No    Financial Assistance Needed: no  Which Pharmacy is filling the prescription: [facility] - [place], [place] - [address]   Pharmacy Notified: yes  Patient Notified: not needed

## 2024-11-04 ENCOUNTER — OFFICE VISIT (OUTPATIENT)
Dept: AUDIOLOGY | Facility: CLINIC | Age: 64
End: 2024-11-04
Payer: COMMERCIAL

## 2024-11-04 DIAGNOSIS — H90.6 MIXED HEARING LOSS, BILATERAL: Primary | ICD-10-CM

## 2024-11-04 PROCEDURE — V5299 HEARING SERVICE: HCPCS | Performed by: AUDIOLOGIST

## 2024-11-04 NOTE — PROGRESS NOTES
AUDIOLOGY REPORT    HEARING AID CHECK    SUBJECTIVE:Sa KENIA Mackey is a 64 year old male who was seen in the Audiology Clinic at the [facility] on 11/4/2024  for a hearing aid check.  Previous results have revealed a mild sloping to a moderately-severe predominately sensorineural hearing loss in the right ear (25 dB air-bone gap at 250 Hz, masking dilemma at 1000 Hz for bone conduction) and mild to moderately-severe mixed hearing loss in the left ear.  The patient was medically evaluated and determined to be cleared for binaural hearing aids by Dr. Angela Santiago.   The patient has been seen previously in this clinic and was fit with binaural Phonak Audeo P70-R ITALIA hearing aids on 6/6/2023.   reports he is having issues with itching and pain in both ears.     OBJECTIVE:     Otoscopy revealed clear ear canals bilaterally.  He reports having some itching in the ears from the hearing aids.     Based on patient report, the following changes were made:    He is not changing the domes or wax traps for fear he will break the hearing aids.  I changed his receivers to the 5.0 receivers with CeruStop.  I showed him how to change these today and gave him extras. He did well. He will try and change them on a regular basis now.  Cleaned the hearing aids and listening check is good. He is doing well otherwise and no programming changes were needed today.     ASSESSMENT: Hearing aid check was completed today. Changes to hearing aid was completed as outlined above.     PLAN: will return for follow-up as needed, or at least every 6-9 months for cleaning and assessment of hearing aid.   He will return in March 2025 for updated audiogram. Please call this clinic with any questions regarding today s appointment.    Monico Alberts., CCC-A  [place] Licensed Audiologist #[id number]

## 2024-11-07 DIAGNOSIS — H90.6 MIXED CONDUCTIVE AND SENSORINEURAL HEARING LOSS OF BOTH EARS: Primary | ICD-10-CM

## 2024-11-15 DIAGNOSIS — I10 ESSENTIAL HYPERTENSION WITH GOAL BLOOD PRESSURE LESS THAN 140/90: ICD-10-CM

## 2024-11-15 DIAGNOSIS — E55.9 HYPOVITAMINOSIS D: ICD-10-CM

## 2024-11-15 DIAGNOSIS — E11.65 TYPE 2 DIABETES MELLITUS WITH HYPERGLYCEMIA, WITHOUT LONG-TERM CURRENT USE OF INSULIN (H): ICD-10-CM

## 2024-11-15 DIAGNOSIS — M1A.09X0 IDIOPATHIC CHRONIC GOUT OF MULTIPLE SITES WITHOUT TOPHUS: ICD-10-CM

## 2024-11-15 DIAGNOSIS — E78.5 HYPERLIPIDEMIA LDL GOAL <100: ICD-10-CM

## 2024-11-15 RX ORDER — ASPIRIN 81 MG/1
TABLET ORAL
Qty: 90 TABLET | Refills: 1 | Status: SHIPPED | OUTPATIENT
Start: 2024-11-15

## 2024-11-15 RX ORDER — METFORMIN HYDROCHLORIDE 500 MG/1
2000 TABLET, EXTENDED RELEASE ORAL DAILY
Qty: 360 TABLET | Refills: 1 | Status: SHIPPED | OUTPATIENT
Start: 2024-11-15

## 2024-11-15 RX ORDER — FEBUXOSTAT 40 MG/1
40 TABLET, FILM COATED ORAL DAILY
Qty: 90 TABLET | Refills: 1 | Status: SHIPPED | OUTPATIENT
Start: 2024-11-15

## 2024-11-15 RX ORDER — ATORVASTATIN CALCIUM 80 MG/1
80 TABLET, FILM COATED ORAL DAILY
Qty: 90 TABLET | Refills: 1 | Status: SHIPPED | OUTPATIENT
Start: 2024-11-15

## 2024-11-15 RX ORDER — AMLODIPINE BESYLATE 10 MG/1
10 TABLET ORAL DAILY
Qty: 90 TABLET | Refills: 1 | Status: SHIPPED | OUTPATIENT
Start: 2024-11-15

## 2024-11-15 RX ORDER — LISINOPRIL 40 MG/1
40 TABLET ORAL DAILY
Qty: 90 TABLET | Refills: 1 | Status: SHIPPED | OUTPATIENT
Start: 2024-11-15

## 2024-11-15 RX ORDER — CHOLECALCIFEROL (VITAMIN D3) 50 MCG
50 TABLET ORAL DAILY
Qty: 90 TABLET | Refills: 1 | Status: SHIPPED | OUTPATIENT
Start: 2024-11-15

## 2024-11-15 NOTE — TELEPHONE ENCOUNTER
Name from pharmacy: ASPIRIN LOW DOSE 81 MG TBEC 81 Tablet          Will file in chart as: ASPIRIN LOW DOSE 81 MG EC tablet    Sig: TAKE 1 TABLET (81 MG) BY MOUTH DAILY    Disp: 90 tablet    Refills: 1    Start: 11/15/2024    Class: E-Prescribe    Non-formulary For: Hyperlipidemia LDL goal <100    Last ordered: 5 months ago (5/23/2024) by Jason Siddiqui MD    Last refill: 11/15/2024    Rx #: [id number]    Analgesics (Non-Narcotic Tylenol and ASA Only) Hsucly66/15/2024 01:07 PM   Protocol Details Patient is age 20 years or older    Medication is active on med list    Medication matches indication    Recent (12 mo) or future (90 days) visit within the authorizing provider's specialty       Name from pharmacy: AMLODIPINE BES 10MG TABLET 10 Tablet         Will file in chart as: amLODIPine (NORVASC) 10 MG tablet    Sig: TAKE 1 TABLET (10 MG) BY MOUTH DAILY    Disp: 90 tablet    Refills: 1    Start: 11/15/2024    Class: E-Prescribe    Non-formulary For: Essential hypertension with goal blood pressure less than 140/90    Last ordered: 5 months ago (5/23/2024) by Jason Siddiqui MD    Last refill: 11/15/2024    Rx #: [id number]    Calcium Channel Blockers Protocol  Gphlbj55/15/2024 01:07 PM   Protocol Details Most recent blood pressure under 140/90 in past 12 months    Medication is active on med list    GFR is on file in the past 12 months and most recent GFR is normal    Recent (12 mo) or future (90 days) visit within the authorizing provider's specialty    Patient is age 18 or older       Name from pharmacy: ATORVASTATIN 80MG TABLET 80 Tablet         Will file in chart as: atorvastatin (LIPITOR) 80 MG tablet    Sig: TAKE 1 TABLET (80 MG) BY MOUTH DAILY    Disp: 90 tablet    Refills: 1    Start: 11/15/2024    Class: E-Prescribe    Non-formulary For: Type 2 diabetes mellitus with hyperglycemia, without long-term current use of insulin (H)    Last ordered: 5 months ago (5/23/2024) by Jason Siddiqui MD    Last refill:  11/15/2024    Rx #: [id number]    Antihyperlipidemic agents Wojivl92/15/2024 01:07 PM   Protocol Details LDL on file in the past 12 months    Medication is active on med list    Recent (12 mo) or future (90 days) visit within the authorizing provider's specialty    Patient is age 18 years or older       Name from pharmacy: FEBUXOSTAT 40 MG TABS 40 Tablet         Will file in chart as: febuxostat (ULORIC) 40 MG TABS tablet    Sig: TAKE 1 TABLET (40 MG) BY MOUTH DAILY    Disp: 90 tablet    Refills: 1    Start: 11/15/2024    Class: E-Prescribe    Non-formulary For: Idiopathic chronic gout of multiple sites without tophus    Last ordered: 5 months ago (5/23/2024) by Jason Siddiqui MD    Last refill: 11/15/2024    Rx #: [id number]    Gout Agents Protocol Ubyhvo16/15/2024 01:07 PM   Protocol Details CBC on file in past 12 months    ALT on file in past 12 months    Has Uric Acid on file in past 12 months and value is less than 6    Medication is active on med list    Medication indicated for associated diagnosis    Has GFR on file in past 12 months and most recent value is normal    Recent (12 mo) or future (90 days) visit within the authorizing provider's specialty    Patient is age 18 or older       Name from pharmacy: METFORMIN HCL ER 500MG 24HR 500 Tablet         Will file in chart as: metFORMIN (GLUCOPHAGE XR) 500 MG 24 hr tablet    Sig: TAKE 4 TABLETS (2,000 MG) BY MOUTH DAILY    Disp: 360 tablet    Refills: 1    Start: 11/15/2024    Class: E-Prescribe    Non-formulary For: Type 2 diabetes mellitus with hyperglycemia, without long-term current use of insulin (H)    Last ordered: 5 months ago (5/23/2024) by Jason Siddiqui MD    Last refill: 11/15/2024    Rx #: [id number]    Biguanide Agents Iopfgs83/15/2024 01:07 PM   Protocol Details Patient is age 10 or older    Patient has documented A1c within the specified period of time.    Patient does NOT have a diagnosis of CHF.    Medication is active on med  list    Medication indicated for associated diagnosis    Has GFR on file in past 12 months and most recent value is normal    Recent (6 mo) or future (90 days) visit within the authorizing provider's specialty       Name from pharmacy: LISINOPRIL 40 MG TABLET 40 Tablet         Will file in chart as: lisinopril (ZESTRIL) 40 MG tablet    Sig: TAKE 1 TABLET (40 MG) BY MOUTH DAILY    Disp: 90 tablet    Refills: 1    Start: 11/15/2024    Class: E-Prescribe    Non-formulary For: Essential hypertension with goal blood pressure less than 140/90    Last ordered: 5 months ago (5/23/2024) by Jason Siddiqui MD    Last refill: 11/15/2024    Rx #: [id number]    ACE Inhibitors (Including Combos) Protocol Gecsri41/15/2024 01:07 PM   Protocol Details Most recent blood pressure under 140/90 in past 12 months- Clinicial or Patient Reported    Medication is active on med list    Medication indicated for associated diagnosis    Recent (12 mo) or future (90 days) visit within the authorizing provider's specialty    Most recent GFR on file in the past 12 months >30    Patient is age 18 or older    Normal serum potassium on file in past 12 months       Name from pharmacy: VIT D3 2,000U (50MCG) TAB 2000 UNIT Tablet         Will file in chart as: vitamin D3 (CHOLECALCIFEROL) 50 mcg (2000 units) tablet    Sig: TAKE 1 TABLET (50 MCG) BY MOUTH DAILY    Disp: 90 tablet    Refills: 1    Start: 11/15/2024    Class: E-Prescribe    Non-formulary For: Hypovitaminosis D    Last ordered: 5 months ago (5/23/2024) by Jason Siddiqui MD    Last refill: 11/15/2024    Rx #: [id number]    Vitamin Supplements Protocol Xuwwil59/15/2024 01:07 PM   Protocol Details The patient does not have an order for high-dose vitamin D    Medication is active on med list    Medication indicated for associated diagnosis    The patient is 2 years of age or older    Recent (12 mo) or future (90 days) visit within the authorizing provider's specialty          BP  Readings from Last 3 Encounters:   08/21/24 135/66   07/12/24 135/81   06/06/24 (!) 162/83       GFR Estimate   Date Value Ref Range Status   08/01/2024 >90 >60 mL/min/1.73m2 Final     Comment:     eGFR calculated using 2021 CKD-EPI equation.   01/29/2021 >90 >60.0 mL/min/1.7 m2 Final       Potassium   Date Value Ref Range Status   08/01/2024 4.3 3.4 - 5.3 mmol/L Final   08/08/2022 4.5 3.4 - 5.3 mmol/L Final   01/29/2021 4.3 3.2 - 4.6 mmol/L Final     Vitaliy Danielle RN, MSN

## 2024-12-06 DIAGNOSIS — B49 FUNGAL INFECTION: ICD-10-CM

## 2024-12-06 NOTE — TELEPHONE ENCOUNTER
Name from pharmacy: NYSTATIN 837626 UNIT/GM CRM [id number] Cream         Will file in chart as: nystatin (MYCOSTATIN) 409329 UNIT/GM external cream    Sig: Apply twice daily to fungus infection only    Disp: 30 g    Refills: 1    Start: 12/6/2024    Class: E-Prescribe    Non-formulary For: Fungal infection    Last ordered: 4 months ago (7/12/2024) by Jason Siddiqui MD    Last refill: 11/15/2024    Rx #: [id number]    Antifungal Agents Kqmiqq1712/06/2024 01:42 PM   Protocol Details Medication indicated for associated diagnosis          Routed to provider due to failed RN protocol.       Vitaily Danielle, RN, MSN

## 2024-12-09 RX ORDER — NYSTATIN 100000 U/G
CREAM TOPICAL
Qty: 30 G | Refills: 1 | Status: SHIPPED | OUTPATIENT
Start: 2024-12-09

## 2025-01-16 DIAGNOSIS — E11.65 TYPE 2 DIABETES MELLITUS WITH HYPERGLYCEMIA, WITHOUT LONG-TERM CURRENT USE OF INSULIN (H): ICD-10-CM

## 2025-01-16 RX ORDER — FLASH GLUCOSE SENSOR
KIT MISCELLANEOUS
Qty: 6 EACH | Refills: 1 | Status: SHIPPED | OUTPATIENT
Start: 2025-01-16

## 2025-01-16 NOTE — TELEPHONE ENCOUNTER
Name from pharmacy: JARDIANCE 25 MG TABLET 25 Tablet          Will file in chart as: JARDIANCE 25 MG TABS tablet    Sig: TAKE 1 TABLET (25 MG) BY MOUTH DAILY    Disp: 30 tablet    Refills: 1    Start: 1/16/2025    Class: E-Prescribe    Non-formulary For: Type 2 diabetes mellitus with hyperglycemia, without long-term current use of insulin (H)    Last ordered: 6 months ago (7/18/2024) by Jason Siddiqui MD    Last refill: 1/16/2025    Rx #: [id number]    Sodium Glucose Co-Transport Inhibitor Agents Tvtiic4601/16/2025 01:05 PM   Protocol Details Patient has documented A1c within the specified period of time.    Recent (6 mo) or future (90 days) visit within the authorizing provider's specialty    Medication is active on med list    Medication indicated for associated diagnosis    Has GFR on file in past 12 months and most recent value is >30    Patient is age 18 or older    Patient has documented normal Potassium within the last 12 mos.       Name from pharmacy: FREESTYLE JOSE SENSOR 14D Miscellaneous         Will file in chart as: Continuous Glucose Sensor (FREESTYLE JOSE 14 DAY SENSOR) MISC    Sig: Use to test blood sugar daily per 's instructions.  Remove and replace every 14 days.    Original sig: USE TO TEST BLOOD SUGAR DAILY PER 'S INSTRUCTIONS. REMOVE AND REPLACE EVERY 14 DAYS.    Disp: 2 each    Refills: 6    Start: 1/16/2025    Class: E-Prescribe    Non-formulary For: Type 2 diabetes mellitus with hyperglycemia, without long-term current use of insulin (H)    Last ordered: 10 months ago (2/23/2024) by Jason Siddiqui MD    Last refill: 1/16/2025    Rx #: [id number]       Routed to provider due to failed RN protocol.     Vitaliy Danielle, RN, MSN    
TELEMETRY

## 2025-01-17 ENCOUNTER — OFFICE VISIT (OUTPATIENT)
Dept: FAMILY MEDICINE | Facility: CLINIC | Age: 65
End: 2025-01-17
Payer: COMMERCIAL

## 2025-01-17 VITALS
SYSTOLIC BLOOD PRESSURE: 156 MMHG | TEMPERATURE: 97.5 F | OXYGEN SATURATION: 96 % | DIASTOLIC BLOOD PRESSURE: 86 MMHG | HEART RATE: 64 BPM | WEIGHT: 221.8 LBS | RESPIRATION RATE: 16 BRPM | BODY MASS INDEX: 38.07 KG/M2

## 2025-01-17 DIAGNOSIS — E11.69 TYPE 2 DIABETES MELLITUS WITH OTHER SPECIFIED COMPLICATION, WITHOUT LONG-TERM CURRENT USE OF INSULIN (H): Primary | ICD-10-CM

## 2025-01-17 DIAGNOSIS — R13.10 DYSPHAGIA, UNSPECIFIED TYPE: ICD-10-CM

## 2025-01-17 DIAGNOSIS — R09.A2 GLOBUS PHARYNGEUS: ICD-10-CM

## 2025-01-17 LAB
EST. AVERAGE GLUCOSE BLD GHB EST-MCNC: 197 MG/DL
HBA1C MFR BLD: 8.5 % (ref 0–5.6)

## 2025-01-17 PROCEDURE — 36415 COLL VENOUS BLD VENIPUNCTURE: CPT | Performed by: INTERNAL MEDICINE

## 2025-01-17 PROCEDURE — 83036 HEMOGLOBIN GLYCOSYLATED A1C: CPT | Performed by: INTERNAL MEDICINE

## 2025-01-17 PROCEDURE — 99213 OFFICE O/P EST LOW 20 MIN: CPT | Mod: GC | Performed by: INTERNAL MEDICINE

## 2025-01-17 NOTE — PROGRESS NOTES
"  Assessment & Plan     This is a 64-year-old male presenting here for his diabetes management and oropharyngeal dysphagia aphasia.    Type II diabetes mellitus (H)  Hemoglobin A1c has increased from 7.1 to 7.9.  We will get another hemoglobin A1c today to determine if medication adjustments need to be made for the next visit.  - HEMOGLOBIN A1C; Future    Dysphagia, unspecified type  Globus pharyngeus  Given the food and liquids get stuck in the middle of his throat is consistent with oropharyngeal dysphagia.  The intermittent feeling of a mass in his neck that is nonpainful is consistent with globus pharyngeus.  This should resolve or improve within the next 3 to 6 weeks.  However, we will follow-up in 3 weeks to reassess if his dysphagia has improved or needs further workup, especially if symptoms worsen or he develops new symptoms.   -Monitor for now          BMI  Estimated body mass index is 38.07 kg/m  as calculated from the following:    Height as of 7/12/24: 1.626 m (5' 4\").    Weight as of this encounter: 100.6 kg (221 lb 12.8 oz).   Weight management plan: Discussed healthy diet and exercise guidelines      Return in about 3 weeks (around 2/7/2025).    This patient with Dr. Loera, who agrees with assessment and plan    Subjective   Sa is a 64 year old, presenting for the following health issues:  Diabetes (DM/Feels something is stuck on his throat on and off. When swallowing food he feels like it gets stuck for th epast 3 weeks )      1/17/2025     8:44 AM   Additional Questions   Roomed by Corina   Accompanied by self         1/17/2025    Information    services provided? Yes   Language Liam   Type of interpretation provided Face-to-face    name Taye Ferrell    Agency [facility]  Services     HPI   This is a 64-year-old male who presents here for  his diabetes follow-up.  Patient is on metformin XR, Jardiance, and glipizide; he has been taking " these medications consistently.  Patient denies any adverse effects from these medications or signs of hypoglycemia such as palpitations or diaphoresis.  Patient denies any polyuria or polyphagia. His last hemoglobin was 7.9% around 6 months ago, which increased from 7.1%.    Patient is also complaining of food getting stuck in the middle of his throat recently.  His symptoms started 3 weeks ago.  He has a sensation of something being stuck in his throat that intermittently comes and goes.  This is not painful.  However, he does endorse having problems with both liquids and solids.  Patient denies any recent unintended weight loss, or changes in bowel habits such as melena or hematochezia.  Patient denies night sweats, new facial/neck masses, cough, reflux, or fevers.  He denies any history of smoking or use of betel nut.          Objective    BP (!) 161/89   Pulse 64   Temp 97.5  F (36.4  C) (Oral)   Resp 16   Wt 100.6 kg (221 lb 12.8 oz)   SpO2 96%   BMI 38.07 kg/m    Body mass index is 38.07 kg/m .  Physical Exam   GENERAL: alert and no distress  HENT: nose and mouth without ulcers or lesions.  No enlarged tonsils.  NECK: no adenopathy, no asymmetry, masses, or scars.  No thyromegaly or tender thyroid.   RESP: lungs clear to auscultation - no rales, rhonchi or wheezes  CV: regular rate and rhythm, normal S1 S2, no S3 or S4, no murmur, click or rub, no peripheral edema  ABDOMEN: soft, nontender, no hepatosplenomegaly, no masses and bowel sounds normal  MS: no gross musculoskeletal defects noted, no edema  SKIN: no suspicious lesions or rashes  PSYCH: mentation appears normal, affect normal/bright            Signed Electronically by: Alvina Mercado MD

## 2025-01-17 NOTE — PROGRESS NOTES
Preceptor Attestation:    I discussed the patient with the resident and evaluated the patient in person. I have verified the content of the note, which accurately reflects my assessment of the patient and the plan of care.    No warning signs with dysphagia/globus sensation. Will monitor at this time. If no resolution, would recommend EGD.       Supervising Physician:  Yanet Loera MD

## 2025-01-17 NOTE — LETTER
January 20, 2025       KENIA Key  [address]  [place] [address]        Dear ,    We are writing to inform you of your test results.    Your HgA1c is has gone up a little. We might need to adjust your diabetes medication on the next visit.     Resulted Orders   HEMOGLOBIN A1C   Result Value Ref Range    Estimated Average Glucose 197 (H) <117 mg/dL    Hemoglobin A1C 8.5 (H) 0.0 - 5.6 %      Comment:      Normal <5.7%   Prediabetes 5.7-6.4%    Diabetes 6.5% or higher     Note: Adopted from ADA consensus guidelines.       If you have any questions or concerns, please call the clinic at the number listed above.       Sincerely,      Yanet Loera MD    Electronically signed

## 2025-01-17 NOTE — RESULT ENCOUNTER NOTE
Patient was notified today in clinic that this result with be discussed with him on the next follow-up visit.

## 2025-02-15 DIAGNOSIS — E11.65 TYPE 2 DIABETES MELLITUS WITH HYPERGLYCEMIA, WITHOUT LONG-TERM CURRENT USE OF INSULIN (H): ICD-10-CM

## 2025-02-17 DIAGNOSIS — G63 POLYNEUROPATHY ASSOCIATED WITH UNDERLYING DISEASE: ICD-10-CM

## 2025-02-17 RX ORDER — GABAPENTIN 400 MG/1
400 CAPSULE ORAL 2 TIMES DAILY
Qty: 120 CAPSULE | Refills: 1 | Status: SHIPPED | OUTPATIENT
Start: 2025-02-17

## 2025-02-17 RX ORDER — EMPAGLIFLOZIN 25 MG/1
25 TABLET, FILM COATED ORAL DAILY
Qty: 90 TABLET | Refills: 5 | Status: SHIPPED | OUTPATIENT
Start: 2025-02-17

## 2025-02-17 RX ORDER — FLASH GLUCOSE SENSOR
KIT MISCELLANEOUS
Qty: 2 EACH | Refills: 7 | Status: SHIPPED | OUTPATIENT
Start: 2025-02-17

## 2025-02-17 NOTE — TELEPHONE ENCOUNTER
Name from pharmacy: FREESTYLE JOSE SENSOR 14D Miscellaneous          Will file in chart as: Continuous Glucose Sensor (FREESTYLE JOSE 14 DAY SENSOR) MISC    Sig: USE TO TEST BLOOD SUGAR DAILY PER 'S INSTRUCTIONS. REMOVE AND REPLACE EVERY 14 DAYS.    Disp: 2 each    Refills: 7    Start: 2/15/2025    Class: E-Prescribe    Non-formulary For: Type 2 diabetes mellitus with hyperglycemia, without long-term current use of insulin (H)    To pharmacy: 02/15/2025    Last ordered: 1 month ago (1/16/2025) by Jason Siddiqui MD    Last refill: 1/16/2025    Rx #: [id number]        Name from pharmacy: JARDIANCE 25 MG TABLET 25 Tablet         Will file in chart as: JARDIANCE 25 MG TABS tablet    Sig: TAKE 1 TABLET (25 MG) BY MOUTH DAILY    Disp: 90 tablet    Refills: 1    Start: 2/15/2025    Class: E-Prescribe    Non-formulary For: Type 2 diabetes mellitus with hyperglycemia, without long-term current use of insulin (H)    To pharmacy: 02/15/2025    Last ordered: 1 month ago (1/16/2025) by Jason Siddiqui MD    Last refill: 1/16/2025    Rx #: [id number]    Sodium Glucose Co-Transport Inhibitor Agents Dpjaaw35/15/2025 11:06 AM   Protocol Details Patient has documented A1c within the specified period of time.    Medication is active on med list and the sig matches. RN to manually verify dose and sig if red X/fail.    Recent (6 mo) or future (90 days) visit within the authorizing provider's specialty    Medication indicated for associated diagnosis    Has GFR on file in past 12 months and most recent value is >30    Patient is age 18 or older    Patient has documented normal Potassium within the last 12 mos.        Hemoglobin A1C   Date Value Ref Range Status   01/17/2025 8.5 (H) 0.0 - 5.6 % Final     Comment:     Normal <5.7%   Prediabetes 5.7-6.4%    Diabetes 6.5% or higher     Note: Adopted from ADA consensus guidelines.   01/29/2021 7.8 (H) 4.1 - 5.7 % Final     GFR Estimate   Date Value Ref Range Status    08/01/2024 >90 >60 mL/min/1.73m2 Final     Comment:     eGFR calculated using 2021 CKD-EPI equation.   01/29/2021 >90 >60.0 mL/min/1.7 m2 Final     Potassium   Date Value Ref Range Status   08/01/2024 4.3 3.4 - 5.3 mmol/L Final   08/08/2022 4.5 3.4 - 5.3 mmol/L Final   01/29/2021 4.3 3.2 - 4.6 mmol/L Final     Prescription approved per [facility] Refill Protocol.  MANJU Waldron, BSN

## 2025-02-19 NOTE — PROGRESS NOTES
"Social Work Note:    Data and Intervention:     SW called [facility] to inquire about in-home psychotherapy services. No answer. SW left V/M with call back number.     SW called [facility]. SW spoke with representative who shared that they are covered by most insurance plans other than [employer]. [facility] serves a \"huge refugee\" population and will provide  services. SW was transferred to Minerva Lindsay (Director) ([phone]) and left V/M in regards to coordinating in-home services in [place], [place]. SW left call back number.      Assessment and Plan:     SW will attempt to reach both providers again tomorrow if no returned calls today.     KALEIGH Dougherty    ADDENDUM 3/9/2020 1:03 PM:    [facility] returned SW's call and informed that Pt's home address is not located within their service area. Therefore, in-home services cannot be coordinated through [facility].     " Spoke with pt and informed of lab results and recommendations. Voiced understanding.

## 2025-03-04 ENCOUNTER — OFFICE VISIT (OUTPATIENT)
Dept: AUDIOLOGY | Facility: CLINIC | Age: 65
End: 2025-03-04
Attending: FAMILY MEDICINE
Payer: COMMERCIAL

## 2025-03-04 DIAGNOSIS — H90.3 ASYMMETRICAL SENSORINEURAL HEARING LOSS: Primary | ICD-10-CM

## 2025-03-04 DIAGNOSIS — H90.6 MIXED CONDUCTIVE AND SENSORINEURAL HEARING LOSS OF BOTH EARS: ICD-10-CM

## 2025-03-04 PROCEDURE — 92557 COMPREHENSIVE HEARING TEST: CPT | Performed by: AUDIOLOGIST

## 2025-03-04 PROCEDURE — 92567 TYMPANOMETRY: CPT | Performed by: AUDIOLOGIST

## 2025-03-06 ENCOUNTER — OFFICE VISIT (OUTPATIENT)
Dept: FAMILY MEDICINE | Facility: CLINIC | Age: 65
End: 2025-03-06
Payer: COMMERCIAL

## 2025-03-06 VITALS
HEART RATE: 99 BPM | DIASTOLIC BLOOD PRESSURE: 77 MMHG | RESPIRATION RATE: 16 BRPM | TEMPERATURE: 98.7 F | OXYGEN SATURATION: 97 % | BODY MASS INDEX: 37.83 KG/M2 | HEIGHT: 64 IN | SYSTOLIC BLOOD PRESSURE: 136 MMHG | WEIGHT: 221.6 LBS

## 2025-03-06 DIAGNOSIS — E11.65 TYPE 2 DIABETES MELLITUS WITH HYPERGLYCEMIA, WITHOUT LONG-TERM CURRENT USE OF INSULIN (H): ICD-10-CM

## 2025-03-06 DIAGNOSIS — G47.33 OBSTRUCTIVE SLEEP APNEA SYNDROME: Primary | ICD-10-CM

## 2025-03-06 DIAGNOSIS — H66.016 RECURRENT ACUTE SUPPURATIVE OTITIS MEDIA WITH SPONTANEOUS RUPTURE OF BOTH TYMPANIC MEMBRANES: ICD-10-CM

## 2025-03-06 ASSESSMENT — PATIENT HEALTH QUESTIONNAIRE - PHQ9
SUM OF ALL RESPONSES TO PHQ QUESTIONS 1-9: 3
10. IF YOU CHECKED OFF ANY PROBLEMS, HOW DIFFICULT HAVE THESE PROBLEMS MADE IT FOR YOU TO DO YOUR WORK, TAKE CARE OF THINGS AT HOME, OR GET ALONG WITH OTHER PEOPLE: SOMEWHAT DIFFICULT
SUM OF ALL RESPONSES TO PHQ QUESTIONS 1-9: 3

## 2025-03-06 NOTE — PROGRESS NOTES
ASSESSMENT/PLAN:   was seen today for throat problem, ear problem, diabetes and nose problem.    Diagnoses and all orders for this visit:    Obstructive sleep apnea syndrome  -     CPAP Order for DME - ONLY FOR DME    Recurrent acute suppurative otitis media with spontaneous rupture of both tympanic membranes  -     TYMPANOMETRY  -     Body fluid, unsp Aerobic Bacterial Culture Routine Without Gram Stain; Future  -     amoxicillin-clavulanate (AUGMENTIN) 875-125 MG tablet; Take 1 tablet by mouth 2 times daily.  -     Body fluid, unsp Aerobic Bacterial Culture Routine Without Gram Stain    Type 2 diabetes mellitus with hyperglycemia, without long-term current use of insulin (H)    Other orders  -     RSV VACCINE (ABRYSVO)      Assessment & Plan  Obstructive sleep apnea syndrome:  - Not using CPAP regularly due to discomfort and difficulty breathing with the mask.  - Order new CPAP supplies through [facility].    Recurrent acute suppurative otitis media with spontaneous rupture of both tympanic membranes:  - Prescribe antibiotics twice a day for 10 days. Follow-up with ENT in two weeks to check ears and throat.    Will ask PharmD to see re CGM concerns.    Follow up in 6 weeks will check labs and focus on DM2 at that time.       Total visit time with patient was 25 mins, all of which was face to face MD time, and over 50% of this time was spent in counseling and coordination of care.  Including post-encounter documentation and orders on the date of service, total encounter time was 32 mins.    The longitudinal plan of care for the diagnosis(es)/condition(s) as documented were addressed during this visit. Due to the added complexity in care, I will continue to support  in the subsequent management and with ongoing continuity of care.      Subjective   Sa is a 64 year old, presenting for the following health issues:  Throat Problem (Feels like something stuck there), Ear Problem, Diabetes, and Nose Problem   History  "of Present Illness-  Sa T Key, 64-year-old male    - Throat swelling and stiffness for about 2 to 3 months, no pain reported.  - Difficulty swallowing due to throat symptoms.  - No runny nose, ear pain, or cough at the onset of throat symptoms.  - Has not been using CPAP recently due to difficulty breathing with it.  Has also not changed face mask nor other CPAP accessories for a long time.  - History of previous otitis media with TM perforation with current symptoms suggesting possible recurrence.      Review of Systems   DM2, not using CGM - Experiences itching when using a continuous glucose monitor, particularly after 5 to 6 days of use.      2/8/2024    10:31 AM 7/12/2024    11:56 AM 3/6/2025     1:06 PM   PHQ   PHQ-9 Total Score 9 2 3    Q9: Thoughts of better off dead/self-harm past 2 weeks Not at all Not at all Not at all        Proxy-reported         Objective    Physical Exam   /77 (BP Location: Left arm, Patient Position: Sitting, Cuff Size: Adult Large)   Pulse 99   Temp 98.7  F (37.1  C) (Oral)   Resp 16   Ht 1.626 m (5' 4\")   Wt 100.5 kg (221 lb 9.6 oz)   SpO2 97%   BMI 38.04 kg/m       Vitals stable  Well nourished and in no distress  Physical Exam  - HEENT: Throat examination showed no significant findings. Ear examination revealed chronic serous otitis media of both ears with faint discharge visible L ear canal. Plan to perform a tympanogram and swab of the discharge.  - SKIN: Examination of skin showed improvement, previously covered with pruritic areas.  Neck supple without lymphadenopathy, no goiter  Heart sounds normal without murmur    Lungs clear to auscultation, no wheezes/rales/rhonchi, good air entry     No lower extremity edema     Tympanogram flat but with some movement.  Clinically CSOM with perf L TM.      Lab Results   Component Value Date    A1C 8.5 01/17/2025    A1C 7.9 07/12/2024    A1C 7.1 02/23/2024    A1C 8.6 10/16/2023    A1C 8.5 06/01/2023    A1C 7.8 01/29/2021 "    A1C 9.0 12/04/2020    A1C 10.2 03/05/2020    A1C 7.0 09/27/2019    A1C 11.2 06/28/2019         Answers submitted by the patient for this visit:  Patient Health Questionnaire (Submitted on 3/6/2025)  If you checked off any problems, how difficult have these problems made it for you to do your work, take care of things at home, or get along with other people?: Somewhat difficult  PHQ9 TOTAL SCORE: 3

## 2025-03-06 NOTE — PROGRESS NOTES
Prior to immunization administration, verified patients identity using patient s name and date of birth. Please see Immunization Activity for additional information.     Screening Questionnaire for Adult Immunization    Are you sick today?   No   Do you have allergies to medications, food, a vaccine component or latex?   No   Have you ever had a serious reaction after receiving a vaccination?   Yes   Do you have a long-term health problem with heart, lung, kidney, or metabolic disease (e.g., diabetes), asthma, a blood disorder, no spleen, complement component deficiency, a cochlear implant, or a spinal fluid leak?  Are you on long-term aspirin therapy?   Yes   Do you have cancer, leukemia, HIV/AIDS, or any other immune system problem?   No   Do you have a parent, brother, or sister with an immune system problem?   No   In the past 3 months, have you taken medications that affect  your immune system, such as prednisone, other steroids, or anticancer drugs; drugs for the treatment of rheumatoid arthritis, Crohn s disease, or psoriasis; or have you had radiation treatments?   No   Have you had a seizure, or a brain or other nervous system problem?   No   During the past year, have you received a transfusion of blood or blood    products, or been given immune (gamma) globulin or antiviral drug?   No   For women: Are you pregnant or is there a chance you could become       pregnant during the next month?   No   Have you received any vaccinations in the past 4 weeks?   No     Immunization questionnaire was positive for at least one answer.  Notified Dr. Siddiqui.      Patient instructed to remain in clinic for 15 minutes afterwards, and to report any adverse reactions.     Screening performed by Neris Saravia CMA on 3/6/2025 at 2:02 PM.        Answers submitted by the patient for this visit:  Patient Health Questionnaire (Submitted on 3/6/2025)  If you checked off any problems, how difficult have these problems made it for  you to do your work, take care of things at home, or get along with other people?: Somewhat difficult  PHQ9 TOTAL SCORE: 3

## 2025-03-06 NOTE — PATIENT INSTRUCTIONS
Local Medical Supply stores:    [facility]  [address], (near [place] and [place])  [place] [address]   Phone: [phone]    [facility]   [address], (near [place] [address]   Phone: [phone]    [facility]   [facility]  [address]  [address] [place] [address]  Phone: [phone]    [facility]  [address] [facility])  [place], [place]   PHONE: [phone]    [facility]  [address] [place], [place]   PHONE: [phone]    [address] [address]  Phone: [phone]  [facility] Fax: [phone]    [facility]  Multiple locations in the [facility]  PHONE: [phone]    [facility]  Multiple locations in [facility] phone line: [phone]  Fax: [phone]

## 2025-03-07 DIAGNOSIS — M25.50 ARTHRALGIA, UNSPECIFIED JOINT: ICD-10-CM

## 2025-03-07 DIAGNOSIS — L40.0 PSORIASIS VULGARIS: ICD-10-CM

## 2025-03-10 LAB
BACTERIA SPEC CULT: ABNORMAL

## 2025-03-10 RX ORDER — SECUKINUMAB 150 MG/ML
INJECTION SUBCUTANEOUS
Qty: 2 ML | Refills: 4 | Status: SHIPPED | OUTPATIENT
Start: 2025-03-10

## 2025-03-11 ENCOUNTER — DOCUMENTATION ONLY (OUTPATIENT)
Dept: FAMILY MEDICINE | Facility: CLINIC | Age: 65
End: 2025-03-11
Payer: COMMERCIAL

## 2025-03-11 NOTE — TELEPHONE ENCOUNTER
LVM for Milton, daughter to call back to schedule follow up biologics with Deya Kelly in August 2025. Consent to communicate on file.    Thank you  Dai Lam

## 2025-03-11 NOTE — PROGRESS NOTES
To be completed in Nursing note:    Please reference list for forms that require a visit for completion.  Please remind patients that providers are given 3-5 business days to complete and return forms.      Form type: Statement of Certifying Physician for Therapeutic Shoes    Date form received: 3/11/25    Date form completed by Physician: 3/13/25    How was form returned to patient (mailed, faxed, or at  for patient to ): Fax to [facility] at [phone]    Date form mailed/faxed/left at  for patient and sent to HIM for scanning: Faxed 3/13/25 at 2:46pm      Once form is left for patient, faxed, or mailed PCS will then close the documentation only encounter.

## 2025-03-13 ENCOUNTER — MEDICAL CORRESPONDENCE (OUTPATIENT)
Dept: HEALTH INFORMATION MANAGEMENT | Facility: CLINIC | Age: 65
End: 2025-03-13
Payer: COMMERCIAL

## 2025-05-19 ENCOUNTER — OFFICE VISIT (OUTPATIENT)
Dept: FAMILY MEDICINE | Facility: CLINIC | Age: 65
End: 2025-05-19
Payer: COMMERCIAL

## 2025-05-19 VITALS
HEART RATE: 80 BPM | WEIGHT: 222.2 LBS | HEIGHT: 64 IN | DIASTOLIC BLOOD PRESSURE: 79 MMHG | RESPIRATION RATE: 24 BRPM | BODY MASS INDEX: 37.94 KG/M2 | SYSTOLIC BLOOD PRESSURE: 132 MMHG | OXYGEN SATURATION: 96 % | TEMPERATURE: 98.7 F

## 2025-05-19 DIAGNOSIS — I10 ESSENTIAL HYPERTENSION WITH GOAL BLOOD PRESSURE LESS THAN 140/90: ICD-10-CM

## 2025-05-19 DIAGNOSIS — H60.393 INFECTIVE OTITIS EXTERNA, BILATERAL: ICD-10-CM

## 2025-05-19 DIAGNOSIS — E11.65 TYPE 2 DIABETES MELLITUS WITH HYPERGLYCEMIA, WITHOUT LONG-TERM CURRENT USE OF INSULIN (H): Primary | ICD-10-CM

## 2025-05-19 DIAGNOSIS — M1A.09X0 CHRONIC GOUT OF MULTIPLE SITES, UNSPECIFIED CAUSE: ICD-10-CM

## 2025-05-19 DIAGNOSIS — E11.65 TYPE 2 DIABETES MELLITUS WITH HYPERGLYCEMIA, WITHOUT LONG-TERM CURRENT USE OF INSULIN (H): ICD-10-CM

## 2025-05-19 DIAGNOSIS — E55.9 HYPOVITAMINOSIS D: ICD-10-CM

## 2025-05-19 DIAGNOSIS — M1A.09X0 IDIOPATHIC CHRONIC GOUT OF MULTIPLE SITES WITHOUT TOPHUS: ICD-10-CM

## 2025-05-19 DIAGNOSIS — H66.016 RECURRENT ACUTE SUPPURATIVE OTITIS MEDIA WITH SPONTANEOUS RUPTURE OF BOTH TYMPANIC MEMBRANES: ICD-10-CM

## 2025-05-19 DIAGNOSIS — E78.5 HYPERLIPIDEMIA LDL GOAL <100: ICD-10-CM

## 2025-05-19 LAB
ANION GAP SERPL CALCULATED.3IONS-SCNC: 13 MMOL/L (ref 7–15)
BUN SERPL-MCNC: 21.3 MG/DL (ref 8–23)
CALCIUM SERPL-MCNC: 9.7 MG/DL (ref 8.8–10.4)
CHLORIDE SERPL-SCNC: 102 MMOL/L (ref 98–107)
CHOLEST SERPL-MCNC: 264 MG/DL
CREAT SERPL-MCNC: 1.07 MG/DL (ref 0.67–1.17)
CREAT UR-MCNC: 33.6 MG/DL
EGFRCR SERPLBLD CKD-EPI 2021: 78 ML/MIN/1.73M2
EST. AVERAGE GLUCOSE BLD GHB EST-MCNC: 212 MG/DL
FASTING STATUS PATIENT QL REPORTED: ABNORMAL
FASTING STATUS PATIENT QL REPORTED: ABNORMAL
GLUCOSE SERPL-MCNC: 260 MG/DL (ref 70–99)
HBA1C MFR BLD: 9 % (ref 0–5.6)
HCO3 SERPL-SCNC: 24 MMOL/L (ref 22–29)
HDLC SERPL-MCNC: 40 MG/DL
LDLC SERPL CALC-MCNC: 164 MG/DL
MICROALBUMIN UR-MCNC: 12.5 MG/L
MICROALBUMIN/CREAT UR: 37.2 MG/G CR (ref 0–17)
NONHDLC SERPL-MCNC: 224 MG/DL
POTASSIUM SERPL-SCNC: 4.5 MMOL/L (ref 3.4–5.3)
SODIUM SERPL-SCNC: 139 MMOL/L (ref 135–145)
TRIGL SERPL-MCNC: 298 MG/DL
URATE SERPL-MCNC: 10.4 MG/DL (ref 3.4–7)

## 2025-05-19 PROCEDURE — 82043 UR ALBUMIN QUANTITATIVE: CPT | Performed by: FAMILY MEDICINE

## 2025-05-19 PROCEDURE — 3078F DIAST BP <80 MM HG: CPT | Performed by: FAMILY MEDICINE

## 2025-05-19 PROCEDURE — G2211 COMPLEX E/M VISIT ADD ON: HCPCS | Performed by: FAMILY MEDICINE

## 2025-05-19 PROCEDURE — 87147 CULTURE TYPE IMMUNOLOGIC: CPT | Performed by: FAMILY MEDICINE

## 2025-05-19 PROCEDURE — 83036 HEMOGLOBIN GLYCOSYLATED A1C: CPT | Performed by: FAMILY MEDICINE

## 2025-05-19 PROCEDURE — 99214 OFFICE O/P EST MOD 30 MIN: CPT | Performed by: FAMILY MEDICINE

## 2025-05-19 PROCEDURE — 80061 LIPID PANEL: CPT | Performed by: FAMILY MEDICINE

## 2025-05-19 PROCEDURE — 82570 ASSAY OF URINE CREATININE: CPT | Performed by: FAMILY MEDICINE

## 2025-05-19 PROCEDURE — 87070 CULTURE OTHR SPECIMN AEROBIC: CPT | Performed by: FAMILY MEDICINE

## 2025-05-19 PROCEDURE — 87186 SC STD MICRODIL/AGAR DIL: CPT | Performed by: FAMILY MEDICINE

## 2025-05-19 PROCEDURE — 80048 BASIC METABOLIC PNL TOTAL CA: CPT | Performed by: FAMILY MEDICINE

## 2025-05-19 PROCEDURE — 84550 ASSAY OF BLOOD/URIC ACID: CPT | Performed by: FAMILY MEDICINE

## 2025-05-19 PROCEDURE — 3075F SYST BP GE 130 - 139MM HG: CPT | Performed by: FAMILY MEDICINE

## 2025-05-19 PROCEDURE — 87077 CULTURE AEROBIC IDENTIFY: CPT | Performed by: FAMILY MEDICINE

## 2025-05-19 PROCEDURE — 36415 COLL VENOUS BLD VENIPUNCTURE: CPT | Performed by: FAMILY MEDICINE

## 2025-05-19 RX ORDER — ASPIRIN 81 MG/1
81 TABLET, COATED ORAL
Qty: 90 TABLET | Refills: 2 | Status: SHIPPED | OUTPATIENT
Start: 2025-05-19

## 2025-05-19 RX ORDER — CHOLECALCIFEROL (VITAMIN D3) 50 MCG
1 TABLET ORAL DAILY
Qty: 90 TABLET | Refills: 2 | Status: SHIPPED | OUTPATIENT
Start: 2025-05-19

## 2025-05-19 RX ORDER — METFORMIN HYDROCHLORIDE 500 MG/1
2000 TABLET, EXTENDED RELEASE ORAL DAILY
Qty: 360 TABLET | Refills: 1 | Status: SHIPPED | OUTPATIENT
Start: 2025-05-19

## 2025-05-19 RX ORDER — ATORVASTATIN CALCIUM 80 MG/1
80 TABLET, FILM COATED ORAL DAILY
Qty: 90 TABLET | Refills: 2 | Status: SHIPPED | OUTPATIENT
Start: 2025-05-19

## 2025-05-19 RX ORDER — LISINOPRIL 40 MG/1
40 TABLET ORAL DAILY
Qty: 90 TABLET | Refills: 2 | Status: SHIPPED | OUTPATIENT
Start: 2025-05-19

## 2025-05-19 RX ORDER — FEBUXOSTAT 40 MG/1
40 TABLET, FILM COATED ORAL DAILY
Qty: 90 TABLET | Refills: 2 | Status: SHIPPED | OUTPATIENT
Start: 2025-05-19

## 2025-05-19 RX ORDER — AMLODIPINE BESYLATE 10 MG/1
10 TABLET ORAL DAILY
Qty: 90 TABLET | Refills: 2 | Status: SHIPPED | OUTPATIENT
Start: 2025-05-19

## 2025-05-19 RX ORDER — AMPHOTERICIN B
POWDER (GRAM) MISCELLANEOUS
Qty: 3 G | Refills: 0 | Status: SHIPPED | OUTPATIENT
Start: 2025-05-19

## 2025-05-19 ASSESSMENT — PATIENT HEALTH QUESTIONNAIRE - PHQ9: SUM OF ALL RESPONSES TO PHQ QUESTIONS 1-9: 7

## 2025-05-19 NOTE — TELEPHONE ENCOUNTER
Name from pharmacy: LISINOPRIL 40 MG TABLET 40 Tablet          Will file in chart as: lisinopril (ZESTRIL) 40 MG tablet    Sig: TAKE 1 TABLET (40 MG) BY MOUTH DAILY    Disp: 90 tablet    Refills: 2    Start: 5/19/2025    Class: E-Prescribe    Non-formulary For: Essential hypertension with goal blood pressure less than 140/90    Last ordered: 6 months ago (11/15/2024) by Jason Siddiqui MD    Last refill: 5/19/2025    Rx #: [id number]    ACE Inhibitors (Including Combos) Protocol Sbulhj4105/19/2025 12:08 PM   Protocol Details Most recent blood pressure under 140/90 in past 12 months- Clinicial or Patient Reported    Medication is active on med list and the sig matches. RN to manually verify dose and sig if red X/fail.    Medication indicated for associated diagnosis    Recent (12 mo) or future (90 days) visit within the authorizing provider's specialty    Most recent GFR on file in the past 12 months >30    Patient is age 18 or older    Normal serum potassium on file in past 12 months       Name from pharmacy: VIT D3 2,000U (50MCG) TAB 2000 UNIT Tablet         Will file in chart as: vitamin D3 (CHOLECALCIFEROL) 50 mcg (2000 units) tablet    Sig: TAKE 1 TABLET (50 MCG) BY MOUTH DAILY    Disp: 90 tablet    Refills: 2    Start: 5/19/2025    Class: E-Prescribe    Non-formulary For: Hypovitaminosis D    Last ordered: 6 months ago (11/15/2024) by Jason Siddiqui MD    Last refill: 5/19/2025    Rx #: [id number]    Vitamin Supplements Protocol Ynwfdl2005/19/2025 12:08 PM   Protocol Details The patient does not have an order for high-dose vitamin D    Medication is active on med list and the sig matches. RN to manually verify dose and sig if red X/fail.    Medication indicated for associated diagnosis    The patient is 2 years of age or older    Recent (12 mo) or future (90 days) visit within the authorizing provider's specialty       Name from pharmacy: ASPIRIN LOW DOSE 81 MG TBEC 81 Tablet         Will file in chart as:  ASPIRIN LOW DOSE 81 MG EC tablet    Sig: TAKE 1 TABLET (81 MG) BY MOUTH DAILY    Disp: 90 tablet    Refills: 2    Start: 5/19/2025    Class: E-Prescribe    Non-formulary For: Hyperlipidemia LDL goal <100    Last ordered: 6 months ago (11/15/2024) by Jason Siddiqui MD    Last refill: 5/19/2025    Rx #: [id number]    Analgesics (Non-Narcotic Tylenol and ASA Only) Jctoxh6505/19/2025 12:08 PM   Protocol Details Patient is age 20 years or older    Medication is active on med list and the sig matches. RN to manually verify dose and sig if red X/fail.    Medication matches indication    Recent (12 mo) or future (90 days) visit within the authorizing provider's specialty       Name from pharmacy: FEBUXOSTAT 40 MG TABS 40 Tablet         Will file in chart as: febuxostat (ULORIC) 40 MG TABS tablet    Sig: TAKE 1 TABLET (40 MG) BY MOUTH DAILY    Disp: 90 tablet    Refills: 2    Start: 5/19/2025    Class: E-Prescribe    Non-formulary For: Idiopathic chronic gout of multiple sites without tophus    Last ordered: 6 months ago (11/15/2024) by Jason Siddiqui MD    Last refill: 5/19/2025    Rx #: [id number]    Gout Agents Protocol Jhxpoo9705/19/2025 12:08 PM   Protocol Details Has Uric Acid on file in past 12 months and value is less than 6    CBC on file in past 12 months    ALT on file in past 12 months    Medication is active on med list and the sig matches. RN to manually verify dose and sig if red X/fail.    Medication indicated for associated diagnosis    Has GFR on file in past 12 months and most recent value is normal    Recent (12 mo) or future (90 days) visit within the authorizing provider's specialty    Patient is age 18 or older       Name from pharmacy: AMLODIPINE BES 10MG TABLET 10 Tablet         Will file in chart as: amLODIPine (NORVASC) 10 MG tablet    Sig: TAKE 1 TABLET (10 MG) BY MOUTH DAILY    Disp: 90 tablet    Refills: 2    Start: 5/19/2025    Class: E-Prescribe    Non-formulary For: Essential  hypertension with goal blood pressure less than 140/90    Last ordered: 6 months ago (11/15/2024) by Jason Siddiqui MD    Last refill: 5/19/2025    Rx #: [id number]    Calcium Channel Blockers Protocol  Buyhwf5305/19/2025 12:08 PM   Protocol Details Most recent blood pressure under 140/90 in past 12 months    Medication is active on med list and the sig matches. RN to manually verify dose and sig if red X/fail.    Medication is indicated for associated diagnosis    GFR is on file in the past 12 months and most recent GFR is normal    Recent (12 mo) or future (90 days) visit within the authorizing provider's specialty    Patient is age 18 or older       Name from pharmacy: ATORVASTATIN 80MG TABLET 80 Tablet         Will file in chart as: atorvastatin (LIPITOR) 80 MG tablet    Sig: TAKE 1 TABLET (80 MG) BY MOUTH DAILY    Disp: 90 tablet    Refills: 2    Start: 5/19/2025    Class: E-Prescribe    Non-formulary For: Type 2 diabetes mellitus with hyperglycemia, without long-term current use of insulin (H)    Last ordered: 6 months ago (11/15/2024) by Jason Siddiqui MD    Last refill: 5/19/2025    Rx #: [id number]    Antihyperlipidemic agents Wvcznk5105/19/2025 12:08 PM   Protocol Details LDL on file in the past 12 months    Medication is active on med list and the sig matches. RN to manually verify dose and sig if red X/fail.    Recent (12 mo) or future (90 days) visit within the authorizing provider's specialty    Patient is age 18 years or older          Vitaliy Danielle, RN, MSN

## 2025-05-20 ENCOUNTER — TELEPHONE (OUTPATIENT)
Dept: FAMILY MEDICINE | Facility: CLINIC | Age: 65
End: 2025-05-20
Payer: COMMERCIAL

## 2025-05-20 DIAGNOSIS — H60.393 INFECTIVE OTITIS EXTERNA, BILATERAL: Primary | ICD-10-CM

## 2025-05-20 NOTE — TELEPHONE ENCOUNTER
Clyde Siddiqui,     Prior Authorization needed on:  Amphotericin B 905 UNIT/MG POWD   Please advise if you would like to send an alternative or start a PA?   If you would like to proceed with a PA,   Indicate Rationale & Previously Tried & Failed. (see below)   Rationale: _____________  Previously Tried and Failed:_______________  Route to [facility] PA MED [[id number]].      --------------------------------------------------------------------------------------------------------------------------    (PCS fill out see below)  Prior Authorization Retail Medication Request    Medication/Dose: Amphotericin B 905 UNIT/MG POWD  Diagnosis and ICD code (if different than what is on RX):  H60.393  New/renewal/insurance change PA/secondary ins. PA:  Previously Tried and Failed:  ______________  Rationale:  ________________    Insurance   Primary: are-[facility]  Insurance ID:  [id number]    Secondary (if applicable):  Insurance ID:      Pharmacy Information (if different than what is on RX)  Name:  [facility]  Phone:  [phone]  Fax:[phone]    Clinic Information  Preferred routing pool for dept communication: LAURENT Saravia CMA on 5/20/2025 at 10:44 AM

## 2025-05-20 NOTE — PROGRESS NOTES
ASSESSMENT/PLAN:  Sa was seen today for follow up, ear problem, throat problem and derm problem.    Diagnoses and all orders for this visit:    Type II diabetes mellitus (H)    Type 2 diabetes mellitus with hyperglycemia, without long-term current use of insulin (H)  -     Hemoglobin A1c  -     Basic metabolic panel  -     Lipid Profile  -     Albumin Random Urine Quantitative with Creat Ratio  -     metFORMIN (GLUCOPHAGE XR) 500 MG 24 hr tablet; Take 4 tablets (2,000 mg) by mouth daily.    Chronic gout of multiple sites, unspecified cause  -     Uric acid    Infective otitis externa, bilateral  -     Amphotericin B 905 UNIT/MG POWD; CSF/HC otic powder 1 puff to both ear stwice daily for 3 weeks  -     Respiratory Aerobic Bacterial Culture; Future  -     Respiratory Aerobic Bacterial Culture    Recurrent acute suppurative otitis media with spontaneous rupture of both tympanic membranes  -     amoxicillin-clavulanate (AUGMENTIN) 875-125 MG tablet; Take 1 tablet by mouth 2 times daily.  -     Swab Aerobic Bacterial Culture Routine Without Gram Stain; Future  -     Cancel: Swab Aerobic Bacterial Culture Routine Without Gram Stain      Assessment & Plan  Type 2 diabetes mellitus with hyperglycemia, without long-term current use of insulin (H)  - Hyperglycemia likely due to reduced metformin intake and dietary habits, including increased rice consumption.  - Increase metformin to 4 tablets daily. Consider weekly injection to help with weight loss and sugar control. Reorder metformin prescription with note on dosage.  - Risks and side effects: Potential need for continued use of weekly injection to maintain weight and sugar levels.    Infective otitis externa, bilateral  - Possible fungal infection in ears.  - Reorder amphotericin powder prescription for ear treatment. Obtain ear swab for culture. Call pharmacy to ensure prescription fulfillment. Patient agrees to pay out of pocket for medication.    Recurrent acute  suppurative otitis media with spontaneous rupture of both tympanic membranes  - Persistent ear drainage despite previous treatments.  - Obtain ear swab for culture. Consider alternative ENT specialist if current treatment does not resolve drainage.       Total visit time with patient was 25 mins, all of which was face to face MD time, and over 50% of this time was spent in counseling and coordination of care.  Including post-encounter documentation and orders on the date of service, total encounter time was 32 mins.    The longitudinal plan of care for the diagnosis(es)/condition(s) as documented were addressed during this visit. Due to the added complexity in care, I will continue to support  in the subsequent management and with ongoing continuity of care.      Subjective    is a 64 year old, presenting for the following health issues:  Follow Up (Diabetes), Ear Problem (drainage), Throat Problem (Difficulty swallow), and Derm Problem (Rash on upper back)     History of Present Illness-Sa KENIA Mackey, a 64-year-old male, reports ongoing ear drainage and itchiness, which have made it difficult for him to wear his hearing aids. He has been using Q-tips to alleviate the itchiness. He mentions issues with obtaining prescribed medication from the pharmacy, as the prescription was not received. He has been taking metformin for a long time but recently reduced the dosage from four pills a day to two, due to fatigue from taking the full dose. He acknowledges that his blood sugar levels have been higher, which he attributes to increased consumption of rice and other dietary changes. He had a blood draw prior to this visit. His son advised him not to use a certain medication, and his son also called the pharmacy to check on a prescription. Additionally, a friend of his has used an injection for diabetes management.    Review of Systems   Psoriasis being managed by Derm        Objective    Physical Exam   /79 (BP  "Location: Left arm, Patient Position: Sitting, Cuff Size: Adult Large)   Pulse 80   Temp 98.7  F (37.1  C) (Oral)   Resp 24   Ht 1.626 m (5' 4\")   Wt 100.8 kg (222 lb 3.2 oz)   SpO2 96%   BMI 38.14 kg/m       Vitals stable  Well nourished and in no distress    Physical Exam  Vitals:  - Age: 64 years    Lab results:  - Blood sugar level: 9 (reported as higher than desired, with a target closer to 7)    Physical exam:  - Swab taken from ear drainage    Test results:  - Blood drawn prior to the visit, results not detailed in the transcript    HEENT:; TMs bulging and opaque consistent with CSOM.  Discharge appears to be leaking from TMs.  Swab taken.  nasopharynx pink and moist; oropharynx pink and moist  Neck supple without lymphadenopathy, no goiter  Heart sounds normal without murmur    Lungs clear to auscultation, no wheezes/rales/rhonchi, good air entry   Mild +1 lower extremity edema  Derm: patches of psoriasis, defer to Derm       Results for orders placed or performed in visit on 05/19/25   Hemoglobin A1c     Status: Abnormal   Result Value Ref Range    Estimated Average Glucose 212 (H) <117 mg/dL    Hemoglobin A1C 9.0 (H) 0.0 - 5.6 %   Basic metabolic panel     Status: Abnormal   Result Value Ref Range    Sodium 139 135 - 145 mmol/L    Potassium 4.5 3.4 - 5.3 mmol/L    Chloride 102 98 - 107 mmol/L    Carbon Dioxide (CO2) 24 22 - 29 mmol/L    Anion Gap 13 7 - 15 mmol/L    Urea Nitrogen 21.3 8.0 - 23.0 mg/dL    Creatinine 1.07 0.67 - 1.17 mg/dL    GFR Estimate 78 >60 mL/min/1.73m2    Calcium 9.7 8.8 - 10.4 mg/dL    Glucose 260 (H) 70 - 99 mg/dL    Patient Fasting > 8hrs? Unknown    Lipid Profile     Status: Abnormal   Result Value Ref Range    Cholesterol 264 (H) <200 mg/dL    Triglycerides 298 (H) <150 mg/dL    Direct Measure HDL 40 >=40 mg/dL    LDL Cholesterol Calculated 164 (H) <100 mg/dL    Non HDL Cholesterol 224 (H) <130 mg/dL    Patient Fasting > 8hrs? Unknown     Narrative    " Cholesterol  Desirable: < 200 mg/dL  Borderline High: 200 - 239 mg/dL  High: >= 240 mg/dL    Triglycerides  Normal: < 150 mg/dL  Borderline High: 150 - 199 mg/dL  High: 200-499 mg/dL  Very High: >= 500 mg/dL    Direct Measure HDL  Female: >= 50 mg/dL   Male: >= 40 mg/dL    LDL Cholesterol  Desirable: < 100 mg/dL  Above Desirable: 100 - 129 mg/dL   Borderline High: 130 - 159 mg/dL   High:  160 - 189 mg/dL   Very High: >= 190 mg/dL    Non HDL Cholesterol  Desirable: < 130 mg/dL  Above Desirable: 130 - 159 mg/dL  Borderline High: 160 - 189 mg/dL  High: 190 - 219 mg/dL  Very High: >= 220 mg/dL   Albumin Random Urine Quantitative with Creat Ratio     Status: Abnormal   Result Value Ref Range    Creatinine Urine mg/dL 33.6 mg/dL    Albumin Urine mg/L 12.5 mg/L    Albumin Urine mg/g Cr 37.20 (H) 0.00 - 17.00 mg/g Cr   Uric acid     Status: Abnormal   Result Value Ref Range    Uric Acid 10.4 (H) 3.4 - 7.0 mg/dL   Respiratory Aerobic Bacterial Culture     Status: Abnormal (Preliminary result)    Specimen: Ear, Right; Swab   Result Value Ref Range    Culture Culture in progress     Culture 4+ Gram negative bacilli (A)

## 2025-05-21 ENCOUNTER — RESULTS FOLLOW-UP (OUTPATIENT)
Dept: FAMILY MEDICINE | Facility: CLINIC | Age: 65
End: 2025-05-21

## 2025-05-21 NOTE — LETTER
May 21, 2025       KENIA Mackey  [address]  [place] [address]        Dear .Key and family,    We are writing to inform you of your test results.    These labs show that you have not been regularly taking many of your medications - your gout (uric acid) levels, your bad cholesterol LDL, your average sugar A1c as we discussed are all high.  The medications DO work!  Along with trying to eat healthily and remain active.  PLEASE start taking your medications again every day.  As we discussed, you are to take 4 metformin per day (either 2 in morning and 2 in night or all 4 at the same time).     I also talked with the ENT doctor and because he is not sure if your infection is in your middle ear (behind the ear drum) or in the ear canal, he wants you to get a CT scan of your ears.  They said they will reach out to schedule this with you.     Please have someone REPLY to me so I know you got this message and understand it.  Thank you!     Resulted Orders   Hemoglobin A1c   Result Value Ref Range    Estimated Average Glucose 212 (H) <117 mg/dL    Hemoglobin A1C 9.0 (H) 0.0 - 5.6 %      Comment:      Normal <5.7%   Prediabetes 5.7-6.4%    Diabetes 6.5% or higher     Note: Adopted from ADA consensus guidelines.   Basic metabolic panel   Result Value Ref Range    Sodium 139 135 - 145 mmol/L    Potassium 4.5 3.4 - 5.3 mmol/L    Chloride 102 98 - 107 mmol/L    Carbon Dioxide (CO2) 24 22 - 29 mmol/L    Anion Gap 13 7 - 15 mmol/L    Urea Nitrogen 21.3 8.0 - 23.0 mg/dL    Creatinine 1.07 0.67 - 1.17 mg/dL    GFR Estimate 78 >60 mL/min/1.73m2      Comment:      eGFR calculated using 2021 CKD-EPI equation.    Calcium 9.7 8.8 - 10.4 mg/dL    Glucose 260 (H) 70 - 99 mg/dL    Patient Fasting > 8hrs? Unknown    Lipid Profile   Result Value Ref Range    Cholesterol 264 (H) <200 mg/dL    Triglycerides 298 (H) <150 mg/dL    Direct Measure HDL 40 >=40 mg/dL    LDL Cholesterol Calculated 164 (H) <100 mg/dL    Non HDL Cholesterol 224 (H)  <130 mg/dL    Patient Fasting > 8hrs? Unknown     Narrative    Cholesterol  Desirable: < 200 mg/dL  Borderline High: 200 - 239 mg/dL  High: >= 240 mg/dL    Triglycerides  Normal: < 150 mg/dL  Borderline High: 150 - 199 mg/dL  High: 200-499 mg/dL  Very High: >= 500 mg/dL    Direct Measure HDL  Female: >= 50 mg/dL   Male: >= 40 mg/dL    LDL Cholesterol  Desirable: < 100 mg/dL  Above Desirable: 100 - 129 mg/dL   Borderline High: 130 - 159 mg/dL   High:  160 - 189 mg/dL   Very High: >= 190 mg/dL    Non HDL Cholesterol  Desirable: < 130 mg/dL  Above Desirable: 130 - 159 mg/dL  Borderline High: 160 - 189 mg/dL  High: 190 - 219 mg/dL  Very High: >= 220 mg/dL   Albumin Random Urine Quantitative with Creat Ratio   Result Value Ref Range    Creatinine Urine mg/dL 33.6 mg/dL      Comment:      The reference ranges have not been established in urine creatinine. The results should be integrated into the clinical context for interpretation.    Albumin Urine mg/L 12.5 mg/L      Comment:      The reference ranges have not been established in urine albumin. The results should be integrated into the clinical context for interpretation.    Albumin Urine mg/g Cr 37.20 (H) 0.00 - 17.00 mg/g Cr      Comment:      Microalbuminuria is defined as an albumin:creatinine ratio of 17 to 299 for males and 25 to 299 for females. A ratio of albumin:creatinine of 300 or higher is indicative of overt proteinuria.  Due to biologic variability, positive results should be confirmed by a second, first-morning random or 24-hour timed urine specimen. If there is discrepancy, a third specimen is recommended. When 2 out of 3 results are in the microalbuminuria range, this is evidence for incipient nephropathy and warrants increased efforts at glucose control, blood pressure control, and institution of therapy with an angiotensin-converting-enzyme (ACE) inhibitor (if the patient can tolerate it).     Uric acid   Result Value Ref Range    Uric Acid 10.4 (H)  3.4 - 7.0 mg/dL   Respiratory Aerobic Bacterial Culture   Result Value Ref Range    Culture 4+ Normal corin     Culture 4+ Pseudomonas aeruginosa (A)     Culture 4+ Pseudomonas aeruginosa (A)     Culture 4+ Staphylococcus aureus (A)     Culture 3+ Streptococcus pyogenes (Group A Streptococcus) (A)       Comment:      This organism is susceptible to ampicillin, penicillin, vancomycin and the cephalosporins. If treatment is required and your patient is allergic to penicillin, contact the microbiology lab within 5 days to request susceptibility testing.       If you have any questions or concerns, please call the clinic at the number listed above.       Sincerely,      Jason Siddiqui MD    Electronically signed

## 2025-05-21 NOTE — RESULT ENCOUNTER NOTE
Clyde Peña and family - these labs show that you have not been regularly taking many of your medications - your gout (uric acid) levels, your bad cholesterol LDL, your average sugar A1c as we discussed are all high.  The medications DO work!  Along with trying to eat healthily and remain active.  PLEASE start taking your medications again every day.  As we discussed, you are to take 4 metformin per day (either 2 in morning and 2 in night or all 4 at the same time).    I also talked with the ENT doctor and because he is not sure if your infection is in your middle ear (behind the ear drum) or in the ear canal, he wants you to get a CT scan of your ears.  They said they will reach out to schedule this with you.    Please have someone REPLY to me so I know you got this message and understand it.  Thank you!  Jason Siddiqui

## 2025-05-22 LAB
BACTERIA SPEC CULT: ABNORMAL

## 2025-05-27 DIAGNOSIS — G63 POLYNEUROPATHY ASSOCIATED WITH UNDERLYING DISEASE: ICD-10-CM

## 2025-05-27 RX ORDER — GABAPENTIN 400 MG/1
400 CAPSULE ORAL 2 TIMES DAILY
Qty: 120 CAPSULE | Refills: 1 | Status: SHIPPED | OUTPATIENT
Start: 2025-05-27

## 2025-06-01 ENCOUNTER — TRANSFERRED RECORDS (OUTPATIENT)
Dept: MULTI SPECIALTY CLINIC | Facility: CLINIC | Age: 65
End: 2025-06-01

## 2025-06-01 LAB — RETINOPATHY: NORMAL

## 2025-06-12 DIAGNOSIS — E11.65 TYPE 2 DIABETES MELLITUS WITH HYPERGLYCEMIA, WITHOUT LONG-TERM CURRENT USE OF INSULIN (H): ICD-10-CM

## 2025-06-12 RX ORDER — GLIPIZIDE 10 MG/1
10 TABLET, FILM COATED, EXTENDED RELEASE ORAL DAILY
Qty: 90 TABLET | Refills: 1 | Status: SHIPPED | OUTPATIENT
Start: 2025-06-12

## 2025-06-12 NOTE — TELEPHONE ENCOUNTER
Name from pharmacy: GLIPIZIDE ER 10 MG TB24 10 Tablet          Will file in chart as: glipiZIDE (GLUCOTROL XL) 10 MG 24 hr tablet    Sig: TAKE 1 TABLET (10 MG) BY MOUTH DAILY.    Disp: 90 tablet    Refills: 1    Start: 6/12/2025    Class: E-Prescribe    Non-formulary For: Type 2 diabetes mellitus with hyperglycemia, without long-term current use of insulin (H)    Last ordered: 3 months ago (3/14/2025) by Jason Siddiqui MD    Last refill: 6/12/2025    Rx #: [id number]    Sulfonylurea Agents [id number]/12/2025 09:13 AM   Protocol Details Patient has documented A1c within the specified period of time.    Medication is active on med list and the sig matches. RN to manually verify dose and sig if red X/fail.    Has GFR on file in past 12 months and most recent value is normal    Recent (6 month) or future (90 days) visit with the authorizing provider's specialty (provided they have been seen in the past 9 months)    Medication indicated for associated diagnosis    Patient is age 18 or older    Patient has a recent creatinine (normal) within the past 12 mos.          Lab Results   Component Value Date    A1C 9.0 05/19/2025    A1C 8.5 01/17/2025    A1C 7.9 07/12/2024    A1C 7.1 02/23/2024    A1C 8.6 10/16/2023    A1C 7.8 01/29/2021    A1C 9.0 12/04/2020    A1C 10.2 03/05/2020    A1C 7.0 09/27/2019    A1C 11.2 06/28/2019       GFR Estimate   Date Value Ref Range Status   05/19/2025 78 >60 mL/min/1.73m2 Final     Comment:     eGFR calculated using 2021 CKD-EPI equation.   01/29/2021 >90 >60.0 mL/min/1.7 m2 Final       Creatinine   Date Value Ref Range Status   05/19/2025 1.07 0.67 - 1.17 mg/dL Final   01/29/2021 0.7 0.7 - 1.3 mg/dL Final       Prescription approved per [facility] Refill Protocol.    Vitaliy Danielle RN, MSN

## 2025-07-07 ENCOUNTER — OFFICE VISIT (OUTPATIENT)
Dept: FAMILY MEDICINE | Facility: CLINIC | Age: 65
End: 2025-07-07
Payer: COMMERCIAL

## 2025-07-07 ENCOUNTER — ANCILLARY PROCEDURE (OUTPATIENT)
Dept: GENERAL RADIOLOGY | Facility: CLINIC | Age: 65
End: 2025-07-07
Payer: COMMERCIAL

## 2025-07-07 ENCOUNTER — ANCILLARY PROCEDURE (OUTPATIENT)
Dept: ULTRASOUND IMAGING | Facility: HOSPITAL | Age: 65
End: 2025-07-07
Attending: STUDENT IN AN ORGANIZED HEALTH CARE EDUCATION/TRAINING PROGRAM
Payer: COMMERCIAL

## 2025-07-07 VITALS
HEART RATE: 63 BPM | OXYGEN SATURATION: 97 % | WEIGHT: 228 LBS | BODY MASS INDEX: 38.93 KG/M2 | SYSTOLIC BLOOD PRESSURE: 136 MMHG | TEMPERATURE: 97.7 F | DIASTOLIC BLOOD PRESSURE: 78 MMHG | RESPIRATION RATE: 16 BRPM | HEIGHT: 64 IN

## 2025-07-07 DIAGNOSIS — M25.561 ACUTE PAIN OF RIGHT KNEE: ICD-10-CM

## 2025-07-07 DIAGNOSIS — M25.561 ACUTE PAIN OF RIGHT KNEE: Primary | ICD-10-CM

## 2025-07-07 DIAGNOSIS — M10.9 GOUTY ARTHROPATHY: ICD-10-CM

## 2025-07-07 DIAGNOSIS — R94.39 ABNORMAL STRESS TEST: ICD-10-CM

## 2025-07-07 LAB — URATE SERPL-MCNC: 10.5 MG/DL (ref 3.4–7)

## 2025-07-07 PROCEDURE — 99213 OFFICE O/P EST LOW 20 MIN: CPT | Mod: GC

## 2025-07-07 PROCEDURE — 36415 COLL VENOUS BLD VENIPUNCTURE: CPT

## 2025-07-07 PROCEDURE — 84550 ASSAY OF BLOOD/URIC ACID: CPT

## 2025-07-07 PROCEDURE — 3078F DIAST BP <80 MM HG: CPT

## 2025-07-07 PROCEDURE — 3075F SYST BP GE 130 - 139MM HG: CPT

## 2025-07-07 PROCEDURE — 73562 X-RAY EXAM OF KNEE 3: CPT | Mod: TC | Performed by: RADIOLOGY

## 2025-07-07 ASSESSMENT — PATIENT HEALTH QUESTIONNAIRE - PHQ9
SUM OF ALL RESPONSES TO PHQ QUESTIONS 1-9: 1
10. IF YOU CHECKED OFF ANY PROBLEMS, HOW DIFFICULT HAVE THESE PROBLEMS MADE IT FOR YOU TO DO YOUR WORK, TAKE CARE OF THINGS AT HOME, OR GET ALONG WITH OTHER PEOPLE: NOT DIFFICULT AT ALL
SUM OF ALL RESPONSES TO PHQ QUESTIONS 1-9: 1

## 2025-07-07 NOTE — PROGRESS NOTES
Assessment & Plan     Acute pain of right knee  5-day history of right knee pain status post fall.  Tripped and fell on the front of his knee and hit a concrete floor.  Movement feels limited due to pain.  Also feels more pain with movement.  Still able to ambulate and bear weight.  Primarily feels pain along the front of the knee in the area of the patella.  On exam, there is effusion present primarily along the medial joint line.  Patient has tenderness to palpation of the patella and with movement of the patella.  Exam otherwise unremarkable.  Highest suspicion for soft tissue injury versus bursitis.  Low suspicion for fracture but will rule out with knee x-ray.  Plan to treat with scheduled Tylenol for 1 week and follow-up thereafter.  Discussed signs and symptoms that would prompt closer follow-up including increased redness, warmth, swelling, pain of the knee which would raise concern for septic arthritis.  - XR Knee Right 3 Views; Future  - Scheduled Tylenol; patient has reported allergy to NSAIDs    Gouty arthropathy  History of gout with elevated uric acid on most recent blood work.  Will recheck today so result is available for next follow-up with PCP.  - Uric acid      Subjective   Sa is a 65 year old, presenting for the following health issues:  Knee Injury (Ck R knee pain- states that he slipped 5 days ago and injured his knee)        7/7/2025     8:24 AM   Additional Questions   Roomed by mao   Accompanied by self         7/7/2025    Information    services provided? Yes   Language Liam   Type of interpretation provided Face-to-face    name Taye Ferrell    [employer]   Patient presenting today for evaluation of right knee pain.  Onset of pain 5 days ago.  Patient slipped and fell while walking.  Fell on the front of his knee.  The concrete floor, was initially bleeding from the abrasion.  Has never injured his knee before.  Movement of the knee  "feels limited due to the pain.  Does note more pain with walking around.  Currently using anything for the pain, did clean the abrasion and is otherwise been massaging the knee.  Is able to bear weight and walk.  Feels pain from the knee.  No recent imaging of this knee.        Objective    /78   Pulse 63   Temp 97.7  F (36.5  C) (Oral)   Resp 16   Ht 1.626 m (5' 4\")   Wt 103.4 kg (228 lb)   SpO2 97%   BMI 39.14 kg/m    Body mass index is 39.14 kg/m .  Physical Exam   GENERAL: alert and no distress  NECK: no asymmetry, masses, or scars  RESP: nonlabored breathing on room air, no cough  CV: skin is warm and well-perfused  MS: no gross musculoskeletal defects noted, effusion of right knee medial joint line, healing abrasion overlying right knee cap, tenderness to palpation of the patella and with movement of the patella, negative anterior and posterior drawer, no medial or lateral laxity, gait appears to be at baseline  NEURO: normal strength and tone, mentation intact and speech normal  PSYCH: mentation appears normal, affect normal/bright    Results for orders placed or performed in visit on 07/07/25   XR Knee Right 3 Views     Status: None    Narrative    EXAM: XR KNEE RIGHT 3 VIEWS  LOCATION: [facility]  DATE: 7/7/2025    INDICATION: fell on right knee, tenderness over patella and joint line  COMPARISON: None.      Impression    IMPRESSION:     Very mild tricompartmental degenerative changes. No sizable effusion. No displaced fracture. There is soft tissue swelling over the knee anteriorly.   Results for orders placed or performed in visit on 07/07/25   POC US OB     Status: None    Narrative    Entered in error. No ultrasound was done.    Impression    Entered in error. No ultrasound was done.   Results for orders placed or performed in visit on 07/07/25   Uric acid     Status: Abnormal   Result Value Ref Range    Uric Acid 10.5 (H) 3.4 - 7.0 mg/dL       Signed Electronically " by: Elidia Christie MD

## 2025-07-07 NOTE — PROGRESS NOTES
Preceptor Attestation:    I discussed the patient with the resident and evaluated the patient in person. I have verified the content of the note, which accurately reflects my assessment of the patient and the plan of care.   Supervising Physician:  Yanet Loera MD

## 2025-07-13 NOTE — PROGRESS NOTES
"Assessment & Plan     Gouty arthropathy  Pt has history of gout.  Has been on 5 Oxistat 40 mg.  Uric acid level at last appointment was 10.5.   - Discussed foods to limit  - Increase febuxostat from 40mg to 80mg with goal uric acid <6    Acute right knee pain  Pt was seen two weeks ago for acute right knee pain following a fall. Was given tylenol for pain which is now significantly improved.  Expected improvement to minor injury.  - Continue taking tylenol as needed for pain  - Follow-up as needed    Also discussed recommendations for patient to follow-up for preventative visit and to follow-up for diabetes with PCP.    Subjective    is a 65 year old, presenting for the following health issues:  Right knee pain and gout    HPI     is a 65 y.o. male with gout who presents for follow-up of his gout and right knee pain. He presented acutely at his last visit two weeks ago s/p fall on his knee. He was given Tylenol for 1 week. Today he reports the pain is significantly improved, he just has some lingering stiffness with extending/flexing his knee.     He reports not experiencing a recent or current flare of his gout. He states his gout is in both his toes and knees. His uric acid two weeks ago was 10.5. We discussed increasing his febuxostat from 40 mg to 80 mg to get uric acid level at goal. We also talked about decreasing consumption of foods that can increase uric acid, including red meat, beer, and seafood.     Patient denies fever, chills, cough, n/v, diarrhea, and recent illness.       Objective    /79 (BP Location: Left arm, Patient Position: Sitting, Cuff Size: Adult Large)   Pulse 60   Temp 97.6  F (36.4  C) (Oral)   Resp 20   Ht 1.626 m (5' 4\")   Wt 103.5 kg (228 lb 3.2 oz)   SpO2 95%   BMI 39.17 kg/m    Body mass index is 39.17 kg/m .  Physical Exam   GENERAL: alert and no acute distress  EYES: eyes grossly normal to inspection  HENT: hearing grossly intact, moist mucus membranes  RESP: lungs " clear to auscultation - no rales, rhonchi or wheezes  CV: regular rate and rhythm, normal S1 S2, no murmur appreciated  MS: Gait at baseline, no gross musculoskeletal defects noted, minimal effusion of right medial joint line  PSYCH: mentation appears normal, affect normal/bright    Diana Solis, MS3  [employer]      Resident/Fellow Attestation   I, Patricia Mcneal MD, was present with the medical/[employer] student who participated in the service and in the documentation of the note.  I have verified the history and personally performed the physical exam and medical decision making.  I agree with the assessment and plan of care as documented in the note.      Patricia Mcneal MD  PGY3     Answers submitted by the patient for this visit:  Patient Health Questionnaire (Submitted on 7/14/2025)  If you checked off any problems, how difficult have these problems made it for you to do your work, take care of things at home, or get along with other people?: Not difficult at all  PHQ9 TOTAL SCORE: 2

## 2025-07-14 ENCOUNTER — OFFICE VISIT (OUTPATIENT)
Dept: FAMILY MEDICINE | Facility: CLINIC | Age: 65
End: 2025-07-14
Payer: COMMERCIAL

## 2025-07-14 VITALS
DIASTOLIC BLOOD PRESSURE: 79 MMHG | RESPIRATION RATE: 20 BRPM | BODY MASS INDEX: 38.96 KG/M2 | SYSTOLIC BLOOD PRESSURE: 138 MMHG | HEART RATE: 60 BPM | TEMPERATURE: 97.6 F | WEIGHT: 228.2 LBS | HEIGHT: 64 IN | OXYGEN SATURATION: 95 %

## 2025-07-14 DIAGNOSIS — M10.9 GOUTY ARTHROPATHY: Primary | ICD-10-CM

## 2025-07-14 RX ORDER — FEBUXOSTAT 80 MG/1
80 TABLET, FILM COATED ORAL DAILY
Qty: 90 TABLET | Refills: 3 | Status: SHIPPED | OUTPATIENT
Start: 2025-07-14

## 2025-07-14 ASSESSMENT — PATIENT HEALTH QUESTIONNAIRE - PHQ9
SUM OF ALL RESPONSES TO PHQ QUESTIONS 1-9: 2
SUM OF ALL RESPONSES TO PHQ QUESTIONS 1-9: 2
10. IF YOU CHECKED OFF ANY PROBLEMS, HOW DIFFICULT HAVE THESE PROBLEMS MADE IT FOR YOU TO DO YOUR WORK, TAKE CARE OF THINGS AT HOME, OR GET ALONG WITH OTHER PEOPLE: NOT DIFFICULT AT ALL

## 2025-07-14 NOTE — PROGRESS NOTES
Preceptor Attestation:    I discussed the patient with the resident and evaluated the patient in person. I have verified the content of the note, which accurately reflects my assessment of the patient and the plan of care.   Supervising Physician:  Matt Crowley MD.

## 2025-08-13 DIAGNOSIS — E11.65 TYPE 2 DIABETES MELLITUS WITH HYPERGLYCEMIA, WITHOUT LONG-TERM CURRENT USE OF INSULIN (H): ICD-10-CM

## 2025-08-13 RX ORDER — METFORMIN HYDROCHLORIDE 500 MG/1
2000 TABLET, EXTENDED RELEASE ORAL DAILY
Qty: 360 TABLET | Refills: 1 | Status: SHIPPED | OUTPATIENT
Start: 2025-08-13